# Patient Record
Sex: MALE | Race: BLACK OR AFRICAN AMERICAN | NOT HISPANIC OR LATINO | Employment: UNEMPLOYED | URBAN - METROPOLITAN AREA
[De-identification: names, ages, dates, MRNs, and addresses within clinical notes are randomized per-mention and may not be internally consistent; named-entity substitution may affect disease eponyms.]

---

## 2017-06-22 ENCOUNTER — HOSPITAL ENCOUNTER (EMERGENCY)
Facility: HOSPITAL | Age: 62
Discharge: HOME | End: 2017-06-22
Attending: FAMILY MEDICINE
Payer: COMMERCIAL

## 2017-06-22 VITALS
HEART RATE: 100 BPM | DIASTOLIC BLOOD PRESSURE: 93 MMHG | WEIGHT: 210 LBS | OXYGEN SATURATION: 100 % | SYSTOLIC BLOOD PRESSURE: 143 MMHG | HEIGHT: 72 IN | RESPIRATION RATE: 20 BRPM | BODY MASS INDEX: 28.44 KG/M2 | TEMPERATURE: 98 F

## 2017-06-22 DIAGNOSIS — K04.7 TOOTH ABSCESS: Primary | ICD-10-CM

## 2017-06-22 DIAGNOSIS — R06.02 SOB (SHORTNESS OF BREATH): ICD-10-CM

## 2017-06-22 DIAGNOSIS — R05.9 COUGH: ICD-10-CM

## 2017-06-22 LAB
BACTERIA #/AREA URNS AUTO: NORMAL /HPF
BILIRUB UR QL STRIP: NEGATIVE
CLARITY UR REFRACT.AUTO: CLEAR
COLOR UR AUTO: ABNORMAL
GLUCOSE UR QL STRIP: NEGATIVE
HGB UR QL STRIP: ABNORMAL
HYALINE CASTS UR QL AUTO: 1 /LPF
KETONES UR QL STRIP: ABNORMAL
LEUKOCYTE ESTERASE UR QL STRIP: ABNORMAL
MICROSCOPIC COMMENT: NORMAL
NITRITE UR QL STRIP: NEGATIVE
PH UR STRIP: 5 [PH] (ref 5–8)
PROT UR QL STRIP: ABNORMAL
RBC #/AREA URNS AUTO: 4 /HPF (ref 0–4)
SP GR UR STRIP: 1.02 (ref 1–1.03)
URN SPEC COLLECT METH UR: ABNORMAL
UROBILINOGEN UR STRIP-ACNC: ABNORMAL EU/DL
WBC #/AREA URNS AUTO: 3 /HPF (ref 0–5)

## 2017-06-22 PROCEDURE — 25000003 PHARM REV CODE 250: Performed by: FAMILY MEDICINE

## 2017-06-22 PROCEDURE — 81000 URINALYSIS NONAUTO W/SCOPE: CPT

## 2017-06-22 PROCEDURE — 96365 THER/PROPH/DIAG IV INF INIT: CPT

## 2017-06-22 PROCEDURE — 99284 EMERGENCY DEPT VISIT MOD MDM: CPT | Mod: 25

## 2017-06-22 RX ORDER — CLINDAMYCIN PHOSPHATE 150 MG/ML
600 INJECTION, SOLUTION INTRAVENOUS
Status: DISCONTINUED | OUTPATIENT
Start: 2017-06-22 | End: 2017-06-22

## 2017-06-22 RX ORDER — HYDROCODONE BITARTRATE AND ACETAMINOPHEN 5; 325 MG/1; MG/1
1 TABLET ORAL
Status: COMPLETED | OUTPATIENT
Start: 2017-06-22 | End: 2017-06-22

## 2017-06-22 RX ORDER — CLINDAMYCIN HYDROCHLORIDE 300 MG/1
300 CAPSULE ORAL EVERY 6 HOURS
Qty: 40 CAPSULE | Refills: 0 | Status: SHIPPED | OUTPATIENT
Start: 2017-06-22 | End: 2017-08-08

## 2017-06-22 RX ORDER — HYDROCODONE BITARTRATE AND ACETAMINOPHEN 5; 325 MG/1; MG/1
1 TABLET ORAL EVERY 8 HOURS PRN
Qty: 12 TABLET | Refills: 0 | Status: SHIPPED | OUTPATIENT
Start: 2017-06-22 | End: 2017-08-08

## 2017-06-22 RX ORDER — CHLORHEXIDINE GLUCONATE ORAL RINSE 1.2 MG/ML
15 SOLUTION DENTAL 2 TIMES DAILY
Qty: 473 ML | Refills: 0 | Status: SHIPPED | OUTPATIENT
Start: 2017-06-22 | End: 2017-07-08

## 2017-06-22 RX ORDER — METRONIDAZOLE 500 MG/1
500 TABLET ORAL 3 TIMES DAILY
Qty: 21 TABLET | Refills: 0 | Status: SHIPPED | OUTPATIENT
Start: 2017-06-22 | End: 2017-06-29

## 2017-06-22 RX ORDER — CLINDAMYCIN PHOSPHATE 600 MG/50ML
600 INJECTION, SOLUTION INTRAVENOUS
Status: COMPLETED | OUTPATIENT
Start: 2017-06-22 | End: 2017-06-22

## 2017-06-22 RX ADMIN — HYDROCODONE BITARTRATE AND ACETAMINOPHEN 1 TABLET: 5; 325 TABLET ORAL at 12:06

## 2017-06-22 RX ADMIN — CLINDAMYCIN IN 5 PERCENT DEXTROSE 600 MG: 12 INJECTION, SOLUTION INTRAVENOUS at 01:06

## 2017-06-22 NOTE — ED NOTES
Attempted to butterfly pt with 21g needle, attempt was unsuccessful, Dr. Garcia and Juno/RN notified. Verbal order to hold off on blood work by Dr. Garcia.

## 2017-06-22 NOTE — ED NOTES
"Pt's spouse at nurse's stating "He can barely move without being short of breath." Pt refusing labs. D/C instructions given.   "

## 2017-06-22 NOTE — ED PROVIDER NOTES
Encounter Date: 6/22/2017       History     Chief Complaint   Patient presents with    Shortness of Breath     Pt reports SOB x 2 weeks. Pt also reports right jaw swelling and pain that started this am. Pt denies chest pain. Pt reports SOB worse with ambulation    Jaw Pain     Patient complains of right lower jaw pain since this morning and also swelling.  Patient says he had some pain a few days ago.  Patient denies any fever.  Patient also complains of exertional shortness of breath and last few days.  No chest pain.  Patient does not take any medications.  No cough, cold, wheezing.      The history is provided by the patient.     Review of patient's allergies indicates:  No Known Allergies  History reviewed. No pertinent past medical history.  History reviewed. No pertinent surgical history.  History reviewed. No pertinent family history.  Social History   Substance Use Topics    Smoking status: Current Some Day Smoker     Types: Cigarettes    Smokeless tobacco: Never Used    Alcohol use No     Review of Systems   Constitutional: Negative for activity change, appetite change, chills, diaphoresis and fever.   HENT: Positive for dental problem. Negative for congestion, rhinorrhea, sneezing and sore throat.    Eyes: Negative for pain, discharge, redness and itching.   Respiratory: Negative for cough, chest tightness, shortness of breath and wheezing.    Cardiovascular: Negative for chest pain and palpitations.   Gastrointestinal: Negative for abdominal distention, abdominal pain, diarrhea, nausea and vomiting.   Genitourinary: Negative for dysuria, flank pain and frequency.   Musculoskeletal: Negative for back pain and neck pain.   Skin: Negative for rash.   Neurological: Negative for tremors, weakness, light-headedness, numbness and headaches.   Psychiatric/Behavioral: Negative for decreased concentration and hallucinations. The patient is not nervous/anxious.    All other systems reviewed and are  negative.      Physical Exam     Initial Vitals [06/22/17 1050]   BP Pulse Resp Temp SpO2   (!) 136/93 108 (!) 24 98.5 °F (36.9 °C) (!) 94 %      MAP       107.33         Physical Exam    Nursing note and vitals reviewed.  Constitutional: He appears well-developed and well-nourished.   HENT:   Head: Normocephalic.       Nose: Nose normal.   Mouth/Throat: Oropharynx is clear and moist.   Patient has swelling and tenderness in the right lower jaw-    Eyes: EOM are normal. Pupils are equal, round, and reactive to light.   Neck: Normal range of motion. Neck supple.   Cardiovascular: Normal rate, regular rhythm, normal heart sounds and intact distal pulses. Exam reveals no gallop and no friction rub.    No murmur heard.  Pulmonary/Chest: Breath sounds normal. No respiratory distress. He has no wheezes. He has no rhonchi. He has no rales. He exhibits no tenderness.   Abdominal: Soft. Bowel sounds are normal.   Musculoskeletal: Normal range of motion.   Neurological: He is alert and oriented to person, place, and time. He has normal strength and normal reflexes. He displays normal reflexes. No cranial nerve deficit or sensory deficit.   Skin: Skin is warm. Capillary refill takes less than 2 seconds.         ED Course   Procedures  Labs Reviewed   CBC W/ AUTO DIFFERENTIAL   COMPREHENSIVE METABOLIC PANEL   URINALYSIS   TROPONIN I   B-TYPE NATRIURETIC PEPTIDE             Medical Decision Making:   ED Management:  The patient had a difficult time getting blood.  So his blood tests were incomplete.  Patient did not want to stay anymore.  I offered him to complete his blood tests for better results  And monitoring.  He declined and wanted to leave.  Patient is advised to complete blood work up with his primary care physician.  And verbalized understanding and he will get it done.                   ED Course     Clinical Impression:   The primary encounter diagnosis was Tooth abscess. Diagnoses of SOB (shortness of breath) and  Cough were also pertinent to this visit.    Disposition:   Disposition: Discharged  Condition: Srinath Garcia MD  06/29/17 0623

## 2017-06-22 NOTE — ED TRIAGE NOTES
Pt reports SOB x 2 weeks. Pt also reports right jaw swelling and pain that started this am. Pt denies chest pain. Pt reports SOB worse with ambulation

## 2017-07-06 ENCOUNTER — TELEPHONE (OUTPATIENT)
Dept: GASTROENTEROLOGY | Facility: CLINIC | Age: 62
End: 2017-07-06

## 2017-07-06 NOTE — TELEPHONE ENCOUNTER
Message was left on patient's voice mail to give the office a call in regards to scheduling a office visit prior to scheduling Colonoscopy. Labs was scan in under media.

## 2017-08-08 ENCOUNTER — OFFICE VISIT (OUTPATIENT)
Dept: GASTROENTEROLOGY | Facility: CLINIC | Age: 62
End: 2017-08-08
Payer: COMMERCIAL

## 2017-08-08 VITALS
BODY MASS INDEX: 28.15 KG/M2 | DIASTOLIC BLOOD PRESSURE: 79 MMHG | HEART RATE: 78 BPM | SYSTOLIC BLOOD PRESSURE: 145 MMHG | WEIGHT: 207.81 LBS | HEIGHT: 72 IN

## 2017-08-08 DIAGNOSIS — Z80.0 FAMILY HISTORY OF COLON CANCER: ICD-10-CM

## 2017-08-08 DIAGNOSIS — R10.13 DYSPEPSIA: Primary | ICD-10-CM

## 2017-08-08 PROCEDURE — 99243 OFF/OP CNSLTJ NEW/EST LOW 30: CPT | Mod: S$GLB,,, | Performed by: INTERNAL MEDICINE

## 2017-08-08 PROCEDURE — 99999 PR PBB SHADOW E&M-EST. PATIENT-LVL III: CPT | Mod: PBBFAC,,, | Performed by: INTERNAL MEDICINE

## 2017-08-08 NOTE — LETTER
August 8, 2017      Farhat Matson MD  [address]  [address]  [place] [address]           [facility]  [address], [address],  [place] [address]  Phone: [phone]  Fax: [phone]          Patient: Miky Esqueda   MR Number: [id number]   YOB: 1955   Date of Visit: 8/8/2017       Dear Dr. Farhat Matson:    Thank you for referring Miky Esqueda to me for evaluation. Attached you will find relevant portions of my assessment and plan of care.    If you have questions, please do not hesitate to call me. I look forward to following Miky Esqueda along with you.    Sincerely,    Orlando Lu Jr., MD    Enclosure  CC:  No Recipients    If you would like to receive this communication electronically, please contact [email or web address] or [phone] to request more information on [employer] Link access.    For providers and/or their staff who would like to refer a patient to [facility], please contact us through our one-stop-shop provider referral line, [facility], at [phone].    If you feel you have received this communication in error or would no longer like to receive these types of communications, please e-mail [email or web address]

## 2017-08-08 NOTE — PROGRESS NOTES
History and Physical          History    Patient presents for GI evaluation.  Has occasional dyspepsia, on no medications.  No persistent heartburn.  GI systems review unremarkable.  MCV is low.  No anemia.    Patient denies any abdominal pain, weight loss or blood in the stool.    There is a family history of colon cancer in his grandfather.  Nondrinker.  Tobacco occasional.  Past medical history otherwise unremarkable.    Systems review:   No weight changes or fatigue.  No sore throat or hoarseness.  No chest pain, palpitations.  No shortness of breath or wheezing.  No abnormal bleeding.  No frequency or difficulty urinating.  No neurologic symptoms  No anxiety or depression.         History reviewed. No pertinent past medical history.    History reviewed. No pertinent surgical history.    Family History   Problem Relation Age of Onset    Heart disease Father        Social History     Social History    Marital status:      Spouse name: N/A    Number of children: N/A    Years of education: N/A     Social History Main Topics    Smoking status: Former Smoker     Types: Cigarettes    Smokeless tobacco: Former User    Alcohol use No    Drug use: No    Sexual activity: Not Asked     Other Topics Concern    None     Social History Narrative    None       No current outpatient prescriptions on file.     No current facility-administered medications for this visit.        Review of patient's allergies indicates:  No Known Allergies    Objective:      Vitals:    08/08/17 1330   BP: (!) 145/79   Pulse: 78     Physical Exam   Constitutional: Patient is oriented to person, place, and time. Appears well-nourished.   HENT: sclera non icteric  Mouth/Throat: Oropharynx is clear and moist.   Eyes: Pupils are equal, round, and reactive to light.   Neck: Neck supple.   Cardiovascular: Normal heart sounds.   Pulmonary/Chest: Effort normal and breath sounds normal.   Abdominal: Soft. Exhibits no mass. There is no  tenderness. There is no guarding.    Neurological:Alert and oriented to person, place, and time.   Skin: Skin is warm. No rash noted.   Psychiatric: Has a normal mood and affect.     Assessment:  Dyspepsia  Family history of colon cancer    Plan:  Colonoscopy    CC Dr. Farhat Matson       I have reviewed the patient's medical history in detail and updated the computerized patient record

## 2017-08-30 ENCOUNTER — TELEPHONE (OUTPATIENT)
Dept: GASTROENTEROLOGY | Facility: CLINIC | Age: 62
End: 2017-08-30

## 2017-08-30 NOTE — TELEPHONE ENCOUNTER
----- Message from Joi Mercado sent at 8/29/2017  4:59 PM CDT -----  Contact: self, [phone]  Patient called in requesting to speak with you regarding call he received about colonoscopy prep.  Please advise.

## 2017-08-30 NOTE — TELEPHONE ENCOUNTER
Spoke with patient in regards to cancelling his Colonoscopy at [facility] on 9/5/17. Patient stated that we will call back at a later date to reschedule.

## 2020-05-25 ENCOUNTER — HOSPITAL ENCOUNTER (INPATIENT)
Facility: HOSPITAL | Age: 65
LOS: 1 days | Discharge: HOME | DRG: 176 | End: 2020-05-26
Attending: EMERGENCY MEDICINE | Admitting: SPECIALIST
Payer: COMMERCIAL

## 2020-05-25 DIAGNOSIS — I26.99 PE (PULMONARY THROMBOEMBOLISM): Primary | ICD-10-CM

## 2020-05-25 DIAGNOSIS — R06.02 SHORTNESS OF BREATH: ICD-10-CM

## 2020-05-25 DIAGNOSIS — R55 NEAR SYNCOPE: ICD-10-CM

## 2020-05-25 DIAGNOSIS — I26.99 BILATERAL PULMONARY EMBOLISM: ICD-10-CM

## 2020-05-25 DIAGNOSIS — R79.89 ELEVATED TROPONIN: ICD-10-CM

## 2020-05-25 DIAGNOSIS — R42 DIZZINESS: ICD-10-CM

## 2020-05-25 PROBLEM — D50.9 IRON DEFICIENCY ANEMIA: Status: ACTIVE | Noted: 2020-05-25

## 2020-05-25 PROBLEM — F17.200 TOBACCO DEPENDENCE: Status: ACTIVE | Noted: 2020-05-25

## 2020-05-25 PROBLEM — R06.09 DYSPNEA ON EXERTION: Status: ACTIVE | Noted: 2020-05-25

## 2020-05-25 LAB
ALBUMIN SERPL BCP-MCNC: 3.9 G/DL (ref 3.5–5.2)
ALP SERPL-CCNC: 88 U/L (ref 55–135)
ALT SERPL W/O P-5'-P-CCNC: 13 U/L (ref 10–44)
ANION GAP SERPL CALC-SCNC: 9 MMOL/L (ref 8–16)
AST SERPL-CCNC: 32 U/L (ref 10–40)
BASOPHILS # BLD AUTO: 0.03 K/UL (ref 0–0.2)
BASOPHILS NFR BLD: 0.4 % (ref 0–1.9)
BILIRUB SERPL-MCNC: 0.4 MG/DL (ref 0.1–1)
BNP SERPL-MCNC: 123 PG/ML (ref 0–99)
BUN SERPL-MCNC: 19 MG/DL (ref 8–23)
CALCIUM SERPL-MCNC: 9.1 MG/DL (ref 8.7–10.5)
CHLORIDE SERPL-SCNC: 108 MMOL/L (ref 95–110)
CK SERPL-CCNC: 275 U/L (ref 20–200)
CO2 SERPL-SCNC: 23 MMOL/L (ref 23–29)
CREAT SERPL-MCNC: 1.2 MG/DL (ref 0.5–1.4)
CRP SERPL-MCNC: 12.6 MG/L (ref 0–8.2)
D DIMER PPP IA.FEU-MCNC: 30.14 MG/L FEU
DIFFERENTIAL METHOD: ABNORMAL
EOSINOPHIL # BLD AUTO: 0.1 K/UL (ref 0–0.5)
EOSINOPHIL NFR BLD: 0.6 % (ref 0–8)
ERYTHROCYTE [DISTWIDTH] IN BLOOD BY AUTOMATED COUNT: 15.2 % (ref 11.5–14.5)
ERYTHROCYTE [SEDIMENTATION RATE] IN BLOOD BY WESTERGREN METHOD: 17 MM/HR (ref 0–10)
EST. GFR  (AFRICAN AMERICAN): >60 ML/MIN/1.73 M^2
EST. GFR  (NON AFRICAN AMERICAN): >60 ML/MIN/1.73 M^2
ESTIMATED AVG GLUCOSE: 120 MG/DL (ref 68–131)
FERRITIN SERPL-MCNC: 445 NG/ML (ref 20–300)
GLUCOSE SERPL-MCNC: 103 MG/DL (ref 70–110)
HBA1C MFR BLD HPLC: 5.8 % (ref 4–5.6)
HCT VFR BLD AUTO: 43.3 % (ref 40–54)
HGB BLD-MCNC: 13.3 G/DL (ref 14–18)
IMM GRANULOCYTES # BLD AUTO: 0.01 K/UL (ref 0–0.04)
IMM GRANULOCYTES NFR BLD AUTO: 0.1 % (ref 0–0.5)
INR PPP: 1 (ref 0.8–1.2)
LACTATE SERPL-SCNC: 1.2 MMOL/L (ref 0.5–2.2)
LDH SERPL L TO P-CCNC: 325 U/L (ref 110–260)
LYMPHOCYTES # BLD AUTO: 2.9 K/UL (ref 1–4.8)
LYMPHOCYTES NFR BLD: 36.3 % (ref 18–48)
MAGNESIUM SERPL-MCNC: 1.9 MG/DL (ref 1.6–2.6)
MCH RBC QN AUTO: 23.3 PG (ref 27–31)
MCHC RBC AUTO-ENTMCNC: 30.7 G/DL (ref 32–36)
MCV RBC AUTO: 76 FL (ref 82–98)
MONOCYTES # BLD AUTO: 0.9 K/UL (ref 0.3–1)
MONOCYTES NFR BLD: 11.3 % (ref 4–15)
NEUTROPHILS # BLD AUTO: 4.1 K/UL (ref 1.8–7.7)
NEUTROPHILS NFR BLD: 51.3 % (ref 38–73)
NRBC BLD-RTO: 0 /100 WBC
PHOSPHATE SERPL-MCNC: 3.1 MG/DL (ref 2.7–4.5)
PLATELET # BLD AUTO: 141 K/UL (ref 150–350)
PMV BLD AUTO: 11.1 FL (ref 9.2–12.9)
POTASSIUM SERPL-SCNC: 3.9 MMOL/L (ref 3.5–5.1)
PROCALCITONIN SERPL IA-MCNC: 0.04 NG/ML
PROT SERPL-MCNC: 8.2 G/DL (ref 6–8.4)
PROTHROMBIN TIME: 10.3 SEC (ref 9–12.5)
RBC # BLD AUTO: 5.72 M/UL (ref 4.6–6.2)
SARS-COV-2 RDRP RESP QL NAA+PROBE: NEGATIVE
SODIUM SERPL-SCNC: 140 MMOL/L (ref 136–145)
TROPONIN I SERPL DL<=0.01 NG/ML-MCNC: 3.46 NG/ML (ref 0–0.03)
TROPONIN I SERPL DL<=0.01 NG/ML-MCNC: 3.84 NG/ML (ref 0–0.03)
TSH SERPL DL<=0.005 MIU/L-ACNC: 2.56 UIU/ML (ref 0.4–4)
WBC # BLD AUTO: 7.99 K/UL (ref 3.9–12.7)

## 2020-05-25 PROCEDURE — 87040 BLOOD CULTURE FOR BACTERIA: CPT | Mod: 59

## 2020-05-25 PROCEDURE — 85379 FIBRIN DEGRADATION QUANT: CPT

## 2020-05-25 PROCEDURE — 63600175 PHARM REV CODE 636 W HCPCS: Performed by: EMERGENCY MEDICINE

## 2020-05-25 PROCEDURE — 83036 HEMOGLOBIN GLYCOSYLATED A1C: CPT

## 2020-05-25 PROCEDURE — 96372 THER/PROPH/DIAG INJ SC/IM: CPT | Mod: 59

## 2020-05-25 PROCEDURE — 83880 ASSAY OF NATRIURETIC PEPTIDE: CPT

## 2020-05-25 PROCEDURE — 85610 PROTHROMBIN TIME: CPT

## 2020-05-25 PROCEDURE — U0002 COVID-19 LAB TEST NON-CDC: HCPCS

## 2020-05-25 PROCEDURE — 84145 PROCALCITONIN (PCT): CPT

## 2020-05-25 PROCEDURE — 83615 LACTATE (LD) (LDH) ENZYME: CPT

## 2020-05-25 PROCEDURE — 25500020 PHARM REV CODE 255: Performed by: SPECIALIST

## 2020-05-25 PROCEDURE — 85652 RBC SED RATE AUTOMATED: CPT

## 2020-05-25 PROCEDURE — 93005 ELECTROCARDIOGRAM TRACING: CPT

## 2020-05-25 PROCEDURE — 84484 ASSAY OF TROPONIN QUANT: CPT

## 2020-05-25 PROCEDURE — 99285 EMERGENCY DEPT VISIT HI MDM: CPT | Mod: 25

## 2020-05-25 PROCEDURE — 82728 ASSAY OF FERRITIN: CPT

## 2020-05-25 PROCEDURE — 36000 PLACE NEEDLE IN VEIN: CPT

## 2020-05-25 PROCEDURE — 84484 ASSAY OF TROPONIN QUANT: CPT | Mod: 91

## 2020-05-25 PROCEDURE — 36415 COLL VENOUS BLD VENIPUNCTURE: CPT

## 2020-05-25 PROCEDURE — 80053 COMPREHEN METABOLIC PANEL: CPT

## 2020-05-25 PROCEDURE — 82550 ASSAY OF CK (CPK): CPT

## 2020-05-25 PROCEDURE — 84100 ASSAY OF PHOSPHORUS: CPT

## 2020-05-25 PROCEDURE — 83605 ASSAY OF LACTIC ACID: CPT

## 2020-05-25 PROCEDURE — 86140 C-REACTIVE PROTEIN: CPT

## 2020-05-25 PROCEDURE — 11000001 HC ACUTE MED/SURG PRIVATE ROOM

## 2020-05-25 PROCEDURE — 85025 COMPLETE CBC W/AUTO DIFF WBC: CPT

## 2020-05-25 PROCEDURE — 83735 ASSAY OF MAGNESIUM: CPT

## 2020-05-25 PROCEDURE — 87040 BLOOD CULTURE FOR BACTERIA: CPT

## 2020-05-25 PROCEDURE — 84443 ASSAY THYROID STIM HORMONE: CPT

## 2020-05-25 RX ORDER — DEXTROSE 50 % IN WATER (D50W) INTRAVENOUS SYRINGE
25
Status: DISCONTINUED | OUTPATIENT
Start: 2020-05-25 | End: 2020-05-26 | Stop reason: HOSPADM

## 2020-05-25 RX ORDER — ENOXAPARIN SODIUM 100 MG/ML
1 INJECTION SUBCUTANEOUS
Status: DISCONTINUED | OUTPATIENT
Start: 2020-05-26 | End: 2020-05-26 | Stop reason: HOSPADM

## 2020-05-25 RX ORDER — IBUPROFEN 200 MG
16 TABLET ORAL
Status: DISCONTINUED | OUTPATIENT
Start: 2020-05-25 | End: 2020-05-26 | Stop reason: HOSPADM

## 2020-05-25 RX ORDER — ACETAMINOPHEN 325 MG/1
650 TABLET ORAL EVERY 4 HOURS PRN
Status: DISCONTINUED | OUTPATIENT
Start: 2020-05-25 | End: 2020-05-26 | Stop reason: HOSPADM

## 2020-05-25 RX ORDER — FERROUS SULFATE 324(65)MG
325 TABLET, DELAYED RELEASE (ENTERIC COATED) ORAL DAILY
COMMUNITY
End: 2020-07-16

## 2020-05-25 RX ORDER — PANTOPRAZOLE SODIUM 40 MG/1
40 TABLET, DELAYED RELEASE ORAL DAILY
Status: DISCONTINUED | OUTPATIENT
Start: 2020-05-26 | End: 2020-05-26

## 2020-05-25 RX ORDER — GLUCAGON 1 MG
1 KIT INJECTION
Status: DISCONTINUED | OUTPATIENT
Start: 2020-05-25 | End: 2020-05-26 | Stop reason: HOSPADM

## 2020-05-25 RX ORDER — IBUPROFEN 200 MG
24 TABLET ORAL
Status: DISCONTINUED | OUTPATIENT
Start: 2020-05-25 | End: 2020-05-26 | Stop reason: HOSPADM

## 2020-05-25 RX ORDER — ENOXAPARIN SODIUM 100 MG/ML
1 INJECTION SUBCUTANEOUS
Status: COMPLETED | OUTPATIENT
Start: 2020-05-25 | End: 2020-05-25

## 2020-05-25 RX ORDER — ONDANSETRON 8 MG/1
8 TABLET, ORALLY DISINTEGRATING ORAL EVERY 6 HOURS PRN
Status: DISCONTINUED | OUTPATIENT
Start: 2020-05-25 | End: 2020-05-26 | Stop reason: HOSPADM

## 2020-05-25 RX ORDER — TALC
9 POWDER (GRAM) TOPICAL NIGHTLY PRN
Status: DISCONTINUED | OUTPATIENT
Start: 2020-05-25 | End: 2020-05-26 | Stop reason: HOSPADM

## 2020-05-25 RX ORDER — SODIUM CHLORIDE 0.9 % (FLUSH) 0.9 %
5 SYRINGE (ML) INJECTION
Status: DISCONTINUED | OUTPATIENT
Start: 2020-05-25 | End: 2020-05-26 | Stop reason: HOSPADM

## 2020-05-25 RX ORDER — AMOXICILLIN 250 MG
1 CAPSULE ORAL 2 TIMES DAILY PRN
Status: DISCONTINUED | OUTPATIENT
Start: 2020-05-25 | End: 2020-05-26 | Stop reason: HOSPADM

## 2020-05-25 RX ADMIN — IOHEXOL 100 ML: 350 INJECTION, SOLUTION INTRAVENOUS at 07:05

## 2020-05-25 RX ADMIN — ENOXAPARIN SODIUM 100 MG: 100 INJECTION SUBCUTANEOUS at 06:05

## 2020-05-25 NOTE — ED NOTES
Adult Physical Assessment  LOC: Miky Esqueda, 65 y.o. male verified via two identifiers.  The patient is awake, alert, oriented and speaking appropriately at this time.  APPEARANCE: Patient resting comfortably and appears to be in no acute distress at this time. Patient is clean and well groomed, patient's clothing is properly fastened.  SKIN:The skin is warm and dry, color consistent with ethnicity, patient has normal skin turgor and moist mucus membranes, skin intact, no breakdown or brusing noted.  MUSCULOSKELETAL: Patient moving all extremities well, no obvious swelling or deformities noted.  RESPIRATORY: Airway is open and patent, respirations are spontaneous, patient has a normal effort and rate, no accessory muscle use noted.  CARDIAC: Patient has a normal rate and rhythm, no periphreal edema noted in any extremity, capillary refill < 3 seconds in all extremities  ABDOMEN: Soft and non tender to palpation, no abdominal distention noted. Bowel sounds present in all four quadrants.  NEUROLOGIC: Eyes open spontaneously, behavior appropriate to situation, follows commands, facial expression symmetrical, bilateral hand grasp equal and even, purposeful motor response noted, normal sensation in all extremities when touched with a finger.

## 2020-05-25 NOTE — ED PROVIDER NOTES
Encounter Date: 5/25/2020    SCRIBE #1 NOTE: I, Leonora Yao, am scribing for, and in the presence of,  Dr. Mahajan. I have scribed the entire note.       History     Chief Complaint   Patient presents with    Dizziness     dizzy and sob with standing. onset yesterday after working on his car in the heat.      Miky Esqueda is a 65 y.o. male with a past medical history of anemia who presents to the ED due to dizziness with associated shortness of breath. Patient reports symptoms began mid-afternoon yesterday when he was changing the oil underneath his car. Upon getting up, he states he began to feel dizzy near syncopal and short of breath. Patient describes dizziness as a wooziness as opposed to a spinning sensation. Shortly after, he notes symptoms mildly improved, but today dizziness has persisted with changes in position from sitting to standing. When EMS arrived to patient's house, they report he was mildly tachypneic and hypoxic. He denies trauma prior to onset of symptoms. He also denies fever, chills, cough, congestion, nausea, vomiting, headache, or chest pain. The patient is not currently on any medications and has no history of any respiratory illness.     The history is provided by the patient.     Review of patient's allergies indicates:  No Known Allergies  No past medical history on file.  No past surgical history on file.  Family History   Problem Relation Age of Onset    Heart disease Father      Social History     Tobacco Use    Smoking status: Former Smoker     Types: Cigarettes    Smokeless tobacco: Former User   Substance Use Topics    Alcohol use: No    Drug use: No     Review of Systems   Constitutional: Negative for chills and fever.   HENT: Negative for congestion.    Eyes: Negative for pain.   Respiratory: Positive for shortness of breath.    Cardiovascular: Negative for chest pain.   Gastrointestinal: Negative for nausea and vomiting.   Genitourinary: Negative for dysuria and  hematuria.   Musculoskeletal: Negative for back pain.   Skin: Negative for color change.   Neurological: Positive for dizziness (Wooziness). Negative for syncope and numbness.   Psychiatric/Behavioral: Negative for confusion.   All other systems reviewed and are negative.      Physical Exam     Initial Vitals [05/25/20 1555]   BP Pulse Resp Temp SpO2   (!) 184/81 99 (!) 26 97.6 °F (36.4 °C) 95 %      MAP       --         Physical Exam    Nursing note and vitals reviewed.  Constitutional: He appears well-developed and well-nourished. He is not diaphoretic. No distress.   HENT:   Head: Normocephalic and atraumatic.   Partially dry mucous membranes   Eyes: Conjunctivae and EOM are normal. Pupils are equal, round, and reactive to light.   Neck: Normal range of motion. Neck supple.   Cardiovascular: Normal rate, regular rhythm, normal heart sounds and intact distal pulses. Exam reveals no gallop and no friction rub.    No murmur heard.  Pulmonary/Chest: Breath sounds normal. No respiratory distress.   Abdominal: Soft. Bowel sounds are normal. He exhibits no distension. There is no tenderness.   Musculoskeletal: Normal range of motion. He exhibits no edema or tenderness.   Neurological: He is alert and oriented to person, place, and time. He has normal strength.   Skin: Skin is warm and dry. Capillary refill takes less than 2 seconds. No erythema.   Psychiatric: He has a normal mood and affect. His behavior is normal. Thought content normal.         ED Course   Procedures  Labs Reviewed   CBC W/ AUTO DIFFERENTIAL - Abnormal; Notable for the following components:       Result Value    Hemoglobin 13.3 (*)     Mean Corpuscular Volume 76 (*)     Mean Corpuscular Hemoglobin 23.3 (*)     Mean Corpuscular Hemoglobin Conc 30.7 (*)     RDW 15.2 (*)     Platelets 141 (*)     All other components within normal limits   TROPONIN I - Abnormal; Notable for the following components:    Troponin I 3.841 (*)     All other components  within normal limits   B-TYPE NATRIURETIC PEPTIDE - Abnormal; Notable for the following components:     (*)     All other components within normal limits   D DIMER, QUANTITATIVE - Abnormal; Notable for the following components:    D-Dimer 30.14 (*)     All other components within normal limits   C-REACTIVE PROTEIN - Abnormal; Notable for the following components:    CRP 12.6 (*)     All other components within normal limits   COMPREHENSIVE METABOLIC PANEL   PROTIME-INR   SARS-COV-2 RNA AMPLIFICATION, QUAL   C-REACTIVE PROTEIN     EKG Readings: (Independently Interpreted)   Time of 16:29. Normal sinus rhythm. Inferior Q wave. Nonspecific ST and T wave abnormality. T wave flattening an minimal inversion appreciated in the high lateral leads is new from previous EKG taken in 2017, but T wave inversion in anterior leads is old.        Imaging Results          CT Head Without Contrast (Final result)  Result time 05/25/20 17:17:29    Final result by Suhas Barker MD (05/25/20 17:17:29)                 Impression:      No acute intracranial abnormalities identified.    Paranasal sinus disease as above.      Electronically signed by: Suhas Braker MD  Date:    05/25/2020  Time:    17:17             Narrative:    EXAMINATION:  CT HEAD WITHOUT CONTRAST    CLINICAL HISTORY:  Confusion/delirium, altered LOC, unexplained;Dizziness;Stroke;    TECHNIQUE:  Low dose axial images were obtained through the head.  Coronal and sagittal reformations were also performed. Contrast was not administered.    COMPARISON:  None.    FINDINGS:  No evidence of acute/recent major vascular distribution cerebral infarction, intraparenchymal hemorrhage, or intra-axial space occupying lesion. The ventricular system is normal in size and configuration with no evidence of hydrocephalus. No effacement of the skull-base cisterns. No abnormal extra-axial fluid collections or blood products.  Bilateral maxillary, ethmoid, sphenoid, and frontal  sinus disease is seen.  Partial fluid opacification is seen of the bilateral mastoid air cells.  The calvarium shows no significant abnormality.                               X-Ray Chest AP Portable (Final result)  Result time 05/25/20 17:10:39    Final result by Jf Rosenthal MD (05/25/20 17:10:39)                 Impression:      As above.      Electronically signed by: Jf Rosenthal  Date:    05/25/2020  Time:    17:10             Narrative:    EXAMINATION:  XR CHEST AP PORTABLE    CLINICAL HISTORY:  CHF;    TECHNIQUE:  Single frontal view of the chest was performed.    COMPARISON:  06/22/2017    FINDINGS:  Cardiomediastinal silhouette is stable.  Slight increased interstitial attenuation without confluent airspace opacity or lobar consolidation.  No pleural effusion or pneumothorax.  Possible right upper lobe 5 mm nodular density not definitely seen on prior.  May benefit from outpatient, nonemergent CT chest for further evaluation.    No acute osseous abnormality.                                 Medical Decision Making:   Initial Assessment:   Miky Esqueda is a 65 y.o. male with a past medical history of anemia who presents to the ED due to dizziness with associated shortness of breath.   Differential Diagnosis:   Differential Diagnosis includes, but is not limited to:  Clinical Tests:   Lab Tests: Ordered and Reviewed  Radiological Study: Ordered and Reviewed  Medical Tests: Ordered and Reviewed  ED Management:  6:40 PM  Patient non-toxic in appearance, no CP or visible respiratory difficulty. Discussed CT and lab findings with patient which exhibit concern for an underlying PE. COVID-19 test is negative at this juncture. No acute changes on EKG except for slight flattening of T waves in the high lateral leads in comparison to most recent one. SC Lovenox had been administered. Will obtain CTA of chest to further elucidate abnormal nodule on CXR and assess for possible PE. Patient remains  hemodynamically stable. [facility] who will [facility] patient for further management.   7:31 PM  Radiologist notified that extensive bilateral PE are appreciated on CTA. Lovenox has already been administered. Patient remains hemodynamically stable. Questionable consolidation in RUL. Will order IV Rocephin/Azithromycin. Patient denies allergy to PCN.                                  Clinical Impression:       ICD-10-CM ICD-9-CM   1. Dizziness R42 780.4   2. Shortness of breath R06.02 786.05   3. Near syncope R55 780.2   4. Elevated troponin R79.89 790.6   5. PE (pulmonary thromboembolism) I26.99 415.19                   I, Dr. Vinh Mahajan, personally performed the services described in this documentation. All medical record entries made by the scribe were at my direction and in my presence.  I have reviewed the chart and agree that the record reflects my personal performance and is accurate and complete               Vinh Mahajan MD  05/25/20 6000       Vinh Mahajan MD  05/25/20 1325

## 2020-05-26 VITALS
HEIGHT: 72 IN | SYSTOLIC BLOOD PRESSURE: 141 MMHG | WEIGHT: 208.13 LBS | BODY MASS INDEX: 28.19 KG/M2 | RESPIRATION RATE: 19 BRPM | HEART RATE: 84 BPM | DIASTOLIC BLOOD PRESSURE: 84 MMHG | OXYGEN SATURATION: 90 % | TEMPERATURE: 96 F

## 2020-05-26 PROBLEM — I82.4Y3 ACUTE DEEP VEIN THROMBOSIS (DVT) OF PROXIMAL VEIN OF BOTH LOWER EXTREMITIES: Status: ACTIVE | Noted: 2020-05-26

## 2020-05-26 LAB
ALBUMIN SERPL BCP-MCNC: 3.5 G/DL (ref 3.5–5.2)
ALP SERPL-CCNC: 72 U/L (ref 55–135)
ALT SERPL W/O P-5'-P-CCNC: 10 U/L (ref 10–44)
ANION GAP SERPL CALC-SCNC: 8 MMOL/L (ref 8–16)
AST SERPL-CCNC: 21 U/L (ref 10–40)
BASOPHILS # BLD AUTO: 0.03 K/UL (ref 0–0.2)
BASOPHILS NFR BLD: 0.3 % (ref 0–1.9)
BILIRUB SERPL-MCNC: 0.6 MG/DL (ref 0.1–1)
BUN SERPL-MCNC: 18 MG/DL (ref 8–23)
CALCIUM SERPL-MCNC: 8.9 MG/DL (ref 8.7–10.5)
CHLORIDE SERPL-SCNC: 108 MMOL/L (ref 95–110)
CO2 SERPL-SCNC: 23 MMOL/L (ref 23–29)
CREAT SERPL-MCNC: 1.2 MG/DL (ref 0.5–1.4)
DIFFERENTIAL METHOD: ABNORMAL
EOSINOPHIL # BLD AUTO: 0.1 K/UL (ref 0–0.5)
EOSINOPHIL NFR BLD: 0.6 % (ref 0–8)
ERYTHROCYTE [DISTWIDTH] IN BLOOD BY AUTOMATED COUNT: 15.1 % (ref 11.5–14.5)
EST. GFR  (AFRICAN AMERICAN): >60 ML/MIN/1.73 M^2
EST. GFR  (NON AFRICAN AMERICAN): >60 ML/MIN/1.73 M^2
GLUCOSE SERPL-MCNC: 102 MG/DL (ref 70–110)
HCT VFR BLD AUTO: 40.7 % (ref 40–54)
HGB BLD-MCNC: 12.6 G/DL (ref 14–18)
IMM GRANULOCYTES # BLD AUTO: 0.02 K/UL (ref 0–0.04)
IMM GRANULOCYTES NFR BLD AUTO: 0.2 % (ref 0–0.5)
LYMPHOCYTES # BLD AUTO: 2.7 K/UL (ref 1–4.8)
LYMPHOCYTES NFR BLD: 31.5 % (ref 18–48)
MAGNESIUM SERPL-MCNC: 2 MG/DL (ref 1.6–2.6)
MCH RBC QN AUTO: 23.2 PG (ref 27–31)
MCHC RBC AUTO-ENTMCNC: 31 G/DL (ref 32–36)
MCV RBC AUTO: 75 FL (ref 82–98)
MONOCYTES # BLD AUTO: 0.9 K/UL (ref 0.3–1)
MONOCYTES NFR BLD: 10.3 % (ref 4–15)
NEUTROPHILS # BLD AUTO: 4.9 K/UL (ref 1.8–7.7)
NEUTROPHILS NFR BLD: 57.1 % (ref 38–73)
NRBC BLD-RTO: 0 /100 WBC
PHOSPHATE SERPL-MCNC: 3.4 MG/DL (ref 2.7–4.5)
PLATELET # BLD AUTO: 125 K/UL (ref 150–350)
PMV BLD AUTO: 11 FL (ref 9.2–12.9)
POTASSIUM SERPL-SCNC: 4.4 MMOL/L (ref 3.5–5.1)
PROT SERPL-MCNC: 7.2 G/DL (ref 6–8.4)
RBC # BLD AUTO: 5.43 M/UL (ref 4.6–6.2)
SODIUM SERPL-SCNC: 139 MMOL/L (ref 136–145)
TROPONIN I SERPL DL<=0.01 NG/ML-MCNC: 2.61 NG/ML (ref 0–0.03)
WBC # BLD AUTO: 8.64 K/UL (ref 3.9–12.7)

## 2020-05-26 PROCEDURE — 84484 ASSAY OF TROPONIN QUANT: CPT

## 2020-05-26 PROCEDURE — 97165 OT EVAL LOW COMPLEX 30 MIN: CPT

## 2020-05-26 PROCEDURE — 93005 ELECTROCARDIOGRAM TRACING: CPT

## 2020-05-26 PROCEDURE — 83735 ASSAY OF MAGNESIUM: CPT

## 2020-05-26 PROCEDURE — 25000003 PHARM REV CODE 250: Performed by: STUDENT IN AN ORGANIZED HEALTH CARE EDUCATION/TRAINING PROGRAM

## 2020-05-26 PROCEDURE — 97116 GAIT TRAINING THERAPY: CPT

## 2020-05-26 PROCEDURE — 94761 N-INVAS EAR/PLS OXIMETRY MLT: CPT

## 2020-05-26 PROCEDURE — 84100 ASSAY OF PHOSPHORUS: CPT

## 2020-05-26 PROCEDURE — 63600175 PHARM REV CODE 636 W HCPCS: Performed by: STUDENT IN AN ORGANIZED HEALTH CARE EDUCATION/TRAINING PROGRAM

## 2020-05-26 PROCEDURE — 97161 PT EVAL LOW COMPLEX 20 MIN: CPT

## 2020-05-26 PROCEDURE — 80053 COMPREHEN METABOLIC PANEL: CPT

## 2020-05-26 PROCEDURE — 36415 COLL VENOUS BLD VENIPUNCTURE: CPT

## 2020-05-26 PROCEDURE — 85025 COMPLETE CBC W/AUTO DIFF WBC: CPT

## 2020-05-26 PROCEDURE — 96372 THER/PROPH/DIAG INJ SC/IM: CPT

## 2020-05-26 RX ADMIN — ENOXAPARIN SODIUM 100 MG: 100 INJECTION SUBCUTANEOUS at 05:05

## 2020-05-26 RX ADMIN — PANTOPRAZOLE SODIUM 40 MG: 40 TABLET, DELAYED RELEASE ORAL at 09:05

## 2020-05-26 NOTE — PLAN OF CARE
Pt has been approved for home oxygen.  Andrea delivered his tank to his bedside nurse.  His wife will transport him home.  No other needs identified.  Rounds completed on pt.  All questions addressed.  Bedside nurse to discuss d/c medications.  Discussed importance to attend all f/u appts and take medications as prescribed.  Verbalized understanding.    Follow up with Farhat Matson MD   The office will call you to set up hospital follow-up appointment.  [address]   [address]   [place] [address]   [phone]         05/26/20 1342   Final Note   Assessment Type Final Discharge Note   Anticipated Discharge Disposition Home   Hospital Follow Up  Appt(s) scheduled? Yes   Discharge plans and expectations educations in teach back method with documentation complete? Yes   Right Care Referral Info   Post Acute Recommendation No Care     Darron Morrell RN,   [phone]

## 2020-05-26 NOTE — PROGRESS NOTES
Progress Note    Admit Date: 5/25/2020   LOS: 1 day     SUBJECTIVE:       The patient was seen and examined this morning via video conferencing, to conserve PPE. Patient reports no acute issues overnight. Patient reports that pain is adequately controlled. Patient denies decreased urinary output.Patient denies fevers overnight.  Continues to require supplemental O2.     Denies f/c/n/v/d/c/cp/palpitations/freq/urgency/dysuria    Continuous Infusions:  Scheduled Meds:   enoxaparin  1 mg/kg Subcutaneous Q12H    pantoprazole  40 mg Oral Daily     PRN Meds:acetaminophen, dextrose 50 % in water (D50W), dextrose 50%, glucagon (human recombinant), glucose, glucose, melatonin, ondansetron, senna-docusate 8.6-50 mg, sodium chloride 0.9%    Review of patient's allergies indicates:  No Known Allergies    OBJECTIVE:     Vital Signs (Most Recent)  Temp: 97.7 °F (36.5 °C) (05/26/20 0756)  Pulse: 79 (05/26/20 0756)  Resp: 19 (05/26/20 0756)  BP: 128/81 (05/26/20 0756)  SpO2: (!) 93 % (05/26/20 0756)    Vital Signs Range (Last 24H):  Temp:  [96.5 °F (35.8 °C)-97.9 °F (36.6 °C)]   Pulse:  [75-99]   Resp:  [18-26]   BP: (127-184)/(78-92)   SpO2:  [92 %-96 %]     I & O (Last 24H):    Intake/Output Summary (Last 24 hours) at 5/26/2020 0956  Last data filed at 5/25/2020 2136  Gross per 24 hour   Intake --   Output 200 ml   Net -200 ml     Ventilator Data (Last 24H):          Physical Exam:    GEN - AAOx4, NAD  CHEST - no distress  EXTR - moving all extremities   NEURO - no asterixis or focal deficits   SKIN - no obvious skin breakdown or rash    Lines/Drains:       Peripheral IV - Single Lumen 05/25/20 1848 20 G Left Forearm (Active)   Site Assessment Clean;Dry;Intact;No redness;No swelling 5/25/2020 10:35 PM   Line Status Saline locked 5/25/2020 10:35 PM   Dressing Status Clean;Dry;Intact 5/25/2020 10:35 PM   Dressing Intervention First dressing 5/25/2020  6:54 PM   Reason Not Rotated Not due 5/25/2020  6:54 PM   Number of days: 0        Recent Labs     05/25/20  1634 05/25/20 2033 05/26/20  0517   WBC 7.99  --  8.64   HGB 13.3*  --  12.6*   HCT 43.3  --  40.7   *  --  125*     --  139   K 3.9  --  4.4     --  108   CO2 23  --  23   BUN 19  --  18   CREATININE 1.2  --  1.2   BILITOT 0.4  --  0.6   AST 32  --  21   ALT 13  --  10   ALKPHOS 88  --  72   CALCIUM 9.1  --  8.9   ALBUMIN 3.9  --  3.5   PROT 8.2  --  7.2   MG  --  1.9 2.0   PHOS  --  3.1 3.4         ASSESSMENT/PLAN:     Miky Esqueda is a 65 y.o. male with pmh  has a past medical history of Bilateral pulmonary embolism (5/25/2020) and Iron deficiency anemia.   who presented with Bilateral pulmonary embolism. The primary encounter diagnosis was Dizziness. Diagnoses of Shortness of breath, Near syncope, Elevated troponin, PE (pulmonary thromboembolism), and Bilateral pulmonary embolism were also pertinent to this visit.    Bilateral Pulmonary Embolisms:   - CTA w/ extensive bilateral pulmonary thromboemboli  - Therapeutic Lovenox initiated in ED (1mg/kg q12h), continue on admission  - BLE US - DVT of both extremities   - Echo results pending   - Continue supplemental O2 via NC  -Wean O2 as able, may need home O2 eval   -Will transition to Xarelto as out patient for 3 months duration   - Continuous pulse ox, telemetry  - Continue to monitor      Elevated Troponin:   - Likely 2/2 demand ischemia given bilateral PEs  - Troponin 3.8, EKG w/ nonspecific changes  -Troponin improved to 2.6, no EKG changes or chest pain  -Continue to monitor      COVID Rule-Out:   - Rapid COVID test negative in ED  - Additional labs ordered, results pending   - Continue COVID isolation and precautions   - Repeat COVID test ordered for 24 hours after initial test        Code: Full  Diet: Regular  PPx: PPI, SCDs, Lovenox 1mg/kg q12h, transition to Xarelto and discharge after home O2 eval and Oxygen is delivered          Shruthi Conner MD   [facility]   05/26/2020

## 2020-05-26 NOTE — NURSING
Admit completed per flowsheet. Patient's questions answered.  Pt instructed on VTE, diet, tests, fall precautions and medications. Understanding verbalized. Patient has no complaints at this time.

## 2020-05-26 NOTE — PT/OT/SLP EVAL
Physical Therapy Evaluation, Treatment, and Discharge Note    Patient Name:  Miky Esqueda   MRN:  [id number]    Recommendations:     Discharge Recommendations:  home   Discharge Equipment Recommendations: none   Barriers to discharge: None    Assessment:     Miky Esqueda is a 65 y.o. male admitted with a medical diagnosis of Bilateral pulmonary embolism. Pt ambulating independently without any safety concerns. Pt on 2 L/min supplemental O2 and SaO2 was 93-94% at rest and decreased to 87% while donning socks at EOB and was 89-90% immediately following ambulation. No further PT needs, d/c PT.    Recent Surgery: * No surgery found *      Plan:     During this hospitalization, patient does not require further acute PT services.  Please re-consult if situation changes.      Subjective     Chief Complaint: only c/o mild shortness of breath  Patient/Family Comments/goals: pt is pleasant and agreeable to participate in therapy  Pain/Comfort:  · Pain Rating 1: 0/10  · Pain Rating Post-Intervention 1: 0/10    Patients cultural, spiritual, Religion conflicts given the current situation: no    Living Environment:  Pt lives with his wife in a [facility] with no MAX and tub/shower combo.  Prior to admission, patients level of function was independent without AD for mobility and ADLs, drives, and works as a mechanical .  Equipment used at home: none.  DME owned (not currently used): none.  Upon discharge, patient will have assistance from his family.    Objective:     Communicated with nurse August prior to session.  Patient found supine with bed alarm, telemetry, oxygen upon PT entry to room.    General Precautions: Standard, fall   Orthopedic Precautions:N/A   Braces: N/A     Exams:  · Gross Motor Coordination:  WFL  · Postural Exam:  Patient presented with the following abnormalities:    · -       No postural abnormalities identified  · Sensation:    · -       Intact  · Skin Integrity/Edema:      · -       Skin  integrity: Visible skin intact  · RLE ROM: WFL  · RLE Strength: WFL  · LLE ROM: WFL  · LLE Strength: WFL    Functional Mobility:  · Bed Mobility:     · Scooting: modified independence  · Supine to Sit: modified independence  · Transfers:     · Sit to Stand:  independence with no AD  · Gait: ~150 ft with no AD independently - does not c/o shortness of breath. SaO2 was 89-90% during ambulation and upon seated rest break.    AM-PAC 6 CLICK MOBILITY  Total Score:24       Therapeutic Activities and Exercises:  Pt ambulated in room and presenting with no instability or safety concerns.  Pt on 2 L/min throughout session: SaO2 was 93-94% at rest, decreased as low as 87% when donning socks, recovered to 91% then was 89-90% during ambulation and post ambulation.  Left up in chair, educated to ambulate at least 2-3x/day with nursing staff.       Patient left up in chair with all lines intact, call button in reach, chair alarm on and nurse notified.    GOALS:   Multidisciplinary Problems     Physical Therapy Goals     Not on file          Multidisciplinary Problems (Resolved)        Problem: Physical Therapy Goal    Goal Priority Disciplines Outcome Goal Variances Interventions   Physical Therapy Goal   (Resolved)     PT, PT/OT Met                     History:     Past Medical History:   Diagnosis Date    Bilateral pulmonary embolism 5/25/2020    Iron deficiency anemia        History reviewed. No pertinent surgical history.    Time Tracking:     PT Received On: 05/26/20  PT Start Time: 1059     PT Stop Time: 1128  PT Total Time (min): 29 min with OT    Billable Minutes: Evaluation 15 and Gait Training 14      Karina Russ, PT  05/26/2020

## 2020-05-26 NOTE — PLAN OF CARE
"VN note: VN cued into patient's room to review discharge papers. VN educated patient on new medication and side effects. Medication list reviewed. Follow-up information given. Bedside Delivery completed. VN educated patient on pulmonary embolism signs and symptoms and when to seek medical attention. Patient verbalized understanding and all questions answered. Refer to clinical references for further education given. He will call when his wife arrives.    Patient's oxygen at bedside. Patient states he smokes "just a little." I informed patient cannot smoke with oxygen. Encouraged to quit.  "

## 2020-05-26 NOTE — PLAN OF CARE
Pt lives with his spouse in [place] and is independent at home.  He works in construction driving trucks.  His wife Priscilla will transport him home at time of discharge.  Pt encouraged to call with any questions or concerns.  Cm will continue to follow pt through transitions of care and assist with any discharge needs.     05/26/20 1337   Discharge Assessment   Assessment Type Discharge Planning Assessment   Confirmed/corrected address and phone number on facesheet? Yes   Assessment information obtained from? Patient   Communicated expected length of stay with patient/caregiver yes   Prior to hospitilization cognitive status: Alert/Oriented   Prior to hospitalization functional status: Independent   Current cognitive status: Alert/Oriented   Current Functional Status: Independent   Facility Arrived From: home   Lives With spouse   Able to Return to Prior Arrangements yes   Is patient able to care for self after discharge? Yes   Who are your caregiver(s) and their phone number(s)? Priscilla (pt's spouse)   Patient's perception of discharge disposition home or selfcare   Readmission Within the Last 30 Days no previous admission in last 30 days   Patient currently being followed by outpatient case management? No   Patient currently receives any other outside agency services? No   Equipment Currently Used at Home none   Do you have any problems affording any of your prescribed medications? No   Is the patient taking medications as prescribed? yes   Does the patient have transportation home? Yes   Transportation Anticipated family or friend will provide   Discharge Plan A Home   Discharge Plan B Home with family   DME Needed Upon Discharge  oxygen   Patient/Family in Agreement with Plan yes     Darron Morrell RN,   [phone]

## 2020-05-26 NOTE — PLAN OF CARE
VN note: VN cued into patient's room for introduction. VN informed patient that VN would be working closely along side bedside nurse, PCT, and the rest of the care team and making rounds throughout the shift. Updated on plan of care. Patient verbalized understanding. Allowed time for questions. VN will continue to be available to patient and intervene prn.

## 2020-05-26 NOTE — PLAN OF CARE
Problem: Physical Therapy Goal  Goal: Physical Therapy Goal  Outcome: Met     PT evaluation completed, note to follow. Pt ambulating independently without any safety concerns. Pt on 2 L/min supplemental O2 and SaO2 was 93-94% at rest and decreased to 87% while donning socks at EOB and was 89-90% immediately following ambulation. No further PT needs, d/c PT.

## 2020-05-26 NOTE — PLAN OF CARE
Patient aaox4, amb, contx2, vss, POC explained patient verbalize understanding, patient discharged, given o2 cylinder  to transport and education hand out,  will cont to monitor.

## 2020-05-26 NOTE — PLAN OF CARE
Problem: Occupational Therapy Goal  Goal: Occupational Therapy Goal  Outcome: MET        Pt performing at baseline for ADLs and functional mobility at this time. Pt with complaints of slight SOB With increased functional mobility in room; O2 dropping to 87% on 2L O2, however, recovering to 92% with seated rest break and PLB. Educated on PLB and energy conservation throughout session for home use. No further OT needs at this time.

## 2020-05-26 NOTE — DISCHARGE INSTRUCTIONS
Pulmonary Embolism, Discharge Instructions for (English) View Edit Remove   Oxygen, Using Safely (English) View Edit Remove   Rivaroxaban oral tablets (English) View Edit Remove   Diet, Discharge Instructions for Eating a Low-Salt (English) View Edit Remove

## 2020-05-26 NOTE — PT/OT/SLP EVAL
Occupational Therapy   Evaluation/Dc Summary     Name: Miky Esqueda  MRN: [id number]  Admitting Diagnosis:  Bilateral pulmonary embolism      Recommendations:     Discharge Recommendations: home  Discharge Equipment Recommendations:  none  Barriers to discharge:  None    Assessment:     Miky Esqueda is a 65 y.o. male with a medical diagnosis of Bilateral pulmonary embolism.  He presents SOB With increased axs. Performance deficits affecting function: impaired cardiopulmonary response to activity, impaired endurance.      Pt performing at baseline for ADLs and functional mobility at this time. Pt with complaints of slight SOB With increased functional mobility in room; O2 dropping to 87% on 2L O2, however, recovering to 92% with seated rest break and PLB. Educated on PLB and energy conservation throughout session for home use. No further OT needs at this time.     Rehab Prognosis: Good; patient would benefit from acute skilled OT services to address these deficits and reach maximum level of function.       Plan:     Patient to be seen (d/c OT ) to address the above listed problems via    · Plan of Care Expires: 05/26/20  · Plan of Care Reviewed with: patient    Subjective     Chief Complaint: SOB with increased axs  Patient/Family Comments/goals: decrease SOB     Occupational Profile:  Living Environment: spouse, SSH, no steps to enter, t/s combo  Previous level of function: independent; works; drives   Equipment Used at Home:  none  Assistance upon Discharge: from spouse     Pain/Comfort:  · Pain Rating 1: 0/10    Patients cultural, spiritual, Pentecostalism conflicts given the current situation:      Objective:     Communicated with: michelle prior to session.   General Precautions: Standard, fall   Orthopedic Precautions:N/A   Braces: N/A     Occupational Performance:    Bed Mobility:    · Patient completed Scooting/Bridging with independence  · Patient completed Supine to Sit with independence    Functional  Mobility/Transfers:  · Patient completed Sit <> Stand Transfer with independence  with  no assistive device   · Functional Mobility: independent without AD     Activities of Daily Living:  · Lower Body Dressing: independence /bringing BLEs to bed     Cognitive/Visual Perceptual:  WFL     Physical Exam:  Balance:    -       WFL   Postural examination/scapula alignment:    -       No postural abnormalities identified  Skin integrity: Visible skin intact  Dominant hand:    -       right  Upper Extremity Range of Motion:   BUE ROM WFL   Upper Extremity Strength:  BUE WFL    Strength:  Good     AMPAC 6 Click ADL:  AMPAC Total Score: 24    Treatment & Education:  Pt educated on PLB  Educated on impt of OOB axs and functional mobility throughout the day/sitting in b/s chair   Educated on adaptive LBD   Education:    Patient left up in chair with all lines intact, call button in reach, chair alarm on and nsg notified    GOALS:   Multidisciplinary Problems     Occupational Therapy Goals        Problem: Occupational Therapy Goal    Goal Priority Disciplines Outcome Interventions   Occupational Therapy Goal     OT, PT/OT Ongoing, Progressing                    History:     Past Medical History:   Diagnosis Date    Bilateral pulmonary embolism 5/25/2020    Iron deficiency anemia        History reviewed. No pertinent surgical history.    Time Tracking:     OT Date of Treatment: 05/26/20  OT Start Time: 1059  OT Stop Time: 1128  OT Total Time (min): 29 min    Billable Minutes:Evaluation 10 co treatment with PT     Sasha Moyer OT  5/26/2020

## 2020-05-26 NOTE — H&P
[facility]  History & Physical    Name: Miky Esqueda  : 1955  MRN: [id number]    SUBJECTIVE:     History of Present Illness:  66 yo M with a PMHx including iron deficiency anemia and tobacco use presents to [facility] for evaluation of acute onset SOB, diaphoresis, and lightheadedness x1 day. Pt reports sudden onset of symptoms yesterday afternoon, occurring while he was changing the oil in his car. Pt noted some improvement with rest, but symptoms continued overnight and into this morning/afternoon. Associated symptoms include headache yesterday evening (resolved with Tylenol x1). Pt's wife contacted PCP, Dr. Matson, who recommended ED evaluation. Additionally, pt's wife reports that pt is still smoking and that pt mentioned pulling a muscle in his right groin last week. 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 Denies fever, chills, chest pain, cough, sore throat, abdominal pain, N/V, diarrhea, constipation, urinary symptoms. No known sick contacts. Pt does not take any daily medications.     In the ED, pt was afebrile w/ HR 99, RR 26, /81, and SpO2 95% on 3L NC. Initial labs included D-Dimer 30.14, CRP 12.6, , troponin 3.8. Rapid COVID negative. Of note, Hgb 13.3 w/ MCV 76, consistent with PMHx of SUBHASH. CT head w/o contrast showed no acute intracranial abnormalities. CTA showed extensive bilateral pulmonary thromboemboli with no evidence of central saddle embolus, moderate pulmonary emphysema, and a small focal area of consolidation within the medial aspect of RUL (possibly reflecting focal pneumonia or potential small area of infarction). Therapeutic Lovenox was initiated in ED. Family Medicine consulted for admission.     Review of patient's allergies indicates:  No Known Allergies    Past Medical History:   Diagnosis Date    Iron deficiency anemia       History reviewed. No pertinent surgical history.     Family History   Problem Relation  Age of Onset    Heart disease Father      Social History     Tobacco Use    Smoking status: Former Smoker     Types: Cigarettes    Smokeless tobacco: Former User   Substance Use Topics    Alcohol use: No    Drug use: No      Review of Systems   Constitutional: Positive for diaphoresis. Negative for chills, fever, malaise/fatigue and weight loss.   HENT: Negative for congestion, sinus pain and sore throat.    Eyes: Negative for blurred vision and double vision.   Respiratory: Positive for shortness of breath. Negative for cough, hemoptysis, sputum production and wheezing.    Cardiovascular: Negative for chest pain, palpitations, orthopnea, claudication, leg swelling and PND.   Gastrointestinal: Negative for abdominal pain, blood in stool, constipation, diarrhea, heartburn, melena, nausea and vomiting.   Genitourinary: Negative for dysuria, flank pain, frequency, hematuria and urgency.   Musculoskeletal: Negative for back pain, falls, joint pain, myalgias and neck pain.   Skin: Negative.    Neurological: Positive for dizziness (lightheaded) and headaches (mild, resolved with Tylenol x1). Negative for tingling, tremors, sensory change, speech change, focal weakness, seizures and loss of consciousness.   Endo/Heme/Allergies: Negative.    Psychiatric/Behavioral: Negative.      OBJECTIVE:     Vital Signs (Most Recent)  Temp: 97.6 °F (36.4 °C) (05/25/20 1555)  Pulse: 85 (05/25/20 1832)  Resp: 18 (05/25/20 1832)  BP: (!) 148/90 (05/25/20 1832)  SpO2: (!) 93 % (05/25/20 1832)     Physical Exam   Constitutional: He is oriented to person, place, and time and well-developed, well-nourished, and in no distress. No distress.   HENT:   Head: Normocephalic and atraumatic.   Nose: Nose normal.   Mouth/Throat: Oropharynx is clear and moist. No oropharyngeal exudate.   Eyes: Pupils are equal, round, and reactive to light. Conjunctivae and EOM are normal. Right eye exhibits no discharge. Left eye exhibits no discharge. No scleral  icterus.   Neck: Normal range of motion. Neck supple. No JVD present.   Cardiovascular: Normal rate, regular rhythm, normal heart sounds and intact distal pulses.   Pulmonary/Chest: Effort normal and breath sounds normal. No respiratory distress.   Comfortable on 3L NC   Abdominal: Soft. Bowel sounds are normal. He exhibits no distension. There is no tenderness.   Musculoskeletal: Normal range of motion. He exhibits no edema, tenderness or deformity.   Lymphadenopathy:     He has no cervical adenopathy.   Neurological: He is alert and oriented to person, place, and time. GCS score is 15.   Skin: Skin is warm and dry. No rash noted. He is not diaphoretic. No erythema. No pallor.   Psychiatric: Mood, memory, affect and judgment normal.     Laboratory  Recent Labs   Lab 05/25/20  1634   WBC 7.99   RBC 5.72   HGB 13.3*   HCT 43.3   *   MCV 76*   MCH 23.3*   MCHC 30.7*     Recent Labs   Lab 05/25/20  1634      K 3.9   CO2 23      BUN 19   CREATININE 1.2     Recent Labs   Lab 05/25/20  1634   CALCIUM 9.1     Recent Labs   Lab 05/25/20  1634   PROT 8.2   ALBUMIN 3.9   BILITOT 0.4   AST 32   ALKPHOS 88   ALT 13     Recent Labs   Lab 05/25/20  1634   INR 1.0     Recent Labs   Lab 05/25/20  1634   TROPONINI 3.841*     Recent Labs   Lab 05/25/20  1634   *     Diagnostic Results  Imaging Results          CTA Chest Non-Coronary (PE Study) (Final result)  Result time 05/25/20 19:22:30    Final result by Suhas Barker MD (05/25/20 19:22:30)                 Impression:      1. Extensive bilateral pulmonary thromboemboli, as above.  No evidence of central saddle embolus.  2. Moderate pulmonary emphysema.  3. Small focal area of consolidation within the medial aspect of the right upper lobe.  This could reflect focal pneumonia or potential small area of infarction, given aforementioned findings.    COMMUNICATION  This critical result was discovered/received on 5/25/2020 at 19:16.    The critical  information above was relayed directly by Suhas Barker MD by telephone to Dr. Vinh Mahajan on 5/25/2020 at 19:21.      Electronically signed by: Suhas Barker MD  Date:    05/25/2020  Time:    19:22             Narrative:    EXAMINATION:  CTA CHEST NON CORONARY    CLINICAL HISTORY:  Chest pain, acute, PE suspected, high pretest prob;    TECHNIQUE:  Low dose axial images, sagittal and coronal reformations were obtained from the thoracic inlet to the lung bases following the IV administration of 100 mL of Omnipaque 350.  Contrast timing was optimized to evaluate the pulmonary arteries.  MIP images were performed.    COMPARISON:  None    FINDINGS:  Structures at the base of the neck are unremarkable.  Aorta is non-aneurysmal.  The heart is normal in size without pericardial effusion.  Bilateral intraluminal filling defects are seen consistent with pulmonary thromboemboli within bilateral upper and lower lobar and segmental branches.  Right-sided clot extends into the right main pulmonary artery.  No evidence of central saddle embolus.  There is no evidence of mediastinal, axillary, or hilar lymph node enlargement.  The esophagus is unremarkable along its course.    The trachea and bronchi are patent.  The lungs are symmetrically expanded.  Moderate centrilobular pulmonary emphysematous changes are seen within the upper lobes.  There is mild bronchiectasis seen within the bilateral lower lobes.  Small focal area of consolidation seen within the medial aspect of the right upper lobe.  No pleural effusion.    The visualized abdominal structures are unremarkable.  Osseous structures demonstrate degenerative change.  Extrathoracic soft tissues are unremarkable.                               CT Head Without Contrast (Final result)  Result time 05/25/20 17:17:29    Final result by Suhas Barker MD (05/25/20 17:17:29)                 Impression:      No acute intracranial abnormalities identified.    Paranasal sinus  disease as above.      Electronically signed by: Suhas Barker MD  Date:    05/25/2020  Time:    17:17             Narrative:    EXAMINATION:  CT HEAD WITHOUT CONTRAST    CLINICAL HISTORY:  Confusion/delirium, altered LOC, unexplained;Dizziness;Stroke;    TECHNIQUE:  Low dose axial images were obtained through the head.  Coronal and sagittal reformations were also performed. Contrast was not administered.    COMPARISON:  None.    FINDINGS:  No evidence of acute/recent major vascular distribution cerebral infarction, intraparenchymal hemorrhage, or intra-axial space occupying lesion. The ventricular system is normal in size and configuration with no evidence of hydrocephalus. No effacement of the skull-base cisterns. No abnormal extra-axial fluid collections or blood products.  Bilateral maxillary, ethmoid, sphenoid, and frontal sinus disease is seen.  Partial fluid opacification is seen of the bilateral mastoid air cells.  The calvarium shows no significant abnormality.                               X-Ray Chest AP Portable (Final result)  Result time 05/25/20 17:10:39    Final result by Jf Rosenthal MD (05/25/20 17:10:39)                 Impression:      As above.      Electronically signed by: Jf Rosenthal  Date:    05/25/2020  Time:    17:10             Narrative:    EXAMINATION:  XR CHEST AP PORTABLE    CLINICAL HISTORY:  CHF;    TECHNIQUE:  Single frontal view of the chest was performed.    COMPARISON:  06/22/2017    FINDINGS:  Cardiomediastinal silhouette is stable.  Slight increased interstitial attenuation without confluent airspace opacity or lobar consolidation.  No pleural effusion or pneumothorax.  Possible right upper lobe 5 mm nodular density not definitely seen on prior.  May benefit from outpatient, nonemergent CT chest for further evaluation.    No acute osseous abnormality.                                ASSESSMENT/PLAN:     64 yo M with a PMHx including iron deficiency anemia and  tobacco use presents to [facility] for evaluation of acute onset SOB, diaphoresis, and lightheadedness x1 day.     Bilateral Pulmonary Embolisms:   - CTA w/ extensive bilateral pulmonary thromboemboli  - Therapeutic Lovenox initiated in ED (1mg/kg q12h), continue on admission  - Echo and BLE US ordered, results pending   - Continue supplemental O2 via NC  - Continuous pulse ox, telemetry  - Continue to monitor     Elevated Troponin:   - Likely 2/2 demand ischemia given bilateral PEs  - Troponin 3.8, EKG w/ nonspecific changes  - Trend troponin and EKG q6h x2    COVID Rule-Out:   - Rapid COVID test negative in ED  - Additional labs ordered, results pending   - Continue COVID isolation and precautions   - Repeat COVID test ordered for 24 hours after initial test      Code: Full  Diet: Regular  PPx: PPI, SCDs, Lovenox 1mg/kg q12h    Case discussed with staff physicians, Dr. Matson (pt's PCP) and Dr. Magallon.       Cindy Bartholomew MD  [facility] PGY-1  5/25/2020

## 2020-05-26 NOTE — NURSING
Home Oxygen Evaluation    Date Performed: 2020    1) Patient's Home O2 Sat on room air, while at rest: 90%        If O2 sats on room air at rest are 88% or below, patient qualifies. No additional testing needed. Document N/A in steps 2 and 3. If 89% or above, complete steps 2.      2) Patient's O2 Sat on room air while exercisin%        If O2 sats on room air while exercising remain 89% or above patient does not qualify, no further testing needed Document N/A in step 3. If O2 sats on room air while exercising are 88% or below, continue to step 3.      3) Patient's O2 Sat while exercising on O2: 90% at 2 LPM         (Must show improvement from #2 for patients to qualify)    If O2 sats improve on oxygen, patient qualifies for portable oxygen. If not, the patient does not qualify.

## 2020-05-26 NOTE — PLAN OF CARE
Plan of care reviewed with patient and daughter, understanding verbalized.  Pt remains SR on tele.  No complaints overnight. Bed alarm on, call light in reach, fall precautions in place.

## 2020-05-26 NOTE — PLAN OF CARE
Discharge instructions given to the patient.  Reviewed post cath  Monitoring and care, return visits and medications. A copy of the discharge paperwork had been given to the patient. The patient had no questions.

## 2020-05-27 NOTE — DISCHARGE SUMMARY
.fp [facility]  Discharge Summary      Admit Date: 5/25/2020    Discharge Date and Time: 5/26/2020  3:16 PM    Attending Physician: Guillermina Loredo MD      Primary Care Physician: Farhat Matson MD     Reason for Admission: Shortness of breath       Hospital Course (synopsis of major diagnoses, care, treatment, and services provided during the course of the hospital stay):     64 yo M with a PMHx including iron deficiency anemia and tobacco use presents to [facility]- for evaluation of acute onset SOB, diaphoresis, and lightheadedness x1 day. Pt reports sudden onset of symptoms yesterday afternoon, occurring while he was changing the oil in his car. Pt noted some improvement with rest, but symptoms continued overnight and into this morning/afternoon. Associated symptoms include headache yesterday evening (resolved with Tylenol x1). Pt's wife contacted PCP, Dr. Matson, who recommended ED evaluation. Additionally, pt's wife reports that pt is still smoking and that pt mentioned pulling a muscle in his right groin last week. 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 Denies fever, chills, chest pain, cough, sore throat, abdominal pain, N/V, diarrhea, constipation, urinary symptoms. No known sick contacts. Pt does not take any daily medications.      In the ED, pt was afebrile w/ HR 99, RR 26, /81, and SpO2 95% on 3L NC. Initial labs included D-Dimer 30.14, CRP 12.6, , troponin 3.8. Rapid COVID negative. Of note, Hgb 13.3 w/ MCV 76, consistent with PMHx of SUBHASH. CT head w/o contrast showed no acute intracranial abnormalities. CTA showed extensive bilateral pulmonary thromboemboli with no evidence of central saddle embolus, moderate pulmonary emphysema, and a small focal area of consolidation within the medial aspect of RUL (possibly reflecting focal pneumonia or potential small area of infarction). Therapeutic Lovenox was initiated in ED. Family  Medicine consulted for admission. Patient was admitted for further workup of DVT and to initiate treatment. He qualified for home O2 and was delivered to patient at bedside. Repeat COVID test negative and patient will be discharge with 3 months of Xarelto and close PCP follow up. Patient is medically stable for discharge home with close follow up.       Consults: none    Significant Diagnostic Studies:   Recent Labs     05/25/20 1634 05/26/20  0517   WBC 7.99 8.64   HGB 13.3* 12.6*   HCT 43.3 40.7   * 125*   MCV 76* 75*   RDW 15.2* 15.1*       Recent Labs     05/25/20  1634 05/26/20  0517    139   K 3.9 4.4    108   CO2 23 23    102   BUN 19 18   CREATININE 1.2 1.2   CALCIUM 9.1 8.9   PROT 8.2 7.2   ALBUMIN 3.9 3.5   BILITOT 0.4 0.6   ALKPHOS 88 72   AST 32 21   ALT 13 10   ANIONGAP 9 8   ESTGFRAFRICA >60 >60   EGFRNONAA >60 >60       Recent Labs     05/25/20 2033 05/26/20  0517   MG 1.9 2.0   PHOS 3.1 3.4       Coags  Lab Results   Component Value Date    INR 1.0 05/25/2020     Recent Labs   Lab 05/25/20  1634   INR 1.0       A1c:   Lab Results   Component Value Date    HGBA1C 5.8 (H) 05/25/2020   , Last Gluc: No results for input(s): POCTGLUCOSE in the last 168 hours.    TSH:   Lab Results   Component Value Date    TSH 2.565 05/25/2020         Cardiac Enzymes  Recent Labs     05/25/20 1634 05/25/20 2033 05/25/20  2304 05/26/20  0517   TROPONINI 3.841*  --  3.461* 2.606*   CPK  --  275*  --   --        Imaging   Imaging Results           US Lower Extremity Veins Bilateral (Final result)  Result time 05/25/20 22:18:54    Final result by Suhas Barker MD (05/25/20 22:18:54)                 Impression:      Bilateral lower extremity deep venous thrombosis, as above.    This report was flagged in Epic as abnormal.      Electronically signed by: Suhas Barker MD  Date:    05/25/2020  Time:    22:18             Narrative:    EXAMINATION:  US LOWER EXTREMITY VEINS BILATERAL    CLINICAL  HISTORY:  Bilateral pulmonary embolism;    TECHNIQUE:  Duplex and color flow Doppler evaluation of the bilateral lower extremity veins was performed.    COMPARISON:  None    FINDINGS:  Right lower extremity:    Nonocclusive thrombus is seen within the right femoral and popliteal veins.  No evidence of clot involving the right common femoral, greater saphenous, posterior tibial, anterior tibial, and peroneal veins.    Left lower extremity:    Nonocclusive thrombus is seen within the left popliteal vein.  No evidence of clot involving the left common femoral, femoral, greater saphenous, posterior tibial, anterior tibial, and peroneal veins.                               CTA Chest Non-Coronary (PE Study) (Final result)  Result time 05/25/20 19:22:30    Final result by Suhas Barker MD (05/25/20 19:22:30)                 Impression:      1. Extensive bilateral pulmonary thromboemboli, as above.  No evidence of central saddle embolus.  2. Moderate pulmonary emphysema.  3. Small focal area of consolidation within the medial aspect of the right upper lobe.  This could reflect focal pneumonia or potential small area of infarction, given aforementioned findings.    COMMUNICATION  This critical result was discovered/received on 5/25/2020 at 19:16.    The critical information above was relayed directly by Suhas Barker MD by telephone to Dr. Vinh Mahajan on 5/25/2020 at 19:21.      Electronically signed by: Suhas Barker MD  Date:    05/25/2020  Time:    19:22             Narrative:    EXAMINATION:  CTA CHEST NON CORONARY    CLINICAL HISTORY:  Chest pain, acute, PE suspected, high pretest prob;    TECHNIQUE:  Low dose axial images, sagittal and coronal reformations were obtained from the thoracic inlet to the lung bases following the IV administration of 100 mL of Omnipaque 350.  Contrast timing was optimized to evaluate the pulmonary arteries.  MIP images were performed.    COMPARISON:  None    FINDINGS:  Structures  at the base of the neck are unremarkable.  Aorta is non-aneurysmal.  The heart is normal in size without pericardial effusion.  Bilateral intraluminal filling defects are seen consistent with pulmonary thromboemboli within bilateral upper and lower lobar and segmental branches.  Right-sided clot extends into the right main pulmonary artery.  No evidence of central saddle embolus.  There is no evidence of mediastinal, axillary, or hilar lymph node enlargement.  The esophagus is unremarkable along its course.    The trachea and bronchi are patent.  The lungs are symmetrically expanded.  Moderate centrilobular pulmonary emphysematous changes are seen within the upper lobes.  There is mild bronchiectasis seen within the bilateral lower lobes.  Small focal area of consolidation seen within the medial aspect of the right upper lobe.  No pleural effusion.    The visualized abdominal structures are unremarkable.  Osseous structures demonstrate degenerative change.  Extrathoracic soft tissues are unremarkable.                               CT Head Without Contrast (Final result)  Result time 05/25/20 17:17:29    Final result by Suhas Barker MD (05/25/20 17:17:29)                 Impression:      No acute intracranial abnormalities identified.    Paranasal sinus disease as above.      Electronically signed by: Shuas Barker MD  Date:    05/25/2020  Time:    17:17             Narrative:    EXAMINATION:  CT HEAD WITHOUT CONTRAST    CLINICAL HISTORY:  Confusion/delirium, altered LOC, unexplained;Dizziness;Stroke;    TECHNIQUE:  Low dose axial images were obtained through the head.  Coronal and sagittal reformations were also performed. Contrast was not administered.    COMPARISON:  None.    FINDINGS:  No evidence of acute/recent major vascular distribution cerebral infarction, intraparenchymal hemorrhage, or intra-axial space occupying lesion. The ventricular system is normal in size and configuration with no evidence of  hydrocephalus. No effacement of the skull-base cisterns. No abnormal extra-axial fluid collections or blood products.  Bilateral maxillary, ethmoid, sphenoid, and frontal sinus disease is seen.  Partial fluid opacification is seen of the bilateral mastoid air cells.  The calvarium shows no significant abnormality.                               X-Ray Chest AP Portable (Final result)  Result time 05/25/20 17:10:39    Final result by Jf Rosenthal MD (05/25/20 17:10:39)                 Impression:      As above.      Electronically signed by: Jf Rosenthal  Date:    05/25/2020  Time:    17:10             Narrative:    EXAMINATION:  XR CHEST AP PORTABLE    CLINICAL HISTORY:  CHF;    TECHNIQUE:  Single frontal view of the chest was performed.    COMPARISON:  06/22/2017    FINDINGS:  Cardiomediastinal silhouette is stable.  Slight increased interstitial attenuation without confluent airspace opacity or lobar consolidation.  No pleural effusion or pneumothorax.  Possible right upper lobe 5 mm nodular density not definitely seen on prior.  May benefit from outpatient, nonemergent CT chest for further evaluation.    No acute osseous abnormality.                                  Final Diagnoses:    Principal Problem: Bilateral pulmonary embolism   Secondary Diagnoses:   Active Hospital Problems    Diagnosis  POA    *Bilateral pulmonary embolism [I26.99]  Yes    Iron deficiency anemia [D50.9]  Yes    Dyspnea on exertion [R06.09]  Yes    Near syncope [R55]  Yes    Elevated troponin [R79.89]  Yes    Tobacco dependence [F17.200]  Yes    Dizziness [R42]  Yes      Resolved Hospital Problems   No resolved problems to display.       Discharged Condition: stable    Disposition: Home or Self Care    Follow Up/Patient Instructions:     Medications:  Reconciled Home Medications:      Medication List      START taking these medications    * XARELTO 15 mg (42)- 20 mg (9) tablet dose pack  Generic drug:  rivaroxaban  Take  15 mg by mouth 2 (two) times daily with meals for 21 days, THEN 20 mg daily with dinner or evening meal.  Start taking on:  May 26, 2020     * XARELTO 20 mg Tab  Generic drug:  rivaroxaban  Take one tablet by mouth once daily         * This list has 2 medication(s) that are the same as other medications prescribed for you. Read the directions carefully, and ask your doctor or other care provider to review them with you.            CONTINUE taking these medications    ferrous sulfate 324 mg (65 mg iron) Tbec  Take 325 mg by mouth once daily.     multivitamin capsule  Take 1 capsule by mouth once daily.          Discharge Procedure Orders   OXYGEN FOR HOME USE     Order Specific Question Answer Comments   Liter Flow 2    Duration With activity    Qualifying SpO2: 85%    Testing done at: Exercise/Activity    Route nasal cannula    Device home concentrator with portable unit    Length of need (in months): 99 mos    Patient condition with qualifying saturation other chronic pulmonary condition PE   Height: 6' (1.829 m)    Weight: 94.4 kg (208 lb 1.8 oz)    Does patient have medical equipment at home? none    Alternative treatment measures have been tried or considered and deemed clinically ineffective. Yes    Vendor: [facility]    Expected Date of Delivery: 5/26/2020      Diet Cardiac     Notify your health care provider if you experience any of the following:  temperature >100.4     Notify your health care provider if you experience any of the following:  difficulty breathing or increased cough     Notify your health care provider if you experience any of the following:  severe persistent headache     Notify your health care provider if you experience any of the following:  persistent dizziness, light-headedness, or visual disturbances     Notify your health care provider if you experience any of the following:  increased confusion or weakness     Activity as tolerated     Follow-up Information     Farhat Matson MD.     Specialty:  Family Medicine  Why:  The office will call you to set up hospital follow-up appointment.  Contact information:  [address]  [address]  [place] [address] [phone]                 Case discussed with staff, MD Shruthi Valle MD   [facility] Family Medicine PGY-2   [facility]  05/27/2020 3:54 PM

## 2020-05-28 ENCOUNTER — PATIENT OUTREACH (OUTPATIENT)
Dept: ADMINISTRATIVE | Facility: CLINIC | Age: 65
End: 2020-05-28

## 2020-05-28 NOTE — PATIENT INSTRUCTIONS
Pulmonary Embolism (PE)  A pulmonary embolus is most often due to a blood clot that develops in a deep vein of the leg (deep vein thrombosis). If that clot, breaks loose and travels to the lung, it is called a pulmonary embolism (PE). This can cut off the flow of blood in the lungs.  A blood clot in the lungs is a medical emergency and may cause death.   Healthcare providers use the term venous thromboembolism (VTE) to describe these 2 conditions: deep vein thrombosis and pulmonary embolism. They use the term VTE because the 2 conditions are very closely related. And, because their prevention and treatment are also closely related.      A pulmonary embolism occurs when a blood clot forms in a vein and travels to the lungs.   How is pulmonary embolism diagnosed?  Your healthcare provider examines you and asks about your symptoms and health history. You may also have one or more of the following:  · Blood tests to check for blood clotting or other problems  · Imaging tests to look for clots in the veins or lung  · Electrocardiography (ECG) to test how well the heart is working  How is pulmonary embolism treated?  · Blood-thinning medicines (anticoagulants). These medicines thin the blood. They may be given as a pill, as an injection, or through a tube into a vein (intravenous or IV). Blood thinners help prevent more blood clots from forming. They also help to prevent an existing clot from getting larger.  · Thrombolysis. Thrombolytic medicines are used to quickly dissolve a blood clot. A long, narrow tube (catheter) is used to deliver medicine directly to the clot. Thrombolytic medicines increase the risk of bleeding so they are used very carefully.  · Inferior vena cava (IVC) filter surgery. The vena cava is the bodys largest vein. It carries blood from the body to the heart. A small filter traps blood clots in the lower body and prevents them from traveling to the lungs. The filter is inserted into the vein  through a catheter. The filter may be used if blood thinners cannot be taken or if they don't work.   · Pulmonary embolectomy. This is a procedure to remove a blood clot in the lungs. It may be done with surgery or with a catheter inserted in the body. It may be done when other treatments aren't safe or don't work.  What are the long-term concerns?  With treatment, blood clots are usually dissolved or removed. Some treatments can even help prevent future clots. But having a PE can put you at risk for another life-threatening blood clot. So, you will likely need to take anticoagulants to help keep blood clots from forming again. You may need to take this medicine for months or years.  You may also need to make lifestyle changes. This may include getting more active and eating healthier. You may need to wear elastic (compression) stockings and and take breaks on long trips.  Call 911  Call 911 or get emergency help if you have symptoms of a blood clot that has traveled to the lungs. The symptoms include:  · Chest pain  · Trouble breathing  · Coughing (may cough up blood)  · Fainting  · Fast heartbeat  · Sweating  Call 911 if you have heavy or uncontrolled bleeding.  When to call your healthcare provider  Call your healthcare provider if you have swelling or pain in your leg, arm, or other area. These are symptoms of a blood clot.  You may have bleeding if you take medicine to help prevent blood clots.  Call your healthcare provider if you have signs or symptoms of bleeding. This includes:  · Blood in the urine  · Bleeding with bowel movements  · Bleeding from the nose, gums, a cut, or vagina   Date Last Reviewed: 2/1/2017 © 2000-2017 [employer]. [address] [place] [address], [place] [address]. All rights reserved. This information is not intended as a substitute for professional medical care. Always follow your healthcare professional's instructions.

## 2020-05-28 NOTE — PROGRESS NOTES
Please forward this important TCC information to your provider in order to maximize the post discharge care delivery of this patient.    C3 nurse spoke with Miky Esqueda  for a TCC post hospital discharge follow up call. The patient has a scheduled HOSFU appointment with Farhat Matson MD on 06/06/2020 @ 1100.    Respectfully,  Amy Parr LPN    [facility]    [email or web address]       Please do not reply to this message, as this inbox is not routinely monitored.

## 2020-05-31 LAB
BACTERIA BLD CULT: NORMAL
BACTERIA BLD CULT: NORMAL

## 2020-06-12 ENCOUNTER — HOSPITAL ENCOUNTER (INPATIENT)
Facility: HOSPITAL | Age: 65
LOS: 4 days | Discharge: HOME | DRG: 065 | End: 2020-06-16
Attending: EMERGENCY MEDICINE | Admitting: FAMILY MEDICINE
Payer: COMMERCIAL

## 2020-06-12 DIAGNOSIS — I63.9 STROKE: ICD-10-CM

## 2020-06-12 DIAGNOSIS — I63.9 CEREBROVASCULAR ACCIDENT (CVA), UNSPECIFIED MECHANISM: ICD-10-CM

## 2020-06-12 DIAGNOSIS — Z86.73 H/O: CVA (CEREBROVASCULAR ACCIDENT): ICD-10-CM

## 2020-06-12 DIAGNOSIS — R55 SYNCOPE: ICD-10-CM

## 2020-06-12 DIAGNOSIS — T45.515A ANTICOAGULANT CAUSING ADVERSE EFFECT IN THERAPEUTIC USE, INITIAL ENCOUNTER: ICD-10-CM

## 2020-06-12 DIAGNOSIS — Z86.718 HISTORY OF DEEP VEIN THROMBOSIS (DVT) OF LOWER EXTREMITY: ICD-10-CM

## 2020-06-12 DIAGNOSIS — R55 SYNCOPE, UNSPECIFIED SYNCOPE TYPE: Primary | ICD-10-CM

## 2020-06-12 DIAGNOSIS — D64.9 SYMPTOMATIC ANEMIA: ICD-10-CM

## 2020-06-12 DIAGNOSIS — Z86.711 HISTORY OF PULMONARY EMBOLUS (PE): ICD-10-CM

## 2020-06-12 DIAGNOSIS — R07.9 CHEST PAIN: ICD-10-CM

## 2020-06-12 DIAGNOSIS — D64.9 ACUTE ANEMIA: ICD-10-CM

## 2020-06-12 DIAGNOSIS — I63.9 CVA (CEREBRAL VASCULAR ACCIDENT): ICD-10-CM

## 2020-06-12 PROBLEM — I65.23 BILATERAL STENOSIS OF CAROTID ARTERIES GREATER THAN 50%: Status: ACTIVE | Noted: 2020-06-12

## 2020-06-12 LAB
ABO + RH BLD: NORMAL
ALBUMIN SERPL BCP-MCNC: 3.5 G/DL (ref 3.5–5.2)
ALP SERPL-CCNC: 61 U/L (ref 55–135)
ALT SERPL W/O P-5'-P-CCNC: 15 U/L (ref 10–44)
AMPHET+METHAMPHET UR QL: NEGATIVE
ANION GAP SERPL CALC-SCNC: 10 MMOL/L (ref 8–16)
AST SERPL-CCNC: 18 U/L (ref 10–40)
BARBITURATES UR QL SCN>200 NG/ML: NEGATIVE
BASOPHILS # BLD AUTO: 0.02 K/UL (ref 0–0.2)
BASOPHILS NFR BLD: 0.2 % (ref 0–1.9)
BENZODIAZ UR QL SCN>200 NG/ML: NEGATIVE
BILIRUB SERPL-MCNC: 0.3 MG/DL (ref 0.1–1)
BLD GP AB SCN CELLS X3 SERPL QL: NORMAL
BNP SERPL-MCNC: 15 PG/ML (ref 0–99)
BUN SERPL-MCNC: 24 MG/DL (ref 8–23)
BZE UR QL SCN: NEGATIVE
CALCIUM SERPL-MCNC: 8.5 MG/DL (ref 8.7–10.5)
CANNABINOIDS UR QL SCN: NEGATIVE
CHLORIDE SERPL-SCNC: 106 MMOL/L (ref 95–110)
CHOLEST SERPL-MCNC: 177 MG/DL (ref 120–199)
CHOLEST/HDLC SERPL: 4.3 {RATIO} (ref 2–5)
CO2 SERPL-SCNC: 21 MMOL/L (ref 23–29)
CREAT SERPL-MCNC: 1.1 MG/DL (ref 0.5–1.4)
CREAT UR-MCNC: 119.7 MG/DL (ref 23–375)
DIFFERENTIAL METHOD: ABNORMAL
EOSINOPHIL # BLD AUTO: 0 K/UL (ref 0–0.5)
EOSINOPHIL NFR BLD: 0.3 % (ref 0–8)
ERYTHROCYTE [DISTWIDTH] IN BLOOD BY AUTOMATED COUNT: 17.2 % (ref 11.5–14.5)
EST. GFR  (AFRICAN AMERICAN): >60 ML/MIN/1.73 M^2
EST. GFR  (NON AFRICAN AMERICAN): >60 ML/MIN/1.73 M^2
FERRITIN SERPL-MCNC: 333 NG/ML (ref 20–300)
GLUCOSE SERPL-MCNC: 106 MG/DL (ref 70–110)
HCT VFR BLD AUTO: 20.9 % (ref 40–54)
HCT VFR BLD AUTO: 22.5 % (ref 40–54)
HDLC SERPL-MCNC: 41 MG/DL (ref 40–75)
HDLC SERPL: 23.2 % (ref 20–50)
HGB BLD-MCNC: 6.4 G/DL (ref 14–18)
HGB BLD-MCNC: 6.8 G/DL (ref 14–18)
IMM GRANULOCYTES # BLD AUTO: 0.07 K/UL (ref 0–0.04)
IMM GRANULOCYTES NFR BLD AUTO: 0.7 % (ref 0–0.5)
INR PPP: 1.4 (ref 0.8–1.2)
LDH SERPL L TO P-CCNC: 185 U/L (ref 110–260)
LDLC SERPL CALC-MCNC: 118.2 MG/DL (ref 63–159)
LYMPHOCYTES # BLD AUTO: 1.7 K/UL (ref 1–4.8)
LYMPHOCYTES NFR BLD: 18 % (ref 18–48)
MAGNESIUM SERPL-MCNC: 1.6 MG/DL (ref 1.6–2.6)
MCH RBC QN AUTO: 24.3 PG (ref 27–31)
MCHC RBC AUTO-ENTMCNC: 30.2 G/DL (ref 32–36)
MCV RBC AUTO: 80 FL (ref 82–98)
METHADONE UR QL SCN>300 NG/ML: NEGATIVE
MONOCYTES # BLD AUTO: 0.7 K/UL (ref 0.3–1)
MONOCYTES NFR BLD: 7.5 % (ref 4–15)
NEUTROPHILS # BLD AUTO: 6.9 K/UL (ref 1.8–7.7)
NEUTROPHILS NFR BLD: 73.3 % (ref 38–73)
NONHDLC SERPL-MCNC: 136 MG/DL
NRBC BLD-RTO: 0 /100 WBC
OB PNL STL: POSITIVE
OPIATES UR QL SCN: NEGATIVE
PCP UR QL SCN>25 NG/ML: NEGATIVE
PHOSPHATE SERPL-MCNC: 3.2 MG/DL (ref 2.7–4.5)
PLATELET # BLD AUTO: 205 K/UL (ref 150–350)
PMV BLD AUTO: 11.2 FL (ref 9.2–12.9)
POCT GLUCOSE: 100 MG/DL (ref 70–110)
POTASSIUM SERPL-SCNC: 4.1 MMOL/L (ref 3.5–5.1)
PROT SERPL-MCNC: 6.8 G/DL (ref 6–8.4)
PROTHROMBIN TIME: 15 SEC (ref 9–12.5)
RBC # BLD AUTO: 2.8 M/UL (ref 4.6–6.2)
RETICS/RBC NFR AUTO: 5.4 % (ref 0.4–2)
SARS-COV-2 RDRP RESP QL NAA+PROBE: NEGATIVE
SODIUM SERPL-SCNC: 137 MMOL/L (ref 136–145)
TOXICOLOGY INFORMATION: NORMAL
TRIGL SERPL-MCNC: 89 MG/DL (ref 30–150)
TROPONIN I SERPL DL<=0.01 NG/ML-MCNC: <0.006 NG/ML (ref 0–0.03)
TSH SERPL DL<=0.005 MIU/L-ACNC: 0.92 UIU/ML (ref 0.4–4)
WBC # BLD AUTO: 9.35 K/UL (ref 3.9–12.7)

## 2020-06-12 PROCEDURE — 83735 ASSAY OF MAGNESIUM: CPT

## 2020-06-12 PROCEDURE — 85014 HEMATOCRIT: CPT

## 2020-06-12 PROCEDURE — 93010 ELECTROCARDIOGRAM REPORT: CPT | Mod: ,,, | Performed by: INTERNAL MEDICINE

## 2020-06-12 PROCEDURE — 85045 AUTOMATED RETICULOCYTE COUNT: CPT

## 2020-06-12 PROCEDURE — 82272 OCCULT BLD FECES 1-3 TESTS: CPT

## 2020-06-12 PROCEDURE — 84443 ASSAY THYROID STIM HORMONE: CPT

## 2020-06-12 PROCEDURE — A4216 STERILE WATER/SALINE, 10 ML: HCPCS | Performed by: STUDENT IN AN ORGANIZED HEALTH CARE EDUCATION/TRAINING PROGRAM

## 2020-06-12 PROCEDURE — 84425 ASSAY OF VITAMIN B-1: CPT

## 2020-06-12 PROCEDURE — P9021 RED BLOOD CELLS UNIT: HCPCS

## 2020-06-12 PROCEDURE — 93005 ELECTROCARDIOGRAM TRACING: CPT

## 2020-06-12 PROCEDURE — 80307 DRUG TEST PRSMV CHEM ANLYZR: CPT

## 2020-06-12 PROCEDURE — 82607 VITAMIN B-12: CPT

## 2020-06-12 PROCEDURE — 86920 COMPATIBILITY TEST SPIN: CPT

## 2020-06-12 PROCEDURE — 83880 ASSAY OF NATRIURETIC PEPTIDE: CPT

## 2020-06-12 PROCEDURE — 83615 LACTATE (LD) (LDH) ENZYME: CPT

## 2020-06-12 PROCEDURE — 94761 N-INVAS EAR/PLS OXIMETRY MLT: CPT

## 2020-06-12 PROCEDURE — 11000001 HC ACUTE MED/SURG PRIVATE ROOM

## 2020-06-12 PROCEDURE — 82728 ASSAY OF FERRITIN: CPT

## 2020-06-12 PROCEDURE — 36415 COLL VENOUS BLD VENIPUNCTURE: CPT

## 2020-06-12 PROCEDURE — 84484 ASSAY OF TROPONIN QUANT: CPT

## 2020-06-12 PROCEDURE — 82746 ASSAY OF FOLIC ACID SERUM: CPT

## 2020-06-12 PROCEDURE — 85018 HEMOGLOBIN: CPT

## 2020-06-12 PROCEDURE — 86901 BLOOD TYPING SEROLOGIC RH(D): CPT

## 2020-06-12 PROCEDURE — 84100 ASSAY OF PHOSPHORUS: CPT

## 2020-06-12 PROCEDURE — 83540 ASSAY OF IRON: CPT

## 2020-06-12 PROCEDURE — 99285 EMERGENCY DEPT VISIT HI MDM: CPT | Mod: 25

## 2020-06-12 PROCEDURE — 80053 COMPREHEN METABOLIC PANEL: CPT

## 2020-06-12 PROCEDURE — 36430 TRANSFUSION BLD/BLD COMPNT: CPT

## 2020-06-12 PROCEDURE — 85610 PROTHROMBIN TIME: CPT

## 2020-06-12 PROCEDURE — 25000003 PHARM REV CODE 250: Performed by: STUDENT IN AN ORGANIZED HEALTH CARE EDUCATION/TRAINING PROGRAM

## 2020-06-12 PROCEDURE — 85025 COMPLETE CBC W/AUTO DIFF WBC: CPT

## 2020-06-12 PROCEDURE — 93010 EKG 12-LEAD: ICD-10-PCS | Mod: ,,, | Performed by: INTERNAL MEDICINE

## 2020-06-12 PROCEDURE — 80061 LIPID PANEL: CPT

## 2020-06-12 PROCEDURE — U0002 COVID-19 LAB TEST NON-CDC: HCPCS

## 2020-06-12 RX ORDER — HYDROCODONE BITARTRATE AND ACETAMINOPHEN 500; 5 MG/1; MG/1
TABLET ORAL
Status: DISCONTINUED | OUTPATIENT
Start: 2020-06-12 | End: 2020-06-16 | Stop reason: HOSPADM

## 2020-06-12 RX ORDER — ATORVASTATIN CALCIUM 40 MG/1
40 TABLET, FILM COATED ORAL DAILY
Status: DISCONTINUED | OUTPATIENT
Start: 2020-06-12 | End: 2020-06-16 | Stop reason: HOSPADM

## 2020-06-12 RX ORDER — SODIUM CHLORIDE AND POTASSIUM CHLORIDE 150; 900 MG/100ML; MG/100ML
INJECTION, SOLUTION INTRAVENOUS CONTINUOUS
Status: DISCONTINUED | OUTPATIENT
Start: 2020-06-12 | End: 2020-06-12

## 2020-06-12 RX ORDER — LABETALOL HYDROCHLORIDE 5 MG/ML
10 INJECTION, SOLUTION INTRAVENOUS EVERY 4 HOURS PRN
Status: DISCONTINUED | OUTPATIENT
Start: 2020-06-12 | End: 2020-06-16 | Stop reason: HOSPADM

## 2020-06-12 RX ORDER — SODIUM CHLORIDE 0.9 % (FLUSH) 0.9 %
3 SYRINGE (ML) INJECTION EVERY 8 HOURS
Status: DISCONTINUED | OUTPATIENT
Start: 2020-06-12 | End: 2020-06-16 | Stop reason: HOSPADM

## 2020-06-12 RX ADMIN — ATORVASTATIN CALCIUM 40 MG: 40 TABLET, FILM COATED ORAL at 06:06

## 2020-06-12 RX ADMIN — Medication 3 ML: at 09:06

## 2020-06-12 NOTE — PLAN OF CARE
VN cued into pt's room for introduction. VN informed pt that VN would be working along side bedside nurse and PCT throughout shift. Level of present pain assessed. At present no distress noted. Thoroughly discussed with the patient the plan of care. Admission assessment data collection completed with assistance from patient. Discussed with patient High fall risk protocol and interventions that have been initiated and cont be in place for safety. Patient verbalized clear understanding and cooperation using teach back method. Bed alarm presently activated and in use. Will cont to be available to patient and intervene prn.

## 2020-06-12 NOTE — ED NOTES
Pt resting on stretcher with eyes closed. On cardiac monitor. VS stable. No distress noted. Sitter at bedside. Will continue to monitor.

## 2020-06-12 NOTE — HPI
"Miky Esqueda is a 65 y.o. male with history of bilateral PE on Xarelto, iron deficiency anemia who presents to [facility] for evaluation of  syncopal episode at  Dr. Matson's office 1 hour PTA . The patient went to Dr. Matson's office this AM because last night he was urinating and had an episode of dizziness and he started to "black out". The patient reports having multiple bouts of feeling faint and sob the last couple of days. Today, while at 's office, patient states he was sitting down when he became dizzy again and had another syncopal episode. Per EMS, the syncopal episode lasted about 30 seconds. Before he had this episode, he notes that he started sweating. He denied experiencing any CP, SOB, HA, numbness, or tingling. Per , patient had syncopal episode then very brief 15 second seizure then was limp in his arms and woke up very quickly. He was very diaphoretic. He had micturition. Patient noted to have a heart murmur and bilateral bruits on exam at [facility] office.   "

## 2020-06-12 NOTE — ED PROVIDER NOTES
"Encounter Date: 6/12/2020    SCRIBE #1 NOTE: I, Adolph Lam , am scribing for, and in the presence of,  . I have scribed the entire note.       History     Chief Complaint   Patient presents with    Loss of Consciousness     Recent admit for bilateral PE at this facility - presented to family clinic in [facility] for follow-up; per Dr. Gonsalves, pt. stated that he had a syncopal episode last night and while in office, patient experienced another syncopal episode followed by brief seizure like activity lasting about 30 sec. Sent here for eval.      Miky Esqueda is a 65 y.o. male who  has a past medical history of Bilateral pulmonary embolism (5/25/2020) and Iron deficiency anemia.    The patient presents to the ED after having a syncopal episode at Dr. Matson's office. The patient went to Dr. Matson's office this AM because last night he was urinating and had an episode of dizziness and he started to "black out". His wife made an appointment for this morning at 9am. While at 's office, patient states he was sitting down when he became dizzy again and had another syncopal episode. Per EMS, the syncopal episode lasted about 30 seconds. Before he had this episode, he notes that he started sweating. He denied experiencing any CP, SOB, HA, numbness, or tingling. Per , patient had syncopal episode then very brief 15 second seizure then was limp in his arms and woke up very quickly. He was very diaphoretic. He had micturition. Patient noted to have a heart murmur and bilateral bruits on exam.         The history is provided by the patient.     Review of patient's allergies indicates:  No Known Allergies  Past Medical History:   Diagnosis Date    Bilateral pulmonary embolism 5/25/2020    Iron deficiency anemia      No past surgical history on file.  Family History   Problem Relation Age of Onset    Heart disease Father      Social History     Tobacco Use    Smoking status: Former Smoker     Types: " Cigarettes    Smokeless tobacco: Former User   Substance Use Topics    Alcohol use: No    Drug use: No     Review of Systems   Constitutional: Positive for diaphoresis. Negative for fever.   Respiratory: Negative for cough and shortness of breath.    Cardiovascular: Negative for chest pain.   Gastrointestinal: Negative for nausea.   Musculoskeletal: Negative for myalgias.   Neurological: Positive for dizziness and syncope. Negative for weakness, numbness and headaches.       Physical Exam     Initial Vitals [06/12/20 1300]   BP Pulse Resp Temp SpO2   (!) 121/58 81 18 -- 99 %      MAP       --         Physical Exam    Nursing note and vitals reviewed.  Constitutional: He appears well-developed and well-nourished.   HENT:   Head: Normocephalic and atraumatic.   Eyes: EOM are normal. Pupils are equal, round, and reactive to light.   Neck: Normal range of motion. Neck supple.   Cardiovascular: Normal rate, regular rhythm, normal heart sounds and intact distal pulses.   Pulmonary/Chest: Breath sounds normal.   Abdominal: Soft. Bowel sounds are normal. He exhibits no distension. There is no tenderness. There is no rebound and no guarding.   Musculoskeletal: Normal range of motion. He exhibits no edema.   Neurological: He is alert and oriented to person, place, and time.   Skin: Skin is warm and dry.   Psychiatric: He has a normal mood and affect. His behavior is normal. Judgment and thought content normal.         ED Course   Critical Care  Date/Time: 6/12/2020 2:51 PM  Performed by: Isela Garcia MD  Authorized by: Genaro Mckinnon MD   Total critical care time (exclusive of procedural time) : 45 minutes  Critical care time was exclusive of separately billable procedures and treating other patients and teaching time.  Critical care was necessary to treat or prevent imminent or life-threatening deterioration of the following conditions: cardiac failure, circulatory failure and CNS failure or compromise.  Critical  care was time spent personally by me on the following activities: blood draw for specimens, development of treatment plan with patient or surrogate, examination of patient, ordering and performing treatments and interventions, ordering and review of radiographic studies, re-evaluation of patient's condition, evaluation of patient's response to treatment, obtaining history from patient or surrogate, ordering and review of laboratory studies and pulse oximetry.        Labs Reviewed   CBC W/ AUTO DIFFERENTIAL - Abnormal; Notable for the following components:       Result Value    RBC 2.80 (*)     Hemoglobin 6.8 (*)     Hematocrit 22.5 (*)     Mean Corpuscular Volume 80 (*)     Mean Corpuscular Hemoglobin 24.3 (*)     Mean Corpuscular Hemoglobin Conc 30.2 (*)     RDW 17.2 (*)     Immature Granulocytes 0.7 (*)     Immature Grans (Abs) 0.07 (*)     Gran% 73.3 (*)     All other components within normal limits   COMPREHENSIVE METABOLIC PANEL - Abnormal; Notable for the following components:    CO2 21 (*)     BUN, Bld 24 (*)     Calcium 8.5 (*)     All other components within normal limits   TROPONIN I   B-TYPE NATRIURETIC PEPTIDE   PROTIME-INR   SARS-COV-2 RNA AMPLIFICATION, QUAL   PROTIME-INR   TYPE & SCREEN   PREPARE RBC SOFT     EKG Readings: (Independently Interpreted)   Initial: 1232. Rhythm: Normal Sinus Rhythm. Heart Rate: 82. Ectopy: No Ectopy. Conduction: Normal. ST Segments: Normal ST Segments. T Waves: Normal. Clinical Impression: Normal Sinus Rhythm       Imaging Results           CT Head Without Contrast (Final result)  Result time 06/12/20 14:02:08    Final result by Calixto Saha MD (06/12/20 14:02:08)                 Impression:      When compared to prior CT of 05/25/2020, there has been interval development of a hypodense area in the left parietal lobe concerning for infarct.  MRI could help provide a better time frame and further characterize.    Extensive chronic paranasal sinus disease.    This report  was flagged in Epic as abnormal.      Electronically signed by: Calixto Saha MD  Date:    06/12/2020  Time:    14:02             Narrative:    EXAMINATION:  CT HEAD WITHOUT CONTRAST    CLINICAL HISTORY:  Confusion/delirium, altered LOC, unexplained;    TECHNIQUE:  Low dose axial images were obtained through the head.  Coronal and sagittal reformations were also performed. Contrast was not administered.    COMPARISON:  05/25/2020    FINDINGS:  When compared to prior CT of 05/25/2020, there has been interval development of a hypodense area in the left parietal lobe suggestive of a infarct.  No evidence of acute intracranial hemorrhage or other areas of infarct.  No midline shift or hydrocephalus.  No abnormal extra-axial fluid collections.  Calvarium is intact.  There is extensive paranasal sinus disease with chronic opacification of the sphenoid, maxillary, ethmoid and frontal sinuses.  No evidence of fracture.                               X-Ray Chest AP Portable (Final result)  Result time 06/12/20 13:15:52    Final result by Calixto Saha MD (06/12/20 13:15:52)                 Impression:      No acute abnormality.      Electronically signed by: Calixto Saha MD  Date:    06/12/2020  Time:    13:15             Narrative:    EXAMINATION:  XR CHEST AP PORTABLE    CLINICAL HISTORY:  Chest Pain;.    TECHNIQUE:  Single frontal portable view of the chest was performed.    COMPARISON:  05/25/2020    FINDINGS:  Support devices: None    The lungs are clear, with normal appearance of pulmonary vasculature and no pleural effusion or pneumothorax.    The cardiac silhouette is normal in size. The hilar and mediastinal contours are unremarkable.    Bones are intact.                                 Medical Decision Making:   Clinical Tests:   Lab Tests: Reviewed and Ordered  Radiological Study: Reviewed and Ordered  Medical Tests: Reviewed and Ordered                                 Clinical Impression:       ICD-10-CM  ICD-9-CM   1. Syncope, unspecified syncope type R55 780.2   2. Chest pain R07.9 786.50   3. Cerebrovascular accident (CVA), unspecified mechanism I63.9 434.91   4. Syncope R55 780.2   5. Acute anemia D64.9 285.9             ED Disposition Condition    Admit              I, Isela Garcia, personally performed the services described in this documentation. All medical record entries made by the scribe were at my direction and in my presence.  I have reviewed the chart and agree that the record reflects my personal performance and is accurate and complete. Isela Garcia M.D. 2:51 PM06/12/2020             Isela Garcia MD  06/12/20 1452

## 2020-06-12 NOTE — ASSESSMENT & PLAN NOTE
Recently discharged from [facility]   Started on Xarelto 15 mg BID x 21 days (day 17)   Then Xarelto 20 mg daily   Will hold Xarelto now in the setting of decreased Hgb in 2 weeks

## 2020-06-12 NOTE — ASSESSMENT & PLAN NOTE
Patient with syncopal episode at PCP   Recently discharged from [facility]   CT head is negative for any acute hemorrhagic event  Anti-platelet therapy: ASA, consider plavix   Atorvastatin for secondary stroke prevention   Labs: HgA1C, Lipid Panel, TSH, Vitamin B-12, B6, Thiamine, Folate, RPR  Neurology consulted  admit to tele  Q4 nuero checks   MRI, MRA, Carotid Doppler ordered  2D echo with bubble study  PT/OT/ST evaluation  NPO pending speech evaluation   Elevate the head of the bed with aspiration precautions  Fall precautions  hold home blood pressure medications to allow for permissive hypertension    IV labetalol PRN for SBP >220 or DBP >120  Will provide stroke education prior to discharge

## 2020-06-12 NOTE — ED NOTES
Present to ED with syncopal episode and +LOC. Reports feeling dizzy last night. Reports going to doctor's appointment today while in the doctor's office pt had syncopal episode with + LOC and seizure lasting about 20-30 secs.. Denies chest pains, SOB, N/V/D.

## 2020-06-12 NOTE — ASSESSMENT & PLAN NOTE
Patient with reported dizziness and SOB for the last couple days   Patient with hx of iron deficiency anemia at baseline on iron   Never had a colonoscopy - denies dark tarry stools   H/H 6.8/22.5 on admission down from 12.6/40.7 2 weeks ago   Will hold Xarelto for now   Type and Cross   Consents signed in chart   Will transfuse 1 unit today   [facility] consulted   FOBT pending

## 2020-06-12 NOTE — SUBJECTIVE & OBJECTIVE
Past Medical History:   Diagnosis Date    Bilateral pulmonary embolism 5/25/2020    Iron deficiency anemia        No past surgical history on file.    Review of patient's allergies indicates:  No Known Allergies    No current facility-administered medications on file prior to encounter.      Current Outpatient Medications on File Prior to Encounter   Medication Sig    ferrous sulfate 324 mg (65 mg iron) TbEC Take 325 mg by mouth once daily.    multivitamin capsule Take 1 capsule by mouth once daily.    rivaroxaban (XARELTO) 15 mg (42)- 20 mg (9) tablet dose pack Take 15 mg by mouth 2 (two) times daily with meals for 21 days, THEN 20 mg daily with dinner or evening meal.    rivaroxaban (XARELTO) 20 mg Tab Take one tablet by mouth once daily     Family History     Problem Relation (Age of Onset)    Heart disease Father        Tobacco Use    Smoking status: Former Smoker     Types: Cigarettes    Smokeless tobacco: Former User   Substance and Sexual Activity    Alcohol use: No    Drug use: No    Sexual activity: Not on file     Review of Systems   Constitutional: Negative for activity change, chills, fatigue and fever.   HENT: Negative for congestion, ear pain and nosebleeds.    Eyes: Negative for pain.   Respiratory: Positive for shortness of breath. Negative for apnea, chest tightness and wheezing.    Cardiovascular: Negative for chest pain and palpitations.   Gastrointestinal: Negative for abdominal distention, abdominal pain, anal bleeding, blood in stool, constipation, diarrhea, nausea and rectal pain.   Endocrine: Negative for polydipsia.   Genitourinary: Negative for flank pain.   Musculoskeletal: Negative for arthralgias, back pain, neck pain and neck stiffness.   Skin: Negative for rash.   Neurological: Positive for dizziness, seizures (reported by PCP), syncope and headaches. Negative for tremors, facial asymmetry, speech difficulty and light-headedness.   Psychiatric/Behavioral: Negative for  agitation.     Objective:     Vital Signs (Most Recent):  Pulse: 78 (06/12/20 1508)  Resp: 18 (06/12/20 1508)  BP: 124/70 (06/12/20 1400)  SpO2: 98 % (06/12/20 1508) Vital Signs (24h Range):  Pulse:  [72-81] 78  Resp:  [15-18] 18  SpO2:  [98 %-99 %] 98 %  BP: (121-124)/(58-70) 124/70     Weight: 96.2 kg (212 lb)  Body mass index is 28.75 kg/m².    Physical Exam   Constitutional: He is oriented to person, place, and time. He appears well-developed and well-nourished. He is cooperative. He does not appear ill.   HENT:   Head: Normocephalic and atraumatic.   Right Ear: External ear normal.   Left Ear: External ear normal.   Nose: Nose normal.   Mouth/Throat: Oropharynx is clear and moist.   Eyes: Pupils are equal, round, and reactive to light. Conjunctivae and EOM are normal.   Neck: Normal range of motion. Neck supple.   Cardiovascular: Normal rate, regular rhythm and intact distal pulses. Exam reveals no gallop and no friction rub.   No murmur heard.  Pulmonary/Chest: Effort normal and breath sounds normal. No stridor. No respiratory distress. He has no wheezes.   Abdominal: Soft. Bowel sounds are normal. He exhibits no distension. There is no tenderness. There is no guarding.   Musculoskeletal: Normal range of motion. He exhibits no edema.   Neurological: He is alert and oriented to person, place, and time. He has normal strength. He displays no tremor. No cranial nerve deficit or sensory deficit. GCS eye subscore is 4. GCS verbal subscore is 5. GCS motor subscore is 6.          Skin: Skin is warm and dry.   Psychiatric: He has a normal mood and affect.   Nursing note and vitals reviewed.        CRANIAL NERVES     CN III, IV, VI   Pupils are equal, round, and reactive to light.  Extraocular motions are normal.        Significant Labs:   CBC:   Recent Labs   Lab 06/12/20  1252   WBC 9.35   HGB 6.8*   HCT 22.5*        CMP:   Recent Labs   Lab 06/12/20  1252      K 4.1      CO2 21*      BUN  24*   CREATININE 1.1   CALCIUM 8.5*   PROT 6.8   ALBUMIN 3.5   BILITOT 0.3   ALKPHOS 61   AST 18   ALT 15   ANIONGAP 10   EGFRNONAA >60     Cardiac Markers:   Recent Labs   Lab 06/12/20  1252   BNP 15     POCT Glucose: No results for input(s): POCTGLUCOSE in the last 48 hours.  Troponin:   Recent Labs   Lab 06/12/20  1252   TROPONINI <0.006     TSH:   Recent Labs   Lab 05/25/20  2033   TSH 2.565     All pertinent labs within the past 24 hours have been reviewed.    Significant Imaging:   US Carotid Bilateral   Final Result      There is slight increased peak systolic velocities in the ICAs concerning for 50-69% stenosis.      Antegrade vertebral arteries bilateral      See above for additional details.         Electronically signed by: Santy Catalan DO   Date:    06/12/2020   Time:    16:00      CT Head Without Contrast   Final Result   Abnormal      When compared to prior CT of 05/25/2020, there has been interval development of a hypodense area in the left parietal lobe concerning for infarct.  MRI could help provide a better time frame and further characterize.      Extensive chronic paranasal sinus disease.      This report was flagged in Epic as abnormal.         Electronically signed by: Calixto Saha MD   Date:    06/12/2020   Time:    14:02      X-Ray Chest AP Portable   Final Result      No acute abnormality.         Electronically signed by: Calixto Saha MD   Date:    06/12/2020   Time:    13:15      MRA Brain without contrast    (Results Pending)   MRA Neck without contrast    (Results Pending)   MRI Brain Without Contrast    (Results Pending)      I have reviewed and interpreted all pertinent imaging results/findings within the past 24 hours.

## 2020-06-12 NOTE — H&P
"[facility] Medicine  History & Physical    Patient Name: Miky Esqueda  MRN: [id number]  Admission Date: 6/12/2020  Attending Physician: Genaro Mckinnon MD   Primary Care Provider: Farhat Matson MD         Patient information was obtained from patient, past medical records and ER records.     Subjective:     Principal Problem:CVA (cerebral vascular accident)    Chief Complaint:   Chief Complaint   Patient presents with    Loss of Consciousness     Recent admit for bilateral PE at this facility - presented to family [facility] in [facility] for follow-up; per Dr. Gonsalves, pt. stated that he had a syncopal episode last night and while in office, patient experienced another syncopal episode followed by brief seizure like activity lasting about 30 sec. Sent here for eval.         HPI: Miky Esqueda is a 65 y.o. male with history of bilateral PE on Xarelto, iron deficiency anemia who presents to [facility] for evaluation of  syncopal episode at  Dr. Matson's office 1 hour PTA . The patient went to Dr. Matson's office this AM because last night he was urinating and had an episode of dizziness and he started to "black out". The patient reports having multiple bouts of feeling faint and sob the last couple of days. Today, while at 's office, patient states he was sitting down when he became dizzy again and had another syncopal episode. Per EMS, the syncopal episode lasted about 30 seconds. Before he had this episode, he notes that he started sweating. He denied experiencing any CP, SOB, HA, numbness, or tingling. Per , patient had syncopal episode then very brief 15 second seizure then was limp in his arms and woke up very quickly. He was very diaphoretic. He had micturition. Patient noted to have a heart murmur and bilateral bruits on exam at [facility] office.     In the ED, CTH showed interval development of a hypodense area in the left parietal lobe concerning for infarct. There was a " significant drop in H/H to 6.8/22.5 from 12.6/40.7 2 weeks prior. He was type, crossed and transfused 1 unit RBC. [facility] was consulted. Vitals stable on admission. FM consulted for admission and management     Past Medical History:   Diagnosis Date    Bilateral pulmonary embolism 5/25/2020    Iron deficiency anemia        No past surgical history on file.    Review of patient's allergies indicates:  No Known Allergies    No current facility-administered medications on file prior to encounter.      Current Outpatient Medications on File Prior to Encounter   Medication Sig    ferrous sulfate 324 mg (65 mg iron) TbEC Take 325 mg by mouth once daily.    multivitamin capsule Take 1 capsule by mouth once daily.    rivaroxaban (XARELTO) 15 mg (42)- 20 mg (9) tablet dose pack Take 15 mg by mouth 2 (two) times daily with meals for 21 days, THEN 20 mg daily with dinner or evening meal.    rivaroxaban (XARELTO) 20 mg Tab Take one tablet by mouth once daily     Family History     Problem Relation (Age of Onset)    Heart disease Father        Tobacco Use    Smoking status: Former Smoker     Types: Cigarettes    Smokeless tobacco: Former User   Substance and Sexual Activity    Alcohol use: No    Drug use: No    Sexual activity: Not on file     Review of Systems   Constitutional: Negative for activity change, chills, fatigue and fever.   HENT: Negative for congestion, ear pain and nosebleeds.    Eyes: Negative for pain.   Respiratory: Positive for shortness of breath. Negative for apnea, chest tightness and wheezing.    Cardiovascular: Negative for chest pain and palpitations.   Gastrointestinal: Negative for abdominal distention, abdominal pain, anal bleeding, blood in stool, constipation, diarrhea, nausea and rectal pain.   Endocrine: Negative for polydipsia.   Genitourinary: Negative for flank pain.   Musculoskeletal: Negative for arthralgias, back pain, neck pain and neck stiffness.   Skin: Negative for rash.    Neurological: Positive for dizziness, seizures (reported by PCP), syncope and headaches. Negative for tremors, facial asymmetry, speech difficulty and light-headedness.   Psychiatric/Behavioral: Negative for agitation.     Objective:     Vital Signs (Most Recent):  Pulse: 78 (06/12/20 1508)  Resp: 18 (06/12/20 1508)  BP: 124/70 (06/12/20 1400)  SpO2: 98 % (06/12/20 1508) Vital Signs (24h Range):  Pulse:  [72-81] 78  Resp:  [15-18] 18  SpO2:  [98 %-99 %] 98 %  BP: (121-124)/(58-70) 124/70     Weight: 96.2 kg (212 lb)  Body mass index is 28.75 kg/m².    Physical Exam   Constitutional: He is oriented to person, place, and time. He appears well-developed and well-nourished. He is cooperative. He does not appear ill.   HENT:   Head: Normocephalic and atraumatic.   Right Ear: External ear normal.   Left Ear: External ear normal.   Nose: Nose normal.   Mouth/Throat: Oropharynx is clear and moist.   Eyes: Pupils are equal, round, and reactive to light. Conjunctivae and EOM are normal.   Neck: Normal range of motion. Neck supple.   Cardiovascular: Normal rate, regular rhythm and intact distal pulses. Exam reveals no gallop and no friction rub.   No murmur heard.  Pulmonary/Chest: Effort normal and breath sounds normal. No stridor. No respiratory distress. He has no wheezes.   Abdominal: Soft. Bowel sounds are normal. He exhibits no distension. There is no tenderness. There is no guarding.   Musculoskeletal: Normal range of motion. He exhibits no edema.   Neurological: He is alert and oriented to person, place, and time. He has normal strength. He displays no tremor. No cranial nerve deficit or sensory deficit. GCS eye subscore is 4. GCS verbal subscore is 5. GCS motor subscore is 6.          Skin: Skin is warm and dry.   Psychiatric: He has a normal mood and affect.   Nursing note and vitals reviewed.        CRANIAL NERVES     CN III, IV, VI   Pupils are equal, round, and reactive to light.  Extraocular motions are normal.         Significant Labs:   CBC:   Recent Labs   Lab 06/12/20  1252   WBC 9.35   HGB 6.8*   HCT 22.5*        CMP:   Recent Labs   Lab 06/12/20  1252      K 4.1      CO2 21*      BUN 24*   CREATININE 1.1   CALCIUM 8.5*   PROT 6.8   ALBUMIN 3.5   BILITOT 0.3   ALKPHOS 61   AST 18   ALT 15   ANIONGAP 10   EGFRNONAA >60     Cardiac Markers:   Recent Labs   Lab 06/12/20  1252   BNP 15     POCT Glucose: No results for input(s): POCTGLUCOSE in the last 48 hours.  Troponin:   Recent Labs   Lab 06/12/20  1252   TROPONINI <0.006     TSH:   Recent Labs   Lab 05/25/20 2033   TSH 2.565     All pertinent labs within the past 24 hours have been reviewed.    Significant Imaging:   US Carotid Bilateral   Final Result      There is slight increased peak systolic velocities in the ICAs concerning for 50-69% stenosis.      Antegrade vertebral arteries bilateral      See above for additional details.         Electronically signed by: Santy Catalan DO   Date:    06/12/2020   Time:    16:00      CT Head Without Contrast   Final Result   Abnormal      When compared to prior CT of 05/25/2020, there has been interval development of a hypodense area in the left parietal lobe concerning for infarct.  MRI could help provide a better time frame and further characterize.      Extensive chronic paranasal sinus disease.      This report was flagged in Epic as abnormal.         Electronically signed by: Calixto Saha MD   Date:    06/12/2020   Time:    14:02      X-Ray Chest AP Portable   Final Result      No acute abnormality.         Electronically signed by: Calixto Saha MD   Date:    06/12/2020   Time:    13:15      MRA Brain without contrast    (Results Pending)   MRA Neck without contrast    (Results Pending)   MRI Brain Without Contrast    (Results Pending)      I have reviewed and interpreted all pertinent imaging results/findings within the past 24 hours.    Assessment/Plan:     * CVA (cerebral vascular  accident)  Patient with syncopal episode at PCP   Recently discharged from [facility]   CT head is negative for any acute hemorrhagic event  Anti-platelet therapy: ASA, consider plavix   Atorvastatin for secondary stroke prevention   Labs: HgA1C, Lipid Panel, TSH, Vitamin B-12, B6, Thiamine, Folate, RPR  Neurology consulted  admit to tele  Q4 nuero checks   MRI, MRA, Carotid Doppler ordered  2D echo with bubble study  PT/OT/ST evaluation  NPO pending speech evaluation   Elevate the head of the bed with aspiration precautions  Fall precautions  hold home blood pressure medications to allow for permissive hypertension    IV labetalol PRN for SBP >220 or DBP >120  Will provide stroke education prior to discharge          Symptomatic anemia  Patient with reported dizziness and SOB for the last couple days   Patient with hx of iron deficiency anemia at baseline on iron   Never had a colonoscopy - denies dark tarry stools   H/H 6.8/22.5 on admission down from 12.6/40.7 2 weeks ago   Will hold Xarelto for now   Type and Cross   Consents signed in chart   Will transfuse 1 unit today   [facility] consulted   FOBT pending         History of pulmonary embolus (PE)  Recently discharged from [facility]   Started on Xarelto 15 mg BID x 21 days (day 17)   Then Xarelto 20 mg daily   Will hold Xarelto now in the setting of decreased Hgb in 2 weeks       VTE Risk Mitigation (From admission, onward)         Ordered     Place NORAH hose  Until discontinued      06/12/20 1450              Code: Full   Diet: NPO  Disposition: Pending clinical improvement of symptoms.       D'Amico C Johnson, MD  Department of Hospital Medicine   [facility]

## 2020-06-13 LAB
ALBUMIN SERPL BCP-MCNC: 2.8 G/DL (ref 3.5–5.2)
ALP SERPL-CCNC: 45 U/L (ref 55–135)
ALT SERPL W/O P-5'-P-CCNC: 13 U/L (ref 10–44)
ANION GAP SERPL CALC-SCNC: 2 MMOL/L (ref 8–16)
APTT BLDCRRT: 24.8 SEC (ref 21–32)
AST SERPL-CCNC: 58 U/L (ref 10–40)
BASOPHILS # BLD AUTO: 0.01 K/UL (ref 0–0.2)
BASOPHILS NFR BLD: 0.1 % (ref 0–1.9)
BILIRUB SERPL-MCNC: 0.4 MG/DL (ref 0.1–1)
BLD PROD TYP BPU: NORMAL
BLD PROD TYP BPU: NORMAL
BLOOD UNIT EXPIRATION DATE: NORMAL
BLOOD UNIT EXPIRATION DATE: NORMAL
BLOOD UNIT TYPE CODE: 5100
BLOOD UNIT TYPE CODE: 5100
BLOOD UNIT TYPE: NORMAL
BLOOD UNIT TYPE: NORMAL
BUN SERPL-MCNC: 24 MG/DL (ref 8–23)
CALCIUM SERPL-MCNC: 8 MG/DL (ref 8.7–10.5)
CHLORIDE SERPL-SCNC: 105 MMOL/L (ref 95–110)
CO2 SERPL-SCNC: 26 MMOL/L (ref 23–29)
CODING SYSTEM: NORMAL
CODING SYSTEM: NORMAL
CREAT SERPL-MCNC: 0.9 MG/DL (ref 0.5–1.4)
DIFFERENTIAL METHOD: ABNORMAL
DISPENSE STATUS: NORMAL
DISPENSE STATUS: NORMAL
EOSINOPHIL # BLD AUTO: 0.1 K/UL (ref 0–0.5)
EOSINOPHIL NFR BLD: 0.5 % (ref 0–8)
ERYTHROCYTE [DISTWIDTH] IN BLOOD BY AUTOMATED COUNT: 17.9 % (ref 11.5–14.5)
EST. GFR  (AFRICAN AMERICAN): >60 ML/MIN/1.73 M^2
EST. GFR  (NON AFRICAN AMERICAN): >60 ML/MIN/1.73 M^2
FOLATE SERPL-MCNC: 10.7 NG/ML (ref 4–24)
GLUCOSE SERPL-MCNC: 101 MG/DL (ref 70–110)
HCT VFR BLD AUTO: 22 % (ref 40–54)
HCT VFR BLD AUTO: 22.8 % (ref 40–54)
HCT VFR BLD AUTO: 24.2 % (ref 40–54)
HCT VFR BLD AUTO: 25 % (ref 40–54)
HCT VFR BLD AUTO: 25.4 % (ref 40–54)
HGB BLD-MCNC: 6.9 G/DL (ref 14–18)
HGB BLD-MCNC: 7.1 G/DL (ref 14–18)
HGB BLD-MCNC: 7.6 G/DL (ref 14–18)
HGB BLD-MCNC: 8.1 G/DL (ref 14–18)
HGB BLD-MCNC: 8.1 G/DL (ref 14–18)
IMM GRANULOCYTES # BLD AUTO: 0.06 K/UL (ref 0–0.04)
IMM GRANULOCYTES NFR BLD AUTO: 0.6 % (ref 0–0.5)
INR PPP: 1 (ref 0.8–1.2)
IRON SERPL-MCNC: 50 UG/DL (ref 45–160)
LYMPHOCYTES # BLD AUTO: 2.3 K/UL (ref 1–4.8)
LYMPHOCYTES NFR BLD: 21.9 % (ref 18–48)
MAGNESIUM SERPL-MCNC: 1.8 MG/DL (ref 1.6–2.6)
MCH RBC QN AUTO: 25.4 PG (ref 27–31)
MCHC RBC AUTO-ENTMCNC: 31.4 G/DL (ref 32–36)
MCV RBC AUTO: 81 FL (ref 82–98)
MONOCYTES # BLD AUTO: 0.9 K/UL (ref 0.3–1)
MONOCYTES NFR BLD: 8.8 % (ref 4–15)
NEUTROPHILS # BLD AUTO: 7.1 K/UL (ref 1.8–7.7)
NEUTROPHILS NFR BLD: 68.1 % (ref 38–73)
NRBC BLD-RTO: 1 /100 WBC
PHOSPHATE SERPL-MCNC: 3.1 MG/DL (ref 2.7–4.5)
PLATELET # BLD AUTO: 177 K/UL (ref 150–350)
PMV BLD AUTO: 11.3 FL (ref 9.2–12.9)
POCT GLUCOSE: 103 MG/DL (ref 70–110)
POCT GLUCOSE: 111 MG/DL (ref 70–110)
POTASSIUM SERPL-SCNC: 4.2 MMOL/L (ref 3.5–5.1)
PROT SERPL-MCNC: 5.8 G/DL (ref 6–8.4)
PROTHROMBIN TIME: 11 SEC (ref 9–12.5)
RBC # BLD AUTO: 2.72 M/UL (ref 4.6–6.2)
RPR SER QL: NORMAL
SATURATED IRON: 16 % (ref 20–50)
SODIUM SERPL-SCNC: 133 MMOL/L (ref 136–145)
TOTAL IRON BINDING CAPACITY: 306 UG/DL (ref 250–450)
TRANS ERYTHROCYTES VOL PATIENT: NORMAL ML
TRANS ERYTHROCYTES VOL PATIENT: NORMAL ML
TRANSFERRIN SERPL-MCNC: 207 MG/DL (ref 200–375)
VIT B12 SERPL-MCNC: >2000 PG/ML (ref 210–950)
WBC # BLD AUTO: 10.43 K/UL (ref 3.9–12.7)

## 2020-06-13 PROCEDURE — 85018 HEMOGLOBIN: CPT | Mod: 91

## 2020-06-13 PROCEDURE — 85014 HEMATOCRIT: CPT

## 2020-06-13 PROCEDURE — 83735 ASSAY OF MAGNESIUM: CPT

## 2020-06-13 PROCEDURE — 11000001 HC ACUTE MED/SURG PRIVATE ROOM

## 2020-06-13 PROCEDURE — 85730 THROMBOPLASTIN TIME PARTIAL: CPT

## 2020-06-13 PROCEDURE — 80053 COMPREHEN METABOLIC PANEL: CPT

## 2020-06-13 PROCEDURE — 85018 HEMOGLOBIN: CPT

## 2020-06-13 PROCEDURE — 85014 HEMATOCRIT: CPT | Mod: 91

## 2020-06-13 PROCEDURE — 94761 N-INVAS EAR/PLS OXIMETRY MLT: CPT

## 2020-06-13 PROCEDURE — 36415 COLL VENOUS BLD VENIPUNCTURE: CPT

## 2020-06-13 PROCEDURE — 36430 TRANSFUSION BLD/BLD COMPNT: CPT

## 2020-06-13 PROCEDURE — P9021 RED BLOOD CELLS UNIT: HCPCS

## 2020-06-13 PROCEDURE — A4216 STERILE WATER/SALINE, 10 ML: HCPCS | Performed by: STUDENT IN AN ORGANIZED HEALTH CARE EDUCATION/TRAINING PROGRAM

## 2020-06-13 PROCEDURE — 84100 ASSAY OF PHOSPHORUS: CPT

## 2020-06-13 PROCEDURE — 92610 EVALUATE SWALLOWING FUNCTION: CPT

## 2020-06-13 PROCEDURE — 86592 SYPHILIS TEST NON-TREP QUAL: CPT

## 2020-06-13 PROCEDURE — 85025 COMPLETE CBC W/AUTO DIFF WBC: CPT

## 2020-06-13 PROCEDURE — 25000003 PHARM REV CODE 250: Performed by: STUDENT IN AN ORGANIZED HEALTH CARE EDUCATION/TRAINING PROGRAM

## 2020-06-13 PROCEDURE — 85610 PROTHROMBIN TIME: CPT

## 2020-06-13 RX ADMIN — SODIUM CHLORIDE: 0.9 INJECTION, SOLUTION INTRAVENOUS at 10:06

## 2020-06-13 RX ADMIN — Medication 3 ML: at 06:06

## 2020-06-13 RX ADMIN — Medication 3 ML: at 11:06

## 2020-06-13 NOTE — CONSULTS
"NEUROLOGY FLOOR CONSULT    Reason for consult:  CVA    Informant:  Chart Check       Other sources of information : patient    CC:  "syncopal episode"    HPI:   Miky Esqueda is a 65 y.o. year old man who has pmh of SUBHASH, recent b/l PE w DVTs, smoking, who was recently discharged from [facility] after he was found to have bilateral Pes and DVTs.     Two days ago of "dizziness" which he describes as lightheadedness; he states that this happened while he was urinating and he was not wearing his Oxygen. After he reapplied oxygen his symptoms resolved. Yesterday he saw his PCP where he had a syncopal episode and was sent to the ER for Evaluation. He states that right before the episode he felt lightheaded and asked her wife call for help. He was sent to the ER for evaluation. In the ER he was found to have significantly low H/H which had dropped from previous 12.6/40.7 --> 6.8/22.5.    He denies weakness/numbness or episode of such week or two ago. He denies double vision, blurry vision, nausea/vomiting.    ROS: negative except as above, denies dark stools.     Histories:     Allergies:  Patient has no known allergies.    Current Medications:    Current Facility-Administered Medications   Medication Dose Route Frequency Provider Last Rate Last Dose    0.9%  NaCl infusion (for blood administration)   Intravenous Q24H PRN D'Amico C. Johnson, MD        atorvastatin tablet 40 mg  40 mg Oral Daily D'Amico C. Johnson, MD   40 mg at 06/12/20 1831    labetaloL injection 10 mg  10 mg Intravenous Q4H PRN D'Amico C. Johnson, MD        sodium chloride 0.9% flush 3 mL  3 mL Intravenous Q8H D'Amico C. Johnson, MD   3 mL at 06/13/20 0608       Past Medical/Surgical/Family History:  Medical:   Past Medical History:   Diagnosis Date    Bilateral pulmonary embolism 5/25/2020    History of DVT (deep vein thrombosis) 6/12/2020    Iron deficiency anemia       Surgeries: History reviewed. No pertinent surgical history.   Family:   Family " History   Problem Relation Age of Onset    Heart disease Father    , no family history of nerve or muscle disease    Social History:    Substance Abuse/Dependence History:  Social History     Socioeconomic History    Marital status:      Spouse name: Not on file    Number of children: Not on file    Years of education: Not on file    Highest education level: Not on file   Occupational History    Not on file   Social Needs    Financial resource strain: Not on file    Food insecurity     Worry: Not on file     Inability: Not on file    Transportation needs     Medical: Not on file     Non-medical: Not on file   Tobacco Use    Smoking status: Former Smoker     Types: Cigarettes    Smokeless tobacco: Former User   Substance and Sexual Activity    Alcohol use: No    Drug use: No    Sexual activity: Not Currently     Partners: Female   Lifestyle    Physical activity     Days per week: Not on file     Minutes per session: Not on file    Stress: Not on file   Relationships    Social connections     Talks on phone: Not on file     Gets together: Not on file     Attends [employer] service: Not on file     Active member of club or organization: Not on file     Attends meetings of clubs or organizations: Not on file     Relationship status: Not on file   Other Topics Concern    Not on file   Social History Narrative    Not on file               Current Evaluation:     Vital Signs:   Vitals:    06/13/20 0909   BP: 116/65   Pulse: 86   Resp: 16   Temp: 96.6 °F (35.9 °C)          Neurological Exam   Mental Status:  Alert and oriented to self, place, and time.     Language:  No aphasia, no dysarthria.     Cranial Nerves:  Visual fields were intact to confrontation, no RAPD, no anisocoria, EOM intact bilaterally, V1-V3 with good sensation to light touch, no facial asymmetry, hearing grossly intact, palate, and tongue midline. Good shoulder rug.     Motor:  Strength: 5/5 in all 4 extremities through out.    Tone: Good muscle tone.    No pronator drift    DTRs:  Symmetric and 2+ un uppers  3+ in lowers  No clonus  Down going plantars    Sensation:  Intact to light touch through out.  Vibration and proprioception intact    Cerebellar:  Negative Romberg   Good finger to nose.  Good heal to shin  Rapid alternating movement without any problems.    Gait and Stand:  deferred      LABORATORY STUDIES:  Recent Results (from the past 24 hour(s))   CBC auto differential    Collection Time: 06/12/20 12:52 PM   Result Value Ref Range    WBC 9.35 3.90 - 12.70 K/uL    RBC 2.80 (L) 4.60 - 6.20 M/uL    Hemoglobin 6.8 (L) 14.0 - 18.0 g/dL    Hematocrit 22.5 (L) 40.0 - 54.0 %    Mean Corpuscular Volume 80 (L) 82 - 98 fL    Mean Corpuscular Hemoglobin 24.3 (L) 27.0 - 31.0 pg    Mean Corpuscular Hemoglobin Conc 30.2 (L) 32.0 - 36.0 g/dL    RDW 17.2 (H) 11.5 - 14.5 %    Platelets 205 150 - 350 K/uL    MPV 11.2 9.2 - 12.9 fL    Immature Granulocytes 0.7 (H) 0.0 - 0.5 %    Gran # (ANC) 6.9 1.8 - 7.7 K/uL    Immature Grans (Abs) 0.07 (H) 0.00 - 0.04 K/uL    Lymph # 1.7 1.0 - 4.8 K/uL    Mono # 0.7 0.3 - 1.0 K/uL    Eos # 0.0 0.0 - 0.5 K/uL    Baso # 0.02 0.00 - 0.20 K/uL    nRBC 0 0 /100 WBC    Gran% 73.3 (H) 38.0 - 73.0 %    Lymph% 18.0 18.0 - 48.0 %    Mono% 7.5 4.0 - 15.0 %    Eosinophil% 0.3 0.0 - 8.0 %    Basophil% 0.2 0.0 - 1.9 %    Differential Method Automated    Comprehensive metabolic panel    Collection Time: 06/12/20 12:52 PM   Result Value Ref Range    Sodium 137 136 - 145 mmol/L    Potassium 4.1 3.5 - 5.1 mmol/L    Chloride 106 95 - 110 mmol/L    CO2 21 (L) 23 - 29 mmol/L    Glucose 106 70 - 110 mg/dL    BUN, Bld 24 (H) 8 - 23 mg/dL    Creatinine 1.1 0.5 - 1.4 mg/dL    Calcium 8.5 (L) 8.7 - 10.5 mg/dL    Total Protein 6.8 6.0 - 8.4 g/dL    Albumin 3.5 3.5 - 5.2 g/dL    Total Bilirubin 0.3 0.1 - 1.0 mg/dL    Alkaline Phosphatase 61 55 - 135 U/L    AST 18 10 - 40 U/L    ALT 15 10 - 44 U/L    Anion Gap 10 8 - 16 mmol/L    eGFR if  African American >60 >60 mL/min/1.73 m^2    eGFR if non African American >60 >60 mL/min/1.73 m^2   Troponin I #1    Collection Time: 06/12/20 12:52 PM   Result Value Ref Range    Troponin I <0.006 0.000 - 0.026 ng/mL   B-Type natriuretic peptide (BNP)    Collection Time: 06/12/20 12:52 PM   Result Value Ref Range    BNP 15 0 - 99 pg/mL   Protime-INR    Collection Time: 06/12/20  1:19 PM   Result Value Ref Range    Prothrombin Time 15.0 (H) 9.0 - 12.5 sec    INR 1.4 (H) 0.8 - 1.2   COVID-19 Rapid Screening    Collection Time: 06/12/20  2:34 PM   Result Value Ref Range    SARS-CoV-2 RNA, Amplification, Qual Negative Negative   Prepare RBC 1 Unit    Collection Time: 06/12/20  2:35 PM   Result Value Ref Range    UNIT NUMBER [id number]     Product Code I6420A06     DISPENSE STATUS TRANSFUSED     CODING SYSTEM [id number]     Unit Blood Type Code 5100     Unit Blood Type O POS     Unit Expiration [id number]     UNIT NUMBER [id number]     Product Code R5330D03     DISPENSE STATUS CROSSMATCHED     CODING SYSTEM [id number]     Unit Blood Type Code 5100     Unit Blood Type O POS     Unit Expiration [id number]    Type & Screen    Collection Time: 06/12/20  3:23 PM   Result Value Ref Range    Group & Rh B NEG     Indirect Romel NEG    Magnesium    Collection Time: 06/12/20  6:07 PM   Result Value Ref Range    Magnesium 1.6 1.6 - 2.6 mg/dL   Phosphorus    Collection Time: 06/12/20  6:07 PM   Result Value Ref Range    Phosphorus 3.2 2.7 - 4.5 mg/dL   Lipid panel    Collection Time: 06/12/20  6:07 PM   Result Value Ref Range    Cholesterol 177 120 - 199 mg/dL    Triglycerides 89 30 - 150 mg/dL    HDL 41 40 - 75 mg/dL    LDL Cholesterol 118.2 63.0 - 159.0 mg/dL    Hdl/Cholesterol Ratio 23.2 20.0 - 50.0 %    Total Cholesterol/HDL Ratio 4.3 2.0 - 5.0    Non-HDL Cholesterol 136 mg/dL   Lactate dehydrogenase    Collection Time: 06/12/20  6:07 PM   Result Value Ref Range     110 - 260 U/L   Iron and TIBC    Collection Time:  06/12/20  6:07 PM   Result Value Ref Range    Iron 50 45 - 160 ug/dL    Transferrin 207 200 - 375 mg/dL    TIBC 306 250 - 450 ug/dL    Saturated Iron 16 (L) 20 - 50 %   Vitamin B12    Collection Time: 06/12/20  6:07 PM   Result Value Ref Range    Vitamin B-12 >2000 (H) 210 - 950 pg/mL   Folate    Collection Time: 06/12/20  6:07 PM   Result Value Ref Range    Folate 10.7 4.0 - 24.0 ng/mL   Hemoglobin    Collection Time: 06/12/20  6:07 PM   Result Value Ref Range    Hemoglobin 6.4 (L) 14.0 - 18.0 g/dL   Hematocrit    Collection Time: 06/12/20  6:07 PM   Result Value Ref Range    Hematocrit 20.9 (L) 40.0 - 54.0 %   TSH    Collection Time: 06/12/20  6:09 PM   Result Value Ref Range    TSH 0.916 0.400 - 4.000 uIU/mL   Reticulocytes    Collection Time: 06/12/20  6:09 PM   Result Value Ref Range    Retic 5.4 (H) 0.4 - 2.0 %   Ferritin    Collection Time: 06/12/20  6:09 PM   Result Value Ref Range    Ferritin 333 (H) 20.0 - 300.0 ng/mL   Drug screen panel, emergency    Collection Time: 06/12/20  7:20 PM   Result Value Ref Range    Benzodiazepines Negative     Methadone metabolites Negative     Cocaine (Metab.) Negative     Opiate Scrn, Ur Negative     Barbiturate Screen, Ur Negative     Amphetamine Screen, Ur Negative     THC Negative     Phencyclidine Negative     Creatinine, Random Ur 119.7 23.0 - 375.0 mg/dL    Toxicology Information SEE COMMENT    Occult blood x 1, stool    Collection Time: 06/12/20  8:35 PM   Result Value Ref Range    Occult Blood Positive (A) Negative   POCT glucose    Collection Time: 06/12/20  9:57 PM   Result Value Ref Range    POCT Glucose 100 70 - 110 mg/dL   Hemoglobin    Collection Time: 06/13/20 12:07 AM   Result Value Ref Range    Hemoglobin 7.6 (L) 14.0 - 18.0 g/dL   Hematocrit    Collection Time: 06/13/20 12:07 AM   Result Value Ref Range    Hematocrit 24.2 (L) 40.0 - 54.0 %   Comprehensive metabolic panel    Collection Time: 06/13/20  5:32 AM   Result Value Ref Range    Sodium 133 (L) 136  - 145 mmol/L    Potassium 4.2 3.5 - 5.1 mmol/L    Chloride 105 95 - 110 mmol/L    CO2 26 23 - 29 mmol/L    Glucose 101 70 - 110 mg/dL    BUN, Bld 24 (H) 8 - 23 mg/dL    Creatinine 0.9 0.5 - 1.4 mg/dL    Calcium 8.0 (L) 8.7 - 10.5 mg/dL    Total Protein 5.8 (L) 6.0 - 8.4 g/dL    Albumin 2.8 (L) 3.5 - 5.2 g/dL    Total Bilirubin 0.4 0.1 - 1.0 mg/dL    Alkaline Phosphatase 45 (L) 55 - 135 U/L    AST 58 (H) 10 - 40 U/L    Anion Gap 2 (L) 8 - 16 mmol/L    eGFR if African American >60 >60 mL/min/1.73 m^2    eGFR if non African American >60 >60 mL/min/1.73 m^2   Magnesium    Collection Time: 06/13/20  5:32 AM   Result Value Ref Range    Magnesium 1.8 1.6 - 2.6 mg/dL   Phosphorus    Collection Time: 06/13/20  5:32 AM   Result Value Ref Range    Phosphorus 3.1 2.7 - 4.5 mg/dL   CBC auto differential    Collection Time: 06/13/20  5:32 AM   Result Value Ref Range    WBC 10.43 3.90 - 12.70 K/uL    RBC 2.72 (L) 4.60 - 6.20 M/uL    Hemoglobin 6.9 (L) 14.0 - 18.0 g/dL    Hematocrit 22.0 (L) 40.0 - 54.0 %    Mean Corpuscular Volume 81 (L) 82 - 98 fL    Mean Corpuscular Hemoglobin 25.4 (L) 27.0 - 31.0 pg    Mean Corpuscular Hemoglobin Conc 31.4 (L) 32.0 - 36.0 g/dL    RDW 17.9 (H) 11.5 - 14.5 %    Platelets 177 150 - 350 K/uL    MPV 11.3 9.2 - 12.9 fL    Immature Granulocytes 0.6 (H) 0.0 - 0.5 %    Gran # (ANC) 7.1 1.8 - 7.7 K/uL    Immature Grans (Abs) 0.06 (H) 0.00 - 0.04 K/uL    Lymph # 2.3 1.0 - 4.8 K/uL    Mono # 0.9 0.3 - 1.0 K/uL    Eos # 0.1 0.0 - 0.5 K/uL    Baso # 0.01 0.00 - 0.20 K/uL    nRBC 1 (A) 0 /100 WBC    Gran% 68.1 38.0 - 73.0 %    Lymph% 21.9 18.0 - 48.0 %    Mono% 8.8 4.0 - 15.0 %    Eosinophil% 0.5 0.0 - 8.0 %    Basophil% 0.1 0.0 - 1.9 %    Differential Method Automated    APTT    Collection Time: 06/13/20  5:32 AM   Result Value Ref Range    aPTT 24.8 21.0 - 32.0 sec   Protime-INR    Collection Time: 06/13/20  5:32 AM   Result Value Ref Range    Prothrombin Time 11.0 9.0 - 12.5 sec    INR 1.0 0.8 - 1.2    Hemoglobin    Collection Time: 06/13/20  5:32 AM   Result Value Ref Range    Hemoglobin 7.1 (L) 14.0 - 18.0 g/dL   Hematocrit    Collection Time: 06/13/20  5:32 AM   Result Value Ref Range    Hematocrit 22.8 (L) 40.0 - 54.0 %   POCT glucose    Collection Time: 06/13/20  6:15 AM   Result Value Ref Range    POCT Glucose 103 70 - 110 mg/dL       Thyroid normal  HgA1C%:  5.8  Vit B12: >2000  Folate:  10.7    RADIOLOGY STUDIES:  I have personally reviewed the images performed.     HEAD CT: L parietal lobe hypodensity which is absent in previous CT head dated 5/25    BRAIN MRI Subacute ischemic event in L parietal lobe w ADC and T2 changes w no DWI hyperintensity.      Assessment:  P     66 yo man w recent b/l PE in the setting of b/l DVTs who was discharged on NOAC presents after syncopal episode. Workup remarkable for significant drop in h/h w positive FOBT and CTH w new L parietal hypodensity. MR brain consistent with subacute/early remote ischemic event as there are no DWI changes. Etiology of the stroke could be related to the sudden drop of H/H in the setting of 50-69% stenosis of ICAs seen on US vs Hypercoagulable state and paradoxical emboli given recent h/o DVT and PE.     - TTE w bubble pending  if there is a shunt would consult cards.  - Would keep Hemoglobin >7  - normoglycemia, normotension  - Telemetry  - if not anticoagulating 2/2 GI bleed would add Plavix if/when safe from GI perspective. Continue ASA; if anticoagulation is started at some point will need to keep only one antiplatelet on. Triple therapy is not recommended.  - if he is found to have malignancy as a cause of the bleeding will likely need anticoagulation when safe   - NPO until swallow eval  - Smoking cessation counseling  states already stopped smoking when he was diagnosed with pulmonary embolism  - PT/OT/ST  - labs reviewed      Differential diagnosis was explained to the patient. All questions were answered. Patient understood and  agreed to adhere to plan.     Case to be discussed with Dr. Steele      Will follow for additional input regarding management of current neurologic condition and monitor for any new signs/symptoms to suggest neurologic detrimental changes.     Appreciate the consult.     Hever Castano MD

## 2020-06-13 NOTE — PT/OT/SLP PROGRESS
Occupational Therapy      Patient Name:  Miky Esqueda   MRN:  [id number]    9:54 AM Patient not seen today secondary to (EKG in room). Will follow-up as able.    Cora Knott OT  6/13/2020

## 2020-06-13 NOTE — ASSESSMENT & PLAN NOTE
Patient with syncopal episode at PCP   Recently discharged from [facility]   CT head is negative for any acute hemorrhagic event  Anti-platelet therapy: ASA, consider plavix   Atorvastatin for secondary stroke prevention   Labs: HgA1C, Lipid Panel, TSH, Vitamin B-12, B6, Thiamine, Folate, RPR  Neurology consulted, recommendations pending  admit to tele  Q4 nuero checks   MRI, MRA, Carotid Doppler demonstrated no high-grade stenosis, MRI confirmed eft parietal lobe as noted on CT, on MRI appears to represent likely late subacute to early remote ischemic change  2D echo with bubble study  PT/OT/ST evaluation  NPO pending speech evaluation   Elevate the head of the bed with aspiration precautions  Fall precautions  hold home blood pressure medications to allow for permissive hypertension    IV labetalol PRN for SBP >220 or DBP >120  Will provide stroke education prior to discharge

## 2020-06-13 NOTE — PLAN OF CARE
Dr. Harris notified of H/H 6.9/22.  Physician stated she would be placing orders for another unit of blood

## 2020-06-13 NOTE — ASSESSMENT & PLAN NOTE
Recently discharged from [facility]   Started on Xarelto 15 mg BID x 21 days (day 17)   Then Xarelto 20 mg daily   Will hold Xarelto now in the setting of decreased Hgb in 2 weeks   -Continue to monitor H/H  -No increased WOB

## 2020-06-13 NOTE — HOSPITAL COURSE
In the ED, CTH showed interval development of a hypodense area in the left parietal lobe concerning for infarct. There was a significant drop in H/H to 6.8/22.5 from 12.6/40.7 2 weeks prior. He was type, crossed and transfused 2 units RBC. [facility] was consulted. Vitals stable on admission. FM consulted for admission and management. MRA head and neck showed the major intracranial arterial vascular structures demonstrate evidence for appropriate flow without evidence for high-grade stenosis or occlusion and there is no evidence for intracranial aneurysm or AVM. Bilateral Carotid US was significant for  slight increased peak systolic velocities in the ICAs concerning for 50-69% stenosis. Neurology recommended TTE with bubble and if not anticoagulating secondary to GI bleed would add Plavix if/when safe from GI perspective. Continue ASA; if anticoagulation is started at some point will need to keep only one antiplatelet on. Triple therapy is not recommended. A TTE saline (bubble) was ordered. The study is positive for right to left shunt. Cardiology advised they  would not pursue with closure devices till other issues addressed. They were in agreement  holding anticoagulants untill GI evaluates but he will need his xarelto for the PE. The patient was taken by GI for EGD and colonoscopy. EGD showed A small hiatal hernia, patchy moderately friable mucosa with no bleeding was found in the gastric fundus and a single small angiodysplastic lesion with no bleeding was found on the posterior wall of the gastric antrum. Biopsies were taken with a cold forceps for histology. Colonoscopy revealed hemorrhoids on perianal exam. A 6 mm sessile polyp was found in the sigmoid colon and a A 2 mm sessile polyp was found in the rectum  The polyps were removed with a cold snare. Resection and retrieval were complete. GI recommended to consider VCE for further evaluation of possible small bowel AVMs, resume Xarelto (rivaroxaban), No  aspirin, ibuprofen, naproxen, or other non-steroidal anti-inflammatory drugs unless needed for cardiovascular protection. The patient will need repeat colonoscopy in 5 years. Atorvastatin, ASA and Xarelto were sent to [facility] pharmacy for delivery at bedside. Appointments made with PCP, and cardiology. Indications to return to ED given. All questions answered

## 2020-06-13 NOTE — PT/OT/SLP EVAL
"Speech Language Pathology Evaluation  Bedside Swallow    Patient Name:  Miky Esqueda   MRN:  [id number]  Admitting Diagnosis: CVA (cerebral vascular accident)    Recommendations:                 General Recommendations:  diet f/u x1-2  Diet recommendations:  Regular, Thin   Aspiration Precautions: 1 bite/sip at a time, Alternating bites/sips, Feed only when awake/alert, Frequent oral care, HOB to 90 degrees, Meds whole 1 at a time, Remain upright 30 minutes post meal, Small bites/sips and Standard aspiration precautions   General Precautions: Standard, fall  Communication strategies:  none    History:     Past Medical History:   Diagnosis Date    Bilateral pulmonary embolism 5/25/2020    History of DVT (deep vein thrombosis) 6/12/2020    Iron deficiency anemia      HPI: Miky Esqueda is a 65 y.o. male with history of bilateral PE on Xarelto, iron deficiency anemia who presents to [facility] for evaluation of  syncopal episode at  Dr. Matson's office 1 hour PTA . The patient went to Dr. Matson's office this AM because last night he was urinating and had an episode of dizziness and he started to "black out". The patient reports having multiple bouts of feeling faint and sob the last couple of days. Today, while at 's office, patient states he was sitting down when he became dizzy again and had another syncopal episode. Per EMS, the syncopal episode lasted about 30 seconds. Before he had this episode, he notes that he started sweating. He denied experiencing any CP, SOB, HA, numbness, or tingling. Per , patient had syncopal episode then very brief 15 second seizure then was limp in his arms and woke up very quickly. He was very diaphoretic. He had micturition. Patient noted to have a heart murmur and bilateral bruits on exam at [facility]'s office.      In the ED, CTH showed interval development of a hypodense area in the left parietal lobe concerning for infarct. There was a significant drop in H/H to " "6.8/22.5 from 12.6/40.7 2 weeks prior. He was type, crossed and transfused 1 unit RBC. [facility] was consulted. Vitals stable on admission. FM consulted for admission and management      History reviewed. No pertinent surgical history.    Social History: Patient lives at home.    Prior Intubation HX:  N/A    Modified Barium Swallow: None on file, per chart review.     Chest X-Rays: No acute abnormality.     MRI Brain without Contrast:   Impression:       There is no evidence for acute intracranial process, there is a finding of the left parietal lobe as noted on CT, on MRI appears to represent likely late subacute to early remote ischemic change, as discussed above.     Additional chronic intracranial changes are noted.     Paranasal sinus and mastoid findings are noted as above.      Prior diet: Per pt, regular textures/thin liquids.    Subjective     SLP confirmed with RN prior to entry. Pt found in bed awake, alert, and agreeable to participate in skilled ST session. Pt appeared to be in good spirits.   Patient goals: "I'm doing pretty good this morning"     Pain/Comfort:  Pain Rating 1: 0/10    Objective:     Oral Musculature Evaluation  Oral Musculature: WFL  Dentition: present and adequate  Mucosal Quality: good  Mandibular Strength and Mobility: WFL  Oral Labial Strength and Mobility: WFL  Lingual Strength and Mobility: WFL  Buccal Strength and Mobility: WFL  Volitional Swallow: (timely trigger of swallow with adequate laryngeal elevation/excursion per hand palpation)  Voice Prior to PO Intake: (clear)    Bedside Swallow Eval:   Consistencies Assessed:  · Thin liquids -via cup sips x6, straw sips x7  · Puree (pudding) via tsp bites x3  · Solids (pieces of love cracker) x3     Oral Phase:   · WFL    Pharyngeal Phase:   · no overt clinical signs/symptoms of aspiration  · no overt clinical signs/symptoms of pharyngeal dysphagia    Compensatory Strategies  · None    Treatment: SLP will continue to follow and " treat pt x3/wk. Pt will participate in an informal speech-language-cognition assessment next session.     Assessment:     Miky Esqueda is a 65 y.o. male admitted with CVA (cerebral vascular accident).  He presents with oral and pharyngeal phases of the swallow judged to be WFL -- no overt s/s of penetration and/or aspiration across all PO trials assessed.     Goals:   Multidisciplinary Problems     SLP Goals        Problem: SLP Goal    Goal Priority Disciplines Outcome   SLP Goal     SLP Ongoing, Progressing   Description: Short Term Goals:  1. Pt will participate in BSS to determine least restrictive diet. -- MET 6/13  2. Pt will tolerate regular textures/thin liquids with no overt s/s of aspiration.  3. Pt will participate in an informal speech-language-cognition assessment to r/o related deficits.                      Plan:     · Patient to be seen:  3 x/week   · Plan of Care expires:  07/13/20  · Plan of Care reviewed with:  patient, other (see comments)(FLOR Mckinney)   · SLP Follow-Up:  Yes       Discharge recommendations:  other (see comments)(TBD pending pt's progress)     Time Tracking:     SLP Treatment Date:   06/13/20  Speech Start Time:  0821  Speech Stop Time:  0829     Speech Total Time (min):  8 min    Billable Minutes: Eval Swallow and Oral Function 8    NIKOS Gonzalez, CCC-SLP  06/13/2020

## 2020-06-13 NOTE — ASSESSMENT & PLAN NOTE
Patient with reported dizziness and SOB for the last couple days   Patient with hx of iron deficiency anemia at baseline on iron   Never had a colonoscopy - denies dark tarry stools   H/H 6.8/22.5 on admission down from 12.6/40.7 2 weeks ago   Will hold Xarelto for now   Type and Cross   Consents signed in chart   Will transfuse 1 unit today in addition to prior 1 unit PRBCs for total of 2, as H 6.9  [facility] consulted   FOBT positive

## 2020-06-13 NOTE — PLAN OF CARE
1915H Patient is awake and orientedx4. Care plan explained and verbalized understanding. VIP care model introduced.  On room air, O2 saturation 94%. Hooked to heart monitor running. IV site to the right antecubital and left forearm 20G; 1 unit of PRBC infusing well. Maintained on diabetic diet. Instructed on fall precaution. Bed in lowest position, bed alarms on, call light within reach and instructed to call for help when needed.     Blood sugar monitored. Neurochecks every 4 hours, no drift on all 4 extremities.

## 2020-06-13 NOTE — PLAN OF CARE
Problem: SLP Goal  Goal: SLP Goal  Description: Short Term Goals:  1. Pt will participate in BSS to determine least restrictive diet. -- MET 6/13  2. Pt will tolerate regular textures/thin liquids with no overt s/s of aspiration.  3. Pt will participate in an informal speech-language-cognition assessment to r/o related deficits.       Outcome: Ongoing, Progressing   6/13:  Pt participated in clinical swallow eval this AM. Pt tolerated thin liquids, puree, and hard solid textures with no overt s/s of aspiration, at bedside. SLP will continue to follow and treat pt.   - SLP recs: Pending MD approval, Regular Textures and Thin Liquids with standard swallow precautions.   VIVIANA Gonzalez., CCC-SLP   Speech-Language Pathologist

## 2020-06-13 NOTE — PROGRESS NOTES
"  [facility] Medicine  Progress Note    Patient Name: Miky Esqueda  MRN: [id number]  Patient Class: IP- Inpatient   Admission Date: 6/12/2020  Length of Stay: 1 days  Attending Physician: Farhat Matson MD  Primary Care Provider: Farhat Matson MD        Subjective:     Principal Problem: Symptomatic Anemia      HPI:  Miky Esqueda is a 65 y.o. male with history of bilateral PE on Xarelto, iron deficiency anemia who presents to [facility] [place]- for evaluation of  syncopal episode at  Dr. Matson's office 1 hour PTA . The patient went to Dr. Matson's office this AM because last night he was urinating and had an episode of dizziness and he started to "black out". The patient reports having multiple bouts of feeling faint and sob the last couple of days. Today, while at 's office, patient states he was sitting down when he became dizzy again and had another syncopal episode. Per EMS, the syncopal episode lasted about 30 seconds. Before he had this episode, he notes that he started sweating. He denied experiencing any CP, SOB, HA, numbness, or tingling. Per , patient had syncopal episode then very brief 15 second seizure then was limp in his arms and woke up very quickly. He was very diaphoretic. He had micturition. Patient noted to have a heart murmur and bilateral bruits on exam at [facility]'s office.     In the ED, CTH showed interval development of a hypodense area in the left parietal lobe concerning for infarct. There was a significant drop in H/H to 6.8/22.5 from 12.6/40.7 2 weeks prior. He was type, crossed and transfused 1 unit RBC. [facility] was consulted. Vitals stable on admission. FM consulted for admission and management     Overview/Hospital Course:  6/13 FOBT+, MRI/MRA demonstrate no high grade stenosis/occlusion. ICAs 50-60% occlusion. Patient H 6.9, 2nd unit PRBC ordered, neuro consult pending. Awaiting GI and Neuro recs.     Interval History:   +FOBT, no high grade " stenosis/occlusions on MRI/MRA, ICAs 50-60% occlusion.Awaiting GI/Neuro recs. NPO pending GI consult. H: 6.9, an additional 1u RBC ordered.     Review of Systems   Constitutional: Negative for activity change, chills, fatigue and fever.   HENT: Negative for congestion, ear pain and nosebleeds.    Eyes: Negative for pain.   Respiratory: Negative for apnea, chest tightness, shortness of breath and wheezing.    Cardiovascular: Negative for chest pain and palpitations.   Gastrointestinal: Negative for abdominal distention, abdominal pain, anal bleeding, blood in stool, constipation, diarrhea, nausea and rectal pain.   Endocrine: Negative for polydipsia.   Genitourinary: Negative for flank pain.   Musculoskeletal: Negative for arthralgias, back pain, neck pain and neck stiffness.   Skin: Negative for rash.   Neurological: Negative for dizziness, tremors, seizures (reported by PCP), syncope, facial asymmetry, speech difficulty, light-headedness and headaches.   Psychiatric/Behavioral: Negative for agitation.     Objective:     Vital Signs (Most Recent):  Temp: 96.4 °F (35.8 °C) (06/13/20 0610)  Pulse: 83 (06/13/20 0610)  Resp: 20 (06/13/20 0610)  BP: 114/70 (06/13/20 0610)  SpO2: 96 % (06/13/20 0610) Vital Signs (24h Range):  Temp:  [96.4 °F (35.8 °C)-97.6 °F (36.4 °C)] 96.4 °F (35.8 °C)  Pulse:  [] 83  Resp:  [14-20] 20  SpO2:  [93 %-99 %] 96 %  BP: (114-152)/(58-73) 114/70     Weight: 94.3 kg (207 lb 14.3 oz)  Body mass index is 27.43 kg/m².    Intake/Output Summary (Last 24 hours) at 6/13/2020 0749  Last data filed at 6/13/2020 0400  Gross per 24 hour   Intake 250 ml   Output 700 ml   Net -450 ml      Physical Exam  Vitals signs and nursing note reviewed.   Constitutional:       Appearance: He is well-developed and well-nourished. He is not ill-appearing.   HENT:      Head: Normocephalic and atraumatic.      Right Ear: External ear normal.      Left Ear: External ear normal.      Nose: Nose normal.       Mouth/Throat:      Mouth: Oropharynx is clear and moist.   Eyes:      Extraocular Movements: EOM normal.      Conjunctiva/sclera: Conjunctivae normal.      Pupils: Pupils are equal, round, and reactive to light.   Neck:      Musculoskeletal: Normal range of motion and neck supple.   Cardiovascular:      Rate and Rhythm: Normal rate and regular rhythm.      Pulses: Intact distal pulses.      Heart sounds: No murmur. No friction rub. No gallop.    Pulmonary:      Effort: Pulmonary effort is normal. No respiratory distress.      Breath sounds: Normal breath sounds. No stridor. No wheezing.   Abdominal:      General: Bowel sounds are normal. There is no distension.      Palpations: Abdomen is soft.      Tenderness: There is no abdominal tenderness. There is no guarding.   Musculoskeletal: Normal range of motion.         General: No edema.   Skin:     General: Skin is warm and dry.   Neurological:      Mental Status: He is alert and oriented to person, place, and time.      GCS: GCS eye subscore is 4. GCS verbal subscore is 5. GCS motor subscore is 6.      Cranial Nerves: No cranial nerve deficit.      Sensory: No sensory deficit.      Motor: No tremor.      Deep Tendon Reflexes: Strength normal.      Comments:        Psychiatric:         Mood and Affect: Mood and affect normal.         Behavior: Behavior is cooperative.         Significant Labs:   Recent Labs   Lab 06/13/20  0532  06/14/20  0544  06/15/20  0004 06/15/20  0531 06/15/20  1227   WBC 10.43  --  11.31  --   --  11.50  --    HGB 6.9*  7.1*   < > 8.2*  8.2*   < > 8.8* 8.1* 8.3*   HCT 22.0*  22.8*   < > 26.3*  25.6*   < > 28.0* 25.6* 26.1*     --  178  --   --  175  --    *  --  137  --   --  136  --    K 4.2  --  4.4  --   --  4.2  --      --  108  --   --  106  --    CREATININE 0.9  --  1.2  --   --  1.1  --    BUN 24*  --  27*  --   --  17  --    CO2 26  --  22*  --   --  25  --    ALT 13  --  14  --   --  17  --    AST 58*  --  21  --    --  22  --     < > = values in this interval not displayed.         Significant Imaging:   Imaging Results          MRI Brain Without Contrast (Final result)  Result time 06/12/20 17:36:57    Final result by Yusuf Choe MD (06/12/20 17:36:57)                 Impression:      There is no evidence for acute intracranial process, there is a finding of the left parietal lobe as noted on CT, on MRI appears to represent likely late subacute to early remote ischemic change, as discussed above.    Additional chronic intracranial changes are noted.    Paranasal sinus and mastoid findings are noted as above.      Electronically signed by: Yusuf Choe  Date:    06/12/2020  Time:    17:36             Narrative:    EXAMINATION:  MRI BRAIN WITHOUT CONTRAST    CLINICAL HISTORY:  Stroke;    TECHNIQUE:  Multiplanar multisequence MR imaging of the brain was performed without contrast.    COMPARISON:  CT examination of the brain May 25, 2020, CT examination of the brain June 12, 2020    FINDINGS:  MRI examination of the brain was performed, intravenous contrast was not utilized.  Note is made of the finding of the left parietal lobe on the CT brain of June 12, 2020 that was not present on CT brain of May 25, 2020, this is identified on MRI examination, however does not demonstrate abnormal diffusion signal hyperintensity and therefore is not consistent with an area of acute ischemia, this likely relates to an area of late subacute to early remote ischemic change, without evidence for significant diffusion signal abnormality.  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  Additional chronic appearing changes including chronic white matter changes are noted.  There is no evidence for intracranial mass, mass effect or midline shift, and there is no evidence for abnormal diffusion signal hyperintensity to  specifically suggest acute ischemia/infarct or other cause for restricted diffusion.    Appropriate CSF spaces are appropriate flow voids are noted at the skull base.  The upper cervical cord, brainstem and craniocervical junction appear appropriate when accounting for artifact.  The orbits appear intact.  There is prominent circumferential mucosal thickening of the maxillary antra and there is prominent mucosal thickening and opacity throughout the ethmoid air cells left greater than right, there is opacification of the right frontal sinus with mucosal thickening of the left frontal sinus, there is mucosal thickening of the sphenoid sinus with suggestion of mild air-fluid level on the right.  The mastoid air cells demonstrate signal hyperintensity on T2 imaging likely relating to fluid and opacification.                               MRA Neck without contrast (Final result)  Result time 06/12/20 17:42:35    Final result by Yusuf Choe MD (06/12/20 17:42:35)                 Impression:      The major extracranial arterial vascular structures demonstrate signal consistent with appropriate flow without evidence for high-grade stenosis, occlusion, or dissection.      Electronically signed by: Yusuf Choe  Date:    06/12/2020  Time:    17:42             Narrative:    EXAMINATION:  MRA NECK WITHOUT CONTRAST    CLINICAL HISTORY:  Stroke;    TECHNIQUE:  Noncontrast MRA of the neck was performed.  Non-contrast 2D and/or 3D time-of-flight MR angiography of the neck was performed, with MIP reformatting.    COMPARISON:  None    FINDINGS:  The visualized vertebral arteries demonstrate signal consistent with appropriate flow without evidence for high-grade stenosis or occlusion.  The visualized common carotid, internal carotid and external carotid arteries demonstrate signal consistent with appropriate flow, the internal carotid arteries demonstrating appropriate appearance to the level of the skull base, intracranial  components are evaluated separately on the MRA brain report, there is no evidence for high-grade stenosis, or occlusion and no evidence for dissection.                               MRA Brain without contrast (Final result)  Result time 06/12/20 17:42:50    Final result by Yusuf Choe MD (06/12/20 17:42:50)                 Impression:      The major intracranial arterial vascular structures demonstrate evidence for appropriate flow without evidence for high-grade stenosis or occlusion and there is no evidence for intracranial aneurysm or AVM on this exam.      Electronically signed by: Yusuf Choe  Date:    06/12/2020  Time:    17:42             Narrative:    EXAMINATION:  MRA BRAIN WITHOUT CONTRAST    CLINICAL HISTORY:  Stroke;    TECHNIQUE:  Non-contrast 3-D time-of-flight intracranial MR angiography was performed through the [place] [facility] [place] with MIP reformatting.    COMPARISON:  CT examination of the brain June 12, 2020, MRI examination of the brain June 12, 2020    FINDINGS:  The visualized aspects of the distal vertebral arteries demonstrate signal consistent with appropriate flow as does the basilar artery.  The P1 segment of the PCA bilaterally appears hypoplastic, and there are bilateral posterior communicating arteries, otherwise the posterior cerebral arteries bilaterally appear appropriate.  The visualized aspects of the distal internal carotid arteries demonstrate signal consistent with appropriate flow extending to the [id number].  The DEE and MCA bilaterally appear appropriate.  There is no evidence for major intracranial arterial vascular occlusion, or high-grade stenosis and there is no finding to suggest intracranial aneurysm or AVM on this exam.  Density at the left parietal lobe is noted, this may relate to mineralization or may relate to cortical laminar necrosis associated with an area of remote ischemia, paranasal sinus disease is noted.  Intracranial findings are otherwise  discussed on the head CT.                               US Carotid Bilateral (Final result)  Result time 06/12/20 16:00:45    Final result by Santy Catalan DO (06/12/20 16:00:45)                 Impression:      There is slight increased peak systolic velocities in the ICAs concerning for 50-69% stenosis.    Antegrade vertebral arteries bilateral    See above for additional details.      Electronically signed by: Santy Catalan DO  Date:    06/12/2020  Time:    16:00             Narrative:    EXAMINATION:  US CAROTID BILATERAL    CLINICAL HISTORY:  Syncope and collapse    TECHNIQUE:  Grayscale and color Doppler ultrasound examination of the carotid and vertebral artery systems bilaterally.    COMPARISON:  None.    FINDINGS:  Results: the right common carotid artery is patent. The peak systolic/diastolic velocities approximately 118/46 cm/sec which is within normal limits.  . The peak systolic/diastolic velocities of the right proximal, mid and distal internal carotid artery are approximately 118/46, 129/48, and 117/36 cm/sec respectively this corresponds to an ICA/CCA ratio of approximately 1.1/1.0 which is within normal limits.    The right vertebral artery is antegrade.    The left common carotid artery is patent. The peak systolic/diastolic velocity is approximately 130/39 cm/sec which is within normal limits. Peak systolic/diastolic velocity of the proximal, mid and distal left internal carotid arteries is approximately 156/52, 156/45, and 121/51 cm/sec respectively. This corresponds to an ICA/CCA ratio of approximately 1.2/1.3 which is within normal limits.    The left vertebral artery is antegrade.    Measurement of carotid stenosis is based on velocity parameters that correlate the residual internal carotid diameter with [place] [employer] symptomatic carotid endarterectomy trial (NASCET) - based stenosis levels.                                CT Head Without Contrast (Final result)  Result time 06/12/20  14:02:08    Final result by Calixto Saha MD (06/12/20 14:02:08)                 Impression:      When compared to prior CT of 05/25/2020, there has been interval development of a hypodense area in the left parietal lobe concerning for infarct.  MRI could help provide a better time frame and further characterize.    Extensive chronic paranasal sinus disease.    This report was flagged in Epic as abnormal.      Electronically signed by: Calixto Saha MD  Date:    06/12/2020  Time:    14:02             Narrative:    EXAMINATION:  CT HEAD WITHOUT CONTRAST    CLINICAL HISTORY:  Confusion/delirium, altered LOC, unexplained;    TECHNIQUE:  Low dose axial images were obtained through the head.  Coronal and sagittal reformations were also performed. Contrast was not administered.    COMPARISON:  05/25/2020    FINDINGS:  When compared to prior CT of 05/25/2020, there has been interval development of a hypodense area in the left parietal lobe suggestive of a infarct.  No evidence of acute intracranial hemorrhage or other areas of infarct.  No midline shift or hydrocephalus.  No abnormal extra-axial fluid collections.  Calvarium is intact.  There is extensive paranasal sinus disease with chronic opacification of the sphenoid, maxillary, ethmoid and frontal sinuses.  No evidence of fracture.                               X-Ray Chest AP Portable (Final result)  Result time 06/12/20 13:15:52    Final result by Calixto Saha MD (06/12/20 13:15:52)                 Impression:      No acute abnormality.      Electronically signed by: Calixto Saha MD  Date:    06/12/2020  Time:    13:15             Narrative:    EXAMINATION:  XR CHEST AP PORTABLE    CLINICAL HISTORY:  Chest Pain;.    TECHNIQUE:  Single frontal portable view of the chest was performed.    COMPARISON:  05/25/2020    FINDINGS:  Support devices: None    The lungs are clear, with normal appearance of pulmonary vasculature and no pleural effusion or  pneumothorax.    The cardiac silhouette is normal in size. The hilar and mediastinal contours are unremarkable.    Bones are intact.                                    Assessment/Plan:      *Symptomatic anemia  Patient with reported dizziness and SOB for the last couple days   Patient with hx of iron deficiency anemia at baseline on iron   Never had a colonoscopy - denies dark tarry stools   H/H 6.8/22.5 on admission down from 12.6/40.7 2 weeks ago   Will hold Xarelto for now   Type and Cross   Consents signed in chart   Will transfuse 1 unit today in addition to prior 1 unit PRBCs for total of 2, as H 6.9  [facility] consulted   FOBT positive        History of deep vein thrombosis (DVT) of lower extremity  Recently in the hospital for DVT       Syncope  -No episodes since yesterday  -Workup ongoing      History of pulmonary embolus (PE)  Recently discharged from [facility]   Started on Xarelto 15 mg BID x 21 days (day 17)   Then Xarelto 20 mg daily   Will hold Xarelto now in the setting of decreased Hgb in 2 weeks   -Continue to monitor H/H  -No increased WOB  *CVA (cerebral vascular accident)  Patient with syncopal episode at PCP   Recently discharged from [facility]   CT head is negative for any acute hemorrhagic event  Anti-platelet therapy: ASA, consider plavix   Atorvastatin for secondary stroke prevention   Labs: HgA1C, Lipid Panel, TSH, Vitamin B-12, B6, Thiamine, Folate, RPR  Neurology consulted, recommendations pending  admit to tele  Q4 nuero checks   MRI, MRA, Carotid Doppler demonstrated no high-grade stenosis, MRI confirmed eft parietal lobe as noted on CT, on MRI appears to represent likely late subacute to early remote ischemic change  2D echo with bubble study  PT/OT/ST evaluation  NPO pending speech evaluation   Elevate the head of the bed with aspiration precautions  Fall precautions  hold home blood pressure medications to allow for permissive hypertension    IV labetalol PRN for SBP >220 or DBP >120  Will  provide stroke education prior to discharge      VTE Risk Mitigation (From admission, onward)         Ordered     IP VTE HIGH RISK PATIENT  Once      06/12/20 1624     Reason for No Pharmacological VTE Prophylaxis  Once     Question:  Reasons:  Answer:  Already adequately anticoagulated on oral Anticoagulants    06/12/20 1624     Place NORAH hose  Until discontinued      06/12/20 1450                      Sylvia Draper MD   [employer]  PGY-1  Department of [facility] Medicine   [facility]

## 2020-06-13 NOTE — PT/OT/SLP PROGRESS
Physical Therapy  Missed Visit Note      Patient Name:  Miky Esqueda   MRN:  [id number]    Patient not seen today secondary to EKG in room. Will follow-up as able.    Gautam Parisi, PT, DPT

## 2020-06-13 NOTE — SUBJECTIVE & OBJECTIVE
Interval History:   +FOBT, no high grade stenosis/occlusions on MRI/MRA, ICAs 50-60% occlusion.Awaiting GI/Neuro recs. NPO pending GI consult. H: 6.9, an additional 1u RBC ordered.     Review of Systems   Constitutional: Negative for activity change, chills, fatigue and fever.   HENT: Negative for congestion, ear pain and nosebleeds.    Eyes: Negative for pain.   Respiratory: Negative for apnea, chest tightness, shortness of breath and wheezing.    Cardiovascular: Negative for chest pain and palpitations.   Gastrointestinal: Negative for abdominal distention, abdominal pain, anal bleeding, blood in stool, constipation, diarrhea, nausea and rectal pain.   Endocrine: Negative for polydipsia.   Genitourinary: Negative for flank pain.   Musculoskeletal: Negative for arthralgias, back pain, neck pain and neck stiffness.   Skin: Negative for rash.   Neurological: Negative for dizziness, tremors, seizures (reported by PCP), syncope, facial asymmetry, speech difficulty, light-headedness and headaches.   Psychiatric/Behavioral: Negative for agitation.     Objective:     Vital Signs (Most Recent):  Temp: 96.4 °F (35.8 °C) (06/13/20 0610)  Pulse: 83 (06/13/20 0610)  Resp: 20 (06/13/20 0610)  BP: 114/70 (06/13/20 0610)  SpO2: 96 % (06/13/20 0610) Vital Signs (24h Range):  Temp:  [96.4 °F (35.8 °C)-97.6 °F (36.4 °C)] 96.4 °F (35.8 °C)  Pulse:  [] 83  Resp:  [14-20] 20  SpO2:  [93 %-99 %] 96 %  BP: (114-152)/(58-73) 114/70     Weight: 94.3 kg (207 lb 14.3 oz)  Body mass index is 27.43 kg/m².    Intake/Output Summary (Last 24 hours) at 6/13/2020 0749  Last data filed at 6/13/2020 0400  Gross per 24 hour   Intake 250 ml   Output 700 ml   Net -450 ml      Physical Exam  Vitals signs and nursing note reviewed.   Constitutional:       Appearance: He is well-developed and well-nourished. He is not ill-appearing.   HENT:      Head: Normocephalic and atraumatic.      Right Ear: External ear normal.      Left Ear: External ear  normal.      Nose: Nose normal.      Mouth/Throat:      Mouth: Oropharynx is clear and moist.   Eyes:      Extraocular Movements: EOM normal.      Conjunctiva/sclera: Conjunctivae normal.      Pupils: Pupils are equal, round, and reactive to light.   Neck:      Musculoskeletal: Normal range of motion and neck supple.   Cardiovascular:      Rate and Rhythm: Normal rate and regular rhythm.      Pulses: Intact distal pulses.      Heart sounds: No murmur. No friction rub. No gallop.    Pulmonary:      Effort: Pulmonary effort is normal. No respiratory distress.      Breath sounds: Normal breath sounds. No stridor. No wheezing.   Abdominal:      General: Bowel sounds are normal. There is no distension.      Palpations: Abdomen is soft.      Tenderness: There is no abdominal tenderness. There is no guarding.   Musculoskeletal: Normal range of motion.         General: No edema.   Skin:     General: Skin is warm and dry.   Neurological:      Mental Status: He is alert and oriented to person, place, and time.      GCS: GCS eye subscore is 4. GCS verbal subscore is 5. GCS motor subscore is 6.      Cranial Nerves: No cranial nerve deficit.      Sensory: No sensory deficit.      Motor: No tremor.      Deep Tendon Reflexes: Strength normal.      Comments:        Psychiatric:         Mood and Affect: Mood and affect normal.         Behavior: Behavior is cooperative.         Significant Labs:   Recent Lab Results       06/13/20  0615   06/13/20  0532   06/13/20  0007   06/12/20 2157   06/12/20  2035        Benzodiazepines               Methadone metabolites               Phencyclidine               Unit Blood Type Code               Unit Expiration               Unit Blood Type               Albumin   2.8           Alkaline Phosphatase   45           ALT               Amphetamine Screen, Ur               Anion Gap   2           aPTT   24.8  Comment:  aPTT therapeutic range = 39-69 seconds           AST   58  Comment:  Specimen  slightly hemolyzed           Barbiturate Screen, Ur               Baso #   0.01           Basophil%   0.1           BILIRUBIN TOTAL   0.4  Comment:  For infants and newborns, interpretation of results should be based  on gestational age, weight and in agreement with clinical  observations.  Premature Infant recommended reference ranges:  Up to 24 hours.............<8.0 mg/dL  Up to 48 hours............<12.0 mg/dL  3-5 days..................<15.0 mg/dL  6-29 days.................<15.0 mg/dL             BNP               BUN, Bld   24           Calcium   8.0           Chloride   105           Cholesterol               CO2   26           Cocaine (Metab.)               CODING SYSTEM               Creatinine   0.9           Creatinine, Random Ur               Differential Method   Automated           DISPENSE STATUS               eGFR if    >60           eGFR if non    >60  Comment:  Calculation used to obtain the estimated glomerular filtration  rate (eGFR) is the CKD-EPI equation.              Eos #   0.1           Eosinophil%   0.5           Ferritin               Folate               Glucose   101           Gran # (ANC)   7.1           Gran%   68.1           Group & Rh               HDL               Hdl/Cholesterol Ratio               Hematocrit   22.0 24.2            22.8           Hemoglobin   6.9 7.6            7.1           Immature Grans (Abs)   0.06  Comment:  Mild elevation in immature granulocytes is non specific and   can be seen in a variety of conditions including stress response,   acute inflammation, trauma and pregnancy. Correlation with other   laboratory and clinical findings is essential.             Immature Granulocytes   0.6           INDIRECT JACINDA               INR   1.0  Comment:  Coumadin Therapy:  2.0 - 3.0 for INR for all indicators except mechanical heart valves  and antiphospholipid syndromes which should use 2.5 - 3.5.             Iron               LD                LDL Cholesterol External               Lymph #   2.3           Lymph%   21.9           Magnesium   1.8           MCH   25.4           MCHC   31.4           MCV   81           Mono #   0.9           Mono%   8.8           MPV   11.3           Non-HDL Cholesterol               nRBC   1           Occult Blood         Positive     Opiate Scrn, Ur               Phosphorus   3.1           Platelets   177           POCT Glucose 103     100       Potassium   4.2           Product Code               PROTEIN TOTAL   5.8           Protime   11.0           RBC   2.72           RDW   17.9           Retic               SARS-CoV-2 RNA, Amplification, Qual               Saturated Iron               Sodium   133           Marijuana (THC) Metabolite               TIBC               Total Cholesterol/HDL Ratio               Toxicology Information               Transferrin               Triglycerides               Troponin I               TSH               UNIT NUMBER               Vitamin B-12               WBC   10.43                            06/12/20  1920   06/12/20  1809   06/12/20  1807   06/12/20  1523   06/12/20  1435        Benzodiazepines Negative             Methadone metabolites Negative             Phencyclidine Negative             Unit Blood Type Code         5100     Unit Expiration         [id number]     Unit Blood Type         O POS     Albumin               Alkaline Phosphatase               ALT               Amphetamine Screen, Ur Negative             Anion Gap               aPTT               AST               Barbiturate Screen, Ur Negative             Baso #               Basophil%               BILIRUBIN TOTAL               BNP               BUN, Bld               Calcium               Chloride               Cholesterol     177  Comment:  The National Cholesterol Education Program (NCEP) has set the  following guidelines (reference ranges) for Cholesterol:  Optimal.....................<200  mg/dL  Borderline High.............200-239 mg/dL  High........................> or = 240 mg/dL           CO2               Cocaine (Metab.) Negative             CODING SYSTEM         [id number]     Creatinine               Creatinine, Random Ur 119.7  Comment:  The random urine reference ranges provided were established   for 24 hour urine collections.  No reference ranges exist for  random urine specimens.  Correlate clinically.               Differential Method               DISPENSE STATUS         TRANSFUSED     eGFR if                eGFR if non                Eos #               Eosinophil%               Ferritin   333           Folate     10.7         Glucose               Gran # (ANC)               Gran%               Group & Rh       B NEG       HDL     41  Comment:  The National Cholesterol Education Program (NCEP) has set the  following guidelines (reference values) for HDL Cholesterol:  Low...............<40 mg/dL  Optimal...........>60 mg/dL           Hdl/Cholesterol Ratio     23.2         Hematocrit     20.9         Hemoglobin     6.4         Immature Grans (Abs)               Immature Granulocytes               INDIRECT JACINDA       NEG       INR               Iron     50         LD     185  Comment:  Results are increased in hemolyzed samples.         LDL Cholesterol External     118.2  Comment:  The National Cholesterol Education Program (NCEP) has set the  following guidelines (reference values) for LDL Cholesterol:  Optimal.......................<130 mg/dL  Borderline High...............130-159 mg/dL  High..........................160-189 mg/dL  Very High.....................>190 mg/dL           Lymph #               Lymph%               Magnesium     1.6         MCH               MCHC               MCV               Mono #               Mono%               MPV               Non-HDL Cholesterol     136  Comment:  Risk category and Non-HDL cholesterol goals:  Coronary  heart disease (CHD)or equivalent (10-year risk of CHD >20%):  Non-HDL cholesterol goal     <130 mg/dL  Two or more CHD risk factors and 10-year risk of CHD <= 20%:  Non-HDL cholesterol goal     <160 mg/dL  0 to 1 CHD risk factor:  Non-HDL cholesterol goal     <190 mg/dL           nRBC               Occult Blood               Opiate Scrn, Ur Negative             Phosphorus     3.2         Platelets               POCT Glucose               Potassium               Product Code         [id number]     PROTEIN TOTAL               Protime               RBC               RDW               Retic   5.4           SARS-CoV-2 RNA, Amplification, Qual               Saturated Iron     16         Sodium               Marijuana (THC) Metabolite Negative             TIBC     306         Total Cholesterol/HDL Ratio     4.3         Toxicology Information SEE COMMENT  Comment:  This screen includes the following classes of drugs at the   listed cut-off:  Benzodiazepines                  200 ng/ml  Methadone                        300 ng/ml  Cocaine metabolite               300 ng/ml  Opiates                          300 ng/ml  Barbiturates                     200 ng/ml  Amphetamines                    1000 ng/ml  Marijuana metabs (THC)            50 ng/ml  Phencyclidine (PCP)               25 ng/ml  High concentrations of Diphenhydramine may cross-react with  Phencyclidine PCP screening immunoassay giving a false   positive result.  High concentrations of Methylenedioxymethamphetamine (MDMA aka  Ectasy) and other structurally similar compounds may cross-   react with the Amphetamine/Methamphetamine screening   immunoassay giving a false positive result.  A metabolite of the anti-HIV drug Sustiva () may cause  false positive results in the Marijuana metabolite (THC)   screening assay.  Note: This exception list includes only more common   interferants in toxicology screen testing.  Because of many   cross-reactantspositive results  on toxicology drug screens   should be confirmed whenever results do not correlate with   clinical presentation.  This report is intended for use in clinical monitoring and  management of patients. It is not intended for use in   employment related drug testing.  Because of any cross-reactants, positive results on toxicology  drug screens should be confirmed whenever results do not  correlate with clinical presentation.  Presumptive positive results are unconfirmed and may be used   only for medical purposes.  Assay Intended Use: This assay provides only a preliminary analytical  test result. A more specific alternate chemical method must be used  to obtain a confirmed analytical result. Gas chromatography/mass  spectrometry (GS/MS)is the preferred confirmatory method. Clinical  consideration and professional judgement should be applied to any   drug of abuse test result, particularly when preliminary results  are used.               Transferrin     207         Triglycerides     89  Comment:  The National Cholesterol Education Program (NCEP) has set the  following guidelines (reference values) for triglycerides:  Normal......................<150 mg/dL  Borderline High.............150-199 mg/dL  High........................200-499 mg/dL           Troponin I               TSH   0.916           UNIT NUMBER         [id number]     Vitamin B-12     >2000         WBC                                06/12/20  1434   06/12/20  1319   06/12/20  1252        Benzodiazepines           Methadone metabolites           Phencyclidine           Unit Blood Type Code           Unit Expiration           Unit Blood Type           Albumin     3.5     Alkaline Phosphatase     61     ALT     15     Amphetamine Screen, Ur           Anion Gap     10     aPTT           AST     18     Barbiturate Screen, Ur           Baso #     0.02     Basophil%     0.2     BILIRUBIN TOTAL     0.3  Comment:  For infants and newborns, interpretation of  results should be based  on gestational age, weight and in agreement with clinical  observations.  Premature Infant recommended reference ranges:  Up to 24 hours.............<8.0 mg/dL  Up to 48 hours............<12.0 mg/dL  3-5 days..................<15.0 mg/dL  6-29 days.................<15.0 mg/dL       BNP     15  Comment:  Values of less than 100 pg/ml are consistent with non-CHF populations.     BUN, Bld     24     Calcium     8.5     Chloride     106     Cholesterol           CO2     21     Cocaine (Metab.)           CODING SYSTEM           Creatinine     1.1     Creatinine, Random Ur           Differential Method     Automated     DISPENSE STATUS           eGFR if      >60     eGFR if non      >60  Comment:  Calculation used to obtain the estimated glomerular filtration  rate (eGFR) is the CKD-EPI equation.        Eos #     0.0     Eosinophil%     0.3     Ferritin           Folate           Glucose     106     Gran # (ANC)     6.9     Gran%     73.3     Group & Rh           HDL           Hdl/Cholesterol Ratio           Hematocrit     22.5     Hemoglobin     6.8     Immature Grans (Abs)     0.07  Comment:  Mild elevation in immature granulocytes is non specific and   can be seen in a variety of conditions including stress response,   acute inflammation, trauma and pregnancy. Correlation with other   laboratory and clinical findings is essential.       Immature Granulocytes     0.7     INDIRECT JACINDA           INR   1.4  Comment:  Coumadin Therapy:  2.0 - 3.0 for INR for all indicators except mechanical heart valves  and antiphospholipid syndromes which should use 2.5 - 3.5.         Iron           LD           LDL Cholesterol External           Lymph #     1.7     Lymph%     18.0     Magnesium           MCH     24.3     MCHC     30.2     MCV     80     Mono #     0.7     Mono%     7.5     MPV     11.2     Non-HDL Cholesterol           nRBC     0     Occult Blood            Opiate Scrn, Ur           Phosphorus           Platelets     205     POCT Glucose           Potassium     4.1     Product Code           PROTEIN TOTAL     6.8     Protime   15.0       RBC     2.80     RDW     17.2     Retic           SARS-CoV-2 RNA, Amplification, Qual Negative  Comment:  This test utilizes isothermal nucleic acid amplification   technology to detect the SARS-CoV-2 RdRp nucleic acid segment.   The analytical sensitivity (limit of detection) is 125 genome   equivalents/mL.   A POSITIVE result implies infection with the SARS-CoV-2 virus;  the patient is presumed to be contagious.    A NEGATIVE result means that SARS-CoV-2 nucleic acids are not  present above the limit of detection. A NEGATIVE result should be   treated as presumptive. It does not rule out the possibility of   COVID-19 and should not be the sole basis for treatment decisions.   If COVID-19 is strongly suspected based on clinical and exposure   history, re-testing using an alternate molecular assay should be   considered.   This test is only for use under the Food and Drug   Administration s Emergency Use Authorization (EUA).   Commercial kits are provided by [employer].   Performance characteristics of the EUA have been independently  verified by [facility] Department of  Pathology and Laboratory Medicine.   _________________________________________________________________  The ID NOW COVID-19 Letter of Authorization, along with the   authorized Fact Sheet for Healthcare Providers, the authorized Fact  Sheet for Patients, and authorized labeling are available on the FDA   website:  www.fda.gov/MedicalDevices/Safety/EmergencySituations/hsb527742.htm           Saturated Iron           Sodium     137     Marijuana (THC) Metabolite           TIBC           Total Cholesterol/HDL Ratio           Toxicology Information           Transferrin           Triglycerides           Troponin I     <0.006  Comment:  The reference  interval for Troponin I represents the 99th percentile   cutoff   for our facility and is consistent with 3rd generation assay   performance.       TSH           UNIT NUMBER           Vitamin B-12           WBC     9.35           Significant Imaging:   Imaging Results          MRI Brain Without Contrast (Final result)  Result time 06/12/20 17:36:57    Final result by Yusuf Choe MD (06/12/20 17:36:57)                 Impression:      There is no evidence for acute intracranial process, there is a finding of the left parietal lobe as noted on CT, on MRI appears to represent likely late subacute to early remote ischemic change, as discussed above.    Additional chronic intracranial changes are noted.    Paranasal sinus and mastoid findings are noted as above.      Electronically signed by: Yusuf Choe  Date:    06/12/2020  Time:    17:36             Narrative:    EXAMINATION:  MRI BRAIN WITHOUT CONTRAST    CLINICAL HISTORY:  Stroke;    TECHNIQUE:  Multiplanar multisequence MR imaging of the brain was performed without contrast.    COMPARISON:  CT examination of the brain May 25, 2020, CT examination of the brain June 12, 2020    FINDINGS:  MRI examination of the brain was performed, intravenous contrast was not utilized.  Note is made of the finding of the left parietal lobe on the CT brain of June 12, 2020 that was not present on CT brain of May 25, 2020, this is identified on MRI examination, however does not demonstrate abnormal diffusion signal hyperintensity and therefore is not consistent with an area of acute ischemia, this likely relates to an area of late subacute to early remote ischemic change, without evidence for significant diffusion signal abnormality.  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  Additional chronic appearing changes including chronic white matter changes  are noted.  There is no evidence for intracranial mass, mass effect or midline shift, and there is no evidence for abnormal diffusion signal hyperintensity to specifically suggest acute ischemia/infarct or other cause for restricted diffusion.    Appropriate CSF spaces are appropriate flow voids are noted at the skull base.  The upper cervical cord, brainstem and craniocervical junction appear appropriate when accounting for artifact.  The orbits appear intact.  There is prominent circumferential mucosal thickening of the maxillary antra and there is prominent mucosal thickening and opacity throughout the ethmoid air cells left greater than right, there is opacification of the right frontal sinus with mucosal thickening of the left frontal sinus, there is mucosal thickening of the sphenoid sinus with suggestion of mild air-fluid level on the right.  The mastoid air cells demonstrate signal hyperintensity on T2 imaging likely relating to fluid and opacification.                               MRA Neck without contrast (Final result)  Result time 06/12/20 17:42:35    Final result by Yusuf Choe MD (06/12/20 17:42:35)                 Impression:      The major extracranial arterial vascular structures demonstrate signal consistent with appropriate flow without evidence for high-grade stenosis, occlusion, or dissection.      Electronically signed by: Yusuf Choe  Date:    06/12/2020  Time:    17:42             Narrative:    EXAMINATION:  MRA NECK WITHOUT CONTRAST    CLINICAL HISTORY:  Stroke;    TECHNIQUE:  Noncontrast MRA of the neck was performed.  Non-contrast 2D and/or 3D time-of-flight MR angiography of the neck was performed, with MIP reformatting.    COMPARISON:  None    FINDINGS:  The visualized vertebral arteries demonstrate signal consistent with appropriate flow without evidence for high-grade stenosis or occlusion.  The visualized common carotid, internal carotid and external carotid arteries  demonstrate signal consistent with appropriate flow, the internal carotid arteries demonstrating appropriate appearance to the level of the skull base, intracranial components are evaluated separately on the MRA brain report, there is no evidence for high-grade stenosis, or occlusion and no evidence for dissection.                               MRA Brain without contrast (Final result)  Result time 06/12/20 17:42:50    Final result by Yusuf Choe MD (06/12/20 17:42:50)                 Impression:      The major intracranial arterial vascular structures demonstrate evidence for appropriate flow without evidence for high-grade stenosis or occlusion and there is no evidence for intracranial aneurysm or AVM on this exam.      Electronically signed by: Yusuf Choe  Date:    06/12/2020  Time:    17:42             Narrative:    EXAMINATION:  MRA BRAIN WITHOUT CONTRAST    CLINICAL HISTORY:  Stroke;    TECHNIQUE:  Non-contrast 3-D time-of-flight intracranial MR angiography was performed through the [place] [facility] [place] with MIP reformatting.    COMPARISON:  CT examination of the brain June 12, 2020, MRI examination of the brain June 12, 2020    FINDINGS:  The visualized aspects of the distal vertebral arteries demonstrate signal consistent with appropriate flow as does the basilar artery.  The P1 segment of the PCA bilaterally appears hypoplastic, and there are bilateral posterior communicating arteries, otherwise the posterior cerebral arteries bilaterally appear appropriate.  The visualized aspects of the distal internal carotid arteries demonstrate signal consistent with appropriate flow extending to the [id number].  The DEE and MCA bilaterally appear appropriate.  There is no evidence for major intracranial arterial vascular occlusion, or high-grade stenosis and there is no finding to suggest intracranial aneurysm or AVM on this exam.  Density at the left parietal lobe is noted, this may relate to  mineralization or may relate to cortical laminar necrosis associated with an area of remote ischemia, paranasal sinus disease is noted.  Intracranial findings are otherwise discussed on the head CT.                               US Carotid Bilateral (Final result)  Result time 06/12/20 16:00:45    Final result by Santy Catalan DO (06/12/20 16:00:45)                 Impression:      There is slight increased peak systolic velocities in the ICAs concerning for 50-69% stenosis.    Antegrade vertebral arteries bilateral    See above for additional details.      Electronically signed by: Santy Catalan DO  Date:    06/12/2020  Time:    16:00             Narrative:    EXAMINATION:  US CAROTID BILATERAL    CLINICAL HISTORY:  Syncope and collapse    TECHNIQUE:  Grayscale and color Doppler ultrasound examination of the carotid and vertebral artery systems bilaterally.    COMPARISON:  None.    FINDINGS:  Results: the right common carotid artery is patent. The peak systolic/diastolic velocities approximately 118/46 cm/sec which is within normal limits.  . The peak systolic/diastolic velocities of the right proximal, mid and distal internal carotid artery are approximately 118/46, 129/48, and 117/36 cm/sec respectively this corresponds to an ICA/CCA ratio of approximately 1.1/1.0 which is within normal limits.    The right vertebral artery is antegrade.    The left common carotid artery is patent. The peak systolic/diastolic velocity is approximately 130/39 cm/sec which is within normal limits. Peak systolic/diastolic velocity of the proximal, mid and distal left internal carotid arteries is approximately 156/52, 156/45, and 121/51 cm/sec respectively. This corresponds to an ICA/CCA ratio of approximately 1.2/1.3 which is within normal limits.    The left vertebral artery is antegrade.    Measurement of carotid stenosis is based on velocity parameters that correlate the residual internal carotid diameter with [place]  symptomatic carotid endarterectomy trial (NASCET) - based stenosis levels.                                CT Head Without Contrast (Final result)  Result time 06/12/20 14:02:08    Final result by Calixto Saha MD (06/12/20 14:02:08)                 Impression:      When compared to prior CT of 05/25/2020, there has been interval development of a hypodense area in the left parietal lobe concerning for infarct.  MRI could help provide a better time frame and further characterize.    Extensive chronic paranasal sinus disease.    This report was flagged in Epic as abnormal.      Electronically signed by: Calixto Saha MD  Date:    06/12/2020  Time:    14:02             Narrative:    EXAMINATION:  CT HEAD WITHOUT CONTRAST    CLINICAL HISTORY:  Confusion/delirium, altered LOC, unexplained;    TECHNIQUE:  Low dose axial images were obtained through the head.  Coronal and sagittal reformations were also performed. Contrast was not administered.    COMPARISON:  05/25/2020    FINDINGS:  When compared to prior CT of 05/25/2020, there has been interval development of a hypodense area in the left parietal lobe suggestive of a infarct.  No evidence of acute intracranial hemorrhage or other areas of infarct.  No midline shift or hydrocephalus.  No abnormal extra-axial fluid collections.  Calvarium is intact.  There is extensive paranasal sinus disease with chronic opacification of the sphenoid, maxillary, ethmoid and frontal sinuses.  No evidence of fracture.                               X-Ray Chest AP Portable (Final result)  Result time 06/12/20 13:15:52    Final result by Calixto Saha MD (06/12/20 13:15:52)                 Impression:      No acute abnormality.      Electronically signed by: Calixto Saha MD  Date:    06/12/2020  Time:    13:15             Narrative:    EXAMINATION:  XR CHEST AP PORTABLE    CLINICAL HISTORY:  Chest Pain;.    TECHNIQUE:  Single frontal portable view of the chest was  performed.    COMPARISON:  05/25/2020    FINDINGS:  Support devices: None    The lungs are clear, with normal appearance of pulmonary vasculature and no pleural effusion or pneumothorax.    The cardiac silhouette is normal in size. The hilar and mediastinal contours are unremarkable.    Bones are intact.

## 2020-06-13 NOTE — CONSULTS
"  [facility]  Adult Nutrition  Consult Note    SUMMARY     Recommendations    1. Consult SLP for swallow study, honor rec's.   2. If pt on PO diet, consider cardiac restriction.   3. If pt remains NPO, initiate Isosource 1.5 @ 10 ml/hr, advance 10ml/hr q4hrs to goal rate of 50ml/hr. (This provides 1800 kcal, 82g protein, 917 ml free water)   4. Water flushes: 150 ml q4hrs or per MD  5. Weigh pt weekly.    Goals: Initiate diet order within 72 hrs  Nutrition Goal Status: new  Communication of RD Recs: (plan of care)    Thanks for the consult.     Reason for Assessment    Reason For Assessment: consult(assess dietary needs)  Diagnosis: stroke/CVA  Relevant Medical History: iron deficiency anemia, bilateral pulmonary embolism, DVT  Interdisciplinary Rounds: did not attend    General Information Comments: 65 y.o. male presented to the ED after having to syncopal episodes one w/ seizures. Pt on room air no need for respiratory support. Pt NPO. UBW per wt hx= 207 lbs, no weight loss noted. No reports of N/V diarrhea/constipation at this time. SLP has not seen pt to assess swallowing. Pt negative for COVID-19.    Nutrition Discharge Planning: Adequate intake on cardiac diet    Nutrition Risk Screen    Nutrition Risk Screen: no indicators present    Nutrition/Diet History    Spiritual, Cultural Beliefs, Yazdanism Practices, Values that Affect Care: no  Food Allergies: NKFA  Factors Affecting Nutritional Intake: NPO    Anthropometrics    Temp: 96.4 °F (35.8 °C)  Height Method: Stated  Height: 6' 1" (185.4 cm)  Height (inches): 73 in  Weight Method: Bed Scale  Weight: 94.3 kg (207 lb 14.3 oz)  Weight (lb): 207.9 lb  Ideal Body Weight (IBW), Male: 184 lb  % Ideal Body Weight, Male (lb): 112.99 %  BMI (Calculated): 27.4  BMI Grade: 25 - 29.9 - overweight  Weight Loss: (None)     Lab/Procedures/Meds    Pertinent Labs Reviewed: reviewed  Pertinent Labs Comments: H/H 6.9/22.0, Na 133, BUN 24, Ca 8, Alk phos 45, " Pro tot 5.8, Alb 2.8, AST 58  Pertinent Medications Reviewed: reviewed  Pertinent Medications Comments: Na Cl flush    Estimated/Assessed Needs    Weight Used For Calorie Calculations: 94.3 kg (207 lb 14.3 oz)  Energy Calorie Requirements (kcal): 1800 kcal  Energy Need Method: [place]-St Jeor  Protein Requirements: 75-95g (0.8-1.0 g/kg)  Weight Used For Protein Calculations: 94.3 kg (207 lb 14.3 oz)  Fluid Requirements (mL): 1 ml/kcal or per MD  Estimated Fluid Requirement Method: RDA Method  RDA Method (mL): 1800  CHO Requirement: 225 g daily    Nutrition Prescription Ordered    Current Diet Order: NPO    Evaluation of Received Nutrient/Fluid Intake    I/O: -450  Energy Calories Required: not meeting needs  Protein Required: not meeting needs  Fluid Required: meeting needs  Comments: Last BM 6/11  % Intake of Estimated Energy Needs: 0 %  % Meal Intake: NPO    Nutrition Risk    Level of Risk/Frequency of Follow-up: moderate - high(2x/week)     Assessment and Plan  Nutrition Problem  Inadequate oral intake    Related to (etiology):   NPO status    Signs and Symptoms (as evidenced by):   NPO with no other means of nutrition     Interventions(treatment strategy):  1. Collaboration with other providers    Nutrition Diagnosis Status:   New    Monitor and Evaluation    Food and Nutrient Intake: energy intake  Food and Nutrient Adminstration: diet order  Physical Activity and Function: nutrition-related ADLs and IADLs  Anthropometric Measurements: weight, weight change  Biochemical Data, Medical Tests and Procedures: inflammatory profile, electrolyte and renal panel  Nutrition-Focused Physical Findings: overall appearance, skin     Malnutrition Assessment  Energy Intake: (NPO day 1)  Skin (Micronutrient): (Eugene score-18)  Teeth (Micronutrient): (WDL)   Micronutrient Evaluation: suspected deficiency  Micronutrient Evaluation Comments: Iron       Edema (Fluid Accumulation): 1-->trace(legs)   Subcutaneous Fat Loss (Final  Summary): well nourished  Muscle Loss Evaluation (Final Summary): well nourished         Nutrition Follow-Up    RD Follow-up?: Yes

## 2020-06-13 NOTE — PLAN OF CARE
Recommendations    1. Consult SLP for swallow study, honor rec's.   2. If pt on PO diet, consider cardiac restriction.   3. If pt remains NPO, initiate Isosource 1.5 @ 10 ml/hr, advance 10ml/hr q4hrs to goal rate of 50ml/hr. (This provides 1800 kcal, 82g protein, 917 ml free water)   4. Water flushes: 150 ml q4hrs or per MD  5. Weigh pt weekly.    Goals: Initiate diet order within 72 hrs  Nutrition Goal Status: new  Communication of RD Recs: (plan of care)    Thanks for the consult.

## 2020-06-13 NOTE — CONSULTS
[facility]    CC: anemia    HPI 65 y.o. man with acute on chronic, severe, symptomatic, microscopic, iron deficiency anemia not associated with overt GI bleeding. He has had SUBHASH for the last year and has been on iron. Then he recently started xarelto for DVT/PEs about 2 weeks ago. He went to Dr. Matson's office and had a loss of consciousness with seizure like activity and was brought to the ED and found to have a new stroke. He denies any black/bloody stools or diarrhea during this time. He has never had colonoscopy. His grandfather had colon cancer. He denies nausea, vomiting, abdominal pain, weight loss, or fever. He rarely takes NSAIDs, last time about a month ago for a headache.     Collateral obtained from nurse. Chart reviewed and summarized here.    Past Medical History   has a past medical history of Bilateral pulmonary embolism (5/25/2020), History of DVT (deep vein thrombosis) (6/12/2020), and Iron deficiency anemia.    Past Surgical History  None    Social History  No significant alcohol, quit smoking    Family History  Grandfather with colon cancer    Review of Systems  General ROS: negative for chills, fever or weight loss Positive for loss of consciousness  Psychological ROS: negative for hallucination, depression or suicidal ideation  Ophthalmic ROS: negative for blurry vision, photophobia or eye pain  ENT ROS: negative for epistaxis, sore throat or rhinorrhea  Respiratory ROS: no cough, shortness of breath, or wheezing  Cardiovascular ROS: no chest pain or dyspnea on exertion  Gastrointestinal ROS: no abdominal pain, vomiting, diarrhea, black or bloody stools  Genito-Urinary ROS: no dysuria, trouble voiding, or hematuria  Musculoskeletal ROS: negative for gait disturbance or muscular weakness  Neurological ROS: no syncope or seizures; no ataxia  Dermatological ROS: negative for pruritis, rash and jaundice    Physical Examination  /65   Pulse 86   Temp 96.6 °F (35.9 °C)   Resp 16   " Ht 6' 1" (1.854 m)   Wt 94.3 kg (207 lb 14.3 oz)   SpO2 96%   BMI 27.43 kg/m²   General appearance: alert, cooperative, no distress, overweight  HENT: Normocephalic, atraumatic, neck symmetrical, no nasal discharge   Eyes: conjunctivae/corneas clear, EOM's intact  Lungs: clear to auscultation in all fields, no dullness to percussion bilaterally, no wheeze  Heart: normal rate, regular rhythm without rub; palpable peripheral pulses  Abdomen: soft, NT, ND  Extremities: extremities symmetric; no clubbing, cyanosis, or edema  Integument: Skin color, texture, turgor normal; no rashes; hair distrubution normal  Neurologic: Alert and oriented X 3, normal strength, normal coordination  Psychiatric: no pressured speech; normal affect; no evidence of impaired cognition     Labs:  INR 1.4 -> 1  Hg 12.6 -> 6.8  MCV 80    Imaging:  CT Head 6/12/2020  Possible infarct in parietal lobe    I have personally reviewed these images    Assessment:   65 year old man with acute worsening of SUBHASH without overt GI bleeding but recently started on xarelto for PEs. No colonoscopy and grandfather with colon cancer. Rare NSAID use, could be upper or small bowel bleeding, but concerning for malignancy with unexplained SUBHASH, new PEs, and family history.    Plan:  - EGD and colonoscopy on Monday  - clear liquid diet on Sunday  - prep on Sunday night and NPO at midnight    Teresa Nicole MD MPH  [facility] PGY 5  [phone] cell  [phone] pager        "

## 2020-06-14 LAB
ALBUMIN SERPL BCP-MCNC: 3.2 G/DL (ref 3.5–5.2)
ALP SERPL-CCNC: 52 U/L (ref 55–135)
ALT SERPL W/O P-5'-P-CCNC: 14 U/L (ref 10–44)
ANION GAP SERPL CALC-SCNC: 7 MMOL/L (ref 8–16)
AORTIC ROOT ANNULUS: 3.14 CM
AST SERPL-CCNC: 21 U/L (ref 10–40)
AV INDEX (PROSTH): 0.96
AV MEAN GRADIENT: 4 MMHG
AV PEAK GRADIENT: 7 MMHG
AV VALVE AREA: 4.44 CM2
AV VELOCITY RATIO: 0.84
BASOPHILS # BLD AUTO: 0.02 K/UL (ref 0–0.2)
BASOPHILS NFR BLD: 0.2 % (ref 0–1.9)
BILIRUB SERPL-MCNC: 0.4 MG/DL (ref 0.1–1)
BSA FOR ECHO PROCEDURE: 2.2 M2
BUN SERPL-MCNC: 27 MG/DL (ref 8–23)
CALCIUM SERPL-MCNC: 8.5 MG/DL (ref 8.7–10.5)
CHLORIDE SERPL-SCNC: 108 MMOL/L (ref 95–110)
CO2 SERPL-SCNC: 22 MMOL/L (ref 23–29)
CREAT SERPL-MCNC: 1.2 MG/DL (ref 0.5–1.4)
CV ECHO LV RWT: 0.37 CM
DIFFERENTIAL METHOD: ABNORMAL
DOP CALC AO PEAK VEL: 1.33 M/S
DOP CALC AO VTI: 19.82 CM
DOP CALC LVOT AREA: 4.6 CM2
DOP CALC LVOT DIAMETER: 2.43 CM
DOP CALC LVOT PEAK VEL: 1.12 M/S
DOP CALC LVOT STROKE VOLUME: 88.07 CM3
DOP CALCLVOT PEAK VEL VTI: 19 CM
E WAVE DECELERATION TIME: 282.3 MSEC
E/A RATIO: 0.76
ECHO LV POSTERIOR WALL: 0.89 CM (ref 0.6–1.1)
EOSINOPHIL # BLD AUTO: 0.2 K/UL (ref 0–0.5)
EOSINOPHIL NFR BLD: 1.4 % (ref 0–8)
ERYTHROCYTE [DISTWIDTH] IN BLOOD BY AUTOMATED COUNT: 18.4 % (ref 11.5–14.5)
EST. GFR  (AFRICAN AMERICAN): >60 ML/MIN/1.73 M^2
EST. GFR  (NON AFRICAN AMERICAN): >60 ML/MIN/1.73 M^2
FRACTIONAL SHORTENING: 30 % (ref 28–44)
GLUCOSE SERPL-MCNC: 106 MG/DL (ref 70–110)
HCT VFR BLD AUTO: 25.6 % (ref 40–54)
HCT VFR BLD AUTO: 26 % (ref 40–54)
HCT VFR BLD AUTO: 26.3 % (ref 40–54)
HCT VFR BLD AUTO: 27.1 % (ref 40–54)
HGB BLD-MCNC: 8.2 G/DL (ref 14–18)
HGB BLD-MCNC: 8.2 G/DL (ref 14–18)
HGB BLD-MCNC: 8.3 G/DL (ref 14–18)
HGB BLD-MCNC: 8.5 G/DL (ref 14–18)
IMM GRANULOCYTES # BLD AUTO: 0.06 K/UL (ref 0–0.04)
IMM GRANULOCYTES NFR BLD AUTO: 0.5 % (ref 0–0.5)
INTERVENTRICULAR SEPTUM: 1.01 CM (ref 0.6–1.1)
IVRT: 62.8 MSEC
LA MAJOR: 4.65 CM
LA MINOR: 3.94 CM
LA WIDTH: 3.62 CM
LEFT ATRIUM SIZE: 3.89 CM
LEFT ATRIUM VOLUME INDEX: 23.3 ML/M2
LEFT ATRIUM VOLUME: 51.06 CM3
LEFT INTERNAL DIMENSION IN SYSTOLE: 3.4 CM (ref 2.1–4)
LEFT VENTRICLE DIASTOLIC VOLUME INDEX: 50.89 ML/M2
LEFT VENTRICLE DIASTOLIC VOLUME: 111.32 ML
LEFT VENTRICLE MASS INDEX: 74 G/M2
LEFT VENTRICLE SYSTOLIC VOLUME INDEX: 21.7 ML/M2
LEFT VENTRICLE SYSTOLIC VOLUME: 47.5 ML
LEFT VENTRICULAR INTERNAL DIMENSION IN DIASTOLE: 4.87 CM (ref 3.5–6)
LEFT VENTRICULAR MASS: 162.66 G
LV LATERAL E/E' RATIO: 6.1 M/S
LYMPHOCYTES # BLD AUTO: 2.7 K/UL (ref 1–4.8)
LYMPHOCYTES NFR BLD: 24.2 % (ref 18–48)
MAGNESIUM SERPL-MCNC: 1.9 MG/DL (ref 1.6–2.6)
MCH RBC QN AUTO: 26.3 PG (ref 27–31)
MCHC RBC AUTO-ENTMCNC: 32 G/DL (ref 32–36)
MCV RBC AUTO: 82 FL (ref 82–98)
MONOCYTES # BLD AUTO: 0.9 K/UL (ref 0.3–1)
MONOCYTES NFR BLD: 7.6 % (ref 4–15)
MV PEAK A VEL: 0.8 M/S
MV PEAK E VEL: 0.61 M/S
NEUTROPHILS # BLD AUTO: 7.5 K/UL (ref 1.8–7.7)
NEUTROPHILS NFR BLD: 66.1 % (ref 38–73)
NRBC BLD-RTO: 1 /100 WBC
PHOSPHATE SERPL-MCNC: 3.8 MG/DL (ref 2.7–4.5)
PLATELET # BLD AUTO: 178 K/UL (ref 150–350)
PMV BLD AUTO: 10.3 FL (ref 9.2–12.9)
POTASSIUM SERPL-SCNC: 4.4 MMOL/L (ref 3.5–5.1)
PROT SERPL-MCNC: 6.2 G/DL (ref 6–8.4)
PULM VEIN S/D RATIO: 1.83
PV PEAK D VEL: 0.24 M/S
PV PEAK S VEL: 0.44 M/S
PV PEAK VELOCITY: 0.77 CM/S
RA MAJOR: 4.77 CM
RBC # BLD AUTO: 3.12 M/UL (ref 4.6–6.2)
RIGHT VENTRICULAR END-DIASTOLIC DIMENSION: 2.02 CM
SODIUM SERPL-SCNC: 137 MMOL/L (ref 136–145)
TDI LATERAL: 0.1 M/S
WBC # BLD AUTO: 11.31 K/UL (ref 3.9–12.7)

## 2020-06-14 PROCEDURE — 85025 COMPLETE CBC W/AUTO DIFF WBC: CPT

## 2020-06-14 PROCEDURE — 97116 GAIT TRAINING THERAPY: CPT

## 2020-06-14 PROCEDURE — 97161 PT EVAL LOW COMPLEX 20 MIN: CPT

## 2020-06-14 PROCEDURE — 85018 HEMOGLOBIN: CPT | Mod: 91

## 2020-06-14 PROCEDURE — 83735 ASSAY OF MAGNESIUM: CPT

## 2020-06-14 PROCEDURE — A4216 STERILE WATER/SALINE, 10 ML: HCPCS | Performed by: STUDENT IN AN ORGANIZED HEALTH CARE EDUCATION/TRAINING PROGRAM

## 2020-06-14 PROCEDURE — 94761 N-INVAS EAR/PLS OXIMETRY MLT: CPT

## 2020-06-14 PROCEDURE — 97165 OT EVAL LOW COMPLEX 30 MIN: CPT

## 2020-06-14 PROCEDURE — 80053 COMPREHEN METABOLIC PANEL: CPT

## 2020-06-14 PROCEDURE — 85014 HEMATOCRIT: CPT | Mod: 91

## 2020-06-14 PROCEDURE — 84100 ASSAY OF PHOSPHORUS: CPT

## 2020-06-14 PROCEDURE — 11000001 HC ACUTE MED/SURG PRIVATE ROOM

## 2020-06-14 PROCEDURE — 25000003 PHARM REV CODE 250: Performed by: STUDENT IN AN ORGANIZED HEALTH CARE EDUCATION/TRAINING PROGRAM

## 2020-06-14 PROCEDURE — 97530 THERAPEUTIC ACTIVITIES: CPT

## 2020-06-14 PROCEDURE — 36415 COLL VENOUS BLD VENIPUNCTURE: CPT

## 2020-06-14 PROCEDURE — 25000003 PHARM REV CODE 250

## 2020-06-14 RX ORDER — POLYETHYLENE GLYCOL 3350, SODIUM SULFATE ANHYDROUS, SODIUM BICARBONATE, SODIUM CHLORIDE, POTASSIUM CHLORIDE 236; 22.74; 6.74; 5.86; 2.97 G/4L; G/4L; G/4L; G/4L; G/4L
4000 POWDER, FOR SOLUTION ORAL ONCE
Status: COMPLETED | OUTPATIENT
Start: 2020-06-14 | End: 2020-06-14

## 2020-06-14 RX ADMIN — Medication 3 ML: at 02:06

## 2020-06-14 RX ADMIN — ATORVASTATIN CALCIUM 40 MG: 40 TABLET, FILM COATED ORAL at 08:06

## 2020-06-14 RX ADMIN — Medication 3 ML: at 05:06

## 2020-06-14 RX ADMIN — Medication 3 ML: at 10:06

## 2020-06-14 RX ADMIN — POLYETHYLENE GLYCOL 3350, SODIUM SULFATE ANHYDROUS, SODIUM BICARBONATE, SODIUM CHLORIDE, POTASSIUM CHLORIDE 4000 ML: 236; 22.74; 6.74; 5.86; 2.97 POWDER, FOR SOLUTION ORAL at 08:06

## 2020-06-14 NOTE — PROGRESS NOTES
Progress Note  [facility]    Patient Name: Miky Esqueda  MRN: [id number]  Patient Class: IP- Inpatient     Admission Date: 6/12/2020  Length of Stay: 2 days  Attending Physician: Farhat Matson MD  Primary Care Provider: Farhat Matson MD    SUBJECTIVE:     Principal Problem: Symptomatic anemia    Patient seen and examined this AM. NAEON. Pt reports sleeping well - no complaints this AM. Denies headache, dizziness/lightheadedness, chest pain, SOB, abdominal pain, hematuria, blood in stool, melena. Reviewed plan for colonoscopy prep today and plan for EGD and colonoscopy with GI tomorrow - pt expressed understanding, all questions answered.     Review of Systems   Constitutional: Negative for chills and fever.   HENT: Negative for congestion, sinus pain and sore throat.    Eyes: Negative.    Respiratory: Negative for cough and shortness of breath.    Cardiovascular: Negative for chest pain, palpitations and leg swelling.   Gastrointestinal: Negative for abdominal pain, blood in stool, constipation, diarrhea, melena, nausea and vomiting.   Genitourinary: Negative for dysuria, flank pain, frequency, hematuria and urgency.   Musculoskeletal: Negative for back pain, joint pain and myalgias.   Skin: Negative.    Neurological: Negative for dizziness, weakness and headaches.   Endo/Heme/Allergies: Negative.    Psychiatric/Behavioral: Negative.       OBJECTIVE:     Vital Signs (Most Recent)  Temp: 97.1 °F (36.2 °C) (06/14/20 0345)  Pulse: 77 (06/14/20 0400)  Resp: 20 (06/14/20 0345)  BP: 111/74 (06/14/20 0345)  SpO2: (!) 93 % (06/14/20 0345)    I & O (Last 24H):  Intake/Output Summary (Last 24 hours) at 6/14/2020 0603  Last data filed at 6/14/2020 0500  Gross per 24 hour   Intake 915 ml   Output 1050 ml   Net -135 ml     Current Diet Order   Procedures    Diet clear liquid      Physical Exam   Constitutional: He is oriented to person, place, and time and well-developed, well-nourished, and in no distress. No  distress.   HENT:   Head: Normocephalic and atraumatic.   Nose: Nose normal.   Mouth/Throat: Oropharynx is clear and moist. No oropharyngeal exudate.   Eyes: Pupils are equal, round, and reactive to light. Conjunctivae and EOM are normal.   Neck: Normal range of motion. Neck supple.   Cardiovascular: Normal rate, regular rhythm, normal heart sounds and intact distal pulses.   Pulmonary/Chest: Breath sounds normal. No respiratory distress.   Abdominal: Soft. Bowel sounds are normal. He exhibits no distension. There is no abdominal tenderness.   Musculoskeletal: Normal range of motion.         General: No tenderness, deformity or edema.   Lymphadenopathy:     He has no cervical adenopathy.   Neurological: He is alert and oriented to person, place, and time. No cranial nerve deficit. GCS score is 15.   Skin: Skin is warm and dry. He is not diaphoretic.   Nursing note and vitals reviewed.    Laboratory Data  Recent Labs   Lab 06/12/20  1252  06/13/20  0532 06/13/20  1636 06/13/20  2332 06/14/20  0544   WBC 9.35  --  10.43  --   --  11.31   RBC 2.80*  --  2.72*  --   --  3.12*   HGB 6.8*   < > 6.9*  7.1* 8.1* 8.1* 8.2*  8.2*   HCT 22.5*   < > 22.0*  22.8* 25.0* 25.4* 26.3*  25.6*     --  177  --   --  178   MCV 80*  --  81*  --   --  82   MCH 24.3*  --  25.4*  --   --  26.3*   MCHC 30.2*  --  31.4*  --   --  32.0    < > = values in this interval not displayed.     Recent Labs   Lab 06/12/20  1252 06/13/20  0532   CALCIUM 8.5* 8.0*   PROT 6.8 5.8*    133*   K 4.1 4.2   CO2 21* 26    105   BUN 24* 24*   CREATININE 1.1 0.9   ALKPHOS 61 45*   ALT 15 13   AST 18 58*   BILITOT 0.3 0.4     POCT Glucose   Date Value Ref Range Status   06/13/2020 111 (H) 70 - 110 mg/dL Final   06/13/2020 103 70 - 110 mg/dL Final   06/12/2020 100 70 - 110 mg/dL Final     Recent Labs   Lab 06/12/20  1319 06/13/20  0532   INR 1.4* 1.0   APTT  --  24.8     Lab Results   Component Value Date    CHOL 177 06/12/2020     Lab  Results   Component Value Date    HDL 41 06/12/2020     Lab Results   Component Value Date    LDLCALC 118.2 06/12/2020     Lab Results   Component Value Date    TRIG 89 06/12/2020     Lab Results   Component Value Date    CHOLHDL 23.2 06/12/2020     ASSESSMENT/PLAN:     CVA (cerebral vascular accident)  - Patient with syncopal episode at PCP   - CT head w/ hypodense area of the left parietal lobe concerning for infarct  - B/l carotid US showed 50-69% stenosis of ICA bilaterally  - MRI confirmed left parietal lobe chanegs as noted on CT, on MRI appears to represent likely late subacute to early remote ischemic change  - MRA negative for any high-grade stenosis   - Neurology consulted for recs  - Anti-platelet therapy:Holding ASA/Plavix for now, consider restarting if/when safe from GI persepctive  - Atorvastatin for secondary stroke prevention   - 2D echo with bubble study - prelim read shows shunt, awaiting final read, [facility] consulted  - Speech recommending regular textures and thin liquids with standard swallow precautions  - Hold home blood pressure medications to allow for permissive hypertension    - IV labetalol PRN for SBP >220 or DBP >120  - Stroke education prior to discharge  - PT/OT/ST evaluation      Symptomatic anemia  - H/H 6.8/22.5 on admission down from 12.6/40.7 2 weeks ago   - FOBT positive, pt denies noticing bright red blood per rectum/melena  - Holding Xarelto (perviously started for bilateral PEs diagnosed on recent hospital admit)  - S/p 2 pRBC transfusion on 6/13, post-transfusion H/H 8.2/26/3  - [facility] consulted, plan for scope (endo and colo) Monday, clear liquid diet and prep Sunday and NPO at midnight   - Concern for malignancy given SUBHASH, bilateral PEs, and family history of colon cancer       Syncope  - No additional episodes since day of admission  - Workup ongoing, as above     History of pulmonary embolus (PE)      - Holding Xarelto given significant symptomatic anemia        - Perviously started for bilateral PEs diagnosed on recent hospital admit      - Continue to monitor     History of deep vein thrombosis (DVT) of lower extremity      - Holding Xarelto given significant symptomatic anemia    VTE Risk Mitigation (From admission, onward)         Ordered     IP VTE HIGH RISK PATIENT  Once      06/12/20 1624     Reason for No Pharmacological VTE Prophylaxis  Once     Question:  Reasons:  Answer:  Already adequately anticoagulated on oral Anticoagulants    06/12/20 1624     Place NORAH hose  Until discontinued      06/12/20 1450                Code: Full  Diet: Clear liquid, NPO at midnight  Dispo: Scope with GI scheduled for Monday, follow up results      Cindy Bartholomew MD  [facility] PGY-1  6/14/2020

## 2020-06-14 NOTE — PLAN OF CARE
1910H Patient is awake and orientedx4. Care plan explained and verbalized understanding. VIP care model introduced.  On room air, O2 saturation 96%. On heart monitor running Sinus Rhythm, heart rate 80s. IV site to the right antecubital and left forearm 20G; second unit of blood was given during the day. Maintained on low salt,low fat diet. Maintained on fall precaution. Bed in lowest position, bed alarms on, call light within reach and instructed to call for help when needed.      Neurochecks every 4 hours, no drift on all 4 extremities.

## 2020-06-14 NOTE — PLAN OF CARE
Patient stable. VSS. Plan of care reviewed with patient. Verbal reported of understanding. AAOx4. NSR on monitor with no red alarms noted. HR 70-90s. Patient still on clear liquid diet. Neuro checks every 4 hours. No new neuro check deficits noted at this time. Fall precaution maintained. No falls. Safety maintained. Due meds given per ordered. No complaint of SOB or pain throughout the shift. No acute distress noted. Bed in lowest position. Bed alarm on. Head of bed elevated.  Side rails x 2 are up. Call bell within reach. Patient will be monitored overnight.

## 2020-06-14 NOTE — CONSULTS
Reviewed the echocardiogram done which does not conclusively show an intracardiac shut; feel that in one view this most likely is an artifact. Will repeat bubble study in am.

## 2020-06-14 NOTE — PROGRESS NOTES
"[facility]    CC: anemia     HPI 65 y.o. man with acute on chronic, severe, symptomatic, microscopic, iron deficiency anemia not associated with overt GI bleeding. He has had SUBHASH for the last year and has been on iron. Then he recently started xarelto for DVT/PEs about 2 weeks ago. He went to Dr. Matson's office and had a loss of consciousness with seizure like activity and was brought to the ED and found to have a new stroke. He denies any black/bloody stools or diarrhea during this time. He has never had colonoscopy. His grandfather had colon cancer. He denies nausea, vomiting, abdominal pain, weight loss, or fever. He rarely takes NSAIDs, last time about a month ago for a headache.     Interval History:  No bloody stools or diarrhea. No abdominal pain or vomiting.     Past Medical History    has a past medical history of Bilateral pulmonary embolism (5/25/2020), History of DVT (deep vein thrombosis) (6/12/2020), and Iron deficiency anemia.      Review of Systems  General ROS: negative for - chills, fever or weight loss  Cardiovascular ROS: no chest pain or dyspnea on exertion  Gastrointestinal ROS: no abdominal pain, change in bowel habits, or black/ bloody stools    Physical Examination  BP (!) 113/57   Pulse 95   Temp 98 °F (36.7 °C)   Resp 17   Ht 6' 1" (1.854 m)   Wt 98.5 kg (217 lb 2.5 oz)   SpO2 95%   BMI 28.65 kg/m²   General appearance: alert, cooperative, no distress, overweight  HENT: Normocephalic, atraumatic, neck symmetrical, no nasal discharge   Eyes: conjunctivae/corneas clear, EOM's intact  Lungs: clear to auscultation in all fields, no dullness to percussion bilaterally, no wheeze  Heart: normal rate, regular rhythm without rub; palpable peripheral pulses  Abdomen: soft, NT, ND  Extremities: extremities symmetric; no clubbing, cyanosis, or edema  Integument: Skin color, texture, turgor normal; no rashes; hair distrubution normal  Neurologic: Alert and oriented X 3, normal " strength, normal coordination  Psychiatric: no pressured speech; normal affect; no evidence of impaired cognition     Labs:  Hg 8.2 (stable since transfusion)    Assessment:   65 year old man with acute worsening of SUBHASH without overt GI bleeding but recently started on xarelto for PEs. No colonoscopy and grandfather with colon cancer. Rare NSAID use, could be upper or small bowel bleeding, but concerning for malignancy with unexplained SUBHASH, new PEs, and family history.    Plan:  - EGD and colonoscopy on Monday  - clear liquid diet today  - prep tonight and NPO at midnight    Teresa Nicole MD MPH  [facility] PGY 5  [phone] cell  [phone] pager

## 2020-06-14 NOTE — PLAN OF CARE
VN rounds:  VN cued into pt's room with pt's permission.  Pt resting in bed in low position with call bell within reach. Fall risk protocol discussed with pt.  VN instructed to call for assistance.  Pt aware and agreeable.   No acute distress noted.  Pt's chart, labs and vital signs reviewed.  Allowed time for questions.  Will continue to be available and intervene as needed.

## 2020-06-14 NOTE — PLAN OF CARE
Pt performing at baseline for ADLs and functional mobility. No reports of dizziness and/or SOB on RA during session. Able to perform axs with supervision/mod I level of assist. Educated on home safety, recs of shower chair, and given handouts on pursed lip breathing as well as energy conservation techniques. No further OT services required. D/c OT

## 2020-06-14 NOTE — PT/OT/SLP EVAL
Physical Therapy Evaluation    Patient Name:  Miky Esqueda   MRN:  [id number]    Recommendations:     Discharge Recommendations:  home   Discharge Equipment Recommendations: shower chair   Barriers to discharge: None    Assessment:     Miky Esqueda is a 65 y.o. male admitted with a medical diagnosis of Symptomatic anemia.  He presents with the following impairments/functional limitations:  impaired endurance, impaired functional mobilty, gait instability, impaired balance.  Pt cooperative and motivated with Evaluation.  Plan for assess pt's status following procedure planned Monday and if pt stable and achieves PT goals, discharge to home with recs for shower chair.    Rehab Prognosis: Good; patient would benefit from acute skilled PT services to address these deficits and reach maximum level of function.    Recent Surgery: Procedure(s) (LRB):  EGD (ESOPHAGOGASTRODUODENOSCOPY) (N/A)  COLONOSCOPY (N/A)      Plan:     During this hospitalization, patient to be seen 5 x/week to address the identified rehab impairments via gait training, therapeutic activities, therapeutic exercises and progress toward the following goals:    · Plan of Care Expires:  07/14/20    Subjective     Chief Complaint: pt reports no pain or dizziness;  Pt reports fatigue and not yet at full premorbid levels of balance   Patient/Family Comments/goals: PLOF and to return to home  Pain/Comfort:  · Pain Rating 1: 0/10    Patients cultural, spiritual, Mosque conflicts given the current situation: no    Living Environment:  Pt lives with his wife in [facility] with with no MAX and uses tub/shower combo with no shower chair or other DME in home.  Pt ambulates household and community distances without AD independently and + .  Pt is independent with ADL.  Pt utilizes NC O2 PRN in home.    Equipment used at home: none.  DME owned (not currently used): none.  Upon discharge, patient will have assistance from spouse.    Objective:     Communicated with  nsg prior to session.  Patient found supine with bed alarm, telemetry  upon PT entry to room.    General Precautions: Standard, fall   Orthopedic Precautions:N/A   Braces: N/A     Exams:  · A & O x 4  · AROM:  BLE  WFL  Throughout  · MMT:  BLE grossly 5/5 throughout  · Sensory lt touch - intact    Functional Mobility:  · Bed mobility:  Mod Independent w VC technique for improved energy conservation and safety provided  · Txfs:  Sup<>sit SBA and VC;  Sit<>stand SBA   · Gait:  Ambulates without AD CGA ~50' x 2 reps;  Pt then perf within 50' distance reps of higher levels balance challenging activities including step back, change in speed, B dir side step, and 180* turns each perf without demo LOB; SLS - LLE 13 seconds and RLE 11 seconds w slightly greater intrinsic foot mm/ankle use for balance demo'd;  Pt mild increased exertion breathing w activity performance;  Pt demo good safety awareness and following direction during session    Therapeutic Activities and Exercises:   Ed provided for safety w mobility technique and energy conservation techniques w breathing and household tasks.  Ed provided for gradually progression of walking program.  Ed w verbal and demo for self monitoring of pulse at wrist w pt verbalize and return demo understanding.    AM-PAC 6 CLICK MOBILITY  Total Score:22     Patient left seated EOB speaking with MD group with all lines intact, call button in reach, nsg notified and MDs present.    GOALS:   Multidisciplinary Problems     Physical Therapy Goals        Problem: Physical Therapy Goal    Goal Priority Disciplines Outcome Goal Variances Interventions   Physical Therapy Goal     PT, PT/OT      Description: Goals to be met by: discharge date    Patient will increase functional independence with mobility by performin.  Pt to ambulate without AD use without LOB >150' with Supervision/Modified Independent.  2.  Pt to tolerate perf therex 3 x 12 reps  3.  Pt to perf all functional bed  mobility and transfers with Supervsion/Modified Independent demonstrating good safety w technique.                     History:     Past Medical History:   Diagnosis Date    Bilateral pulmonary embolism 5/25/2020    History of DVT (deep vein thrombosis) 6/12/2020    Iron deficiency anemia        History reviewed. No pertinent surgical history.    Time Tracking:     PT Received On: 06/14/20  PT Start Time: 1035     PT Stop Time: 1106  PT Total Time (min): 31 min     Billable Minutes: Evaluation 15 and Gait Training 16      Ekta Morgan, PT  06/14/2020

## 2020-06-14 NOTE — PT/OT/SLP EVAL
Occupational Therapy   Evaluation/Treatment/Dc Summary    Name: Miky Esqueda  MRN: [id number]  Admitting Diagnosis:  Symptomatic anemia      Recommendations:     Discharge Recommendations: other (see comments)(OP cardio/pulm rehab)  Discharge Equipment Recommendations:  shower chair  Barriers to discharge:  None    Assessment:     Miky Esqueda is a 65 y.o. male with a medical diagnosis of Symptomatic anemia.  He presents with impaired endurance. Performance deficits affecting function: impaired endurance, impaired cardiopulmonary response to activity.      Pt performing at baseline for ADLs and functional mobility. No reports of dizziness and/or SOB on RA during session. Able to perform axs with supervision/mod I level of assist. Educated on home safety, recs of shower chair, and given handouts on pursed lip breathing as well as energy conservation techniques. Pt does not demonstrate any deficits with FMC/GMC, strength, ROM and/or vision. No further OT services required. D/c OT     Rehab Prognosis: Good; patient would benefit from acute skilled OT services to address these deficits and reach maximum level of function.       Plan:     Patient to be seen (d/c OT) to address the above listed problems via    · Plan of Care Expires: 06/14/20  · Plan of Care Reviewed with: patient    Subjective     Chief Complaint: pt reports nervous regarding ambulation and OOB axs at this time 2/2 recent syncope episodes   Patient/Family Comments/goals: return to PLOF     Occupational Profile:  Living Environment: with spouse, SSH, no steps, t/s combo  Previous level of function: independent; driving; works, however, currently not at this time due to COVID; recently d/c home from hospital with O2; currently utilizes PRN 2L   Equipment Used at Home:  oxygen  Assistance upon Discharge: from spouse     Pain/Comfort:  · Pain Rating 1: 0/10    Patients cultural, spiritual, Taoist conflicts given the current situation:      Objective:      Communicated with: nsg prior to session.    General Precautions: Standard,     Orthopedic Precautions:N/A   Braces: N/A     Occupational Performance:    Bed Mobility:    · Patient completed Scooting/Bridging with modified independence  · Patient completed Supine to Sit with modified independence  · Patient completed Sit to Supine with modified independence    Functional Mobility/Transfers:  · Patient completed Sit <> Stand Transfer with stand by assistance  with  no assistive device   · Functional Mobility: SBA- progressing to supervision without AD; initially guarded and without arm swing, however, progressing with verbal cues     Activities of Daily Living:  · Feeding:  independence    · Lower Body Dressing: modified independence seated EOB     Cognitive/Visual Perceptual:  WFL     Physical Exam:  Balance:    -       WFL   Skin integrity: Visible skin intact  Sensation:    -       Intact  Dominant hand:    -       right  Upper Extremity Range of Motion:   BUE ROM WFL   Upper Extremity Strength:  BUE strength WFL    Strength:  Good; equal bilaterally   Fine Motor Coordination:    -       Intact    AMPAC 6 Click ADL:  AMPAC Total Score: 23    Treatment & Education:  Pt performing skills as above   Pt initially fearful/hesitsnt of axs 2/2 recent syncope episodes over previous day   Educated pt regarding safety with movements; monitoring of signs/symptoms of dizziness/syncope; moving slowly following transitional movements   Reviewed PLB and energy conservation handouts with pt and left in room   Functional mobility performed in room without AD SBA-supervision; initially guarded and no arm swing, but able to correct with verbal cues   Educated on shower chair recs   Therapist demonstrating UE/LE therex while supine   Education:    Patient left supine with all lines intact, call button in reach, bed alarm on and nsg notified    GOALS:   Multidisciplinary Problems     Occupational Therapy Goals     Not on file                 History:     Past Medical History:   Diagnosis Date    Bilateral pulmonary embolism 5/25/2020    History of DVT (deep vein thrombosis) 6/12/2020    Iron deficiency anemia        History reviewed. No pertinent surgical history.    Time Tracking:     OT Date of Treatment: 06/14/20  OT Start Time: 0909  OT Stop Time: 0937  OT Total Time (min): 28 min    Billable Minutes:Evaluation 10  Therapeutic Activity 18    Sasha Moyer, BLANCA  6/14/2020

## 2020-06-14 NOTE — PLAN OF CARE
PT Evaluation performed.  Pt cooperative and motivated throughout.  Pt amb without AD CGA/SBA 50' x 2 without LOB.  Rec assess pt following Monday's procedure and if pt's status remains stable, achieve goals and discharge acute physical therapy services.

## 2020-06-15 ENCOUNTER — ANESTHESIA (OUTPATIENT)
Dept: ENDOSCOPY | Facility: HOSPITAL | Age: 65
DRG: 065 | End: 2020-06-15
Payer: COMMERCIAL

## 2020-06-15 ENCOUNTER — ANESTHESIA EVENT (OUTPATIENT)
Dept: ENDOSCOPY | Facility: HOSPITAL | Age: 65
DRG: 065 | End: 2020-06-15
Payer: COMMERCIAL

## 2020-06-15 PROBLEM — Z86.718 HISTORY OF DEEP VEIN THROMBOSIS (DVT) OF LOWER EXTREMITY: Status: RESOLVED | Noted: 2020-06-12 | Resolved: 2020-06-15

## 2020-06-15 PROBLEM — T45.515A ANTICOAGULANT CAUSING ADVERSE EFFECT IN THERAPEUTIC USE: Status: RESOLVED | Noted: 2020-06-12 | Resolved: 2020-06-15

## 2020-06-15 LAB
ALBUMIN SERPL BCP-MCNC: 3.2 G/DL (ref 3.5–5.2)
ALP SERPL-CCNC: 54 U/L (ref 55–135)
ALT SERPL W/O P-5'-P-CCNC: 17 U/L (ref 10–44)
ANION GAP SERPL CALC-SCNC: 5 MMOL/L (ref 8–16)
AST SERPL-CCNC: 22 U/L (ref 10–40)
BASOPHILS # BLD AUTO: 0.02 K/UL (ref 0–0.2)
BASOPHILS NFR BLD: 0.2 % (ref 0–1.9)
BILIRUB SERPL-MCNC: 0.6 MG/DL (ref 0.1–1)
BSA FOR ECHO PROCEDURE: 2.2 M2
BUN SERPL-MCNC: 17 MG/DL (ref 8–23)
CALCIUM SERPL-MCNC: 8.5 MG/DL (ref 8.7–10.5)
CHLORIDE SERPL-SCNC: 106 MMOL/L (ref 95–110)
CO2 SERPL-SCNC: 25 MMOL/L (ref 23–29)
CREAT SERPL-MCNC: 1.1 MG/DL (ref 0.5–1.4)
DIFFERENTIAL METHOD: ABNORMAL
EOSINOPHIL # BLD AUTO: 0.1 K/UL (ref 0–0.5)
EOSINOPHIL NFR BLD: 0.4 % (ref 0–8)
ERYTHROCYTE [DISTWIDTH] IN BLOOD BY AUTOMATED COUNT: 18.9 % (ref 11.5–14.5)
EST. GFR  (AFRICAN AMERICAN): >60 ML/MIN/1.73 M^2
EST. GFR  (NON AFRICAN AMERICAN): >60 ML/MIN/1.73 M^2
GLUCOSE SERPL-MCNC: 107 MG/DL (ref 70–110)
HCT VFR BLD AUTO: 25.6 % (ref 40–54)
HCT VFR BLD AUTO: 26.1 % (ref 40–54)
HCT VFR BLD AUTO: 28 % (ref 40–54)
HGB BLD-MCNC: 8.1 G/DL (ref 14–18)
HGB BLD-MCNC: 8.3 G/DL (ref 14–18)
HGB BLD-MCNC: 8.8 G/DL (ref 14–18)
IMM GRANULOCYTES # BLD AUTO: 0.06 K/UL (ref 0–0.04)
IMM GRANULOCYTES NFR BLD AUTO: 0.5 % (ref 0–0.5)
LYMPHOCYTES # BLD AUTO: 1.2 K/UL (ref 1–4.8)
LYMPHOCYTES NFR BLD: 10.4 % (ref 18–48)
MAGNESIUM SERPL-MCNC: 1.8 MG/DL (ref 1.6–2.6)
MCH RBC QN AUTO: 26.2 PG (ref 27–31)
MCHC RBC AUTO-ENTMCNC: 31.6 G/DL (ref 32–36)
MCV RBC AUTO: 83 FL (ref 82–98)
MONOCYTES # BLD AUTO: 1 K/UL (ref 0.3–1)
MONOCYTES NFR BLD: 8.8 % (ref 4–15)
NEUTROPHILS # BLD AUTO: 9.2 K/UL (ref 1.8–7.7)
NEUTROPHILS NFR BLD: 79.7 % (ref 38–73)
NRBC BLD-RTO: 0 /100 WBC
PHOSPHATE SERPL-MCNC: 4 MG/DL (ref 2.7–4.5)
PLATELET # BLD AUTO: 175 K/UL (ref 150–350)
PMV BLD AUTO: 10.5 FL (ref 9.2–12.9)
POTASSIUM SERPL-SCNC: 4.2 MMOL/L (ref 3.5–5.1)
PROT SERPL-MCNC: 6.2 G/DL (ref 6–8.4)
RBC # BLD AUTO: 3.09 M/UL (ref 4.6–6.2)
SODIUM SERPL-SCNC: 136 MMOL/L (ref 136–145)
WBC # BLD AUTO: 11.5 K/UL (ref 3.9–12.7)

## 2020-06-15 PROCEDURE — 43270 PR EGD, FLEX, W/ABLATION, TUMOR/POLYP/LESION(S), W/ DILATION: ICD-10-PCS | Mod: 51,,, | Performed by: INTERNAL MEDICINE

## 2020-06-15 PROCEDURE — 45385 COLONOSCOPY W/LESION REMOVAL: CPT | Performed by: INTERNAL MEDICINE

## 2020-06-15 PROCEDURE — 45385 COLONOSCOPY W/LESION REMOVAL: CPT | Mod: ,,, | Performed by: INTERNAL MEDICINE

## 2020-06-15 PROCEDURE — 88342 IMHCHEM/IMCYTCHM 1ST ANTB: CPT | Mod: 26,,, | Performed by: PATHOLOGY

## 2020-06-15 PROCEDURE — 43239 EGD BIOPSY SINGLE/MULTIPLE: CPT | Performed by: INTERNAL MEDICINE

## 2020-06-15 PROCEDURE — A4216 STERILE WATER/SALINE, 10 ML: HCPCS | Performed by: STUDENT IN AN ORGANIZED HEALTH CARE EDUCATION/TRAINING PROGRAM

## 2020-06-15 PROCEDURE — 83735 ASSAY OF MAGNESIUM: CPT

## 2020-06-15 PROCEDURE — 88305 TISSUE EXAM BY PATHOLOGIST: CPT | Mod: 59 | Performed by: PATHOLOGY

## 2020-06-15 PROCEDURE — 25000003 PHARM REV CODE 250: Performed by: NURSE ANESTHETIST, CERTIFIED REGISTERED

## 2020-06-15 PROCEDURE — 88305 TISSUE EXAM BY PATHOLOGIST: ICD-10-PCS | Mod: 26,,, | Performed by: PATHOLOGY

## 2020-06-15 PROCEDURE — 27201012 HC FORCEPS, HOT/COLD, DISP: Performed by: INTERNAL MEDICINE

## 2020-06-15 PROCEDURE — 80053 COMPREHEN METABOLIC PANEL: CPT

## 2020-06-15 PROCEDURE — 85014 HEMATOCRIT: CPT | Mod: 91

## 2020-06-15 PROCEDURE — 85018 HEMOGLOBIN: CPT

## 2020-06-15 PROCEDURE — 84100 ASSAY OF PHOSPHORUS: CPT

## 2020-06-15 PROCEDURE — 37000008 HC ANESTHESIA 1ST 15 MINUTES: Performed by: INTERNAL MEDICINE

## 2020-06-15 PROCEDURE — 43270 EGD LESION ABLATION: CPT | Performed by: INTERNAL MEDICINE

## 2020-06-15 PROCEDURE — 43239 EGD BIOPSY SINGLE/MULTIPLE: CPT | Mod: 59,,, | Performed by: INTERNAL MEDICINE

## 2020-06-15 PROCEDURE — 88342 IMHCHEM/IMCYTCHM 1ST ANTB: CPT | Performed by: PATHOLOGY

## 2020-06-15 PROCEDURE — 63600175 PHARM REV CODE 636 W HCPCS: Performed by: NURSE ANESTHETIST, CERTIFIED REGISTERED

## 2020-06-15 PROCEDURE — 43239 PR EGD, FLEX, W/BIOPSY, SGL/MULTI: ICD-10-PCS | Mod: 59,,, | Performed by: INTERNAL MEDICINE

## 2020-06-15 PROCEDURE — 36415 COLL VENOUS BLD VENIPUNCTURE: CPT

## 2020-06-15 PROCEDURE — 97530 THERAPEUTIC ACTIVITIES: CPT | Mod: CQ

## 2020-06-15 PROCEDURE — 88305 TISSUE EXAM BY PATHOLOGIST: CPT | Mod: 26,,, | Performed by: PATHOLOGY

## 2020-06-15 PROCEDURE — 25000003 PHARM REV CODE 250: Performed by: STUDENT IN AN ORGANIZED HEALTH CARE EDUCATION/TRAINING PROGRAM

## 2020-06-15 PROCEDURE — 11000001 HC ACUTE MED/SURG PRIVATE ROOM

## 2020-06-15 PROCEDURE — 88342 CHG IMMUNOCYTOCHEMISTRY: ICD-10-PCS | Mod: 26,,, | Performed by: PATHOLOGY

## 2020-06-15 PROCEDURE — 85025 COMPLETE CBC W/AUTO DIFF WBC: CPT

## 2020-06-15 PROCEDURE — 27202087 HC PROBE, APC: Performed by: INTERNAL MEDICINE

## 2020-06-15 PROCEDURE — 97116 GAIT TRAINING THERAPY: CPT | Mod: CQ

## 2020-06-15 PROCEDURE — 37000009 HC ANESTHESIA EA ADD 15 MINS: Performed by: INTERNAL MEDICINE

## 2020-06-15 PROCEDURE — 85018 HEMOGLOBIN: CPT | Mod: 91

## 2020-06-15 PROCEDURE — 43270 EGD LESION ABLATION: CPT | Mod: 51,,, | Performed by: INTERNAL MEDICINE

## 2020-06-15 PROCEDURE — 85014 HEMATOCRIT: CPT

## 2020-06-15 PROCEDURE — 94761 N-INVAS EAR/PLS OXIMETRY MLT: CPT

## 2020-06-15 PROCEDURE — 45385 PR COLONOSCOPY,REMV LESN,SNARE: ICD-10-PCS | Mod: ,,, | Performed by: INTERNAL MEDICINE

## 2020-06-15 PROCEDURE — 27201089 HC SNARE, DISP (ANY): Performed by: INTERNAL MEDICINE

## 2020-06-15 RX ORDER — ETOMIDATE 2 MG/ML
INJECTION INTRAVENOUS
Status: DISCONTINUED | OUTPATIENT
Start: 2020-06-15 | End: 2020-06-15

## 2020-06-15 RX ORDER — SODIUM CHLORIDE 9 MG/ML
INJECTION, SOLUTION INTRAVENOUS CONTINUOUS PRN
Status: DISCONTINUED | OUTPATIENT
Start: 2020-06-15 | End: 2020-06-15

## 2020-06-15 RX ORDER — LIDOCAINE HCL/PF 100 MG/5ML
SYRINGE (ML) INTRAVENOUS
Status: DISCONTINUED | OUTPATIENT
Start: 2020-06-15 | End: 2020-06-15

## 2020-06-15 RX ORDER — GLYCOPYRROLATE 0.2 MG/ML
INJECTION INTRAMUSCULAR; INTRAVENOUS
Status: DISCONTINUED | OUTPATIENT
Start: 2020-06-15 | End: 2020-06-15

## 2020-06-15 RX ORDER — PHENYLEPHRINE HYDROCHLORIDE 10 MG/ML
INJECTION INTRAVENOUS
Status: DISCONTINUED | OUTPATIENT
Start: 2020-06-15 | End: 2020-06-15

## 2020-06-15 RX ORDER — PROPOFOL 10 MG/ML
INJECTION, EMULSION INTRAVENOUS CONTINUOUS PRN
Status: DISCONTINUED | OUTPATIENT
Start: 2020-06-15 | End: 2020-06-15

## 2020-06-15 RX ORDER — PROPOFOL 10 MG/ML
INJECTION, EMULSION INTRAVENOUS
Status: DISCONTINUED | OUTPATIENT
Start: 2020-06-15 | End: 2020-06-15

## 2020-06-15 RX ADMIN — PHENYLEPHRINE HYDROCHLORIDE 100 MCG: 10 INJECTION INTRAVENOUS at 04:06

## 2020-06-15 RX ADMIN — PHENYLEPHRINE HYDROCHLORIDE 100 MCG: 10 INJECTION INTRAVENOUS at 03:06

## 2020-06-15 RX ADMIN — SODIUM CHLORIDE: 0.9 INJECTION, SOLUTION INTRAVENOUS at 03:06

## 2020-06-15 RX ADMIN — GLYCOPYRROLATE 0.2 MG: 0.2 INJECTION, SOLUTION INTRAMUSCULAR; INTRAVENOUS at 03:06

## 2020-06-15 RX ADMIN — ETOMIDATE 4 MG: 2 INJECTION, SOLUTION INTRAVENOUS at 03:06

## 2020-06-15 RX ADMIN — Medication 3 ML: at 06:06

## 2020-06-15 RX ADMIN — Medication 3 ML: at 09:06

## 2020-06-15 RX ADMIN — LIDOCAINE HYDROCHLORIDE 50 MG: 20 INJECTION, SOLUTION INTRAVENOUS at 03:06

## 2020-06-15 RX ADMIN — PROPOFOL 40 MG: 10 INJECTION, EMULSION INTRAVENOUS at 03:06

## 2020-06-15 RX ADMIN — TOPICAL ANESTHETIC 1 EACH: 200 SPRAY DENTAL; PERIODONTAL at 03:06

## 2020-06-15 RX ADMIN — PROPOFOL 100 MCG/KG/MIN: 10 INJECTION, EMULSION INTRAVENOUS at 03:06

## 2020-06-15 NOTE — SUBJECTIVE & OBJECTIVE
Interval History: The patient was seen and examined this morning at bedside, the patient is resting comfortably in NAD. The patient has no acute complaints this morning. The patient is tolerating diet well, good urinary output and has worked with PT/OT. The patient feels significantly better than on admission. The nurse reports no acute events over night. The patient denies any chest pain, SOB or focal neuro deficits. The patient is scheduled for EGD and colonoscopy today. Was NPO last night and completed bowel prep.       Review of Systems   Constitutional: Negative for activity change, chills, fatigue and fever.   HENT: Negative for congestion and ear pain.    Eyes: Negative for pain.   Respiratory: Negative for apnea, chest tightness, shortness of breath and wheezing.    Cardiovascular: Negative for chest pain and palpitations.   Gastrointestinal: Negative for abdominal distention, abdominal pain, constipation, diarrhea, nausea and rectal pain.   Endocrine: Negative for polydipsia.   Genitourinary: Negative for flank pain.   Musculoskeletal: Negative for arthralgias, back pain, neck pain and neck stiffness.   Skin: Negative for rash.   Neurological: Negative for dizziness, tremors, facial asymmetry, speech difficulty and light-headedness.   Psychiatric/Behavioral: Negative for agitation.     Objective:     Vital Signs (Most Recent):  Temp: 96.9 °F (36.1 °C) (06/15/20 0336)  Pulse: 87 (06/15/20 0413)  Resp: 20 (06/15/20 0336)  BP: 119/74 (06/15/20 0336)  SpO2: 98 % (06/15/20 0336) Vital Signs (24h Range):  Temp:  [96.9 °F (36.1 °C)-98.9 °F (37.2 °C)] 96.9 °F (36.1 °C)  Pulse:  [75-95] 87  Resp:  [16-20] 20  SpO2:  [96 %-99 %] 98 %  BP: (119-133)/(62-75) 119/74     Weight: 99.8 kg (220 lb 0.3 oz)  Body mass index is 29.03 kg/m².    Intake/Output Summary (Last 24 hours) at 6/15/2020 0756  Last data filed at 6/15/2020 0611  Gross per 24 hour   Intake 4350 ml   Output 1050 ml   Net 3300 ml      Physical Exam  Vitals  signs and nursing note reviewed.   Constitutional:       Appearance: He is well-developed and well-nourished.   HENT:      Head: Normocephalic and atraumatic.      Right Ear: External ear normal.      Left Ear: External ear normal.      Mouth/Throat:      Mouth: Oropharynx is clear and moist.   Eyes:      Extraocular Movements: EOM normal.      Conjunctiva/sclera: Conjunctivae normal.      Pupils: Pupils are equal, round, and reactive to light.   Neck:      Musculoskeletal: Normal range of motion and neck supple.   Cardiovascular:      Rate and Rhythm: Normal rate and regular rhythm.      Pulses: Intact distal pulses. Pulses are strong.      Heart sounds: Normal heart sounds, S1 normal and S2 normal. No murmur. No friction rub. No gallop.    Pulmonary:      Effort: Pulmonary effort is normal. No respiratory distress.      Breath sounds: Normal breath sounds.   Abdominal:      General: Bowel sounds are normal. There is no distension.      Palpations: Abdomen is soft.      Tenderness: There is no abdominal tenderness. There is no guarding.   Musculoskeletal: Normal range of motion.         General: No edema.   Skin:     General: Skin is warm and dry.   Neurological:      Mental Status: He is alert and oriented to person, place, and time.      GCS: GCS eye subscore is 4. GCS verbal subscore is 5. GCS motor subscore is 6.      Cranial Nerves: No cranial nerve deficit.      Sensory: No sensory deficit.      Deep Tendon Reflexes: Strength normal.   Psychiatric:         Mood and Affect: Mood and affect normal.         Behavior: Behavior is cooperative.         Significant Labs:   CBC:   Recent Labs   Lab 06/14/20  0544  06/14/20  1747 06/15/20  0004 06/15/20  0531   WBC 11.31  --   --   --  11.50   HGB 8.2*  8.2*   < > 8.5* 8.8* 8.1*   HCT 26.3*  25.6*   < > 27.1* 28.0* 25.6*     --   --   --  175    < > = values in this interval not displayed.     CMP:   Recent Labs   Lab 06/14/20  0544 06/15/20  0531     136   K 4.4 4.2    106   CO2 22* 25    107   BUN 27* 17   CREATININE 1.2 1.1   CALCIUM 8.5* 8.5*   PROT 6.2 6.2   ALBUMIN 3.2* 3.2*   BILITOT 0.4 0.6   ALKPHOS 52* 54*   AST 21 22   ALT 14 17   ANIONGAP 7* 5*   EGFRNONAA >60 >60     Magnesium:   Recent Labs   Lab 06/14/20  0544 06/15/20  0531   MG 1.9 1.8     POCT Glucose:   Recent Labs   Lab 06/13/20  1604   POCTGLUCOSE 111*     All pertinent labs within the past 24 hours have been reviewed.    Significant Imaging: I have reviewed all pertinent imaging results/findings within the past 24 hours.

## 2020-06-15 NOTE — PLAN OF CARE
Patient transferred down to Endoscopy via stretcher.  R AC IV flushed and connected to IVF for procedure.  VSS, no complaints at this time, just a little nervous.  Answered questions and offered support.

## 2020-06-15 NOTE — ASSESSMENT & PLAN NOTE
Holding Xarelto in the setting of symptomatic anemia   Will resume on discharge, will start 20 mg daily

## 2020-06-15 NOTE — NURSING
Pt back from procedure AAO. Pt able to transfer into bed independently with staff verbal curing. Rectal polyps removed and sent for biopsy per RN report. Pt denies any discomfort when asked. Pt eating dinner at this time.

## 2020-06-15 NOTE — ASSESSMENT & PLAN NOTE
Hb stable  s/p 2 units PRBCs  Hgb/Hct 8.1/25.6 this AM   Set for EGD/colonoscopy this AM  with Dr Espinal  Completed Bowel prep last night   Continue to hold anticoag and antiplatelet therapy for now  Per PCP: Switch to xarelto 20mg daily upon discharge; noemi completed 15mg BID course  May add ASA 81mg upon discharge also due to subacute CVA

## 2020-06-15 NOTE — PROGRESS NOTES
"[facility] Hospital Medicine  Progress Note    Patient Name: Miky Esqueda  MRN: [id number]  Patient Class: IP- Inpatient   Admission Date: 6/12/2020  Length of Stay: 3 days  Attending Physician: Farhat Matson MD  Primary Care Provider: Farhat Matson MD        Subjective:     Principal Problem:Symptomatic anemia      HPI:  Miky Esqueda is a 65 y.o. male with history of bilateral PE on Xarelto, iron deficiency anemia who presents to [facility] for evaluation of  syncopal episode at  Dr. Matson's office 1 hour PTA . The patient went to Dr. Matson's office this AM because last night he was urinating and had an episode of dizziness and he started to "black out". The patient reports having multiple bouts of feeling faint and sob the last couple of days. Today, while at 's office, patient states he was sitting down when he became dizzy again and had another syncopal episode. Per EMS, the syncopal episode lasted about 30 seconds. Before he had this episode, he notes that he started sweating. He denied experiencing any CP, SOB, HA, numbness, or tingling. Per , patient had syncopal episode then very brief 15 second seizure then was limp in his arms and woke up very quickly. He was very diaphoretic. He had micturition. Patient noted to have a heart murmur and bilateral bruits on exam at [facility]'s office.     In the ED, CTH showed interval development of a hypodense area in the left parietal lobe concerning for infarct. There was a significant drop in H/H to 6.8/22.5 from 12.6/40.7 2 weeks prior. He was type, crossed and transfused 1 unit RBC. [facility] was consulted. Vitals stable on admission. FM consulted for admission and management     Overview/Hospital Course:  6/13 FOBT+, MRI/MRA demonstrate no high grade stenosis/occlusion. ICAs 50-60% occlusion. Patient H 6.9, 2nd unit PRBC ordered, neuro consult pending. Awaiting GI and Neuro recs.     Interval History: The patient was seen and " examined this morning at bedside, the patient is resting comfortably in NAD. The patient has no acute complaints this morning. The patient is tolerating diet well, good urinary output and has worked with PT/OT. The patient feels significantly better than on admission. The nurse reports no acute events over night. The patient denies any chest pain, SOB or focal neuro deficits. The patient is scheduled for EGD and colonoscopy today. Was NPO last night and completed bowel prep.       Review of Systems   Constitutional: Negative for activity change, chills, fatigue and fever.   HENT: Negative for congestion and ear pain.    Eyes: Negative for pain.   Respiratory: Negative for apnea, chest tightness, shortness of breath and wheezing.    Cardiovascular: Negative for chest pain and palpitations.   Gastrointestinal: Negative for abdominal distention, abdominal pain, constipation, diarrhea, nausea and rectal pain.   Endocrine: Negative for polydipsia.   Genitourinary: Negative for flank pain.   Musculoskeletal: Negative for arthralgias, back pain, neck pain and neck stiffness.   Skin: Negative for rash.   Neurological: Negative for dizziness, tremors, facial asymmetry, speech difficulty and light-headedness.   Psychiatric/Behavioral: Negative for agitation.     Objective:     Vital Signs (Most Recent):  Temp: 96.9 °F (36.1 °C) (06/15/20 0336)  Pulse: 87 (06/15/20 0413)  Resp: 20 (06/15/20 0336)  BP: 119/74 (06/15/20 0336)  SpO2: 98 % (06/15/20 0336) Vital Signs (24h Range):  Temp:  [96.9 °F (36.1 °C)-98.9 °F (37.2 °C)] 96.9 °F (36.1 °C)  Pulse:  [75-95] 87  Resp:  [16-20] 20  SpO2:  [96 %-99 %] 98 %  BP: (119-133)/(62-75) 119/74     Weight: 99.8 kg (220 lb 0.3 oz)  Body mass index is 29.03 kg/m².    Intake/Output Summary (Last 24 hours) at 6/15/2020 3753  Last data filed at 6/15/2020 0611  Gross per 24 hour   Intake 4350 ml   Output 1050 ml   Net 3300 ml      Physical Exam  Vitals signs and nursing note reviewed.    Constitutional:       Appearance: He is well-developed and well-nourished.   HENT:      Head: Normocephalic and atraumatic.      Right Ear: External ear normal.      Left Ear: External ear normal.      Mouth/Throat:      Mouth: Oropharynx is clear and moist.   Eyes:      Extraocular Movements: EOM normal.      Conjunctiva/sclera: Conjunctivae normal.      Pupils: Pupils are equal, round, and reactive to light.   Neck:      Musculoskeletal: Normal range of motion and neck supple.   Cardiovascular:      Rate and Rhythm: Normal rate and regular rhythm.      Pulses: Intact distal pulses. Pulses are strong.      Heart sounds: Normal heart sounds, S1 normal and S2 normal. No murmur. No friction rub. No gallop.    Pulmonary:      Effort: Pulmonary effort is normal. No respiratory distress.      Breath sounds: Normal breath sounds.   Abdominal:      General: Bowel sounds are normal. There is no distension.      Palpations: Abdomen is soft.      Tenderness: There is no abdominal tenderness. There is no guarding.   Musculoskeletal: Normal range of motion.         General: No edema.   Skin:     General: Skin is warm and dry.   Neurological:      Mental Status: He is alert and oriented to person, place, and time.      GCS: GCS eye subscore is 4. GCS verbal subscore is 5. GCS motor subscore is 6.      Cranial Nerves: No cranial nerve deficit.      Sensory: No sensory deficit.      Deep Tendon Reflexes: Strength normal.   Psychiatric:         Mood and Affect: Mood and affect normal.         Behavior: Behavior is cooperative.         Significant Labs:   CBC:   Recent Labs   Lab 06/14/20  0544  06/14/20  1747 06/15/20  0004 06/15/20  0531   WBC 11.31  --   --   --  11.50   HGB 8.2*  8.2*   < > 8.5* 8.8* 8.1*   HCT 26.3*  25.6*   < > 27.1* 28.0* 25.6*     --   --   --  175    < > = values in this interval not displayed.     CMP:   Recent Labs   Lab 06/14/20  0544 06/15/20  0531    136   K 4.4 4.2    106   CO2  22* 25    107   BUN 27* 17   CREATININE 1.2 1.1   CALCIUM 8.5* 8.5*   PROT 6.2 6.2   ALBUMIN 3.2* 3.2*   BILITOT 0.4 0.6   ALKPHOS 52* 54*   AST 21 22   ALT 14 17   ANIONGAP 7* 5*   EGFRNONAA >60 >60     Magnesium:   Recent Labs   Lab 06/14/20  0544 06/15/20  0531   MG 1.9 1.8     POCT Glucose:   Recent Labs   Lab 06/13/20  1604   POCTGLUCOSE 111*     All pertinent labs within the past 24 hours have been reviewed.    Significant Imaging: I have reviewed all pertinent imaging results/findings within the past 24 hours.      Assessment/Plan:      * Symptomatic anemia  Hb stable  s/p 2 units PRBCs  Hgb/Hct 8.1/25.6 this AM   Set for EGD/colonoscopy this AM  with Dr Espinal  Completed Bowel prep last night   Continue to hold anticoag and antiplatelet therapy for now  Per PCP: Switch to xarelto 20mg daily upon discharge; noemi completed 15mg BID course  May add ASA 81mg upon discharge also due to subacute CVA          Bilateral stenosis of carotid arteries greater than 50%  Carotid US significant for  slight increased peak systolic velocities in the ICAs concerning for 50-69% stenosis  Continue Atorvastatin for secondary stroke prevention   Hold ASA, Xarelto and plavix in setting of possible GI bleed   Pending repeat Echo       CVA (cerebral vascular accident)  CT head w/ hypodense area of the left parietal lobe concerning for infarct  B/l carotid US showed 50-69% stenosis of ICA bilaterally  MRI confirmed left parietal lobe chanegs as noted on CT, on MRI appears to represent likely late subacute to early remote ischemic change  MRA negative for any high-grade stenosis   Neurology recommends: if not anticoagulating 2/2 GI bleed would add Plavix if/when safe from GI perspective. Continue ASA; if anticoagulation is started at some point will need to keep only one antiplatelet on. Triple therapy is not recommended.  Anti-platelet therapy:Holding ASA/Plavix for now, consider restarting if/when safe from GI  persepctive  Atorvastatin for secondary stroke prevention   2D echo with bubble study - prelim read shows shunt, Cards feels this could be shadowing   Repeat Echo today       Syncope  No further episodes since admission    Will continue to monitor       History of pulmonary embolus (PE)  Holding Xarelto in the setting of symptomatic anemia   Will resume on discharge, will start 20 mg daily       VTE Risk Mitigation (From admission, onward)         Ordered     IP VTE HIGH RISK PATIENT  Once      06/12/20 1624     Reason for No Pharmacological VTE Prophylaxis  Once     Question:  Reasons:  Answer:  Already adequately anticoagulated on oral Anticoagulants    06/12/20 1624     Place NORAH hose  Until discontinued      06/12/20 1450                Code: Full   Diet: NPO pending EGD and Colonoscopy   Disposition: Pending clinical improvement of symptoms.       D'Amico C Johnson, MD  Department of Hospital Medicine   [facility]

## 2020-06-15 NOTE — ANESTHESIA PREPROCEDURE EVALUATION
06/15/2020  Miky Esqueda is a 65 y.o., male w/ GI bleed here for EGD/colonoscopy    started xarelto for DVT/PEs about 2 weeks ago. He went to Dr. Matson's office and had a loss of consciousness with seizure like activity and was brought to the ED and found to have a new stroke    Past Medical History:   Diagnosis Date    Bilateral pulmonary embolism 5/25/2020    History of DVT (deep vein thrombosis) 6/12/2020    Iron deficiency anemia      History reviewed. No pertinent surgical history.  Review of patient's allergies indicates:  No Known Allergies      Anesthesia Evaluation    I have reviewed the Patient Summary Reports.    I have reviewed the Nursing Notes. I have reviewed the NPO Status.   I have reviewed the Medications.     Review of Systems  Anesthesia Hx:   Denies Personal Hx of Anesthesia complications.   Social:  Former Smoker    Hematology/Oncology:         -- Anemia:   Cardiovascular:   PVD ECG has been reviewed. Hx of bilateral PE and DVT, on xarelto ; carotid stenosis   Neurological:   CVA        Physical Exam  General:  Well nourished    Airway/Jaw/Neck:  Airway Findings: Mouth Opening: Normal Tongue: Normal  General Airway Assessment: Adult  Mallampati: III  TM Distance: Normal, at least 6 cm  Jaw/Neck Findings:  Neck ROM: Normal ROM      Dental:  Dental Findings: Upper Dentures   Chest/Lungs:  Chest/Lungs Findings: Clear to auscultation, Normal Respiratory Rate     Heart/Vascular:  Heart Findings: Rate: Tachycardia  Rhythm: Regular Rhythm        Mental Status:  Mental Status Findings:  Alert and Oriented       Lab Results   Component Value Date    WBC 11.50 06/15/2020    HGB 8.1 (L) 06/15/2020    HCT 25.6 (L) 06/15/2020     06/15/2020    CHOL 177 06/12/2020    TRIG 89 06/12/2020    HDL 41 06/12/2020    ALT 17 06/15/2020    AST 22 06/15/2020     06/15/2020    K 4.2 06/15/2020      06/15/2020    CREATININE 1.1 06/15/2020    BUN 17 06/15/2020    CO2 25 06/15/2020    TSH 0.916 06/12/2020    INR 1.0 06/13/2020    HGBA1C 5.8 (H) 05/25/2020 6/13/20  Conclusion    · There is no evidence of intracardiac shunting.  · Left ventricular systolic function. The estimated ejection fraction is 60%.  · Grade I (mild) left ventricular diastolic dysfunction consistent with impaired relaxation.  · Normal right ventricular systolic function.  · Echo is not diagnostic for an intracardiac shunt; most likely artifact, will repeat in am.         Anesthesia Plan  Type of Anesthesia, risks & benefits discussed:  Anesthesia Type:  MAC, general  Patient's Preference:   Intra-op Monitoring Plan: standard ASA monitors  Intra-op Monitoring Plan Comments:   Post Op Pain Control Plan:   Post Op Pain Control Plan Comments:   Induction:    Beta Blocker:  Patient is not currently on a Beta-Blocker (No further documentation required).       Informed Consent: Patient understands risks and agrees with Anesthesia plan.  Questions answered. Anesthesia consent signed with patient.  ASA Score: 4     Day of Surgery Review of History & Physical:            Ready For Surgery From Anesthesia Perspective.

## 2020-06-15 NOTE — PLAN OF CARE
[facility] Plan of Care    Patient is a 64yo M w hx of recent b/l PE in setting of b/l DVTs who was discarged on NOAC, presented on this admission after syncopal episode. Also with drop in H&H, had EGD today to remove rectal polyps. Found to have subacute/early ischemic L parietal stroke (no DWI changes).     Etiology of the stroke could be related to the sudden drop of H/H in the setting of 50-69% stenosis of ICAs seen on US vs Hypercoagulable state and paradoxical emboli given recent h/o DVT and PE and now with echo showing positive L to R shunt.     Patient without focal neurologic deficits on exam.    Stroke workup:    - MRI with L parietal subacute/early ischemic CVA (not seen on DWI)  - MRA H&N without LVO or significant stenosis  - Carotid ultrasounds show 50-69% stenosis of bialteral ICA. Both have patent flow  - TTE w positive L to R shunt. Per cards, not surgical candidate at this time and will followup as outpatient.   - 2/2 stroke risk factors - A1C 5.8, , TSH wnl, B12 >2000, smoking cessation  - would continue on aspirin 81mg once GI cleared after EGD (currently held today for EGD) and high intensity statin atorvastatin 40 given patient w moderate stenosis of bilateral ICA's and LDL of 118. Would not recommend adding on 2nd antiplatelet as there are plans to resume patient's home xarrelo on discharge    Will sign off at this time. Please call with further questions.     Mirela Ac MD  [facility]

## 2020-06-15 NOTE — [employer]OVATION PATIENT INSTRUCTIONS
Discharge Summary/Instructions after an Endoscopic Procedure  Patient Name: Miky Esqueda  Patient MRN: [id number]  Patient YOB: 1955  Monday, Avelina 15, 2020  Brown Go MD  Your health is very important to us during the Covid Crisis. Following your   procedure today, you will receive a daily text for 2 weeks asking about   signs or symptoms of Covid 19.  Please respond to this text when you   receive it so we can follow up and keep you as safe as possible.   RESTRICTIONS:  During your procedure today, you received medications for sedation.  These   medications may affect your judgment, balance and coordination.  Therefore,   for 24 hours, you have the following restrictions:   - DO NOT drive a car, operate machinery, make legal/financial decisions,   sign important papers or drink alcohol.    ACTIVITY:  Today: no heavy lifting, straining or running due to procedural   sedation/anesthesia.  The following day: return to full activity including work.  DIET:  Eat and drink normally unless instructed otherwise.     TREATMENT FOR COMMON SIDE EFFECTS:  - Mild abdominal pain, nausea, belching, bloating or excessive gas:  rest,   eat lightly and use a heating pad.  - Sore Throat: treat with throat lozenges and/or gargle with warm salt   water.  - Because air was used during the procedure, expelling large amounts of air   from your rectum or belching is normal.  - If a bowel prep was taken, you may not have a bowel movement for 1-3 days.    This is normal.  SYMPTOMS TO WATCH FOR AND REPORT TO YOUR PHYSICIAN:  1. Abdominal pain or bloating, other than gas cramps.  2. Chest pain.  3. Back pain.  4. Signs of infection such as: chills or fever occurring within 24 hours   after the procedure.  5. Rectal bleeding, which would show as bright red, maroon, or black stools.   (A tablespoon of blood from the rectum is not serious, especially if   hemorrhoids are present.)  6. Vomiting.  7. Weakness or dizziness.  GO  DIRECTLY TO THE NEAREST EMERGENCY ROOM IF YOU HAVE ANY OF THE FOLLOWING:      Difficulty breathing              Chills and/or fever over 101 F   Persistent vomiting and/or vomiting blood   Severe abdominal pain   Severe chest pain   Black, tarry stools   Bleeding- more than one tablespoon   Any other symptom or condition that you feel may need urgent attention  Your doctor recommends these additional instructions:  If any biopsies were taken, your doctors clinic will contact you in 1 to 2   weeks with any results.  - Return patient to hospital hall for ongoing care.   - Resume previous diet.   - Continue present medications.   - Await pathology results.   - Repeat colonoscopy in 5 years for surveillance.   - Monitor CBC while on iron.  - No obvious source on todays EGD / Colon. Await pathology, if iron def   anemia persists in the near future, would consider VCE for further   evaluation of possible small bowel AVMs.  - Resume Xarelto (rivaroxaban) at prior dose tomorrow.  Refer to referring   physician for further adjustment of therapy.  For questions, problems or results please call your physician - Brown Go MD at Work:  [phone].  EMERGENCY PHONE NUMBER: [phone],  LAB RESULTS: [phone]  IF A COMPLICATION OR EMERGENCY SITUATION ARISES AND YOU ARE UNABLE TO REACH   YOUR PHYSICIAN - GO DIRECTLY TO THE EMERGENCY ROOM.  Brown Go MD  6/15/2020 4:17:04 PM  This report has been verified and signed electronically.  [employer]

## 2020-06-15 NOTE — PLAN OF CARE
Problem: Physical Therapy Goal  Goal: Physical Therapy Goal  Description: Goals to be met by: discharge date    Patient will increase functional independence with mobility by performin.  Pt to ambulate without AD use without LOB >150' with Supervision/Modified Independent.  2.  Pt to tolerate perf therex 3 x 12 reps  3.  Pt to perf all functional bed mobility and transfers with Supervsion/Modified Independent demonstrating good safety w technique.    Outcome: Ongoing, Progressing   Goals ongoing

## 2020-06-15 NOTE — PT/OT/SLP PROGRESS
Physical Therapy Treatment    Patient Name:  Miky Esqueda   MRN:  [id number]    Recommendations:     Discharge Recommendations:  home   Discharge Equipment Recommendations: shower chair   Barriers to discharge: decreased endurance and strength    Assessment:     Miky Esqueda is a 65 y.o. male admitted with a medical diagnosis of Symptomatic anemia.  He presents with the following impairments/functional limitations:  impaired endurance, impaired functional mobilty, impaired balance,pt with improving status and progressing toward goals,continue current POC.    Rehab Prognosis: Good; patient would benefit from acute skilled PT services to address these deficits and reach maximum level of function.    Recent Surgery: Procedure(s) (LRB):  EGD (ESOPHAGOGASTRODUODENOSCOPY) (N/A)  COLONOSCOPY (N/A) Day of Surgery    Plan:     During this hospitalization, patient to be seen 5 x/week to address the identified rehab impairments via gait training, therapeutic activities, therapeutic exercises and progress toward the following goals:    · Plan of Care Expires:  07/14/20    Subjective     Chief Complaint: n/a  Patient/Family Comments/goals: pt is waiting to go for a colonoscopy.  Pain/Comfort:  · Pain Rating 1: 0/10      Objective:     Communicated with nsg prior to session.  Patient found supine with telemetry upon PT entry to room.     General Precautions: Standard, fall   Orthopedic Precautions:N/A   Braces: N/A     Functional Mobility:  · Bed Mobility:     · Supine to Sit: modified independence  · Sit to Supine: modified independence  · Transfers:     · Sit to Stand:  modified independence and supervision with no AD  · Gait: amb ~110' X 1 w/o AD and S  · Balance: fair standing balance      AM-PAC 6 CLICK MOBILITY  Turning over in bed (including adjusting bedclothes, sheets and blankets)?: 4  Sitting down on and standing up from a chair with arms (e.g., wheelchair, bedside commode, etc.): 4  Moving from lying on back to  sitting on the side of the bed?: 4  Moving to and from a bed to a chair (including a wheelchair)?: 3  Need to walk in hospital room?: 3  Climbing 3-5 steps with a railing?: 3  Basic Mobility Total Score: 21       Therapeutic Activities and Exercises: issued pt HEP.       Patient left supine with all lines intact and call button in reach..    GOALS: see general POC  Multidisciplinary Problems     Physical Therapy Goals        Problem: Physical Therapy Goal    Goal Priority Disciplines Outcome Goal Variances Interventions   Physical Therapy Goal     PT, PT/OT Ongoing, Progressing     Description: Goals to be met by: discharge date    Patient will increase functional independence with mobility by performin.  Pt to ambulate without AD use without LOB >150' with Supervision/Modified Independent.  2.  Pt to tolerate perf therex 3 x 12 reps  3.  Pt to perf all functional bed mobility and transfers with Supervsion/Modified Independent demonstrating good safety w technique.                     Time Tracking:     PT Received On: 06/15/20  PT Start Time: 1333     PT Stop Time: 1356  PT Total Time (min): 23 min     Billable Minutes: Gait Training 13 and Therapeutic Activity 10    Treatment Type: Treatment  PT/PTA: PTA     PTA Visit Number: 1     Jaya Jaime, PTA  06/15/2020

## 2020-06-15 NOTE — PLAN OF CARE
Report given to FLOR Garcia. VSS. No complain of pain. Dr Muñoz spoken to patient about report. Report given together with the chart.

## 2020-06-15 NOTE — CONSULTS
Cardiology    Consult Requested By:  Reason for Consult: possible pfo    SUBJECTIVE:     History of Present Illness:  Patient is a 65 y.o. male presents with no history of hypertension or diabetes; he states that he was a past smoker. When asked what brought him here, he tells me he was changing oil and passed out. The next day, he felt dizzy and lied down on the bed. He was feeling bad and his wife brought him to the ER. He denies chest pains or shortness of breath. He denies any medication use. He states he has not been ill in the past; However,   According to the ER notes from 6/12/20, he apparently had a syncopal episode in the office of his family practioner; He does not remember this and states the history he stated during his 5/20 admission. He does not give me any history of that admit and of having a pulmonary embolus. No cardiac issues per patient     Review of patient's allergies indicates:  No Known Allergies    Past Medical History:   Diagnosis Date    Bilateral pulmonary embolism 5/25/2020    History of DVT (deep vein thrombosis) 6/12/2020    Iron deficiency anemia      History reviewed. No pertinent surgical history.  Family History   Problem Relation Age of Onset    Heart disease Father      Social History     Tobacco Use    Smoking status: Former Smoker     Types: Cigarettes    Smokeless tobacco: Former User   Substance Use Topics    Alcohol use: No    Drug use: No        Home meds:  No current facility-administered medications on file prior to encounter.      Current Outpatient Medications on File Prior to Encounter   Medication Sig Dispense Refill    ferrous sulfate 324 mg (65 mg iron) TbEC Take 325 mg by mouth once daily.      multivitamin capsule Take 1 capsule by mouth once daily.      rivaroxaban (XARELTO) 20 mg Tab Take one tablet by mouth once daily 30 tablet 1       Current meds:  Scheduled Meds:   atorvastatin  40 mg Oral Daily    sodium chloride 0.9%  3 mL  Intravenous Q8H     Continuous Infusions:  PRN Meds:.sodium chloride, labetalol      OBJECTIVE:     Vital Signs (Most Recent)  Temp: 97.7 °F (36.5 °C) (06/15/20 1116)  Pulse: 87 (06/15/20 1149)  Resp: 19 (06/15/20 1116)  BP: 125/68 (06/15/20 1116)  SpO2: 100 % (06/15/20 1116)    Vital Signs Range (Last 24H):  Temp:  [96.9 °F (36.1 °C)-98.9 °F (37.2 °C)]   Pulse:  []   Resp:  [16-20]   BP: (116-133)/(62-75)   SpO2:  [96 %-100 %]     Physical Exam:  Neck: normal carotid upstrokes; bilateral carotid bruits, normal JVP  Lungs :clear  Heart: RR, normal S1,S2, systolic ejection murmur LSB, no AI  Abd: negative  Exts: normal DP pulses bilaterally, no edema     Laboratory:  LABS  CBC  Recent Labs   Lab 06/13/20  0532  06/14/20  0544  06/15/20  0004 06/15/20  0531 06/15/20  1227   WBC 10.43  --  11.31  --   --  11.50  --    RBC 2.72*  --  3.12*  --   --  3.09*  --    HGB 6.9*  7.1*   < > 8.2*  8.2*   < > 8.8* 8.1* 8.3*   HCT 22.0*  22.8*   < > 26.3*  25.6*   < > 28.0* 25.6* 26.1*     --  178  --   --  175  --    MCV 81*  --  82  --   --  83  --    MCH 25.4*  --  26.3*  --   --  26.2*  --    MCHC 31.4*  --  32.0  --   --  31.6*  --     < > = values in this interval not displayed.     BMP  Recent Labs   Lab 06/13/20  0532 06/14/20  0544 06/15/20  0531   * 137 136   K 4.2 4.4 4.2   CO2 26 22* 25    108 106   BUN 24* 27* 17   CREATININE 0.9 1.2 1.1    106 107       Recent Labs   Lab 06/13/20  0532 06/14/20  0544 06/15/20  0531   CALCIUM 8.0* 8.5* 8.5*   MG 1.8 1.9 1.8   PHOS 3.1 3.8 4.0       LFT  Recent Labs   Lab 06/13/20  0532 06/14/20  0544 06/15/20  0531   PROT 5.8* 6.2 6.2   ALBUMIN 2.8* 3.2* 3.2*   BILITOT 0.4 0.4 0.6   AST 58* 21 22   ALKPHOS 45* 52* 54*   ALT 13 14 17       COAGS  Recent Labs   Lab 06/12/20  1319 06/13/20  0532   INR 1.4* 1.0   APTT  --  24.8     CE  Recent Labs   Lab 06/12/20  1252   TROPONINI <0.006     BNP  Recent Labs   Lab 06/12/20  1252   BNP 15     Lipid  panel:  Lab Results   Component Value Date    CHOL 177 06/12/2020     Lab Results   Component Value Date    HDL 41 06/12/2020     Lab Results   Component Value Date    LDLCALC 118.2 06/12/2020     Lab Results   Component Value Date    TRIG 89 06/12/2020     Lab Results   Component Value Date    CHOLHDL 23.2 06/12/2020     Diagnostic Results:  EKG: sinus; normal; definitely different from the 2017 EKG showing T wave inversion anteiorly then   Echo: normal EF, diastolic dysfunction; aortic valve normal ; PFO noted  MRA: no high grade stenosis of ICA's  Carotid US: 6/20: ICA disease of 50-69% bilaterally   CT chjest 5/20: pulmonary emboli noted ; troponin 3.8; CTA showed extensive bilateral pulmonary emboli and he was discharged on xarelto  Chart review:  1. Admitted 5/25/20 for evaluation of acute onset of shortness of breath;   ASSESSMENT/PLAN:     1. History of anemia with recent H/H of 8.3/26.1  2.recent history 5/20 of pulmonary emboli discharged on Xarelto and currently on atorvastatin only  3. Memory loss?? - reiterates the reason for this admission as that of the admission for 5/20  4. Abnormal echo with no evidence of aortic stenosis; PFO noted    Plan: 1. Agree with your plans so far of holding anticoagulants till GI evaluates but he will need his xarelto for the pe  2. As for the PFO, at this time, would not pursue with closure devices till other issues addressed.      Kim Cadena MD

## 2020-06-15 NOTE — ASSESSMENT & PLAN NOTE
Carotid US significant for  slight increased peak systolic velocities in the ICAs concerning for 50-69% stenosis  Continue Atorvastatin for secondary stroke prevention   Hold ASA, Xarelto and plavix in setting of possible GI bleed   Pending repeat Echo

## 2020-06-15 NOTE — PLAN OF CARE
1905H Patient is awake and orientedx4. Neurochecks every 4 hours, no deficit noted. Ambulated during the day without dizziness. Care plan explained and verbalized understanding. VIP care model explained. On room air, O2 saturation 96%. On heart monitor running Sinus Rhythm, heart rate 80s, flushed and saline locked. Maintained on clear liquid diet. Maintained on fall precaution. Bed in lowest position, bed alarms on, call light within reach and instructed to call for help when needed.      Golytely solution given, FOR EGD/[place] in the morning. Instructed on NPO after midnight except Golytely prep. Ambulated to bedside commode chair.

## 2020-06-15 NOTE — ASSESSMENT & PLAN NOTE
CT head w/ hypodense area of the left parietal lobe concerning for infarct  B/l carotid US showed 50-69% stenosis of ICA bilaterally  MRI confirmed left parietal lobe chanegs as noted on CT, on MRI appears to represent likely late subacute to early remote ischemic change  MRA negative for any high-grade stenosis   Neurology recommends: if not anticoagulating 2/2 GI bleed would add Plavix if/when safe from GI perspective. Continue ASA; if anticoagulation is started at some point will need to keep only one antiplatelet on. Triple therapy is not recommended.  Anti-platelet therapy:Holding ASA/Plavix for now, consider restarting if/when safe from GI persepctive  Atorvastatin for secondary stroke prevention   2D echo with bubble study - prelim read shows shunt, Cards feels this could be shadowing   Repeat Echo today

## 2020-06-15 NOTE — TRANSFER OF CARE
"Anesthesia Transfer of Care Note    Patient: Miky Esqueda    Procedure(s) Performed: Procedure(s) (LRB):  EGD (ESOPHAGOGASTRODUODENOSCOPY) (N/A)  COLONOSCOPY (N/A)    Patient location: GI    Anesthesia Type: MAC    Transport from OR: Transported from OR on room air with adequate spontaneous ventilation    Post pain: adequate analgesia    Post assessment: no apparent anesthetic complications and tolerated procedure well    Post vital signs: stable    Level of consciousness: awake, alert and oriented    Nausea/Vomiting: no nausea/vomiting    Complications: none    Transfer of care protocol was followed      Last vitals:   Visit Vitals  BP (!) 149/69   Pulse 96   Temp 36.8 °C (98.2 °F)   Resp 20   Ht 6' 1" (1.854 m)   Wt 99.8 kg (220 lb 0.3 oz)   SpO2 100%   BMI 29.03 kg/m²     "

## 2020-06-15 NOTE — PROVATION PATIENT INSTRUCTIONS
Discharge Summary/Instructions after an Endoscopic Procedure  Patient Name: Miky Esqueda  Patient MRN: [id number]  Patient YOB: 1955  Monday, Avelina 15, 2020  Brown Go MD  Your health is very important to us during the Covid Crisis. Following your   procedure today, you will receive a daily text for 2 weeks asking about   signs or symptoms of Covid 19.  Please respond to this text when you   receive it so we can follow up and keep you as safe as possible.   RESTRICTIONS:  During your procedure today, you received medications for sedation.  These   medications may affect your judgment, balance and coordination.  Therefore,   for 24 hours, you have the following restrictions:   - DO NOT drive a car, operate machinery, make legal/financial decisions,   sign important papers or drink alcohol.    ACTIVITY:  Today: no heavy lifting, straining or running due to procedural   sedation/anesthesia.  The following day: return to full activity including work.  DIET:  Eat and drink normally unless instructed otherwise.     TREATMENT FOR COMMON SIDE EFFECTS:  - Mild abdominal pain, nausea, belching, bloating or excessive gas:  rest,   eat lightly and use a heating pad.  - Sore Throat: treat with throat lozenges and/or gargle with warm salt   water.  - Because air was used during the procedure, expelling large amounts of air   from your rectum or belching is normal.  - If a bowel prep was taken, you may not have a bowel movement for 1-3 days.    This is normal.  SYMPTOMS TO WATCH FOR AND REPORT TO YOUR PHYSICIAN:  1. Abdominal pain or bloating, other than gas cramps.  2. Chest pain.  3. Back pain.  4. Signs of infection such as: chills or fever occurring within 24 hours   after the procedure.  5. Rectal bleeding, which would show as bright red, maroon, or black stools.   (A tablespoon of blood from the rectum is not serious, especially if   hemorrhoids are present.)  6. Vomiting.  7. Weakness or dizziness.  GO  DIRECTLY TO THE NEAREST EMERGENCY ROOM IF YOU HAVE ANY OF THE FOLLOWING:      Difficulty breathing              Chills and/or fever over 101 F   Persistent vomiting and/or vomiting blood   Severe abdominal pain   Severe chest pain   Black, tarry stools   Bleeding- more than one tablespoon   Any other symptom or condition that you feel may need urgent attention  Your doctor recommends these additional instructions:  If any biopsies were taken, your doctors clinic will contact you in 1 to 2   weeks with any results.  - Continue present medications.   - Await pathology results.   - No aspirin, ibuprofen, naproxen, or other non-steroidal anti-inflammatory   drugs unless needed for cardiovascular protection.  - See the other procedure note for documentation of additional   recommendations.   - Perform a colonoscopy today.   - Return patient to hospital hall for ongoing care.  For questions, problems or results please call your physician - Brown Go MD at Work:  [phone].  EMERGENCY PHONE NUMBER: [phone],  LAB RESULTS: [phone]  IF A COMPLICATION OR EMERGENCY SITUATION ARISES AND YOU ARE UNABLE TO REACH   YOUR PHYSICIAN - GO DIRECTLY TO THE EMERGENCY ROOM.  Brown Go MD  6/15/2020 3:44:12 PM  This report has been verified and signed electronically.  [employer]

## 2020-06-15 NOTE — PLAN OF CARE
Patient AAOx3  Lives at home with wife  Independent with ADL's    Has home oxygen (portable and home concentrator)- but states he only wears as needed.    PCP is Dr. Farhat Matson    This  put name on white board and explained blue discharge folder to patient. Discharge planning brochure and/or business card given to patient.  Patient verbalized understanding.      Follow-up With  Details  Why  Contact Info   Farhat Matson MD  On 6/22/2020  9:30am  [address]  [address]  [facility] [address]  [phone]          06/15/20 0937   Discharge Assessment   Assessment Type Discharge Planning Assessment   Confirmed/corrected address and phone number on facesheet? Yes   Assessment information obtained from? Patient   Expected Length of Stay (days) 2   Communicated expected length of stay with patient/caregiver yes   Prior to hospitilization cognitive status: Alert/Oriented   Prior to hospitalization functional status: Independent   Current cognitive status: Alert/Oriented   Current Functional Status: Independent   Lives With spouse   Able to Return to Prior Arrangements yes   Is patient able to care for self after discharge? Yes   Patient's perception of discharge disposition home or selfcare   Readmission Within the Last 30 Days no previous admission in last 30 days   Patient currently being followed by outpatient case management? No   Patient currently receives any other outside agency services? No   Is it the patient/care giver preference to resume care with the current outside agency? No   Equipment Currently Used at Home oxygen   Do you have any problems affording any of your prescribed medications? No   Is the patient taking medications as prescribed? yes   Does the patient have transportation home? Yes   Transportation Anticipated family or friend will provide   Does the patient receive services at the [facility]? No   Discharge Plan A Home;Home with family   Discharge Plan B Home;Home with  family   DME Needed Upon Discharge  none   Patient/Family in Agreement with Plan yes     Thi Rothman, RN, CCM, CMSRN  RN Transition Navigator  [phone]

## 2020-06-15 NOTE — ANESTHESIA POSTPROCEDURE EVALUATION
Anesthesia Post Evaluation    Patient: Miky Esqueda    Procedure(s) Performed: Procedure(s) (LRB):  EGD (ESOPHAGOGASTRODUODENOSCOPY) (N/A)  COLONOSCOPY (N/A)    Final Anesthesia Type: MAC    Patient location during evaluation: PACU  Patient participation: Yes- Able to Participate  Level of consciousness: awake and alert  Post-procedure vital signs: reviewed and stable  Pain management: adequate  Airway patency: patent    PONV status at discharge: No PONV  Anesthetic complications: no      Cardiovascular status: blood pressure returned to baseline  Respiratory status: unassisted  Hydration status: euvolemic            Vitals Value Taken Time   /77 06/15/20 1645   Temp 36.7 °C (98.1 °F) 06/15/20 1615   Pulse 99 06/15/20 1645   Resp 20 06/15/20 1645   SpO2 100 % 06/15/20 1645         No case tracking events are documented in the log.      Pain/Randolph Score: Randolph Score: 10 (6/15/2020  4:45 PM)

## 2020-06-16 VITALS
HEIGHT: 73 IN | RESPIRATION RATE: 20 BRPM | SYSTOLIC BLOOD PRESSURE: 113 MMHG | DIASTOLIC BLOOD PRESSURE: 66 MMHG | BODY MASS INDEX: 29.16 KG/M2 | TEMPERATURE: 98 F | HEART RATE: 110 BPM | WEIGHT: 220 LBS | OXYGEN SATURATION: 97 %

## 2020-06-16 PROBLEM — R55 SYNCOPE: Status: RESOLVED | Noted: 2020-06-12 | Resolved: 2020-06-16

## 2020-06-16 LAB
ALBUMIN SERPL BCP-MCNC: 3.1 G/DL (ref 3.5–5.2)
ALP SERPL-CCNC: 59 U/L (ref 55–135)
ALT SERPL W/O P-5'-P-CCNC: 16 U/L (ref 10–44)
ANION GAP SERPL CALC-SCNC: 7 MMOL/L (ref 8–16)
AST SERPL-CCNC: 23 U/L (ref 10–40)
BASOPHILS # BLD AUTO: 0.02 K/UL (ref 0–0.2)
BASOPHILS NFR BLD: 0.3 % (ref 0–1.9)
BILIRUB SERPL-MCNC: 0.4 MG/DL (ref 0.1–1)
BUN SERPL-MCNC: 18 MG/DL (ref 8–23)
CALCIUM SERPL-MCNC: 8.7 MG/DL (ref 8.7–10.5)
CHLORIDE SERPL-SCNC: 107 MMOL/L (ref 95–110)
CO2 SERPL-SCNC: 25 MMOL/L (ref 23–29)
CREAT SERPL-MCNC: 1.3 MG/DL (ref 0.5–1.4)
DIFFERENTIAL METHOD: ABNORMAL
EOSINOPHIL # BLD AUTO: 0.1 K/UL (ref 0–0.5)
EOSINOPHIL NFR BLD: 1.1 % (ref 0–8)
ERYTHROCYTE [DISTWIDTH] IN BLOOD BY AUTOMATED COUNT: 19.3 % (ref 11.5–14.5)
EST. GFR  (AFRICAN AMERICAN): >60 ML/MIN/1.73 M^2
EST. GFR  (NON AFRICAN AMERICAN): 58 ML/MIN/1.73 M^2
GLUCOSE SERPL-MCNC: 107 MG/DL (ref 70–110)
HCT VFR BLD AUTO: 26.3 % (ref 40–54)
HGB BLD-MCNC: 8.3 G/DL (ref 14–18)
IMM GRANULOCYTES # BLD AUTO: 0.02 K/UL (ref 0–0.04)
IMM GRANULOCYTES NFR BLD AUTO: 0.3 % (ref 0–0.5)
LYMPHOCYTES # BLD AUTO: 1.4 K/UL (ref 1–4.8)
LYMPHOCYTES NFR BLD: 17.8 % (ref 18–48)
MAGNESIUM SERPL-MCNC: 2 MG/DL (ref 1.6–2.6)
MCH RBC QN AUTO: 26.4 PG (ref 27–31)
MCHC RBC AUTO-ENTMCNC: 31.6 G/DL (ref 32–36)
MCV RBC AUTO: 84 FL (ref 82–98)
MONOCYTES # BLD AUTO: 1.1 K/UL (ref 0.3–1)
MONOCYTES NFR BLD: 13.8 % (ref 4–15)
NEUTROPHILS # BLD AUTO: 5.3 K/UL (ref 1.8–7.7)
NEUTROPHILS NFR BLD: 66.7 % (ref 38–73)
NRBC BLD-RTO: 1 /100 WBC
PHOSPHATE SERPL-MCNC: 4.4 MG/DL (ref 2.7–4.5)
PLATELET # BLD AUTO: 169 K/UL (ref 150–350)
PMV BLD AUTO: 10.7 FL (ref 9.2–12.9)
POTASSIUM SERPL-SCNC: 4.7 MMOL/L (ref 3.5–5.1)
PROT SERPL-MCNC: 6.3 G/DL (ref 6–8.4)
RBC # BLD AUTO: 3.14 M/UL (ref 4.6–6.2)
SODIUM SERPL-SCNC: 139 MMOL/L (ref 136–145)
WBC # BLD AUTO: 7.88 K/UL (ref 3.9–12.7)

## 2020-06-16 PROCEDURE — 36415 COLL VENOUS BLD VENIPUNCTURE: CPT

## 2020-06-16 PROCEDURE — 80053 COMPREHEN METABOLIC PANEL: CPT

## 2020-06-16 PROCEDURE — A4216 STERILE WATER/SALINE, 10 ML: HCPCS | Performed by: STUDENT IN AN ORGANIZED HEALTH CARE EDUCATION/TRAINING PROGRAM

## 2020-06-16 PROCEDURE — 94761 N-INVAS EAR/PLS OXIMETRY MLT: CPT

## 2020-06-16 PROCEDURE — 83735 ASSAY OF MAGNESIUM: CPT

## 2020-06-16 PROCEDURE — 25000003 PHARM REV CODE 250: Performed by: STUDENT IN AN ORGANIZED HEALTH CARE EDUCATION/TRAINING PROGRAM

## 2020-06-16 PROCEDURE — 85025 COMPLETE CBC W/AUTO DIFF WBC: CPT

## 2020-06-16 PROCEDURE — 84100 ASSAY OF PHOSPHORUS: CPT

## 2020-06-16 RX ORDER — ATORVASTATIN CALCIUM 40 MG/1
40 TABLET, FILM COATED ORAL DAILY
Qty: 90 TABLET | Refills: 3 | Status: ON HOLD | OUTPATIENT
Start: 2020-06-16 | End: 2020-08-06 | Stop reason: ALTCHOICE

## 2020-06-16 RX ORDER — RIVAROXABAN 20 MG/1
20 TABLET, FILM COATED ORAL
Qty: 30 TABLET | Refills: 0 | Status: SHIPPED | OUTPATIENT
Start: 2020-06-16 | End: 2020-07-17

## 2020-06-16 RX ORDER — NAPROXEN SODIUM 220 MG/1
81 TABLET, FILM COATED ORAL DAILY
Qty: 30 TABLET | Refills: 11 | Status: ON HOLD | OUTPATIENT
Start: 2020-06-16 | End: 2020-08-06 | Stop reason: ALTCHOICE

## 2020-06-16 RX ADMIN — Medication 3 ML: at 06:06

## 2020-06-16 RX ADMIN — ATORVASTATIN CALCIUM 40 MG: 40 TABLET, FILM COATED ORAL at 09:06

## 2020-06-16 NOTE — PLAN OF CARE
Discharge orders noted. Additional clinical references attached. Patient's discharge instructions given by bedside RN and reviewed via this VN. Wife at bedside.  Education provided on new and previous medications, diagnosis, and follow-up appointments.  New medications delivered by pharmacy. Patient verbalized understanding. Patient's ride/transportation home at bedside. All questions answered. Transport to [facility] requested. Floor nurse notified.

## 2020-06-16 NOTE — ASSESSMENT & PLAN NOTE
TTE  is positive for right to left shunt.   Cardiology believes at this time, would not pursue with closure devices till other issues addressed  Will need cards follow up outpatient

## 2020-06-16 NOTE — SUBJECTIVE & OBJECTIVE
Interval History: The patient was seen and examined this morning at bedside, the patient is resting comfortably in NAD. The patient has no acute complaints this morning. The patient is tolerating diet well, good urinary output and has worked with PT/OT. Yesterday he had a colonoscopy, EGD and TTE.  The patient feels significantly better than on admission. The nurse reports no acute events over night. The patient denies any chest pain, SOB or focal neuro deficits       Review of Systems   Constitutional: Negative for activity change, chills, fatigue and fever.   HENT: Negative for congestion and ear pain.    Eyes: Negative for pain.   Respiratory: Negative for apnea, chest tightness, shortness of breath and wheezing.    Cardiovascular: Negative for chest pain and palpitations.   Gastrointestinal: Negative for abdominal distention, abdominal pain, constipation, diarrhea, nausea and rectal pain.   Endocrine: Negative for polydipsia.   Genitourinary: Negative for flank pain.   Musculoskeletal: Negative for arthralgias, back pain, neck pain and neck stiffness.   Skin: Negative for rash.   Neurological: Negative for dizziness, tremors, facial asymmetry, speech difficulty and light-headedness.   Psychiatric/Behavioral: Negative for agitation.     Objective:     Vital Signs (Most Recent):  Temp: 98.3 °F (36.8 °C) (06/16/20 0750)  Pulse: 89 (06/16/20 0750)  Resp: 20 (06/16/20 0750)  BP: 113/66 (06/16/20 0750)  SpO2: (!) 94 % (06/16/20 0750) Vital Signs (24h Range):  Temp:  [96.5 °F (35.8 °C)-98.3 °F (36.8 °C)] 98.3 °F (36.8 °C)  Pulse:  [87-99] 89  Resp:  [16-20] 20  SpO2:  [91 %-100 %] 94 %  BP: (104-149)/(55-77) 113/66     Weight: 99.8 kg (220 lb 0.3 oz)  Body mass index is 29.03 kg/m².    Intake/Output Summary (Last 24 hours) at 6/16/2020 0826  Last data filed at 6/16/2020 0800  Gross per 24 hour   Intake 500 ml   Output 800 ml   Net -300 ml      Physical Exam  Vitals signs and nursing note reviewed.   Constitutional:        Appearance: He is well-developed and well-nourished.   HENT:      Head: Normocephalic and atraumatic.      Right Ear: External ear normal.      Left Ear: External ear normal.      Nose: Nose normal.      Mouth/Throat:      Mouth: Oropharynx is clear and moist.   Eyes:      Extraocular Movements: EOM normal.      Conjunctiva/sclera: Conjunctivae normal.      Pupils: Pupils are equal, round, and reactive to light.   Neck:      Musculoskeletal: Normal range of motion and neck supple.   Cardiovascular:      Rate and Rhythm: Normal rate and regular rhythm.      Pulses: Intact distal pulses.      Heart sounds: Normal heart sounds, S1 normal and S2 normal. No murmur. No friction rub. No gallop.    Pulmonary:      Effort: Pulmonary effort is normal. No respiratory distress.      Breath sounds: Normal breath sounds.   Abdominal:      General: Bowel sounds are normal. There is no distension.      Palpations: Abdomen is soft.      Tenderness: There is no abdominal tenderness. There is no guarding.   Musculoskeletal: Normal range of motion.         General: No edema.   Skin:     General: Skin is warm and dry.      Capillary Refill: Capillary refill takes less than 2 seconds.   Neurological:      Mental Status: He is alert and oriented to person, place, and time.      Cranial Nerves: No cranial nerve deficit.      Coordination: Coordination normal.   Psychiatric:         Mood and Affect: Mood and affect normal.         Behavior: Behavior is cooperative.           Significant Labs:   CBC:   Recent Labs   Lab 06/15/20  0531 06/15/20  1227 06/16/20  0505   WBC 11.50  --  7.88   HGB 8.1* 8.3* 8.3*   HCT 25.6* 26.1* 26.3*     --  169     CMP:   Recent Labs   Lab 06/15/20  0531 06/16/20  0505    139   K 4.2 4.7    107   CO2 25 25    107   BUN 17 18   CREATININE 1.1 1.3   CALCIUM 8.5* 8.7   PROT 6.2 6.3   ALBUMIN 3.2* 3.1*   BILITOT 0.6 0.4   ALKPHOS 54* 59   AST 22 23   ALT 17 16   ANIONGAP 5* 7*    EGFRNONAA >60 58*     Magnesium:   Recent Labs   Lab 06/15/20  0531 06/16/20  0505   MG 1.8 2.0     All pertinent labs within the past 24 hours have been reviewed.    Significant Imaging:    I have reviewed and interpreted all pertinent imaging results/findings within the past 24 hours.

## 2020-06-16 NOTE — ASSESSMENT & PLAN NOTE
Repeat Echo is positive for right to left shunt  No intervention by Cardiology at this time   Atorvastatin and ASA for secondary stroke prevention   Neurology signed off with the above recommendations

## 2020-06-16 NOTE — DISCHARGE SUMMARY
"[facility] Hospital Medicine  Discharge Summary      Patient Name: Miky Esqueda  MRN: [id number]  Admission Date: 6/12/2020  Hospital Length of Stay: 4 days  Discharge Date and Time:  06/16/2020 10:26 AM  Attending Physician: Farhat Matson MD   Discharging Provider: D'Amico C Johnson, MD  Primary Care Provider: Farhat Matson MD      HPI:   Miky Esqueda is a 65 y.o. male with history of bilateral PE on Xarelto, iron deficiency anemia who presents to [facility] for evaluation of  syncopal episode at  Dr. Matson's office 1 hour PTA . The patient went to Dr. Matson's office this AM because last night he was urinating and had an episode of dizziness and he started to "black out". The patient reports having multiple bouts of feeling faint and sob the last couple of days. Today, while at 's office, patient states he was sitting down when he became dizzy again and had another syncopal episode. Per EMS, the syncopal episode lasted about 30 seconds. Before he had this episode, he notes that he started sweating. He denied experiencing any CP, SOB, HA, numbness, or tingling. Per , patient had syncopal episode then very brief 15 second seizure then was limp in his arms and woke up very quickly. He was very diaphoretic. He had micturition. Patient noted to have a heart murmur and bilateral bruits on exam at [facility]'s office.     Procedure(s) (LRB):  EGD (ESOPHAGOGASTRODUODENOSCOPY) (N/A)  COLONOSCOPY (N/A)      Hospital Course:    In the ED, CTH showed interval development of a hypodense area in the left parietal lobe concerning for infarct. There was a significant drop in H/H to 6.8/22.5 from 12.6/40.7 2 weeks prior. He was type, crossed and transfused 2 units RBC. [facility] was consulted. Vitals stable on admission.  consulted for admission and management. MRA head and neck showed the major intracranial arterial vascular structures demonstrate evidence for appropriate flow without evidence " for high-grade stenosis or occlusion and there is no evidence for intracranial aneurysm or AVM. Bilateral Carotid US was significant for  slight increased peak systolic velocities in the ICAs concerning for 50-69% stenosis. Neurology recommended TTE with bubble and if not anticoagulating secondary to GI bleed would add Plavix if/when safe from GI perspective. Continue ASA; if anticoagulation is started at some point will need to keep only one antiplatelet on. Triple therapy is not recommended. A TTE saline (bubble) was ordered. The study is positive for right to left shunt. Cardiology advised they  would not pursue with closure devices till other issues addressed. They were in agreement  holding anticoagulants untill GI evaluates but he will need his xarelto for the PE. The patient was taken by GI for EGD and colonoscopy. EGD showed A small hiatal hernia, patchy moderately friable mucosa with no bleeding was found in the gastric fundus and a single small angiodysplastic lesion with no bleeding was found on the posterior wall of the gastric antrum. Biopsies were taken with a cold forceps for histology. Colonoscopy revealed hemorrhoids on perianal exam. A 6 mm sessile polyp was found in the sigmoid colon and a A 2 mm sessile polyp was found in the rectum  The polyps were removed with a cold snare. Resection and retrieval were complete. GI recommended to consider VCE for further evaluation of possible small bowel AVMs, resume Xarelto (rivaroxaban), No aspirin, ibuprofen, naproxen, or other non-steroidal anti-inflammatory drugs unless needed for cardiovascular protection. The patient will need repeat colonoscopy in 5 years. Atorvastatin, ASA and Xarelto were sent to [facility] pharmacy for delivery at bedside. Appointments made with PCP, and cardiology. Indications to return to ED given. All questions answered      Consults:   Consults (From admission, onward)        Status Ordering Provider     Inpatient consult to  Cardiology-[facility]  Once     Provider:  (Not yet assigned)    Completed DERRICK WALTER     Inpatient consult to Gastroenterology-[facility]  Once     Provider:  Elle Stark MD    Completed JOHNSON, D'AMICO C.     Inpatient consult to Neurology  Once     Provider:  (Not yet assigned)    Completed PARIS VAZQUEZ     Inpatient consult to Registered Dietitian/Nutritionist  Once     Provider:  (Not yet assigned)    Completed JOHNSON, D'AMICO C.     IP consult to case management/social work  Once     Provider:  (Not yet assigned)    Completed JOHNSON, D'AMICO C.          * Symptomatic anemia  Hb stable  s/p 2 units PRBCs  H/H stable this AM 8.3/26.3  EGD showed A small hiatal hernia, patchy moderately friable mucosa with no bleeding was found in the gastric fundus and a single small angiodysplastic lesion with no bleeding was found on the posterior wall of the gastric antrum. Biopsies were taken with a cold forceps for histology.   Colonoscopy revealed hemorrhoids on perianal exam. A 6 mm sessile polyp was found in the sigmoid colon and a A 2 mm sessile polyp was found in the rectum  The polyps were removed with a cold snare. Resection and retrieval were complete  Per PCP: Switch to xarelto 20mg daily upon discharge; noemi completed 15mg BID course  May add ASA 81mg upon discharge also due to subacute CVA  Will resume Xarelto and ASA   Repeat colonoscopy in 5 years           Bilateral stenosis of carotid arteries greater than 50%  Carotid US significant for  slight increased peak systolic velocities in the ICAs concerning for 50-69% stenosis  Continue Atorvastatin for secondary stroke prevention   Resume ASA          CVA (cerebral vascular accident)  Repeat Echo is positive for right to left shunt  No intervention by Cardiology at this time   Atorvastatin and ASA for secondary stroke prevention   Neurology signed off with the above recommendations         Final Active Diagnoses:    Diagnosis Date Noted POA     "PRINCIPAL PROBLEM:  Symptomatic anemia [D64.9] 06/12/2020 Yes    Left to right cardiac shunt [I28.0] 06/16/2020 Clinically Undetermined    CVA (cerebral vascular accident) [I63.9] 06/12/2020 Yes    Bilateral stenosis of carotid arteries greater than 50% [I65.23] 06/12/2020 Yes    History of pulmonary embolus (PE) [Z86.711] 05/26/2020 Yes      Problems Resolved During this Admission:    Diagnosis Date Noted Date Resolved POA    Syncope [R55] 06/12/2020 06/16/2020 Yes    History of deep vein thrombosis (DVT) of lower extremity [Z86.718] 06/12/2020 06/15/2020 Not Applicable    Anticoagulant causing adverse effect in therapeutic use [T45.515A] 06/12/2020 06/15/2020 Yes       Discharged Condition: good    Disposition: Home or Self Care    Follow Up:  Follow-up Information     Farhat Matson MD On 6/22/2020.    Specialty: Family Medicine  Why: 9:30am  Contact information:  [address]  [address]  [place] [address]  [phone]             Cruzito Hurst MD On 7/1/2020.    Specialty: Cardiology  Why: 9:15am  Contact information:  [address]  [address]  [facility] [address]  [phone]                 Patient Instructions:      BATH/SHOWER CHAIR FOR HOME USE     Order Specific Question Answer Comments   Height: 6' 1" (1.854 m)    Weight: 99.8 kg (220 lb 0.3 oz)    Does patient have medical equipment at home? oxygen    Length of need (1-99 months): 99    Type: With back      Ambulatory referral/consult to Cardiology   Standing Status: Future   Referral Priority: Routine Referral Type: Consultation   Referral Reason: Specialty Services Required   Referred to Provider: CRUZITO HURST Requested Specialty: Cardiology   Number of Visits Requested: 1     Diet Cardiac     Notify your health care provider if you experience any of the following:  persistent nausea and vomiting or diarrhea     Notify your health care provider if you experience any of the following:  difficulty breathing or increased " cough     Notify your health care provider if you experience any of the following:  increased confusion or weakness     Notify your health care provider if you experience any of the following:  persistent dizziness, light-headedness, or visual disturbances     Activity as tolerated       Significant Diagnostic Studies:   Recent Labs   Lab 06/14/20  0544  06/15/20  0531 06/15/20  1227 06/16/20  0505   WBC 11.31  --  11.50  --  7.88   HGB 8.2*  8.2*   < > 8.1* 8.3* 8.3*   HCT 26.3*  25.6*   < > 25.6* 26.1* 26.3*     --  175  --  169    < > = values in this interval not displayed.     Recent Labs   Lab 06/14/20  0544 06/15/20  0531 06/16/20  0505    136 139   K 4.4 4.2 4.7    106 107   CO2 22* 25 25   BUN 27* 17 18   CREATININE 1.2 1.1 1.3   CALCIUM 8.5* 8.5* 8.7   PROT 6.2 6.2 6.3   BILITOT 0.4 0.6 0.4   ALKPHOS 52* 54* 59   ALT 14 17 16   AST 21 22 23     Recent Labs   Lab 06/12/20  2157 06/13/20  0615 06/13/20  1604   POCTGLUCOSE 100 103 111*     Recent Labs   Lab 06/12/20  1252   TROPONINI <0.006     Lab Results   Component Value Date    TSH 0.916 06/12/2020    TSH 2.565 05/25/2020     MRI Brain Without Contrast   Final Result      There is no evidence for acute intracranial process, there is a finding of the left parietal lobe as noted on CT, on MRI appears to represent likely late subacute to early remote ischemic change, as discussed above.      Additional chronic intracranial changes are noted.      Paranasal sinus and mastoid findings are noted as above.         Electronically signed by: Yusuf Choe   Date:    06/12/2020   Time:    17:36      MRA Neck without contrast   Final Result      The major extracranial arterial vascular structures demonstrate signal consistent with appropriate flow without evidence for high-grade stenosis, occlusion, or dissection.         Electronically signed by: Yusuf Choe   Date:    06/12/2020   Time:    17:42      MRA Brain without contrast   Final  Result      The major intracranial arterial vascular structures demonstrate evidence for appropriate flow without evidence for high-grade stenosis or occlusion and there is no evidence for intracranial aneurysm or AVM on this exam.         Electronically signed by: Yusuf Choe   Date:    06/12/2020   Time:    17:42      US Carotid Bilateral   Final Result      There is slight increased peak systolic velocities in the ICAs concerning for 50-69% stenosis.      Antegrade vertebral arteries bilateral      See above for additional details.         Electronically signed by: Santy Catalan DO   Date:    06/12/2020   Time:    16:00      CT Head Without Contrast   Final Result   Abnormal      When compared to prior CT of 05/25/2020, there has been interval development of a hypodense area in the left parietal lobe concerning for infarct.  MRI could help provide a better time frame and further characterize.      Extensive chronic paranasal sinus disease.      This report was flagged in Epic as abnormal.         Electronically signed by: Calixto Saha MD   Date:    06/12/2020   Time:    14:02      X-Ray Chest AP Portable   Final Result      No acute abnormality.         Electronically signed by: Calixto Saha MD   Date:    06/12/2020   Time:    13:15              Pending Diagnostic Studies:     Procedure Component Value Units Date/Time    Specimen to Pathology, Surgery Gastrointestinal tract [[id number]] Collected: 06/15/20 1613    Order Status: Sent Lab Status: In process Updated: 06/16/20 0909    Vitamin B1 [[id number]] Collected: 06/12/20 1808    Order Status: Sent Lab Status: In process Updated: 06/13/20 1430    Specimen: Blood          Medications:  Reconciled Home Medications:      Medication List      START taking these medications    aspirin 81 MG Chew  Chew and swallow 1 tablet (81 mg total) by mouth once daily.     atorvastatin 40 MG tablet  Commonly known as: LIPITOR  Take 1 tablet (40 mg total) by mouth once  daily.        CHANGE how you take these medications    XARELTO 20 mg Tab  Generic drug: rivaroxaban  Take 1 tablet (20 mg total) by mouth daily with dinner or evening meal.  What changed:   · how much to take  · how to take this  · when to take this        CONTINUE taking these medications    ferrous sulfate 324 mg (65 mg iron) Tbec  Take 325 mg by mouth once daily.     multivitamin capsule  Take 1 capsule by mouth once daily.            Indwelling Lines/Drains at time of discharge:   Lines/Drains/Airways     Airway                 Airway - Non-Surgical 06/15/20 1500 Nasal Cannula less than 1 day                Time spent on the discharge of patient: 32 minutes  Patient was seen and examined on the date of discharge and determined to be suitable for discharge.         D'Amico C Johnson, MD  Department of Hospital Medicine  [facility]

## 2020-06-16 NOTE — PROGRESS NOTES
Pharmacy New Medication Education    Patient and/or Caregiver ACCEPTED medication education.    Pharmacy has provided education on the name, indication, and possible side effects of the medication(s) prescribed, using teach-back method.     Learners of pharmacy medication education includes:  patient    The following medications have also been discussed, during this admission.     Current Facility-Administered Medications   Medication Frequency    0.9%  NaCl infusion (for blood administration) Q24H PRN    atorvastatin tablet 40 mg Daily    labetaloL injection 10 mg Q4H PRN    rivaroxaban tablet 20 mg Daily with dinner    sodium chloride 0.9% flush 3 mL Q8H        Thank you  Brady Ac, PharmD

## 2020-06-16 NOTE — ASSESSMENT & PLAN NOTE
Hb stable  s/p 2 units PRBCs  H/H stable this AM 8.3/26.3  EGD showed A small hiatal hernia, patchy moderately friable mucosa with no bleeding was found in the gastric fundus and a single small angiodysplastic lesion with no bleeding was found on the posterior wall of the gastric antrum. Biopsies were taken with a cold forceps for histology.   Colonoscopy revealed hemorrhoids on perianal exam. A 6 mm sessile polyp was found in the sigmoid colon and a A 2 mm sessile polyp was found in the rectum  The polyps were removed with a cold snare. Resection and retrieval were complete  Per PCP: Switch to xarelto 20mg daily upon discharge; noemi completed 15mg BID course  May add ASA 81mg upon discharge also due to subacute CVA  Will resume Xarelto and ASA   Repeat colonoscopy in 5 years

## 2020-06-16 NOTE — ASSESSMENT & PLAN NOTE
Carotid US significant for  slight increased peak systolic velocities in the ICAs concerning for 50-69% stenosis  Continue Atorvastatin for secondary stroke prevention   Resume ASA

## 2020-06-16 NOTE — PLAN OF CARE
Pt stable. NO distress noted. POC reviewed with pt. Pt verbalized understanding. Pt remains SR on the monitor. NO true red alarms noted. Neuro intact. Denies numbness and tingling. No drift noted in all extremities. Pt denied pain. Fall precautions maintained. Bed in lowest position, call light in reach and bed alarm on.

## 2020-06-16 NOTE — PROGRESS NOTES
"[facility] Hospital Medicine  Progress Note    Patient Name: iMky Esqueda  MRN: [id number]  Patient Class: IP- Inpatient   Admission Date: 6/12/2020  Length of Stay: 4 days  Attending Physician: Farhat Matson MD  Primary Care Provider: Farhat Matson MD        Subjective:     Principal Problem:Symptomatic anemia      HPI:  Miky Esqueda is a 65 y.o. male with history of bilateral PE on Xarelto, iron deficiency anemia who presents to [facility] for evaluation of  syncopal episode at  Dr. Matson's office 1 hour PTA . The patient went to Dr. Matson's office this AM because last night he was urinating and had an episode of dizziness and he started to "black out". The patient reports having multiple bouts of feeling faint and sob the last couple of days. Today, while at 's office, patient states he was sitting down when he became dizzy again and had another syncopal episode. Per EMS, the syncopal episode lasted about 30 seconds. Before he had this episode, he notes that he started sweating. He denied experiencing any CP, SOB, HA, numbness, or tingling. Per , patient had syncopal episode then very brief 15 second seizure then was limp in his arms and woke up very quickly. He was very diaphoretic. He had micturition. Patient noted to have a heart murmur and bilateral bruits on exam at [facility]'s office.     In the ED, CTH showed interval development of a hypodense area in the left parietal lobe concerning for infarct. There was a significant drop in H/H to 6.8/22.5 from 12.6/40.7 2 weeks prior. He was type, crossed and transfused 1 unit RBC. [facility] was consulted. Vitals stable on admission. FM consulted for admission and management     Overview/Hospital Course:   In the ED, CTH showed interval development of a hypodense area in the left parietal lobe concerning for infarct. There was a significant drop in H/H to 6.8/22.5 from 12.6/40.7 2 weeks prior. He was type, crossed and transfused " 2 units RBC. [facility] was consulted. Vitals stable on admission. FM consulted for admission and management. MRA head and neck showed the major intracranial arterial vascular structures demonstrate evidence for appropriate flow without evidence for high-grade stenosis or occlusion and there is no evidence for intracranial aneurysm or AVM. Bilateral Carotid US was significant for  slight increased peak systolic velocities in the ICAs concerning for 50-69% stenosis. Neurology recommended TTE with bubble and if not anticoagulating secondary to GI bleed would add Plavix if/when safe from GI perspective. Continue ASA; if anticoagulation is started at some point will need to keep only one antiplatelet on. Triple therapy is not recommended. A TTE saline (bubble) was ordered. The study is positive for right to left shunt. Cardiology advised they  would not pursue with closure devices till other issues addressed. They were in agreement  holding anticoagulants untill GI evaluates but he will need his xarelto for the PE. The patient was taken by GI for EGD and colonoscopy. EGD showed A small hiatal hernia, patchy moderately friable mucosa with no bleeding was found in the gastric fundus and a single small angiodysplastic lesion with no bleeding was found on the posterior wall of the gastric antrum. Biopsies were taken with a cold forceps for histology. Colonoscopy revealed hemorrhoids on perianal exam. A 6 mm sessile polyp was found in the sigmoid colon and a A 2 mm sessile polyp was found in the rectum  The polyps were removed with a cold snare. Resection and retrieval were complete. GI recommended to consider VCE for further evaluation of possible small bowel AVMs, resume Xarelto (rivaroxaban), No aspirin, ibuprofen, naproxen, or other non-steroidal anti-inflammatory drugs unless needed for cardiovascular protection.     Interval History: The patient was seen and examined this morning at bedside, the patient is resting  comfortably in NAD. The patient has no acute complaints this morning. The patient is tolerating diet well, good urinary output and has worked with PT/OT. Yesterday he had a colonoscopy, EGD and TTE.  The patient feels significantly better than on admission. The nurse reports no acute events over night. The patient denies any chest pain, SOB or focal neuro deficits       Review of Systems   Constitutional: Negative for activity change, chills, fatigue and fever.   HENT: Negative for congestion and ear pain.    Eyes: Negative for pain.   Respiratory: Negative for apnea, chest tightness, shortness of breath and wheezing.    Cardiovascular: Negative for chest pain and palpitations.   Gastrointestinal: Negative for abdominal distention, abdominal pain, constipation, diarrhea, nausea and rectal pain.   Endocrine: Negative for polydipsia.   Genitourinary: Negative for flank pain.   Musculoskeletal: Negative for arthralgias, back pain, neck pain and neck stiffness.   Skin: Negative for rash.   Neurological: Negative for dizziness, tremors, facial asymmetry, speech difficulty and light-headedness.   Psychiatric/Behavioral: Negative for agitation.     Objective:     Vital Signs (Most Recent):  Temp: 98.3 °F (36.8 °C) (06/16/20 0750)  Pulse: 89 (06/16/20 0750)  Resp: 20 (06/16/20 0750)  BP: 113/66 (06/16/20 0750)  SpO2: (!) 94 % (06/16/20 0750) Vital Signs (24h Range):  Temp:  [96.5 °F (35.8 °C)-98.3 °F (36.8 °C)] 98.3 °F (36.8 °C)  Pulse:  [87-99] 89  Resp:  [16-20] 20  SpO2:  [91 %-100 %] 94 %  BP: (104-149)/(55-77) 113/66     Weight: 99.8 kg (220 lb 0.3 oz)  Body mass index is 29.03 kg/m².    Intake/Output Summary (Last 24 hours) at 6/16/2020 0826  Last data filed at 6/16/2020 0800  Gross per 24 hour   Intake 500 ml   Output 800 ml   Net -300 ml      Physical Exam  Vitals signs and nursing note reviewed.   Constitutional:       Appearance: He is well-developed and well-nourished.   HENT:      Head: Normocephalic and  atraumatic.      Right Ear: External ear normal.      Left Ear: External ear normal.      Nose: Nose normal.      Mouth/Throat:      Mouth: Oropharynx is clear and moist.   Eyes:      Extraocular Movements: EOM normal.      Conjunctiva/sclera: Conjunctivae normal.      Pupils: Pupils are equal, round, and reactive to light.   Neck:      Musculoskeletal: Normal range of motion and neck supple.   Cardiovascular:      Rate and Rhythm: Normal rate and regular rhythm.      Pulses: Intact distal pulses.      Heart sounds: Normal heart sounds, S1 normal and S2 normal. No murmur. No friction rub. No gallop.    Pulmonary:      Effort: Pulmonary effort is normal. No respiratory distress.      Breath sounds: Normal breath sounds.   Abdominal:      General: Bowel sounds are normal. There is no distension.      Palpations: Abdomen is soft.      Tenderness: There is no abdominal tenderness. There is no guarding.   Musculoskeletal: Normal range of motion.         General: No edema.   Skin:     General: Skin is warm and dry.      Capillary Refill: Capillary refill takes less than 2 seconds.   Neurological:      Mental Status: He is alert and oriented to person, place, and time.      Cranial Nerves: No cranial nerve deficit.      Coordination: Coordination normal.   Psychiatric:         Mood and Affect: Mood and affect normal.         Behavior: Behavior is cooperative.           Significant Labs:   CBC:   Recent Labs   Lab 06/15/20  0531 06/15/20  1227 06/16/20  0505   WBC 11.50  --  7.88   HGB 8.1* 8.3* 8.3*   HCT 25.6* 26.1* 26.3*     --  169     CMP:   Recent Labs   Lab 06/15/20  0531 06/16/20  0505    139   K 4.2 4.7    107   CO2 25 25    107   BUN 17 18   CREATININE 1.1 1.3   CALCIUM 8.5* 8.7   PROT 6.2 6.3   ALBUMIN 3.2* 3.1*   BILITOT 0.6 0.4   ALKPHOS 54* 59   AST 22 23   ALT 17 16   ANIONGAP 5* 7*   EGFRNONAA >60 58*     Magnesium:   Recent Labs   Lab 06/15/20  0531 06/16/20  0505   MG 1.8 2.0      All pertinent labs within the past 24 hours have been reviewed.    Significant Imaging:    I have reviewed and interpreted all pertinent imaging results/findings within the past 24 hours.      Assessment/Plan:      * Symptomatic anemia  Hb stable  s/p 2 units PRBCs  H/H stable this AM 8.3/26.3  EGD showed A small hiatal hernia, patchy moderately friable mucosa with no bleeding was found in the gastric fundus and a single small angiodysplastic lesion with no bleeding was found on the posterior wall of the gastric antrum. Biopsies were taken with a cold forceps for histology.   Colonoscopy revealed hemorrhoids on perianal exam. A 6 mm sessile polyp was found in the sigmoid colon and a A 2 mm sessile polyp was found in the rectum  The polyps were removed with a cold snare. Resection and retrieval were complete  Per PCP: Switch to xarelto 20mg daily upon discharge; noemi completed 15mg BID course  May add ASA 81mg upon discharge also due to subacute CVA  Will resume Xarelto and ASA   Repeat colonoscopy in 5 years           Left to right cardiac shunt  TTE  is positive for right to left shunt.   Cardiology believes at this time, would not pursue with closure devices till other issues addressed  Will need cards follow up outpatient       Bilateral stenosis of carotid arteries greater than 50%  Carotid US significant for  slight increased peak systolic velocities in the ICAs concerning for 50-69% stenosis  Continue Atorvastatin for secondary stroke prevention   Resume ASA          CVA (cerebral vascular accident)  Repeat Echo is positive for right to left shunt  No intervention by Cardiology at this time   Atorvastatin and ASA for secondary stroke prevention   Neurology signed off with the above recommendations       Syncope  No further episodes since admission    Will continue to monitor       History of pulmonary embolus (PE)  Ok to resume xarelto    Will start 20 mg daily       VTE Risk Mitigation (From admission,  onward)         Ordered     rivaroxaban tablet 20 mg  With dinner      06/16/20 0724     IP VTE HIGH RISK PATIENT  Once      06/12/20 1624     Reason for No Pharmacological VTE Prophylaxis  Once     Question:  Reasons:  Answer:  Already adequately anticoagulated on oral Anticoagulants    06/12/20 1624     Place NORAH hose  Until discontinued      06/12/20 2570                Code: Full   Diet: Cardiac   Disposition: Today . Will need out patient follow up with cards      D'Amico C Johnson, MD  Department of Hospital Medicine   [facility]

## 2020-06-17 ENCOUNTER — PATIENT OUTREACH (OUTPATIENT)
Dept: ADMINISTRATIVE | Facility: CLINIC | Age: 65
End: 2020-06-17

## 2020-06-17 NOTE — PATIENT INSTRUCTIONS
Sepsis     To treat sepsis, antibiotics and fluids may by given through an intravenous (IV) line.     Sepsis happens when your body responds with widespread inflammation to a bad infection or bacteremia--the presence of bacteria in your bloodstream. Sepsis can be deadly. Blood pressure may drop and the lungs and kidneys may start to fail. Emergency care for sepsis is crucial.  Risk factors  Those most at risk for sepsis are:  · Infants or older adults  · People who have an illness that weakens their immune system, such as cancer, AIDS, or diabetes  · People being treated with chemotherapy medicines or radiation, which weakens the immune system  · People who have had a transplant  · People with a very severe infection such as pneumonia, meningitis, or a urinary tract infection  When to go to the emergency department (ED)  Sepsis is an emergency. Go to the nearest ED if you have a fever with any of these symptoms:  · Chills and shaking  · Rapid heartbeat and breathing  · Trouble breathing  · Severe nausea or uncontrolled vomiting  · Confusion, disorientation, drowsiness, or dizziness  · Decreased urination  · Severe pain, including in the back or joints   What to expect in the ED  · Blood and urine tests are done to look for bacteria. They also check for organ failure.  · Blood, urine, or sputum cultures may be taken. The samples are sent to a lab. They are placed in a special container. Any bacteria should grow in 24 hours.  · X-rays or other imaging tests may be done.  A person with sepsis will be admitted to the hospital and treated with antibiotics. Treatment may also include oxygen and intravenous fluids.  Date Last Reviewed: 10/1/2016  © 2027-4334 [employer]. [address] [place] [address], [place] [address]. All rights reserved. This information is not intended as a substitute for professional medical care. Always follow your healthcare professional's instructions.

## 2020-06-18 DIAGNOSIS — B96.81 CHRONIC HELICOBACTER PYLORI GASTRITIS: Primary | ICD-10-CM

## 2020-06-18 DIAGNOSIS — K29.50 CHRONIC HELICOBACTER PYLORI GASTRITIS: Primary | ICD-10-CM

## 2020-06-18 LAB
FINAL PATHOLOGIC DIAGNOSIS: NORMAL
GROSS: NORMAL

## 2020-06-18 RX ORDER — CLARITHROMYCIN 500 MG/1
500 TABLET, FILM COATED ORAL 2 TIMES DAILY
Qty: 28 TABLET | Refills: 0 | Status: CANCELLED | OUTPATIENT
Start: 2020-06-18 | End: 2020-07-02

## 2020-06-18 RX ORDER — BISMUTH SUBCITRATE POTASSIUM, METRONIDAZOLE, TETRACYCLINE HYDROCHLORIDE 140; 125; 125 MG/1; MG/1; MG/1
3 CAPSULE ORAL
Qty: 120 CAPSULE | Refills: 0 | Status: SHIPPED | OUTPATIENT
Start: 2020-06-18 | End: 2020-06-28

## 2020-06-18 RX ORDER — PANTOPRAZOLE SODIUM 40 MG/1
40 TABLET, DELAYED RELEASE ORAL 2 TIMES DAILY
Qty: 20 TABLET | Refills: 0 | Status: ON HOLD | OUTPATIENT
Start: 2020-06-18 | End: 2020-08-10 | Stop reason: HOSPADM

## 2020-06-18 RX ORDER — AMOXICILLIN 500 MG/1
1000 CAPSULE ORAL 2 TIMES DAILY
Qty: 56 CAPSULE | Refills: 0 | Status: CANCELLED | OUTPATIENT
Start: 2020-06-18 | End: 2020-07-02

## 2020-06-19 LAB — VIT B1 BLD-MCNC: 31 UG/L (ref 38–122)

## 2020-06-28 ENCOUNTER — NURSE TRIAGE (OUTPATIENT)
Dept: ADMINISTRATIVE | Facility: CLINIC | Age: 65
End: 2020-06-28

## 2020-06-28 NOTE — TELEPHONE ENCOUNTER
Reason for Disposition   No answer.  First attempt to contact caller.  Follow-up call scheduled within 15 minutes.     Will attempt to call tomorrow, day 14    Additional Information   Negative: Caller is angry or rude (e.g., hangs up, verbally abusive, yelling)   Negative: Caller hangs up   Negative: Caller has already spoken with the PCP and has no further questions.   Negative: Caller has already spoken with another triager and has no further questions.   Negative: Caller has already spoken with another triager or PCP AND has further questions AND triager able to answer questions.    Protocols used: NO CONTACT OR DUPLICATE CONTACT CALL-A-

## 2020-06-29 NOTE — TELEPHONE ENCOUNTER
Day 14 post procedure follow up call attempted, no contact made. Left VM message. Provided number to OOC with instructions to call with questions or concerns.     Reason for Disposition   Message left on unidentified voice mail. Phone number verified.    Additional Information   Negative: Caller has already spoken with the PCP (or office), and has no further questions   Negative: Caller has already spoken with another triager and has no further questions   Negative: Caller has already spoken with another triager or PCP (or office), and has further questions and triager able to answer questions.   Negative: Message left on identified voicemail   Negative: Busy signal.  First attempt to contact caller.  Follow-up call scheduled within 15 minutes.   Negative: No answer.  First attempt to contact caller.  Follow-up call scheduled within 15 minutes.    Protocols used: NO CONTACT OR DUPLICATE CONTACT CALL-A-[place]

## 2020-07-08 ENCOUNTER — TELEPHONE (OUTPATIENT)
Dept: HEMATOLOGY/ONCOLOGY | Facility: CLINIC | Age: 65
End: 2020-07-08

## 2020-07-08 NOTE — TELEPHONE ENCOUNTER
Ced Smith MA   Caller: Unspecified (Today,  1:17 PM)             Juliet mrn [phone]    Previous Messages    ----- Message -----   From: Meli Smith MA   Sent: 7/8/2020   1:27 PM CDT   To: Ced Eubanks     I can schedule it, what's the pt mrn. There's no pt attached to the message   ----- Message -----   From: Ced Eubanks   Sent: 7/8/2020   1:17 PM CDT   To: Janet Farley Staff     Hello,     Dr. Matson would like to refer the following pt to Dr. Farley for pulmonary embolism. The referral and records are scanned into media manager. If there are any further questions in regards to the patient, please contact Dr. Matson's office at, [phone]. Please let me know if I can help schedule in any way.       Thank you   Ced Cotter

## 2020-07-09 ENCOUNTER — TELEPHONE (OUTPATIENT)
Dept: GASTROENTEROLOGY | Facility: CLINIC | Age: 65
End: 2020-07-09

## 2020-07-09 NOTE — TELEPHONE ENCOUNTER
"----- Message from Brown Go MD sent at 7/9/2020  1:05 PM CDT -----  Regarding: RE: ASAP appt  Ok,,  Sarah, looks like we have 1030 opening tomorrow as well... we could offer to see him then.  Thanks  ----- Message -----  From: Farhat Matson MD  Sent: 7/9/2020  11:32 AM CDT  To: Brown Go MD  Subject: RE: ASAP appt                                    I have about 20 pages I gave to patient to give to you - several EGDs and colonoscopies from 2 places.  He's getting a CBC tomorrow am at [facility] -    ----- Message -----  From: Brown Go MD  Sent: 7/9/2020  10:52 AM CDT  To: Farhat Matson MD, Sarah Parr MA  Subject: RE: ASAP appt                                    Thanks for the message.  I will forward this and get him in to see me...   Will definitely need records to determine next steps but likely will need pill cam.    Sarah, can we arrange him to be seen, whichever day works, It seems he will be in town Thursday and we can see him then. If he is seeing Dr. Matson on Monday, we could also offer to overbook him at like 730 on Monday am.    Thanks  Brown  [phone] (cell if you need me)  ----- Message -----  From: Farhat Matson MD  Sent: 7/9/2020  10:40 AM CDT  To: Brown Go MD  Subject: ASAP appt                                        Brown    Mr. Miky Esqueda was admitted twice to a hospital in [place] since he last left [facility] in June after you scoped him.  He did have another biopsy show H pylori and is still being treated with therapy from the GI in [place] (not sure of regimen).  Regardless, he bled again at his follow up and just got out for his second stay in two weeks in [place].  Transfused 2 units, recevied IV iron and had a "longer scope" with area of bleeding noted per wife.  I don't have records of this most recent (2nd [place] hospitalization) yet.    He's due to see me Monday, but would like you to see him in clinic ASAP to discuss/set up pill " endoscopy which was never done yet.    I have advised him to stay off ASA, only take Xarelto 20mg daily (needs it for his DVT and PE he had in May, 2020).  He will be seeing Hematology in [place] next Thrusday.  He could see you in [place] or [place] - his wife is his best contact at [phone] (Priscilla).      Luke Aldana276 [phone]      I had my asst send this to the  also via fax  I have a plethora of notes and procedures from [facility] of [place] - too much to fax.  I'll have them bring copies with them

## 2020-07-09 NOTE — TELEPHONE ENCOUNTER
"----- Message from Brown Go MD sent at 7/9/2020 10:52 AM CDT -----  Regarding: RE: ASA appt  Thanks for the message.  I will forward this and get him in to see me...   Will definitely need records to determine next steps but likely will need pill cam.    Sarah, can we arrange him to be seen, whichever day works, It seems he will be in town Thursday and we can see him then. If he is seeing Dr. Matson on Monday, we could also offer to overbook him at like 730 on Monday am.    Thanks  Brown  [phone] (cell if you need me)  ----- Message -----  From: Farhat Matson MD  Sent: 7/9/2020  10:40 AM CDT  To: Brown Go MD  Subject: ASAP appt                                        Brown,    Mr. Miky Esqueda was admitted twice to a hospital in [place] since he last left [facility] in June after you scoped him.  He did have another biopsy show H pylori and is still being treated with therapy from the GI in [place] (not sure of regimen).  Regardless, he bled again at his follow up and just got out for his second stay in two weeks in [place].  Transfused 2 units, recevied IV iron and had a "longer scope" with area of bleeding noted per wife.  I don't have records of this most recent (2nd [place] hospitalization) yet.    He's due to see me Monday, but would like you to see him in clinic ASAP to discuss/set up pill endoscopy which was never done yet.    I have advised him to stay off ASA, only take Xarelto 20mg daily (needs it for his DVT and PE he had in May, 2020).  He will be seeing Hematology in [place] next Thrusday.  He could see you in [place] or [facility] - his wife is his best contact at [phone] (Priscilla).      Luke  [phone]      I had my asst send this to the  also via fax  I have a plethora of notes and procedures from [facility] of [place] - too much to fax.  I'll have them bring copies with them      "

## 2020-07-10 ENCOUNTER — LAB VISIT (OUTPATIENT)
Dept: LAB | Facility: HOSPITAL | Age: 65
End: 2020-07-10
Attending: FAMILY MEDICINE
Payer: COMMERCIAL

## 2020-07-10 ENCOUNTER — OFFICE VISIT (OUTPATIENT)
Dept: GASTROENTEROLOGY | Facility: CLINIC | Age: 65
End: 2020-07-10
Payer: COMMERCIAL

## 2020-07-10 ENCOUNTER — TELEPHONE (OUTPATIENT)
Dept: ENDOSCOPY | Facility: HOSPITAL | Age: 65
End: 2020-07-10

## 2020-07-10 ENCOUNTER — LAB VISIT (OUTPATIENT)
Dept: FAMILY MEDICINE | Facility: CLINIC | Age: 65
End: 2020-07-10
Payer: COMMERCIAL

## 2020-07-10 VITALS — HEIGHT: 73 IN | WEIGHT: 212.75 LBS | BODY MASS INDEX: 28.2 KG/M2

## 2020-07-10 DIAGNOSIS — K55.20 JEJUNAL AV MALFORMATION: Primary | ICD-10-CM

## 2020-07-10 DIAGNOSIS — D50.0 IRON DEFICIENCY ANEMIA DUE TO CHRONIC BLOOD LOSS: ICD-10-CM

## 2020-07-10 DIAGNOSIS — Z01.812 PRE-PROCEDURE LAB EXAM: ICD-10-CM

## 2020-07-10 DIAGNOSIS — D50.0 CHRONIC BLOOD LOSS ANEMIA: Primary | ICD-10-CM

## 2020-07-10 DIAGNOSIS — Z03.818 ENCOUNTER FOR OBSERVATION FOR SUSPECTED EXPOSURE TO OTHER BIOLOGICAL AGENTS RULED OUT: ICD-10-CM

## 2020-07-10 DIAGNOSIS — R59.0 MESENTERIC LYMPHADENOPATHY: ICD-10-CM

## 2020-07-10 LAB
BASOPHILS # BLD AUTO: 0.04 K/UL (ref 0–0.2)
BASOPHILS NFR BLD: 0.5 % (ref 0–1.9)
DIFFERENTIAL METHOD: ABNORMAL
EOSINOPHIL # BLD AUTO: 0.1 K/UL (ref 0–0.5)
EOSINOPHIL NFR BLD: 1.7 % (ref 0–8)
ERYTHROCYTE [DISTWIDTH] IN BLOOD BY AUTOMATED COUNT: 15.5 % (ref 11.5–14.5)
HCT VFR BLD AUTO: 27.5 % (ref 40–54)
HGB BLD-MCNC: 8.2 G/DL (ref 14–18)
IMM GRANULOCYTES # BLD AUTO: 0.04 K/UL (ref 0–0.04)
IMM GRANULOCYTES NFR BLD AUTO: 0.5 % (ref 0–0.5)
LYMPHOCYTES # BLD AUTO: 1.5 K/UL (ref 1–4.8)
LYMPHOCYTES NFR BLD: 18.6 % (ref 18–48)
MCH RBC QN AUTO: 27.2 PG (ref 27–31)
MCHC RBC AUTO-ENTMCNC: 29.8 G/DL (ref 32–36)
MCV RBC AUTO: 91 FL (ref 82–98)
MONOCYTES # BLD AUTO: 0.7 K/UL (ref 0.3–1)
MONOCYTES NFR BLD: 8.9 % (ref 4–15)
NEUTROPHILS # BLD AUTO: 5.5 K/UL (ref 1.8–7.7)
NEUTROPHILS NFR BLD: 69.8 % (ref 38–73)
NRBC BLD-RTO: 1 /100 WBC
PLATELET # BLD AUTO: 176 K/UL (ref 150–350)
PMV BLD AUTO: 10.4 FL (ref 9.2–12.9)
RBC # BLD AUTO: 3.02 M/UL (ref 4.6–6.2)
WBC # BLD AUTO: 7.84 K/UL (ref 3.9–12.7)

## 2020-07-10 PROCEDURE — U0003 INFECTIOUS AGENT DETECTION BY NUCLEIC ACID (DNA OR RNA); SEVERE ACUTE RESPIRATORY SYNDROME CORONAVIRUS 2 (SARS-COV-2) (CORONAVIRUS DISEASE [COVID-19]), AMPLIFIED PROBE TECHNIQUE, MAKING USE OF HIGH THROUGHPUT TECHNOLOGIES AS DESCRIBED BY CMS-2020-01-R: HCPCS

## 2020-07-10 PROCEDURE — 85025 COMPLETE CBC W/AUTO DIFF WBC: CPT | Mod: PO

## 2020-07-10 PROCEDURE — 99999 PR PBB SHADOW E&M-EST. PATIENT-LVL III: CPT | Mod: PBBFAC,,, | Performed by: INTERNAL MEDICINE

## 2020-07-10 PROCEDURE — 36415 COLL VENOUS BLD VENIPUNCTURE: CPT | Mod: PO

## 2020-07-10 PROCEDURE — 99999 PR PBB SHADOW E&M-EST. PATIENT-LVL III: ICD-10-PCS | Mod: PBBFAC,,, | Performed by: INTERNAL MEDICINE

## 2020-07-10 PROCEDURE — 99215 OFFICE O/P EST HI 40 MIN: CPT | Mod: S$GLB,,, | Performed by: INTERNAL MEDICINE

## 2020-07-10 PROCEDURE — 99215 PR OFFICE/OUTPT VISIT, EST, LEVL V, 40-54 MIN: ICD-10-PCS | Mod: S$GLB,,, | Performed by: INTERNAL MEDICINE

## 2020-07-10 NOTE — TELEPHONE ENCOUNTER
Left message instructing patient to call dept @ [phone] between 8am-4pm.    Arrival time to be given @ 0800 on 7/13/2020

## 2020-07-10 NOTE — H&P (VIEW-ONLY)
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 Diagnoses of Iron deficiency anemia due to chronic blood loss, Encounter for observation for suspected exposure to other biological agents ruled out, and Mesenteric lymphadenopathy were also pertinent to this visit.  Referring provider/PCP: Farhat Matson MD    HPI:  Miky Esqueda is a 65 y.o. male here today for recurrent obscure occult GI bleeding.    He is here for follow-up of obscure occult bleeding.  He had a recent hospital stay at [facility] as well as [facility] where he was transferred to [facility].  He underwent a EGD and colonoscopy with Dr. Bolaños which were fairly unremarkable he did have a very small tubular adenoma that was removed from the sigmoid colon.  There was no source of bleeding found.  He then was transferred to [facility] had a push enteroscopy done with details as below.  There was a angio ectasia that was seen but this was too distal to the extent of enteroscopy reached and thus was not able to be treated.  He is currently asymptomatic besides some mild fatigue on exertion.  He has a decreased exercise tolerance.  He has over although feeling well without any abdominal pain.  There is no melena there is no hematochezia there is no hematemesis.  There is no abdominal pain. He had CBC today and this is stable.    I have reviewed those records.  He also had CT c/a/p which showed mildly enlarge mesenteric nodes throughout the abdomen.  CTAngio was negative for bleeding.    Most recent push enteroscopy done at [facility] 7/7/20  Impression:          - Normal esophagus.                       - Nodular mucosa in the gastric antrum. Biopsied. Jar 1.                       - Normal examined duodenum.                       - Outside the reach of the pediatric colonoscope, we                        could see a single, small, non-bleeding angiodysplastic       "                  lesion found in the jejunum. I attempted scope removal                        back into the stomach three times, loop reduction, and                        manual pressure, yet we were not able to get the                        pediatric colonoscope to a position that would make                        coagulation possible.                       - He has chronic occult, obscure GI bleeding and anemia                        from small bowel (and history of gastric)                        angiodysplasias.                       - Bleeding will likely continue in the presence of                        anticoagulation which he requires for bilateral                        pulmonary emboli and DVT. This is considered to be the                        "new normal" until he is safely able to stop                        anticoagulation.  Recommendation:      - Advance diet as tolerated                       - Restart Xarelto at full strength. As discussed with                        patient's wife, this is likely to cause recurrent                        bleeding. However, the benefit at this time outweighs                        the risks (better to receive intermittent pRBC                        transfusion and/or iron supplementation than to be                        completely off anticoagulation with his degree of                        thrombus burden).                       - Give IV iron sucrose 200mg now.                       - I would recommend IV iron as an outpatient instead of                        oral iron therapy. Once daily oral iron (his current                        dose) will not keep up with the ongoing occult, obscure                        losses.                       - I would recommend at least weekly or every-other-week                        assessment of H&H with attention towards outpatient pRBC                        transfusions.                       - He needs a video " capsule endoscopy (PillCam) that is                        already in the works in [place], [place] wtih Dr. Bolaños. This                        needs to be done in next 1-2 weeks.                       - Very likely, he will require double balloon                        enteroscopy, since the jejunal angiodysplasia was out of                        reach of our standard pediatric colonoscopy (push                        enteroscopy). This would have to be performed at [facility]                        with [facility].                       - Discharge home when H/H stable and no overt bleeding.                        I suspect this will be tomorrow morning.                       - All of the above will have to be coordinated by his                        existing physicians in [place], [place] and [place]. He                        was transferred to our facility since there was                        apparently no GI coverage in [place], [place] ([facility]) yesterday.  PATH:  Negative for HP ; chronic intestinal metaplasia without dysplasia.      EGD and colon with Dr. Bolaños at [facility] 7/2020  No source of bleeding  Small 6mm colon polyp removed, TA on path      Prior Endoscopy:  EGD:  6/15/20 with me  Biopsied.                         - Nodular mucosa in the greater curvature of the                         stomach. Biopsied.                         - Non-bleeding erosive gastropathy. Biopsied.                         - A single small non-bleeding angiodysplastic                         lesion in the stomach. Treated with argon plasma                         coagulation (APC). Ablated.                         - Normal duodenal bulb, first portion of the                         duodenum, second portion of the duodenum, third                         portion of the duodenum and fourth portion of the                         duodenum.   PATH positive for HP, sent  treatment    Colon:  6/15/20 with me  Impression:           - Hemorrhoids found on perianal exam.                         - One 6 mm polyp in the sigmoid colon, removed with                         a cold snare. Resected and retrieved.                         - One 2 mm polyp in the rectum, removed with a cold                         snare. Resected and retrieved.                         - The examined portion of the ileum was normal. At                         least 15 cm examined.                         - Non-bleeding internal hemorrhoids.                         - The examination was otherwise normal on direct                         and retroflexion views.   Recommendation:       - Return patient to hospital hall for ongoing care.                         - Resume previous diet.                         - Continue present medications.                         - Await pathology results.                         - Repeat colonoscopy in 5 years for surveillance.                         - Monitor CBC while on iron.                         - No obvious source on todays EGD / Colon. Await                         pathology, if iron def anemia persists in the near                         future, would consider VCE for further evaluation                         of possible small bowel AVMs.                         - Resume Xarelto (rivaroxaban) at prior dose                         tomorrow.  PATH TA    (Portions of this note were dictated using voice recognition software and may contain dictation related errors in spelling/grammar/syntax not found on text review)    Review of Systems   Constitutional: Positive for malaise/fatigue. Negative for fever.   Respiratory: Negative for cough and shortness of breath.    Cardiovascular: Negative for chest pain and palpitations.   Gastrointestinal: Negative for abdominal pain, blood in stool, nausea and vomiting.   Neurological: Negative for dizziness and headaches.       Past  "Medical History: has a past medical history of Bilateral pulmonary embolism, History of DVT (deep vein thrombosis), and Iron deficiency anemia.    Past Surgical History: has a past surgical history that includes Esophagogastroduodenoscopy (N/A, 6/15/2020) and Colonoscopy (N/A, 6/15/2020).    Family History:family history includes Heart disease in his father.    Allergies: Review of patient's allergies indicates:  No Known Allergies    Social History: reports that he has quit smoking. His smoking use included cigarettes. He has quit using smokeless tobacco. He reports that he does not drink alcohol or use drugs.    Home medications:   Current Outpatient Medications on File Prior to Visit   Medication Sig Dispense Refill    multivitamin capsule Take 1 capsule by mouth once daily.      rivaroxaban (XARELTO) 20 mg Tab Take 1 tablet (20 mg total) by mouth daily with dinner or evening meal. 30 tablet 0    aspirin 81 MG Chew Chew and swallow 1 tablet (81 mg total) by mouth once daily. (Patient not taking: Reported on 7/10/2020) 30 tablet 11    atorvastatin (LIPITOR) 40 MG tablet Take 1 tablet (40 mg total) by mouth once daily. (Patient not taking: Reported on 7/10/2020) 90 tablet 3    ferrous sulfate 324 mg (65 mg iron) TbEC Take 325 mg by mouth once daily.      pantoprazole (PROTONIX) 40 MG tablet Take 1 tablet (40 mg total) by mouth 2 (two) times daily. for 10 days 20 tablet 0     No current facility-administered medications on file prior to visit.        Vital signs:  Ht 6' 1" (1.854 m)   Wt 96.5 kg (212 lb 11.9 oz)   BMI 28.07 kg/m²     Physical Exam  Vitals signs reviewed.   Constitutional:       Appearance: He is not toxic-appearing.   HENT:      Head: Normocephalic and atraumatic.   Eyes:      General: No scleral icterus.     Conjunctiva/sclera: Conjunctivae normal.   Neurological:      Mental Status: He is alert and oriented to person, place, and time.      Gait: Gait normal.   Psychiatric:         Mood and " Affect: Mood normal.         Behavior: Behavior normal.         Routine labs:  Lab Results   Component Value Date    WBC 7.84 07/10/2020    HGB 8.2 (L) 07/10/2020    HCT 27.5 (L) 07/10/2020    MCV 91 07/10/2020     07/10/2020     Lab Results   Component Value Date    INR 1.0 06/13/2020     Lab Results   Component Value Date    IRON 50 06/12/2020    FERRITIN 333 (H) 06/12/2020    TIBC 306 06/12/2020    FESATURATED 16 (L) 06/12/2020     Lab Results   Component Value Date     06/16/2020    K 4.7 06/16/2020     06/16/2020    CO2 25 06/16/2020    BUN 18 06/16/2020    CREATININE 1.3 06/16/2020     Lab Results   Component Value Date    ALBUMIN 3.1 (L) 06/16/2020    ALT 16 06/16/2020    AST 23 06/16/2020    ALKPHOS 59 06/16/2020    BILITOT 0.4 06/16/2020     No results found for: GLUCOSE    I have reviewed prior labs, imaging, notes from last month      Assessment:  1. Jejunal AV malformation    2. Iron deficiency anemia due to chronic blood loss    3. Encounter for observation for suspected exposure to other biological agents ruled out    4. Mesenteric lymphadenopathy        Plan:  Orders Placed This Encounter    COVID-19 Routine Screening    Case request GI: IMAGING PROCEDURE, GI TRACT, INTRALUMINAL, VIA CAPSULE     Will proceed with pill cam  Endoscopy to determine extent of small bowel AVMs.  He will likely require balloon enteroscopy antegrade with Dr. Espinal after pill endoscopy  Ok to continue anticoag as this is high risk condition    I advised he discuss his mesenteric adenopathy findings on CT with Dr. Gonzales, especially in light of his newly diagnosed, seemingly unprovoked PTEs.  Keep Appt with Dr. Gonzales     He will need CT abd / pelv with contrast in 3 months for interval follow up of enlarge lymph nodes on CT - will place recall reminder    RTC pending pill endoscopy      Brown Go MD  [facility]

## 2020-07-10 NOTE — PATIENT INSTRUCTIONS
What is a Video Capsule Endoscopy?  A video capsule endoscopy is a procedure that uses a tiny wireless camera to take pictures of your digestive tract. A capsule endoscopy camera sits inside a vitamin-size capsule that you will swallow. As the capsule travels through your digestive tract, the camera takes thousands of pictures that are transmitted to a recorder you wear on a belt around your waist.       1. DAY BEFORE: 7/12/20  2.   You will start your clear liquid diet (see attached list) beginning at NOON. After 7pm you will take nothing by mouth except necessary medication with a small sip of water and your bowel prep.   3. At noon, mix the 4.1 oz bottle (119 gram) of Miralax and the 32oz Gatorade (use white/clear color) products, place in the refrigerator (do not add ice).   4. At 7pm you will drink 8oz of Miralax/Gatorade mixture every 15 minutes until mixture is gone (a total of 32oz)  5. Just before going to bed, take one dose of over the counter Phazyme or Simethicone as directed on the box.       Day of Swallowing Capsule:7/13/20    1. You will check in at the Admitting desk on the first floor of the [facility]. Do not wear any lipstick or chap stick to appointment.    2. After you check in, you will meet with a nurse or medical assistant who will go over instructions, ensure consent is complete, and give you a small video capsule to swallow.   3. You will be given water to drink when ingesting the capsule.   4. You may drink water throughout the test and are encouraged to walk as much as possible.   5. Oral medications (except for diabetic medication) may be taken at 10:00am  6. You may drink 8ozs of clear liquids (see attached list) at 10:00am and again at 2:00pm  7. Do not exercise Avoid heavy lifting. You can drive a car. You may return to work, if your work allows you to. Avoid unsuitable environments and/or physical movements.   8. Avoid getting the recorder or sensor belt wet  9. Observe  the LED light on the data recorder at least every 15 minutes. If the light stops blinking blue, document the time and call our office at [phone].   10. Return to our office at _________to have the equipment removed. Your physician will discuss with you the outcome of the test and any further course of action needed.   11. After ingesting the capsule and until it leaves your body, avoid being near any source of powerful electromagnetic fields such as one created near a MRI device for 30 days.       If you suffer from any abdominal pain, nausea, or vomiting during this test contact your physician immediately at [phone] or [phone].       CLEAR LIQUID DIET:   - Avoid Red, Orange, Purple, and/or Blue food coloring   - NO DAIRY   - You can have:  Coffee with sugar (no creamer), tea, water, soda, apple or white grape juice, chicken or beef broth/bouillon (no meat, noodles, or veggies), green/yellow popsicles, green/yellow Jell-O, lemonade.      [address] [place] [address] [facility] [phone]

## 2020-07-10 NOTE — PROGRESS NOTES
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 Diagnoses of Iron deficiency anemia due to chronic blood loss, Encounter for observation for suspected exposure to other biological agents ruled out, and Mesenteric lymphadenopathy were also pertinent to this visit.  Referring provider/PCP: Farhat Matson MD    HPI:  Miky Esqueda is a 65 y.o. male here today for recurrent obscure occult GI bleeding.    He is here for follow-up of obscure occult bleeding.  He had a recent hospital stay at [facility] as well as [facility] where he was transferred to [facility].  He underwent a EGD and colonoscopy with Dr. Bolaños which were fairly unremarkable he did have a very small tubular adenoma that was removed from the sigmoid colon.  There was no source of bleeding found.  He then was transferred to [facility] had a push enteroscopy done with details as below.  There was a angio ectasia that was seen but this was too distal to the extent of enteroscopy reached and thus was not able to be treated.  He is currently asymptomatic besides some mild fatigue on exertion.  He has a decreased exercise tolerance.  He has over although feeling well without any abdominal pain.  There is no melena there is no hematochezia there is no hematemesis.  There is no abdominal pain. He had CBC today and this is stable.    I have reviewed those records.  He also had CT c/a/p which showed mildly enlarge mesenteric nodes throughout the abdomen.  CTAngio was negative for bleeding.    Most recent push enteroscopy done at [facility] 7/7/20  Impression:          - Normal esophagus.                       - Nodular mucosa in the gastric antrum. Biopsied. Jar 1.                       - Normal examined duodenum.                       - Outside the reach of the pediatric colonoscope, we                        could see a single, small, non-bleeding angiodysplastic       "                  lesion found in the jejunum. I attempted scope removal                        back into the stomach three times, loop reduction, and                        manual pressure, yet we were not able to get the                        pediatric colonoscope to a position that would make                        coagulation possible.                       - He has chronic occult, obscure GI bleeding and anemia                        from small bowel (and history of gastric)                        angiodysplasias.                       - Bleeding will likely continue in the presence of                        anticoagulation which he requires for bilateral                        pulmonary emboli and DVT. This is considered to be the                        "new normal" until he is safely able to stop                        anticoagulation.  Recommendation:      - Advance diet as tolerated                       - Restart Xarelto at full strength. As discussed with                        patient's wife, this is likely to cause recurrent                        bleeding. However, the benefit at this time outweighs                        the risks (better to receive intermittent pRBC                        transfusion and/or iron supplementation than to be                        completely off anticoagulation with his degree of                        thrombus burden).                       - Give IV iron sucrose 200mg now.                       - I would recommend IV iron as an outpatient instead of                        oral iron therapy. Once daily oral iron (his current                        dose) will not keep up with the ongoing occult, obscure                        losses.                       - I would recommend at least weekly or every-other-week                        assessment of H&H with attention towards outpatient pRBC                        transfusions.                       - He needs a video " capsule endoscopy (PillCam) that is                        already in the works in [place], [place] wtih Dr. Bolaños. This                        needs to be done in next 1-2 weeks.                       - Very likely, he will require double balloon                        enteroscopy, since the jejunal angiodysplasia was out of                        reach of our standard pediatric colonoscopy (push                        enteroscopy). This would have to be performed at [facility]                        with Advanced Endsocopy.                       - Discharge home when H/H stable and no overt bleeding.                        I suspect this will be tomorrow morning.                       - All of the above will have to be coordinated by his                        existing physicians in [place], [place] and [place]. He                        was transferred to our facility since there was                        apparently no GI coverage in [place], [place] ([facility]) yesterday.  PATH:  Negative for HP ; chronic intestinal metaplasia without dysplasia.      EGD and colon with Dr. Bolaños at [facility] 7/2020  No source of bleeding  Small 6mm colon polyp removed, TA on path      Prior Endoscopy:  EGD:  6/15/20 with me  Biopsied.                         - Nodular mucosa in the greater curvature of the                         stomach. Biopsied.                         - Non-bleeding erosive gastropathy. Biopsied.                         - A single small non-bleeding angiodysplastic                         lesion in the stomach. Treated with argon plasma                         coagulation (APC). Ablated.                         - Normal duodenal bulb, first portion of the                         duodenum, second portion of the duodenum, third                         portion of the duodenum and fourth portion of the                         duodenum.   PATH positive for HP, sent  treatment    Colon:  6/15/20 with me  Impression:           - Hemorrhoids found on perianal exam.                         - One 6 mm polyp in the sigmoid colon, removed with                         a cold snare. Resected and retrieved.                         - One 2 mm polyp in the rectum, removed with a cold                         snare. Resected and retrieved.                         - The examined portion of the ileum was normal. At                         least 15 cm examined.                         - Non-bleeding internal hemorrhoids.                         - The examination was otherwise normal on direct                         and retroflexion views.   Recommendation:       - Return patient to hospital hall for ongoing care.                         - Resume previous diet.                         - Continue present medications.                         - Await pathology results.                         - Repeat colonoscopy in 5 years for surveillance.                         - Monitor CBC while on iron.                         - No obvious source on todays EGD / Colon. Await                         pathology, if iron def anemia persists in the near                         future, would consider VCE for further evaluation                         of possible small bowel AVMs.                         - Resume Xarelto (rivaroxaban) at prior dose                         tomorrow.  PATH TA    (Portions of this note were dictated using voice recognition software and may contain dictation related errors in spelling/grammar/syntax not found on text review)    Review of Systems   Constitutional: Positive for malaise/fatigue. Negative for fever.   Respiratory: Negative for cough and shortness of breath.    Cardiovascular: Negative for chest pain and palpitations.   Gastrointestinal: Negative for abdominal pain, blood in stool, nausea and vomiting.   Neurological: Negative for dizziness and headaches.       Past  "Medical History: has a past medical history of Bilateral pulmonary embolism, History of DVT (deep vein thrombosis), and Iron deficiency anemia.    Past Surgical History: has a past surgical history that includes Esophagogastroduodenoscopy (N/A, 6/15/2020) and Colonoscopy (N/A, 6/15/2020).    Family History:family history includes Heart disease in his father.    Allergies: Review of patient's allergies indicates:  No Known Allergies    Social History: reports that he has quit smoking. His smoking use included cigarettes. He has quit using smokeless tobacco. He reports that he does not drink alcohol or use drugs.    Home medications:   Current Outpatient Medications on File Prior to Visit   Medication Sig Dispense Refill    multivitamin capsule Take 1 capsule by mouth once daily.      rivaroxaban (XARELTO) 20 mg Tab Take 1 tablet (20 mg total) by mouth daily with dinner or evening meal. 30 tablet 0    aspirin 81 MG Chew Chew and swallow 1 tablet (81 mg total) by mouth once daily. (Patient not taking: Reported on 7/10/2020) 30 tablet 11    atorvastatin (LIPITOR) 40 MG tablet Take 1 tablet (40 mg total) by mouth once daily. (Patient not taking: Reported on 7/10/2020) 90 tablet 3    ferrous sulfate 324 mg (65 mg iron) TbEC Take 325 mg by mouth once daily.      pantoprazole (PROTONIX) 40 MG tablet Take 1 tablet (40 mg total) by mouth 2 (two) times daily. for 10 days 20 tablet 0     No current facility-administered medications on file prior to visit.        Vital signs:  Ht 6' 1" (1.854 m)   Wt 96.5 kg (212 lb 11.9 oz)   BMI 28.07 kg/m²     Physical Exam  Vitals signs reviewed.   Constitutional:       Appearance: He is not toxic-appearing.   HENT:      Head: Normocephalic and atraumatic.   Eyes:      General: No scleral icterus.     Conjunctiva/sclera: Conjunctivae normal.   Neurological:      Mental Status: He is alert and oriented to person, place, and time.      Gait: Gait normal.   Psychiatric:         Mood and " Affect: Mood normal.         Behavior: Behavior normal.         Routine labs:  Lab Results   Component Value Date    WBC 7.84 07/10/2020    HGB 8.2 (L) 07/10/2020    HCT 27.5 (L) 07/10/2020    MCV 91 07/10/2020     07/10/2020     Lab Results   Component Value Date    INR 1.0 06/13/2020     Lab Results   Component Value Date    IRON 50 06/12/2020    FERRITIN 333 (H) 06/12/2020    TIBC 306 06/12/2020    FESATURATED 16 (L) 06/12/2020     Lab Results   Component Value Date     06/16/2020    K 4.7 06/16/2020     06/16/2020    CO2 25 06/16/2020    BUN 18 06/16/2020    CREATININE 1.3 06/16/2020     Lab Results   Component Value Date    ALBUMIN 3.1 (L) 06/16/2020    ALT 16 06/16/2020    AST 23 06/16/2020    ALKPHOS 59 06/16/2020    BILITOT 0.4 06/16/2020     No results found for: GLUCOSE    I have reviewed prior labs, imaging, notes from last month      Assessment:  1. Jejunal AV malformation    2. Iron deficiency anemia due to chronic blood loss    3. Encounter for observation for suspected exposure to other biological agents ruled out    4. Mesenteric lymphadenopathy        Plan:  Orders Placed This Encounter    COVID-19 Routine Screening    Case request GI: IMAGING PROCEDURE, GI TRACT, INTRALUMINAL, VIA CAPSULE     Will proceed with pill cam  Endoscopy to determine extent of small bowel AVMs.  He will likely require balloon enteroscopy antegrade with Dr. Espinal after pill endoscopy  Ok to continue anticoag as this is high risk condition    I advised he discuss his mesenteric adenopathy findings on CT with Dr. Gonzales, especially in light of his newly diagnosed, seemingly unprovoked PTEs.  Keep Appt with Dr. Gonzales     He will need CT abd / pelv with contrast in 3 months for interval follow up of enlarge lymph nodes on CT - will place recall reminder    RTC pending pill endoscopy      Brown Go MD  [facility]

## 2020-07-11 LAB — SARS-COV-2 RNA RESP QL NAA+PROBE: NOT DETECTED

## 2020-07-13 ENCOUNTER — HOSPITAL ENCOUNTER (OUTPATIENT)
Facility: HOSPITAL | Age: 65
Discharge: HOME | End: 2020-07-13
Attending: INTERNAL MEDICINE | Admitting: INTERNAL MEDICINE
Payer: COMMERCIAL

## 2020-07-13 ENCOUNTER — TELEPHONE (OUTPATIENT)
Dept: CARDIOLOGY | Facility: CLINIC | Age: 65
End: 2020-07-13

## 2020-07-13 DIAGNOSIS — O22.30 DVT (DEEP VEIN THROMBOSIS) IN PREGNANCY: ICD-10-CM

## 2020-07-13 DIAGNOSIS — E78.5 HYPERLIPIDEMIA, UNSPECIFIED HYPERLIPIDEMIA TYPE: Primary | ICD-10-CM

## 2020-07-13 PROCEDURE — 91110 GI TRC IMG INTRAL ESOPH-ILE: CPT | Performed by: INTERNAL MEDICINE

## 2020-07-13 NOTE — PLAN OF CARE
Patient given instructions on Pillcam.  Consent obtained and patient swallowed pill without difficulty, all questions answered, instructed to return at 4:20 and leave at .

## 2020-07-14 ENCOUNTER — TELEPHONE (OUTPATIENT)
Dept: GASTROENTEROLOGY | Facility: CLINIC | Age: 65
End: 2020-07-14

## 2020-07-15 PROCEDURE — 91110 GI TRC IMG INTRAL ESOPH-ILE: CPT | Mod: 26,,, | Performed by: INTERNAL MEDICINE

## 2020-07-15 PROCEDURE — 91110 PR GI TRACT CAPSULE ENDOSCOPY: ICD-10-PCS | Mod: 26,,, | Performed by: INTERNAL MEDICINE

## 2020-07-15 NOTE — PROGRESS NOTES
Discussed results of VCE    2 proximal jejunal concerning spots for potential bleeding.  No active bleeding.     Will refer to dr reed for possible antegrade balloon enteroscopy.

## 2020-07-15 NOTE — Clinical Note
Emmett and staff,  Patient with couple admissions here and at hospital in [place] for occult obscure GI bleeding.  Pill cam with 2 spots in prox jejunum high risk for recurrent bleeding, he is on anticoag for recent PTE.  Can we set him up for antergrade balloon?  Let me know if you need more info or how you would like to proceed?  Thanks  Brown.

## 2020-07-15 NOTE — PROVATION PATIENT INSTRUCTIONS
Discharge Summary/Instructions after an Endoscopic Procedure  Patient Name: Miky Esqueda  Patient MRN: [id number]  Patient YOB: 1955  Wednesday, July 15, 2020  Brown Go MD  Your health is very important to us during the Covid Crisis. Following your   procedure today, you will receive a daily text for 2 weeks asking about   signs or symptoms of Covid 19.  Please respond to this text when you   receive it so we can follow up and keep you as safe as possible.   RESTRICTIONS:  During your procedure today, you received medications for sedation.  These   medications may affect your judgment, balance and coordination.  Therefore,   for 24 hours, you have the following restrictions:   - DO NOT drive a car, operate machinery, make legal/financial decisions,   sign important papers or drink alcohol.    ACTIVITY:  Today: no heavy lifting, straining or running due to procedural   sedation/anesthesia.  The following day: return to full activity including work.  DIET:  Eat and drink normally unless instructed otherwise.     TREATMENT FOR COMMON SIDE EFFECTS:  - Mild abdominal pain, nausea, belching, bloating or excessive gas:  rest,   eat lightly and use a heating pad.  - Sore Throat: treat with throat lozenges and/or gargle with warm salt   water.  - Because air was used during the procedure, expelling large amounts of air   from your rectum or belching is normal.  - If a bowel prep was taken, you may not have a bowel movement for 1-3 days.    This is normal.  SYMPTOMS TO WATCH FOR AND REPORT TO YOUR PHYSICIAN:  1. Abdominal pain or bloating, other than gas cramps.  2. Chest pain.  3. Back pain.  4. Signs of infection such as: chills or fever occurring within 24 hours   after the procedure.  5. Rectal bleeding, which would show as bright red, maroon, or black stools.   (A tablespoon of blood from the rectum is not serious, especially if   hemorrhoids are present.)  6. Vomiting.  7. Weakness or dizziness.  GO  DIRECTLY TO THE NEAREST EMERGENCY ROOM IF YOU HAVE ANY OF THE FOLLOWING:      Difficulty breathing              Chills and/or fever over 101 F   Persistent vomiting and/or vomiting blood   Severe abdominal pain   Severe chest pain   Black, tarry stools   Bleeding- more than one tablespoon   Any other symptom or condition that you feel may need urgent attention  Your doctor recommends these additional instructions:  If any biopsies were taken, your doctors clinic will contact you in 1 to 2   weeks with any results.  - Discharge patient to home.   - Resume previous diet.   - Perform an upper device-assisted enteroscopy at appointment to be   scheduled.   - will refer to Dr. Espinal for consideration of antegrade balloon   enteroscopy for ablation of AVM and lymphangiectasia in proximal jejunum.  For questions, problems or results please call your physician - Brown Go MD.  EMERGENCY PHONE NUMBER: [phone],  LAB RESULTS: [phone]  IF A COMPLICATION OR EMERGENCY SITUATION ARISES AND YOU ARE UNABLE TO REACH   YOUR PHYSICIAN - GO DIRECTLY TO THE EMERGENCY ROOM.  Brown Go MD  7/15/2020 2:00:29 PM  This report has been verified and signed electronically.  [employer]ATION

## 2020-07-16 ENCOUNTER — OFFICE VISIT (OUTPATIENT)
Dept: HEMATOLOGY/ONCOLOGY | Facility: CLINIC | Age: 65
End: 2020-07-16
Payer: COMMERCIAL

## 2020-07-16 ENCOUNTER — LAB VISIT (OUTPATIENT)
Dept: LAB | Facility: HOSPITAL | Age: 65
End: 2020-07-16
Attending: INTERNAL MEDICINE
Payer: COMMERCIAL

## 2020-07-16 ENCOUNTER — DOCUMENTATION ONLY (OUTPATIENT)
Dept: NEUROLOGY | Facility: HOSPITAL | Age: 65
End: 2020-07-16

## 2020-07-16 VITALS
DIASTOLIC BLOOD PRESSURE: 70 MMHG | HEART RATE: 88 BPM | BODY MASS INDEX: 27.78 KG/M2 | WEIGHT: 210.56 LBS | RESPIRATION RATE: 18 BRPM | OXYGEN SATURATION: 98 % | TEMPERATURE: 98 F | SYSTOLIC BLOOD PRESSURE: 114 MMHG

## 2020-07-16 DIAGNOSIS — D50.0 IRON DEFICIENCY ANEMIA DUE TO CHRONIC BLOOD LOSS: ICD-10-CM

## 2020-07-16 DIAGNOSIS — I26.99 BILATERAL PULMONARY EMBOLISM: Primary | ICD-10-CM

## 2020-07-16 DIAGNOSIS — I26.99 BILATERAL PULMONARY EMBOLISM: ICD-10-CM

## 2020-07-16 DIAGNOSIS — I82.433 ACUTE DEEP VEIN THROMBOSIS (DVT) OF POPLITEAL VEIN OF BOTH LOWER EXTREMITIES: ICD-10-CM

## 2020-07-16 DIAGNOSIS — E55.9 VITAMIN D DEFICIENCY: ICD-10-CM

## 2020-07-16 DIAGNOSIS — K55.20 AV MALFORMATION OF GASTROINTESTINAL TRACT: ICD-10-CM

## 2020-07-16 LAB
25(OH)D3+25(OH)D2 SERPL-MCNC: 15 NG/ML (ref 30–96)
ALBUMIN SERPL BCP-MCNC: 3 G/DL (ref 3.5–5.2)
ALP SERPL-CCNC: 81 U/L (ref 55–135)
ALT SERPL W/O P-5'-P-CCNC: 15 U/L (ref 10–44)
ANION GAP SERPL CALC-SCNC: 5 MMOL/L (ref 8–16)
AST SERPL-CCNC: 18 U/L (ref 10–40)
BASOPHILS # BLD AUTO: 0.04 K/UL (ref 0–0.2)
BASOPHILS NFR BLD: 0.5 % (ref 0–1.9)
BILIRUB SERPL-MCNC: 0.2 MG/DL (ref 0.1–1)
BUN SERPL-MCNC: 17 MG/DL (ref 8–23)
CALCIUM SERPL-MCNC: 8.7 MG/DL (ref 8.7–10.5)
CHLORIDE SERPL-SCNC: 107 MMOL/L (ref 95–110)
CO2 SERPL-SCNC: 26 MMOL/L (ref 23–29)
COMPLEXED PSA SERPL-MCNC: 0.69 NG/ML (ref 0–4)
CREAT SERPL-MCNC: 1.3 MG/DL (ref 0.5–1.4)
DIFFERENTIAL METHOD: ABNORMAL
EOSINOPHIL # BLD AUTO: 0.1 K/UL (ref 0–0.5)
EOSINOPHIL NFR BLD: 1.2 % (ref 0–8)
ERYTHROCYTE [DISTWIDTH] IN BLOOD BY AUTOMATED COUNT: 14 % (ref 11.5–14.5)
EST. GFR  (AFRICAN AMERICAN): >60 ML/MIN/1.73 M^2
EST. GFR  (NON AFRICAN AMERICAN): 58 ML/MIN/1.73 M^2
FERRITIN SERPL-MCNC: 131 NG/ML (ref 20–300)
GLUCOSE SERPL-MCNC: 90 MG/DL (ref 70–110)
HCT VFR BLD AUTO: 30 % (ref 40–54)
HGB BLD-MCNC: 9 G/DL (ref 14–18)
IMM GRANULOCYTES # BLD AUTO: 0.03 K/UL (ref 0–0.04)
IMM GRANULOCYTES NFR BLD AUTO: 0.4 % (ref 0–0.5)
IRON SERPL-MCNC: 52 UG/DL (ref 45–160)
LYMPHOCYTES # BLD AUTO: 1.8 K/UL (ref 1–4.8)
LYMPHOCYTES NFR BLD: 23.9 % (ref 18–48)
MCH RBC QN AUTO: 26.5 PG (ref 27–31)
MCHC RBC AUTO-ENTMCNC: 30 G/DL (ref 32–36)
MCV RBC AUTO: 88 FL (ref 82–98)
MONOCYTES # BLD AUTO: 0.7 K/UL (ref 0.3–1)
MONOCYTES NFR BLD: 9.6 % (ref 4–15)
NEUTROPHILS # BLD AUTO: 4.8 K/UL (ref 1.8–7.7)
NEUTROPHILS NFR BLD: 64.4 % (ref 38–73)
NRBC BLD-RTO: 0 /100 WBC
PLATELET # BLD AUTO: 281 K/UL (ref 150–350)
PMV BLD AUTO: 11.3 FL (ref 9.2–12.9)
POTASSIUM SERPL-SCNC: 4.4 MMOL/L (ref 3.5–5.1)
PROT SERPL-MCNC: 6.5 G/DL (ref 6–8.4)
RBC # BLD AUTO: 3.4 M/UL (ref 4.6–6.2)
SATURATED IRON: 17 % (ref 20–50)
SODIUM SERPL-SCNC: 138 MMOL/L (ref 136–145)
TOTAL IRON BINDING CAPACITY: 315 UG/DL (ref 250–450)
TRANSFERRIN SERPL-MCNC: 213 MG/DL (ref 200–375)
WBC # BLD AUTO: 7.4 K/UL (ref 3.9–12.7)

## 2020-07-16 PROCEDURE — 80053 COMPREHEN METABOLIC PANEL: CPT

## 2020-07-16 PROCEDURE — 82306 VITAMIN D 25 HYDROXY: CPT

## 2020-07-16 PROCEDURE — 84153 ASSAY OF PSA TOTAL: CPT

## 2020-07-16 PROCEDURE — 3008F BODY MASS INDEX DOCD: CPT | Mod: CPTII,S$GLB,, | Performed by: INTERNAL MEDICINE

## 2020-07-16 PROCEDURE — 99999 PR PBB SHADOW E&M-EST. PATIENT-LVL III: CPT | Mod: PBBFAC,,, | Performed by: INTERNAL MEDICINE

## 2020-07-16 PROCEDURE — 99999 PR PBB SHADOW E&M-EST. PATIENT-LVL III: ICD-10-PCS | Mod: PBBFAC,,, | Performed by: INTERNAL MEDICINE

## 2020-07-16 PROCEDURE — 99203 PR OFFICE/OUTPT VISIT, NEW, LEVL III, 30-44 MIN: ICD-10-PCS | Mod: S$GLB,,, | Performed by: INTERNAL MEDICINE

## 2020-07-16 PROCEDURE — 82728 ASSAY OF FERRITIN: CPT

## 2020-07-16 PROCEDURE — 3008F PR BODY MASS INDEX (BMI) DOCUMENTED: ICD-10-PCS | Mod: CPTII,S$GLB,, | Performed by: INTERNAL MEDICINE

## 2020-07-16 PROCEDURE — 99203 OFFICE O/P NEW LOW 30 MIN: CPT | Mod: S$GLB,,, | Performed by: INTERNAL MEDICINE

## 2020-07-16 PROCEDURE — 36415 COLL VENOUS BLD VENIPUNCTURE: CPT

## 2020-07-16 PROCEDURE — 85025 COMPLETE CBC W/AUTO DIFF WBC: CPT

## 2020-07-16 PROCEDURE — 83540 ASSAY OF IRON: CPT

## 2020-07-16 RX ORDER — CLARITHROMYCIN 500 MG/1
TABLET, FILM COATED ORAL
Status: ON HOLD | COMMUNITY
Start: 2020-06-25 | End: 2020-08-06 | Stop reason: CLARIF

## 2020-07-16 NOTE — PROGRESS NOTES
PATIENT: Miky Esqueda  MRN: [id number]  DATE: 7/16/2020    Diagnosis:   1. Bilateral pulmonary embolism    2. Acute deep vein thrombosis (DVT) of popliteal vein of both lower extremities    3. AV malformation of gastrointestinal tract    4. Iron deficiency anemia due to chronic blood loss      Chief Complaint: pulmonary embolism    Subjective:     History of Present Illness: He presented to the ER 05/25/2020 with acute onset of shortness of breath, diaphoresis and lightheadedness.  CT chest showed extensive bilateral pulmonary thromboemboli.  No evidence of central saddle embolus.  There was moderate pulmonary emphysema.  Lower extremity ultrasound also revealed nonocclusive thrombus in the right and left popliteal veins as well as the right femoral vein.    This is his 1st episode of pulmonary embolism/DVT.  He has been started on Xarelto and has been taking that religiously.    Then he had a syncopal episode in his PCP's office on 06/12/2020 which lasted about 30 sec followed by brief 15 sec seizure like episode.  He was taken to the ER where a CT head showed a hypodense area in the left parietal lobe concerning for infarct.  By this time he also developed significant anemia and was transfused 2 units packed red blood cells.    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    He is currently taking aspirin and Xarelto.  Unable to add Plavix secondary to symptomatic GI bleeding also.    He is a  but is sedentary at times.    Currently he is in a wheelchair, accompanied by his wife    Component Hemoglobin Hematocrit   Latest Ref Rng & Units 14.0 - 18.0 g/dL 40.0 - 54.0 %   7/10/2020 8.2 (L) 27.5 (L)   6/16/2020 8.3 (L) 26.3 (L)   6/15/2020 8.1 (L) 25.6 (L)   6/14/2020 8.2 (L) 25.6 (L)   6/13/2020 6.9 (L) 22.0 (L)   6/12/2020 6.8 (L) 22.5 (L)   5/26/2020 12.6 (L) 40.7   5/25/2020 13.3 (L) 43.3     To be noted, hemoglobin 5/26 was 12.6 and it dropped to 6.8 on 06/12    Past Medical  History:   Past Medical History:   Diagnosis Date    Bilateral pulmonary embolism 5/25/2020    History of DVT (deep vein thrombosis) 6/12/2020    Iron deficiency anemia        Past Surgical History:   Past Surgical History:   Procedure Laterality Date    COLONOSCOPY N/A 6/15/2020    Procedure: COLONOSCOPY;  Surgeon: Brown Go MD;  Location: [facility] ENDO;  Service: Endoscopy;  Laterality: N/A;    ESOPHAGOGASTRODUODENOSCOPY N/A 6/15/2020    Procedure: EGD (ESOPHAGOGASTRODUODENOSCOPY);  Surgeon: Brown Go MD;  Location: [facility] ENDO;  Service: Endoscopy;  Laterality: N/A;    INTRALUMINAL GASTROINTESTINAL TRACT IMAGING VIA CAPSULE N/A 7/13/2020    Procedure: IMAGING PROCEDURE, GI TRACT, INTRALUMINAL, VIA CAPSULE;  Surgeon: Brown Go MD;  Location: [facility] ENDO;  Service: Endoscopy;  Laterality: N/A;       Family History:   Family History   Problem Relation Age of Onset    Heart disease Father        Social History:  reports that he has quit smoking. His smoking use included cigarettes. He has quit using smokeless tobacco. He reports that he does not drink alcohol or use drugs.    Allergies:  Review of patient's allergies indicates:  No Known Allergies    Medications:  Current Outpatient Medications   Medication Sig Dispense Refill    aspirin 81 MG Chew Chew and swallow 1 tablet (81 mg total) by mouth once daily. 30 tablet 11    atorvastatin (LIPITOR) 40 MG tablet Take 1 tablet (40 mg total) by mouth once daily. 90 tablet 3    clarithromycin (BIAXIN) 500 MG tablet       multivitamin capsule Take 1 capsule by mouth once daily.      rivaroxaban (XARELTO) 20 mg Tab Take 1 tablet (20 mg total) by mouth daily with dinner or evening meal. 30 tablet 0    pantoprazole (PROTONIX) 40 MG tablet Take 1 tablet (40 mg total) by mouth 2 (two) times daily. for 10 days 20 tablet 0     No current facility-administered medications for this visit.        Review of Systems   Constitutional: Positive for fatigue.    Neurological: Positive for weakness.   Hematological: Bruises/bleeds easily.       Objective:     Vitals:    07/16/20 1019   BP: 114/70   Pulse: 88   Resp: 18   Temp: 98 °F (36.7 °C)   SpO2: 98%   Weight: 95.5 kg (210 lb 8.6 oz)     BMI: Body mass index is 27.78 kg/m².    Physical Exam  Vitals signs reviewed.   Constitutional:       General: He is not in acute distress.     Appearance: He is not toxic-appearing or diaphoretic.   HENT:      Mouth/Throat:      Mouth: Mucous membranes are not pale, not dry and not cyanotic. No lacerations.      Pharynx: No oropharyngeal exudate.   Eyes:      General: No scleral icterus.     Conjunctiva/sclera: Conjunctivae normal.   Neck:      Musculoskeletal: Neck supple.      Thyroid: No thyroid mass or thyromegaly.   Cardiovascular:      Rate and Rhythm: Normal rate and regular rhythm.      Heart sounds: Normal heart sounds and S1 normal.   Pulmonary:      Effort: Pulmonary effort is normal. No accessory muscle usage or respiratory distress.      Breath sounds: Normal breath sounds. No stridor. No wheezing or rales.   Abdominal:      Palpations: Abdomen is soft. There is no mass.      Tenderness: There is no abdominal tenderness.   Lymphadenopathy:      Head:      Right side of head: No submental or submandibular adenopathy.      Left side of head: No submental or submandibular adenopathy.      Cervical: No cervical adenopathy.   Skin:     Findings: No bruising, petechiae or rash.   Neurological:      Mental Status: He is alert and oriented to person, place, and time.      Cranial Nerves: No cranial nerve deficit.      Sensory: No sensory deficit.      Gait: Gait normal.   Psychiatric:         Thought Content: Thought content normal.         Judgment: Judgment normal.         Assessment:       1. Bilateral pulmonary embolism    2. Acute deep vein thrombosis (DVT) of popliteal vein of both lower extremities    3. AV malformation of gastrointestinal tract    4. Iron deficiency  anemia due to chronic blood loss      Plan:   Acute bilateral PE with bilateral DVT  -unprovoked  -continue Xarelto  -repeat CT chest and lower extremities to see if it is resolving because he is having trouble tolerating anticoagulation  -if the PE and DVT resolve then we can make a case to decrease the strength of Xarelto    New CVA  -diagnosed June 2020  -on aspirin and Xarelto  -cannot tolerate dual antiplatelet therapy    GI bleeding from AV malformations  -causing symptomatic anemia  -was transfused 2 units packed red blood cells during hospitalization in June  -will check CBC and iron studies  -will pursue IV iron therapy based on today's labs    Many thanks to Dr. Matson for the kind referral    7/17/2020 - addendum - labs show iron deficiency anemia with low iron saturation.  He is symptomatic and is weak.  Will administer 1 dose of Injectafer next week and repeat his labs in 2 weeks.  There is also evidence of vitamin-D deficiency.  Prescribed vitamin D3, 10137 units weekly.  Discussed with patient's wife over the phone about these results.

## 2020-07-16 NOTE — LETTER
July 16, 2020      Farhat Lucio MD  [address] [facility] [address]           [facility]  [address]  [facility] [address]  Phone: [phone]          Patient: Miky Esqueda   MR Number: [id number]   YOB: 1955   Date of Visit: 7/16/2020       Dear Dr. Farhat Lucio:    Thank you for referring Miky Esqueda to me for evaluation. Attached you will find relevant portions of my assessment and plan of care.    If you have questions, please do not hesitate to call me. I look forward to following Miky Esqueda along with you.    Sincerely,    Migueilto Gonzales MD    Enclosure  CC:  No Recipients    If you would like to receive this communication electronically, please contact [email or web address] or [phone] to request more information on [employer] access.    For providers and/or their staff who would like to refer a patient to [facility], please contact us through our one-stop-shop provider referral line, [facility], at [phone].    If you feel you have received this communication in error or would no longer like to receive these types of communications, please e-mail [email or web address]         
[] : Resident

## 2020-07-16 NOTE — PROGRESS NOTES
Video capsule reviewed. There is one small but classic angioectasia in the proximal jejunum. Will direct schedule for upper single balloon enteroscopy and check iron studies same day along with complete consult

## 2020-07-17 RX ORDER — SODIUM CHLORIDE 0.9 % (FLUSH) 0.9 %
10 SYRINGE (ML) INJECTION
Status: CANCELLED | OUTPATIENT
Start: 2020-07-17

## 2020-07-17 RX ORDER — HEPARIN 100 UNIT/ML
500 SYRINGE INTRAVENOUS
Status: CANCELLED | OUTPATIENT
Start: 2020-07-17

## 2020-07-17 RX ORDER — CHOLECALCIFEROL (VITAMIN D3) 1250 MCG
50000 TABLET ORAL WEEKLY
Qty: 12 TABLET | Refills: 3 | Status: SHIPPED | OUTPATIENT
Start: 2020-07-17 | End: 2020-09-29 | Stop reason: SDUPTHER

## 2020-07-17 NOTE — PHYSICIAN QUERY
PT Name: Miky Esqueda  MR #: [id number]     Documentation Clarification      CDS/: Nicole Quinn                 Contact information:[email or web address]    This form is a permanent document in the medical record.     Query Date: July 17, 2020    By submitting this query, we are merely seeking further clarification of documentation. Please utilize your independent clinical judgment when addressing the question(s) below.    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                         - Jejunal lymphangiectasia without any stigmata at                         45 minutes.        Procedure report                                                                            Provider, please clarify if angioectasia/lymphagiectasia bleeding was ruled in or ruled out base on the diagnosis or diagnoses associated with above clinical findings.    [   ] _________angioectasia as source of chronic blood loss____________   [   ] Other (please specify): ____________   [  ] Clinically undetermined                                                                                                           Present on admission (POA) status:   [ Y  ] Yes (Y)                          [  ] Clinically Undetermined (W)  [   ] No (N)                            [   ] Documentation insufficient to determine if condition is POA (U)

## 2020-07-20 ENCOUNTER — TELEPHONE (OUTPATIENT)
Dept: HEMATOLOGY/ONCOLOGY | Facility: CLINIC | Age: 65
End: 2020-07-20

## 2020-07-20 NOTE — TELEPHONE ENCOUNTER
msg sent to infusion center for update on the infusion ----- Message from Annika Quijano sent at 7/20/2020  9:38 AM CDT -----  Contact: Priscilla ( Wife)-[phone]  Type:  Needs Medical Advice    Who Called: Pt's wife Priscilla  Reason for Call:regarding the pt has been weak all weekend and when will the pt start his Iron through a IV  Would the patient rather a call back or a response via [facility]?  Call back  Best Call Back Number: [phone]

## 2020-07-21 ENCOUNTER — INFUSION (OUTPATIENT)
Dept: INFUSION THERAPY | Facility: HOSPITAL | Age: 65
End: 2020-07-21
Attending: INTERNAL MEDICINE
Payer: COMMERCIAL

## 2020-07-21 VITALS
BODY MASS INDEX: 27.91 KG/M2 | HEIGHT: 73 IN | DIASTOLIC BLOOD PRESSURE: 74 MMHG | RESPIRATION RATE: 18 BRPM | SYSTOLIC BLOOD PRESSURE: 121 MMHG | TEMPERATURE: 98 F | WEIGHT: 210.56 LBS | OXYGEN SATURATION: 99 % | HEART RATE: 103 BPM

## 2020-07-21 DIAGNOSIS — K55.20 AV MALFORMATION OF GASTROINTESTINAL TRACT: Primary | ICD-10-CM

## 2020-07-21 DIAGNOSIS — D50.0 IRON DEFICIENCY ANEMIA DUE TO CHRONIC BLOOD LOSS: ICD-10-CM

## 2020-07-21 PROCEDURE — 25000003 PHARM REV CODE 250: Performed by: INTERNAL MEDICINE

## 2020-07-21 PROCEDURE — 96365 THER/PROPH/DIAG IV INF INIT: CPT

## 2020-07-21 PROCEDURE — A4216 STERILE WATER/SALINE, 10 ML: HCPCS | Performed by: INTERNAL MEDICINE

## 2020-07-21 PROCEDURE — 63600175 PHARM REV CODE 636 W HCPCS: Mod: JG | Performed by: INTERNAL MEDICINE

## 2020-07-21 RX ORDER — HEPARIN 100 UNIT/ML
500 SYRINGE INTRAVENOUS
Status: DISCONTINUED | OUTPATIENT
Start: 2020-07-21 | End: 2020-07-21 | Stop reason: HOSPADM

## 2020-07-21 RX ORDER — SODIUM CHLORIDE 0.9 % (FLUSH) 0.9 %
10 SYRINGE (ML) INJECTION
Status: DISCONTINUED | OUTPATIENT
Start: 2020-07-21 | End: 2020-07-21 | Stop reason: HOSPADM

## 2020-07-21 RX ADMIN — FERRIC CARBOXYMALTOSE INJECTION 750 MG: 50 INJECTION, SOLUTION INTRAVENOUS at 12:07

## 2020-07-21 RX ADMIN — Medication 10 ML: at 01:07

## 2020-07-21 RX ADMIN — SODIUM CHLORIDE: 0.9 INJECTION, SOLUTION INTRAVENOUS at 12:07

## 2020-07-21 NOTE — NURSING
Pt tolerated infusion well. No adverse reaction noted. Pt education reinforced on _Injectafer_ , side effects, what to expect, and when to call _Janet_. Pt verbalized understanding. I reviewed pt calendar w/ pt and understanding verbalized. IV flushed w/ NS and D/C per protocol.

## 2020-07-27 ENCOUNTER — HOSPITAL ENCOUNTER (OUTPATIENT)
Dept: RADIOLOGY | Facility: HOSPITAL | Age: 65
Discharge: HOME | End: 2020-07-27
Attending: INTERNAL MEDICINE
Payer: COMMERCIAL

## 2020-07-27 DIAGNOSIS — I26.99 BILATERAL PULMONARY EMBOLISM: ICD-10-CM

## 2020-07-27 DIAGNOSIS — D50.0 IRON DEFICIENCY ANEMIA DUE TO CHRONIC BLOOD LOSS: ICD-10-CM

## 2020-07-27 PROCEDURE — 93970 US LOWER EXTREMITY VEINS BILATERAL: ICD-10-PCS | Mod: 26,,, | Performed by: RADIOLOGY

## 2020-07-27 PROCEDURE — 93970 EXTREMITY STUDY: CPT | Mod: TC

## 2020-07-27 PROCEDURE — 93970 EXTREMITY STUDY: CPT | Mod: 26,,, | Performed by: RADIOLOGY

## 2020-07-28 ENCOUNTER — HOSPITAL ENCOUNTER (OUTPATIENT)
Dept: RADIOLOGY | Facility: HOSPITAL | Age: 65
Discharge: HOME | End: 2020-07-28
Attending: INTERNAL MEDICINE
Payer: COMMERCIAL

## 2020-07-28 ENCOUNTER — TELEPHONE (OUTPATIENT)
Dept: HEMATOLOGY/ONCOLOGY | Facility: CLINIC | Age: 65
End: 2020-07-28

## 2020-07-28 DIAGNOSIS — I26.99 BILATERAL PULMONARY EMBOLISM: ICD-10-CM

## 2020-07-28 DIAGNOSIS — D50.0 IRON DEFICIENCY ANEMIA DUE TO CHRONIC BLOOD LOSS: ICD-10-CM

## 2020-07-28 PROCEDURE — 71275 CT ANGIOGRAPHY CHEST: CPT | Mod: 26,,, | Performed by: RADIOLOGY

## 2020-07-28 PROCEDURE — 25500020 PHARM REV CODE 255: Performed by: INTERNAL MEDICINE

## 2020-07-28 PROCEDURE — 71275 CTA CHEST NON CORONARY: ICD-10-PCS | Mod: 26,,, | Performed by: RADIOLOGY

## 2020-07-28 PROCEDURE — 71275 CT ANGIOGRAPHY CHEST: CPT | Mod: TC

## 2020-07-28 PROCEDURE — 74177 CT ABD & PELVIS W/CONTRAST: CPT | Mod: TC

## 2020-07-28 PROCEDURE — 74177 CT ABDOMEN PELVIS WITH CONTRAST: ICD-10-PCS | Mod: 26,,, | Performed by: RADIOLOGY

## 2020-07-28 PROCEDURE — 74177 CT ABD & PELVIS W/CONTRAST: CPT | Mod: 26,,, | Performed by: RADIOLOGY

## 2020-07-28 RX ADMIN — IOHEXOL 1000 ML: 12 SOLUTION ORAL at 03:07

## 2020-07-28 RX ADMIN — IOHEXOL 100 ML: 350 INJECTION, SOLUTION INTRAVENOUS at 03:07

## 2020-07-28 NOTE — TELEPHONE ENCOUNTER
----- Message from Kayla Lyn sent at 7/28/2020  4:09 PM CDT -----  Contact: Wife Priscilla [phone]  Patient's wife is requesting to speak with nurse regarding pt's blood count and iron levels. Please advise.

## 2020-07-30 ENCOUNTER — OFFICE VISIT (OUTPATIENT)
Dept: HEMATOLOGY/ONCOLOGY | Facility: CLINIC | Age: 65
End: 2020-07-30
Payer: COMMERCIAL

## 2020-07-30 VITALS
SYSTOLIC BLOOD PRESSURE: 120 MMHG | DIASTOLIC BLOOD PRESSURE: 74 MMHG | BODY MASS INDEX: 28.42 KG/M2 | WEIGHT: 215.38 LBS | HEART RATE: 81 BPM | RESPIRATION RATE: 18 BRPM | TEMPERATURE: 98 F | OXYGEN SATURATION: 98 %

## 2020-07-30 DIAGNOSIS — I82.433 ACUTE DEEP VEIN THROMBOSIS (DVT) OF POPLITEAL VEIN OF BOTH LOWER EXTREMITIES: ICD-10-CM

## 2020-07-30 DIAGNOSIS — D50.0 IRON DEFICIENCY ANEMIA DUE TO CHRONIC BLOOD LOSS: ICD-10-CM

## 2020-07-30 DIAGNOSIS — I26.99 BILATERAL PULMONARY EMBOLISM: Primary | ICD-10-CM

## 2020-07-30 DIAGNOSIS — E55.9 VITAMIN D DEFICIENCY: ICD-10-CM

## 2020-07-30 PROCEDURE — 3008F PR BODY MASS INDEX (BMI) DOCUMENTED: ICD-10-PCS | Mod: CPTII,S$GLB,, | Performed by: INTERNAL MEDICINE

## 2020-07-30 PROCEDURE — 99999 PR PBB SHADOW E&M-EST. PATIENT-LVL III: CPT | Mod: PBBFAC,,, | Performed by: INTERNAL MEDICINE

## 2020-07-30 PROCEDURE — 99214 OFFICE O/P EST MOD 30 MIN: CPT | Mod: S$GLB,,, | Performed by: INTERNAL MEDICINE

## 2020-07-30 PROCEDURE — 99214 PR OFFICE/OUTPT VISIT, EST, LEVL IV, 30-39 MIN: ICD-10-PCS | Mod: S$GLB,,, | Performed by: INTERNAL MEDICINE

## 2020-07-30 PROCEDURE — 99999 PR PBB SHADOW E&M-EST. PATIENT-LVL III: ICD-10-PCS | Mod: PBBFAC,,, | Performed by: INTERNAL MEDICINE

## 2020-07-30 PROCEDURE — 3008F BODY MASS INDEX DOCD: CPT | Mod: CPTII,S$GLB,, | Performed by: INTERNAL MEDICINE

## 2020-07-30 NOTE — PROGRESS NOTES
PATIENT: Miky Esqueda  MRN: [id number]  DATE: 7/30/2020    Diagnosis:   1. Bilateral pulmonary embolism    2. Acute deep vein thrombosis (DVT) of popliteal vein of both lower extremities    3. Iron deficiency anemia due to chronic blood loss    4. Vitamin D deficiency      Chief Complaint: pulmonary embolism    Subjective:     History of Present Illness: He presented to the ER 05/25/2020 with acute onset of shortness of breath, diaphoresis and lightheadedness.  CT chest showed extensive bilateral pulmonary thromboemboli.  No evidence of central saddle embolus.  There was moderate pulmonary emphysema.  Lower extremity ultrasound also revealed nonocclusive thrombus in the right and left popliteal veins as well as the right femoral vein.    This is his 1st episode of pulmonary embolism/DVT.  He has been started on Xarelto and has been taking that religiously.    Then he had a syncopal episode in his PCP's office on 06/12/2020 which lasted about 30 sec followed by brief 15 sec seizure like episode.  He was taken to the ER where a CT head showed a hypodense area in the left parietal lobe concerning for infarct.  By this time he also developed significant anemia and was transfused 2 units packed red blood cells.    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    INTERVAL HISTORY:   -he is here for follow-up of PE, lower extremity DVT, stroke and GI bleed  -his currently on Xarelto  -in the past he was on aspirin and Xarelto  -he got 1 dose of Injectafer 07/21/2020  -he feels better now  -is no longer in a wheelchair and he is now ambulating without the help of a cane or walker  -accompanied to the clinic by his wife    Past Medical, Surgical, Family and Social History Reviewed.    Medications and Allergies reviewed      Review of Systems   Constitutional: Positive for fatigue. Negative for fever and unexpected weight change.   HENT: Negative for mouth sores and nosebleeds.    Eyes: Negative for  photophobia and pain.   Respiratory: Negative for shortness of breath and wheezing.    Cardiovascular: Negative for chest pain and palpitations.   Gastrointestinal: Negative for abdominal pain and vomiting.   Genitourinary: Negative for flank pain and hematuria.   Musculoskeletal: Negative for back pain, neck pain and neck stiffness.   Skin: Negative for rash and wound.   Neurological: Negative for seizures, syncope and weakness.   Hematological: Negative for adenopathy. Bruises/bleeds easily.   Psychiatric/Behavioral: Negative for behavioral problems and confusion.   All other systems reviewed and are negative.      Objective:     Vitals:    07/30/20 1455   BP: 120/74   Pulse: 81   Resp: 18   Temp: 98 °F (36.7 °C)   SpO2: 98%   Weight: 97.7 kg (215 lb 6.2 oz)     BMI: Body mass index is 28.42 kg/m².    Physical Exam  Vitals signs reviewed.   Constitutional:       General: He is not in acute distress.     Appearance: He is not toxic-appearing or diaphoretic.   HENT:      Mouth/Throat:      Mouth: Mucous membranes are not pale, not dry and not cyanotic. No lacerations.      Pharynx: No oropharyngeal exudate.   Eyes:      General: No scleral icterus.     Conjunctiva/sclera: Conjunctivae normal.   Neck:      Musculoskeletal: Neck supple.      Thyroid: No thyroid mass or thyromegaly.   Cardiovascular:      Rate and Rhythm: Normal rate and regular rhythm.      Heart sounds: Normal heart sounds and S1 normal.   Pulmonary:      Effort: Pulmonary effort is normal. No accessory muscle usage or respiratory distress.      Breath sounds: Normal breath sounds. No stridor. No wheezing or rales.   Abdominal:      Palpations: Abdomen is soft. There is no mass.      Tenderness: There is no abdominal tenderness.   Lymphadenopathy:      Head:      Right side of head: No submental or submandibular adenopathy.      Left side of head: No submental or submandibular adenopathy.      Cervical: No cervical adenopathy.   Skin:     Findings:  No bruising, petechiae or rash.   Neurological:      Mental Status: He is alert and oriented to person, place, and time.      Cranial Nerves: No cranial nerve deficit.      Sensory: No sensory deficit.      Gait: Gait normal.   Psychiatric:         Thought Content: Thought content normal.         Judgment: Judgment normal.         Assessment:       1. Bilateral pulmonary embolism    2. Acute deep vein thrombosis (DVT) of popliteal vein of both lower extremities    3. Iron deficiency anemia due to chronic blood loss    4. Vitamin D deficiency      Plan:   Acute bilateral PE with bilateral DVT  -unprovoked  -continue Xarelto  -recent CT scan shows resolution of PE  -he is taking Xarelto well, will have him continue Xarelto indefinitely    New CVA  -diagnosed June 2020  -on Xarelto  -cannot tolerate antiplatelet therapy     GI bleeding from AV malformations  -he got 1 dose of Injectafer 07/21/2020  -still anemic but iron levels better  -will continue to monitor hemoglobin    Vitamin-D deficiency  -continue vitamin D3, 88802 units weekly

## 2020-08-03 ENCOUNTER — TELEPHONE (OUTPATIENT)
Dept: HEMATOLOGY/ONCOLOGY | Facility: CLINIC | Age: 65
End: 2020-08-03

## 2020-08-03 NOTE — TELEPHONE ENCOUNTER
Per dr gaona , no need for iron supplements    ----- Message from Lawson Lopez sent at 8/3/2020  1:08 PM CDT -----  Contact: Priscilla, wife/ [phone]  Patient's wife would like a call back from your office regarding iron supplements for the patient, also would like to discuss returning oxygen tanks for the patient. Please Advise.

## 2020-08-04 ENCOUNTER — TELEPHONE (OUTPATIENT)
Dept: HEMATOLOGY/ONCOLOGY | Facility: CLINIC | Age: 65
End: 2020-08-04

## 2020-08-04 NOTE — TELEPHONE ENCOUNTER
Pt wife called reporting pt more dizziness. Will consult with dr gaona    ----- Message from Lauren Ramirez sent at 8/4/2020  1:43 PM CDT -----  Type:  Needs Medical Advice    Who Called: wife  Reason:More dizzy today then he was yesterday and he said he feels like the last time his iron has gotten low   Would the patient rather a call back or a response via [facility]? callback  Best Call Back Number:[phone]  Additional Information: none

## 2020-08-05 ENCOUNTER — TELEPHONE (OUTPATIENT)
Dept: HEMATOLOGY/ONCOLOGY | Facility: CLINIC | Age: 65
End: 2020-08-05

## 2020-08-05 DIAGNOSIS — R11.0 NAUSEA: Primary | ICD-10-CM

## 2020-08-05 RX ORDER — ONDANSETRON 4 MG/1
4 TABLET, ORALLY DISINTEGRATING ORAL EVERY 6 HOURS PRN
Qty: 60 TABLET | Refills: 1 | Status: SHIPPED | OUTPATIENT
Start: 2020-08-05

## 2020-08-05 NOTE — TELEPHONE ENCOUNTER
rx sent to [facility]----- Message from Archana Miranda sent at 8/5/2020 12:31 PM CDT -----  Pt calling to speak with the nurse and has a question. Pt is a little dizzy and stomach up set      Pt can be reached [phone]    Thank you!

## 2020-08-06 ENCOUNTER — HOSPITAL ENCOUNTER (INPATIENT)
Facility: HOSPITAL | Age: 65
LOS: 4 days | Discharge: HOME | DRG: 357 | End: 2020-08-10
Attending: EMERGENCY MEDICINE | Admitting: FAMILY MEDICINE
Payer: COMMERCIAL

## 2020-08-06 ENCOUNTER — ANESTHESIA EVENT (OUTPATIENT)
Dept: ENDOSCOPY | Facility: HOSPITAL | Age: 65
DRG: 357 | End: 2020-08-06
Payer: COMMERCIAL

## 2020-08-06 DIAGNOSIS — D50.0 IRON DEFICIENCY ANEMIA DUE TO CHRONIC BLOOD LOSS: ICD-10-CM

## 2020-08-06 DIAGNOSIS — R40.20 LOC (LOSS OF CONSCIOUSNESS): ICD-10-CM

## 2020-08-06 DIAGNOSIS — I26.99 PULMONARY EMBOLISM, UNSPECIFIED CHRONICITY, UNSPECIFIED PULMONARY EMBOLISM TYPE, UNSPECIFIED WHETHER ACUTE COR PULMONALE PRESENT: ICD-10-CM

## 2020-08-06 DIAGNOSIS — R07.9 CHEST PAIN: ICD-10-CM

## 2020-08-06 DIAGNOSIS — K55.20 AV MALFORMATION OF GI TRACT: ICD-10-CM

## 2020-08-06 DIAGNOSIS — D64.9 SYMPTOMATIC ANEMIA: Primary | ICD-10-CM

## 2020-08-06 DIAGNOSIS — D64.9 ANEMIA: ICD-10-CM

## 2020-08-06 LAB
ABO + RH BLD: NORMAL
ALBUMIN SERPL BCP-MCNC: 2.7 G/DL (ref 3.5–5.2)
ALBUMIN SERPL BCP-MCNC: 2.8 G/DL (ref 3.5–5.2)
ALBUMIN SERPL BCP-MCNC: 3.1 G/DL (ref 3.5–5.2)
ALP SERPL-CCNC: 43 U/L (ref 55–135)
ALP SERPL-CCNC: 45 U/L (ref 55–135)
ALP SERPL-CCNC: 46 U/L (ref 38–126)
ALT SERPL W/O P-5'-P-CCNC: 12 U/L (ref 10–44)
ALT SERPL W/O P-5'-P-CCNC: 13 U/L (ref 10–44)
ALT SERPL W/O P-5'-P-CCNC: 14 U/L (ref 10–44)
ANION GAP SERPL CALC-SCNC: 10 MMOL/L (ref 8–16)
ANION GAP SERPL CALC-SCNC: 5 MMOL/L (ref 8–16)
ANION GAP SERPL CALC-SCNC: 6 MMOL/L (ref 8–16)
ANISOCYTOSIS BLD QL SMEAR: ABNORMAL
ANISOCYTOSIS BLD QL SMEAR: ABNORMAL
APTT BLDCRRT: 23.1 SEC (ref 21–32)
APTT BLDCRRT: <21 SEC (ref 21–32)
AST SERPL-CCNC: 17 U/L (ref 10–40)
AST SERPL-CCNC: 18 U/L (ref 10–40)
AST SERPL-CCNC: 26 U/L (ref 15–46)
BASOPHILS # BLD AUTO: 0.03 K/UL (ref 0–0.2)
BASOPHILS # BLD AUTO: 0.03 K/UL (ref 0–0.2)
BASOPHILS # BLD AUTO: 0.04 K/UL (ref 0–0.2)
BASOPHILS NFR BLD: 0.1 % (ref 0–1.9)
BASOPHILS NFR BLD: 0.1 % (ref 0–1.9)
BASOPHILS NFR BLD: 0.2 % (ref 0–1.9)
BILIRUB DIRECT SERPL-MCNC: 0.5 MG/DL (ref 0.1–0.3)
BILIRUB SERPL-MCNC: 0.5 MG/DL (ref 0.1–1)
BILIRUB SERPL-MCNC: 1.1 MG/DL (ref 0.1–1)
BILIRUB SERPL-MCNC: 1.9 MG/DL (ref 0.1–1)
BILIRUB SERPL-MCNC: 2.6 MG/DL (ref 0.1–1)
BILIRUB UR QL STRIP: NEGATIVE
BLD GP AB SCN CELLS X3 SERPL QL: NORMAL
BLD PROD TYP BPU: NORMAL
BLOOD GROUP ANTIBODIES SERPL: NORMAL
BLOOD UNIT EXPIRATION DATE: NORMAL
BLOOD UNIT TYPE CODE: 9500
BLOOD UNIT TYPE: NORMAL
BUN SERPL-MCNC: 34 MG/DL (ref 2–20)
BUN SERPL-MCNC: 35 MG/DL (ref 8–23)
BUN SERPL-MCNC: 36 MG/DL (ref 8–23)
CALCIUM SERPL-MCNC: 7.7 MG/DL (ref 8.7–10.5)
CALCIUM SERPL-MCNC: 7.9 MG/DL (ref 8.7–10.5)
CALCIUM SERPL-MCNC: 8.3 MG/DL (ref 8.7–10.5)
CHLORIDE SERPL-SCNC: 106 MMOL/L (ref 95–110)
CHLORIDE SERPL-SCNC: 108 MMOL/L (ref 95–110)
CHLORIDE SERPL-SCNC: 109 MMOL/L (ref 95–110)
CLARITY UR: CLEAR
CO2 SERPL-SCNC: 19 MMOL/L (ref 23–29)
CO2 SERPL-SCNC: 22 MMOL/L (ref 23–29)
CO2 SERPL-SCNC: 23 MMOL/L (ref 23–29)
CODING SYSTEM: NORMAL
COLOR UR: ABNORMAL
CREAT SERPL-MCNC: 1.3 MG/DL (ref 0.5–1.4)
CREAT SERPL-MCNC: 1.3 MG/DL (ref 0.5–1.4)
CREAT SERPL-MCNC: 1.35 MG/DL (ref 0.5–1.4)
DACRYOCYTES BLD QL SMEAR: ABNORMAL
DACRYOCYTES BLD QL SMEAR: ABNORMAL
DAT IGG-SP REAG RBC-IMP: NORMAL
DIFFERENTIAL METHOD: ABNORMAL
DISPENSE STATUS: NORMAL
EOSINOPHIL # BLD AUTO: 0 K/UL (ref 0–0.5)
EOSINOPHIL # BLD AUTO: 0 K/UL (ref 0–0.5)
EOSINOPHIL # BLD AUTO: 0.1 K/UL (ref 0–0.5)
EOSINOPHIL NFR BLD: 0 % (ref 0–8)
EOSINOPHIL NFR BLD: 0 % (ref 0–8)
EOSINOPHIL NFR BLD: 0.5 % (ref 0–8)
ERYTHROCYTE [DISTWIDTH] IN BLOOD BY AUTOMATED COUNT: 18.6 % (ref 11.5–14.5)
ERYTHROCYTE [DISTWIDTH] IN BLOOD BY AUTOMATED COUNT: 18.8 % (ref 11.5–14.5)
ERYTHROCYTE [DISTWIDTH] IN BLOOD BY AUTOMATED COUNT: 19.4 % (ref 11.5–14.5)
EST. GFR  (AFRICAN AMERICAN): >60 ML/MIN/1.73 M^2
EST. GFR  (NON AFRICAN AMERICAN): 55.5 ML/MIN/1.73 M^2
EST. GFR  (NON AFRICAN AMERICAN): 58 ML/MIN/1.73 M^2
EST. GFR  (NON AFRICAN AMERICAN): 58 ML/MIN/1.73 M^2
ESTIMATED AVG GLUCOSE: ABNORMAL MG/DL (ref 68–131)
FIBRINOGEN PPP-MCNC: 340 MG/DL (ref 182–366)
GLUCOSE SERPL-MCNC: 119 MG/DL (ref 70–110)
GLUCOSE SERPL-MCNC: 126 MG/DL (ref 70–110)
GLUCOSE SERPL-MCNC: 162 MG/DL (ref 70–110)
GLUCOSE UR QL STRIP: NEGATIVE
HBA1C MFR BLD HPLC: <4 % (ref 4–5.6)
HCT VFR BLD AUTO: 14.9 % (ref 40–54)
HCT VFR BLD AUTO: 17.1 % (ref 40–54)
HCT VFR BLD AUTO: 17.7 % (ref 40–54)
HGB BLD-MCNC: 4.4 G/DL (ref 14–18)
HGB BLD-MCNC: 4.6 G/DL (ref 14–18)
HGB BLD-MCNC: 5.1 G/DL (ref 14–18)
HGB UR QL STRIP: NEGATIVE
HYPOCHROMIA BLD QL SMEAR: ABNORMAL
HYPOCHROMIA BLD QL SMEAR: ABNORMAL
IMM GRANULOCYTES # BLD AUTO: 0.11 K/UL (ref 0–0.04)
IMM GRANULOCYTES # BLD AUTO: 0.19 K/UL (ref 0–0.04)
IMM GRANULOCYTES # BLD AUTO: 0.23 K/UL (ref 0–0.04)
IMM GRANULOCYTES NFR BLD AUTO: 0.8 % (ref 0–0.5)
INR PPP: 1.1 (ref 0.8–1.2)
INR PPP: 1.1 (ref 0.8–1.2)
KETONES UR QL STRIP: NEGATIVE
LACTATE SERPL-SCNC: 1.7 MMOL/L (ref 0.5–2.2)
LDH SERPL L TO P-CCNC: 199 U/L (ref 110–260)
LEUKOCYTE ESTERASE UR QL STRIP: NEGATIVE
LYMPHOCYTES # BLD AUTO: 1.6 K/UL (ref 1–4.8)
LYMPHOCYTES # BLD AUTO: 1.8 K/UL (ref 1–4.8)
LYMPHOCYTES # BLD AUTO: 3.5 K/UL (ref 1–4.8)
LYMPHOCYTES NFR BLD: 26.6 % (ref 18–48)
LYMPHOCYTES NFR BLD: 5.8 % (ref 18–48)
LYMPHOCYTES NFR BLD: 7.9 % (ref 18–48)
MAGNESIUM SERPL-MCNC: 1.8 MG/DL (ref 1.6–2.6)
MAGNESIUM SERPL-MCNC: 1.9 MG/DL (ref 1.6–2.6)
MCH RBC QN AUTO: 25.6 PG (ref 27–31)
MCH RBC QN AUTO: 26.2 PG (ref 27–31)
MCH RBC QN AUTO: 26.7 PG (ref 27–31)
MCHC RBC AUTO-ENTMCNC: 26.9 G/DL (ref 32–36)
MCHC RBC AUTO-ENTMCNC: 28.8 G/DL (ref 32–36)
MCHC RBC AUTO-ENTMCNC: 29.5 G/DL (ref 32–36)
MCV RBC AUTO: 90 FL (ref 82–98)
MCV RBC AUTO: 91 FL (ref 82–98)
MCV RBC AUTO: 95 FL (ref 82–98)
MONOCYTES # BLD AUTO: 1.1 K/UL (ref 0.3–1)
MONOCYTES # BLD AUTO: 1.6 K/UL (ref 0.3–1)
MONOCYTES # BLD AUTO: 1.8 K/UL (ref 0.3–1)
MONOCYTES NFR BLD: 6.5 % (ref 4–15)
MONOCYTES NFR BLD: 6.8 % (ref 4–15)
MONOCYTES NFR BLD: 8.3 % (ref 4–15)
NEUTROPHILS # BLD AUTO: 19.3 K/UL (ref 1.8–7.7)
NEUTROPHILS # BLD AUTO: 24.5 K/UL (ref 1.8–7.7)
NEUTROPHILS # BLD AUTO: 8.4 K/UL (ref 1.8–7.7)
NEUTROPHILS NFR BLD: 63.6 % (ref 38–73)
NEUTROPHILS NFR BLD: 84.4 % (ref 38–73)
NEUTROPHILS NFR BLD: 86.8 % (ref 38–73)
NITRITE UR QL STRIP: NEGATIVE
NRBC BLD-RTO: 0 /100 WBC
NRBC BLD-RTO: 1 /100 WBC
NRBC BLD-RTO: 1 /100 WBC
NT-PROBNP: 110 PG/ML (ref 5–900)
OB PNL STL: POSITIVE
OVALOCYTES BLD QL SMEAR: ABNORMAL
OVALOCYTES BLD QL SMEAR: ABNORMAL
PATH REV BLD -IMP: NORMAL
PH UR STRIP: 5 [PH] (ref 5–8)
PHOSPHATE SERPL-MCNC: 2.9 MG/DL (ref 2.7–4.5)
PHOSPHATE SERPL-MCNC: 3 MG/DL (ref 2.7–4.5)
PLATELET # BLD AUTO: 199 K/UL (ref 150–350)
PLATELET # BLD AUTO: 211 K/UL (ref 150–350)
PLATELET # BLD AUTO: 290 K/UL (ref 150–350)
PLATELET BLD QL SMEAR: ABNORMAL
PLATELET BLD QL SMEAR: ABNORMAL
PMV BLD AUTO: 10.6 FL (ref 9.2–12.9)
PMV BLD AUTO: 10.6 FL (ref 9.2–12.9)
PMV BLD AUTO: 11.6 FL (ref 9.2–12.9)
POIKILOCYTOSIS BLD QL SMEAR: ABNORMAL
POIKILOCYTOSIS BLD QL SMEAR: SLIGHT
POLYCHROMASIA BLD QL SMEAR: ABNORMAL
POLYCHROMASIA BLD QL SMEAR: ABNORMAL
POTASSIUM SERPL-SCNC: 3.9 MMOL/L (ref 3.5–5.1)
POTASSIUM SERPL-SCNC: 4.5 MMOL/L (ref 3.5–5.1)
POTASSIUM SERPL-SCNC: 4.5 MMOL/L (ref 3.5–5.1)
PROCALCITONIN SERPL IA-MCNC: 0.09 NG/ML
PROT SERPL-MCNC: 5.2 G/DL (ref 6–8.4)
PROT SERPL-MCNC: 5.3 G/DL (ref 6–8.4)
PROT SERPL-MCNC: 5.7 G/DL (ref 6–8.4)
PROT UR QL STRIP: NEGATIVE
PROTHROMBIN TIME: 11.6 SEC (ref 9–12.5)
PROTHROMBIN TIME: 12 SEC (ref 9–12.5)
RBC # BLD AUTO: 1.65 M/UL (ref 4.6–6.2)
RBC # BLD AUTO: 1.8 M/UL (ref 4.6–6.2)
RBC # BLD AUTO: 1.95 M/UL (ref 4.6–6.2)
SARS-COV-2 RDRP RESP QL NAA+PROBE: NEGATIVE
SCHISTOCYTES BLD QL SMEAR: PRESENT
SODIUM SERPL-SCNC: 135 MMOL/L (ref 136–145)
SODIUM SERPL-SCNC: 136 MMOL/L (ref 136–145)
SODIUM SERPL-SCNC: 137 MMOL/L (ref 136–145)
SP GR UR STRIP: 1.02 (ref 1–1.03)
TARGETS BLD QL SMEAR: ABNORMAL
TARGETS BLD QL SMEAR: ABNORMAL
TRANS ERYTHROCYTES VOL PATIENT: NORMAL ML
TROPONIN I SERPL DL<=0.01 NG/ML-MCNC: <0.012 NG/ML (ref 0.01–0.03)
TSH SERPL DL<=0.005 MIU/L-ACNC: 1.87 UIU/ML (ref 0.4–4)
URN SPEC COLLECT METH UR: ABNORMAL
UROBILINOGEN UR STRIP-ACNC: NEGATIVE EU/DL
WBC # BLD AUTO: 13.19 K/UL (ref 3.9–12.7)
WBC # BLD AUTO: 22.94 K/UL (ref 3.9–12.7)
WBC # BLD AUTO: 28.26 K/UL (ref 3.9–12.7)

## 2020-08-06 PROCEDURE — 86850 RBC ANTIBODY SCREEN: CPT

## 2020-08-06 PROCEDURE — 83010 ASSAY OF HAPTOGLOBIN QUANT: CPT

## 2020-08-06 PROCEDURE — 86905 BLOOD TYPING RBC ANTIGENS: CPT | Mod: 91

## 2020-08-06 PROCEDURE — 36430 TRANSFUSION BLD/BLD COMPNT: CPT | Mod: ER

## 2020-08-06 PROCEDURE — 86922 COMPATIBILITY TEST ANTIGLOB: CPT

## 2020-08-06 PROCEDURE — 83615 LACTATE (LD) (LDH) ENZYME: CPT

## 2020-08-06 PROCEDURE — 20000000 HC ICU ROOM

## 2020-08-06 PROCEDURE — 81003 URINALYSIS AUTO W/O SCOPE: CPT

## 2020-08-06 PROCEDURE — 84443 ASSAY THYROID STIM HORMONE: CPT

## 2020-08-06 PROCEDURE — 84100 ASSAY OF PHOSPHORUS: CPT | Mod: 91

## 2020-08-06 PROCEDURE — 85025 COMPLETE CBC W/AUTO DIFF WBC: CPT | Mod: 91

## 2020-08-06 PROCEDURE — 86870 RBC ANTIBODY IDENTIFICATION: CPT

## 2020-08-06 PROCEDURE — 85025 COMPLETE CBC W/AUTO DIFF WBC: CPT | Mod: ER

## 2020-08-06 PROCEDURE — 80053 COMPREHEN METABOLIC PANEL: CPT | Mod: 91

## 2020-08-06 PROCEDURE — 83605 ASSAY OF LACTIC ACID: CPT

## 2020-08-06 PROCEDURE — 85384 FIBRINOGEN ACTIVITY: CPT

## 2020-08-06 PROCEDURE — 82247 BILIRUBIN TOTAL: CPT

## 2020-08-06 PROCEDURE — 84484 ASSAY OF TROPONIN QUANT: CPT | Mod: ER

## 2020-08-06 PROCEDURE — 82248 BILIRUBIN DIRECT: CPT

## 2020-08-06 PROCEDURE — 83036 HEMOGLOBIN GLYCOSYLATED A1C: CPT

## 2020-08-06 PROCEDURE — 85060 PATHOLOGIST REVIEW: ICD-10-PCS | Mod: ,,, | Performed by: PATHOLOGY

## 2020-08-06 PROCEDURE — 93010 ELECTROCARDIOGRAM REPORT: CPT | Mod: ,,, | Performed by: INTERNAL MEDICINE

## 2020-08-06 PROCEDURE — 80053 COMPREHEN METABOLIC PANEL: CPT | Mod: ER

## 2020-08-06 PROCEDURE — 25000003 PHARM REV CODE 250: Performed by: STUDENT IN AN ORGANIZED HEALTH CARE EDUCATION/TRAINING PROGRAM

## 2020-08-06 PROCEDURE — P9021 RED BLOOD CELLS UNIT: HCPCS

## 2020-08-06 PROCEDURE — 85610 PROTHROMBIN TIME: CPT | Mod: ER

## 2020-08-06 PROCEDURE — 93005 ELECTROCARDIOGRAM TRACING: CPT | Mod: ER

## 2020-08-06 PROCEDURE — 86880 COOMBS TEST DIRECT: CPT

## 2020-08-06 PROCEDURE — 93010 EKG 12-LEAD: ICD-10-PCS | Mod: ,,, | Performed by: INTERNAL MEDICINE

## 2020-08-06 PROCEDURE — 82272 OCCULT BLD FECES 1-3 TESTS: CPT | Mod: ER

## 2020-08-06 PROCEDURE — 85060 BLOOD SMEAR INTERPRETATION: CPT | Mod: ,,, | Performed by: PATHOLOGY

## 2020-08-06 PROCEDURE — 85610 PROTHROMBIN TIME: CPT | Mod: 91

## 2020-08-06 PROCEDURE — 36415 COLL VENOUS BLD VENIPUNCTURE: CPT

## 2020-08-06 PROCEDURE — 93005 ELECTROCARDIOGRAM TRACING: CPT

## 2020-08-06 PROCEDURE — 84145 PROCALCITONIN (PCT): CPT

## 2020-08-06 PROCEDURE — U0002 COVID-19 LAB TEST NON-CDC: HCPCS | Mod: ER

## 2020-08-06 PROCEDURE — 99285 EMERGENCY DEPT VISIT HI MDM: CPT | Mod: 25,ER

## 2020-08-06 PROCEDURE — 85730 THROMBOPLASTIN TIME PARTIAL: CPT | Mod: ER

## 2020-08-06 PROCEDURE — 63600175 PHARM REV CODE 636 W HCPCS: Performed by: STUDENT IN AN ORGANIZED HEALTH CARE EDUCATION/TRAINING PROGRAM

## 2020-08-06 PROCEDURE — 85730 THROMBOPLASTIN TIME PARTIAL: CPT | Mod: 91

## 2020-08-06 PROCEDURE — 94761 N-INVAS EAR/PLS OXIMETRY MLT: CPT

## 2020-08-06 PROCEDURE — 83880 ASSAY OF NATRIURETIC PEPTIDE: CPT | Mod: ER

## 2020-08-06 PROCEDURE — 83735 ASSAY OF MAGNESIUM: CPT

## 2020-08-06 PROCEDURE — C9113 INJ PANTOPRAZOLE SODIUM, VIA: HCPCS | Performed by: STUDENT IN AN ORGANIZED HEALTH CARE EDUCATION/TRAINING PROGRAM

## 2020-08-06 RX ORDER — HYDROCODONE BITARTRATE AND ACETAMINOPHEN 500; 5 MG/1; MG/1
TABLET ORAL
Status: DISCONTINUED | OUTPATIENT
Start: 2020-08-06 | End: 2020-08-10 | Stop reason: HOSPADM

## 2020-08-06 RX ORDER — PANTOPRAZOLE SODIUM 40 MG/10ML
80 INJECTION, POWDER, LYOPHILIZED, FOR SOLUTION INTRAVENOUS 2 TIMES DAILY
Status: DISCONTINUED | OUTPATIENT
Start: 2020-08-06 | End: 2020-08-10 | Stop reason: HOSPADM

## 2020-08-06 RX ORDER — PANTOPRAZOLE SODIUM 40 MG/10ML
40 INJECTION, POWDER, LYOPHILIZED, FOR SOLUTION INTRAVENOUS 2 TIMES DAILY
Status: DISCONTINUED | OUTPATIENT
Start: 2020-08-06 | End: 2020-08-06

## 2020-08-06 RX ORDER — SODIUM CHLORIDE 0.9 % (FLUSH) 0.9 %
5 SYRINGE (ML) INJECTION
Status: DISCONTINUED | OUTPATIENT
Start: 2020-08-06 | End: 2020-08-10 | Stop reason: HOSPADM

## 2020-08-06 RX ORDER — SODIUM CHLORIDE 0.9 % (FLUSH) 0.9 %
10 SYRINGE (ML) INJECTION
Status: DISCONTINUED | OUTPATIENT
Start: 2020-08-06 | End: 2020-08-10 | Stop reason: HOSPADM

## 2020-08-06 RX ADMIN — PANTOPRAZOLE SODIUM 80 MG: 40 INJECTION, POWDER, FOR SOLUTION INTRAVENOUS at 10:08

## 2020-08-06 RX ADMIN — SODIUM CHLORIDE 1000 ML: 0.9 INJECTION, SOLUTION INTRAVENOUS at 08:08

## 2020-08-06 NOTE — ED NOTES
Belongings given to pt and EMS in bag. 1 cell phone, 1 wallet, 1 pr genna slippers, 1 pr jeans. Pt aware.      Jenny Ibrahim RN  08/06/20 8518

## 2020-08-06 NOTE — H&P
[facility] ICU 5th Floor  Hospital Medicine  History & Physical    Patient Name: Miky Esqueda  MRN: [id number]  Admission Date: 8/6/2020  Attending Physician: Guillermina Loredo MD   Primary Care Provider: Farhat Matson MD         Patient information was obtained from patient, past medical records and ER records.     Subjective:     Principal Problem: Acute symptomatic anemia    Chief Complaint:   Chief Complaint   Patient presents with    Seizures     Pt brought in per [facility]. Pt brought in from the clinic. Pt was being seen for blurry vision and headache. While at the clinic pt had a witnessed seizure that lasted approx 30 secs, LOC x2, [id number] and hypotensive with systolic in the 70's. Upon arrival to ED, pt appears lethargic and pale. Pt answering questions appropriately.         HPI: Mr. 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 Patient was at PCP's office when he experienced an episode of syncope for a sick visit. Patient had been feeling fatigued and lightheaded for the past few days. Per ED note, he had two episodes of LOC at the clinic in which the first event lasted approximately 25 seconds and the second lasting 15 seconds. In between episodes he had an occurrence of non-bloody, non-bilious emesis, in which the second LOC subsequently followed the event. During the second episode, his eyes rolled back and exhibited seizure-like activity with generalized shaking. He was hypotensive, diaphoretic, and pale. On arrival he was alert and oriented and reported weakness and headache with blurry vision. Denied focal weakness or numbness.    Previous workup include an endoscopic workup including upper and lower endoscopy 6/2020 showing one gastric AVM and H.pylori on biopsy, as well as two colon polyps. He again underwent EGD at [facility] which showed a jejunal AVM  which was unable to be reached for treatment and one colon polyp. A VCE was then done at [facility] 7/16/2020 showing 2 proximal AVMs that may have been the source of bleeding. The patient was then referred to Dr. Espinal with [facility] for further evaluation with an upper single balloon enteroscopy at the time.    Per interview with the patient, he confirms that the reported series of events have to his admission today. He reports no trauma to the head. Last bowel movement was a day or two ago and reports it was soft and brown. Denies any trials of new foods or sick contacts. Denies chest pain, abdominal pain, hematuria, hematochezia. Denies weakness or numbness.    In the ED, BP 90/50, H/H 4.6/17.1, Bun/Cr 34/1.35, positive for occult blood in stool, Covid negative, normal cxr. CT head shows no evidence of an acute ischemic event or intracranial hemorrhage. Xarelto was held. Two large bore IVs started. Patient to be admitted to the ICU for close monitoring. One unit of unmatched blood was transfused and a type and screen was done. GI has been consulted and patient is scheduled for a push enteroscopy vs. upper single balloon tomorrow. Patient NPO at midnight. Family medicine consulted for admission and medical management.    Past Medical History:   Diagnosis Date    Bilateral pulmonary embolism 5/25/2020    History of DVT (deep vein thrombosis) 6/12/2020    Iron deficiency anemia     Stroke        Past Surgical History:   Procedure Laterality Date    COLONOSCOPY N/A 6/15/2020    Procedure: COLONOSCOPY;  Surgeon: Brown Go MD;  Location: [facility] ENDO;  Service: Endoscopy;  Laterality: N/A;    ESOPHAGOGASTRODUODENOSCOPY N/A 6/15/2020    Procedure: EGD (ESOPHAGOGASTRODUODENOSCOPY);  Surgeon: Brown Go MD;  Location: [facility] ENDO;  Service: Endoscopy;  Laterality: N/A;    INTRALUMINAL GASTROINTESTINAL TRACT IMAGING VIA CAPSULE N/A 7/13/2020    Procedure: IMAGING PROCEDURE, GI TRACT, INTRALUMINAL, VIA  CAPSULE;  Surgeon: Brown Go MD;  Location: [facility];  Service: Endoscopy;  Laterality: N/A;       Review of patient's allergies indicates:  No Known Allergies    No current facility-administered medications on file prior to encounter.      Current Outpatient Medications on File Prior to Encounter   Medication Sig    cholecalciferol, vitamin D3, 1,250 mcg (50,000 unit) Tab Take 50,000 Units by mouth once a week.    multivitamin capsule Take 1 capsule by mouth once daily.    ondansetron (ZOFRAN-ODT) 4 MG TbDL Take 1 tablet (4 mg total) by mouth every 6 (six) hours as needed (nausea).    pantoprazole (PROTONIX) 40 MG tablet Take 1 tablet (40 mg total) by mouth 2 (two) times daily. for 10 days    [DISCONTINUED] aspirin 81 MG Chew Chew and swallow 1 tablet (81 mg total) by mouth once daily.    [DISCONTINUED] atorvastatin (LIPITOR) 40 MG tablet Take 1 tablet (40 mg total) by mouth once daily.    [DISCONTINUED] clarithromycin (BIAXIN) 500 MG tablet      Family History     Problem Relation (Age of Onset)    Heart disease Father        Tobacco Use    Smoking status: Former Smoker     Types: Cigarettes    Smokeless tobacco: Former User   Substance and Sexual Activity    Alcohol use: No    Drug use: No    Sexual activity: Not Currently     Partners: Female     Review of Systems   Constitutional: Positive for diaphoresis and fatigue. Negative for chills and fever.   HENT: Negative.    Eyes: Positive for visual disturbance.   Respiratory: Negative.  Negative for cough and shortness of breath.    Cardiovascular: Negative for chest pain and palpitations.   Gastrointestinal: Positive for nausea and vomiting. Negative for abdominal pain, blood in stool, constipation, diarrhea and rectal pain.   Genitourinary: Negative for hematuria.   Musculoskeletal: Negative for arthralgias.   Skin: Positive for pallor.   Allergic/Immunologic: Negative.    Neurological: Positive for dizziness, light-headedness and headaches.    Hematological: Negative.    Psychiatric/Behavioral: Negative.      Objective:     Vital Signs (Most Recent):  Temp: 97.7 °F (36.5 °C) (08/06/20 1500)  Pulse: 107 (08/06/20 1600)  Resp: (!) 31 (08/06/20 1600)  BP: (!) 91/49 (08/06/20 1600)  SpO2: (!) 94 % (08/06/20 1600) Vital Signs (24h Range):  Temp:  [97.4 °F (36.3 °C)-97.7 °F (36.5 °C)] 97.7 °F (36.5 °C)  Pulse:  [] 107  Resp:  [17-31] 31  SpO2:  [91 %-100 %] 94 %  BP: ()/(49-89) 91/49     Weight: 97.5 kg (215 lb)  Body mass index is 29.16 kg/m².    Physical Exam  Constitutional:       General: He is not in acute distress.     Appearance: Normal appearance. He is normal weight.   HENT:      Head: Normocephalic and atraumatic.      Right Ear: External ear normal.      Left Ear: External ear normal.      Nose: Nose normal.      Mouth/Throat:      Mouth: Mucous membranes are moist.      Pharynx: Oropharynx is clear.      Comments: Top dentures in place  Eyes:      Extraocular Movements: Extraocular movements intact.      Conjunctiva/sclera: Conjunctivae normal.      Pupils: Pupils are equal, round, and reactive to light.   Neck:      Musculoskeletal: Neck supple.   Cardiovascular:      Rate and Rhythm: Regular rhythm. Tachycardia present.      Heart sounds: Murmur present.   Pulmonary:      Effort: Pulmonary effort is normal.      Breath sounds: Normal breath sounds.   Abdominal:      General: Abdomen is flat. Bowel sounds are normal.      Palpations: Abdomen is soft.      Tenderness: There is no abdominal tenderness.   Musculoskeletal: Normal range of motion.   Skin:     General: Skin is warm and dry.      Coloration: Skin is pale.   Neurological:      General: No focal deficit present.      Mental Status: He is alert and oriented to person, place, and time. Mental status is at baseline.           CRANIAL NERVES     CN III, IV, VI   Pupils are equal, round, and reactive to light.       Significant Labs:   CBC:   Recent Labs   Lab 08/06/20  1025   WBC  13.19*   HGB 4.6*   HCT 17.1*        CMP:   Recent Labs   Lab 08/06/20  1025 08/06/20  1507   * 137   K 3.9 4.5    109   CO2 19* 22*   * 126*   BUN 34* 35*   CREATININE 1.35 1.3   CALCIUM 8.3* 7.9*   PROT 5.7* 5.3*   ALBUMIN 3.1* 2.8*   BILITOT 0.5 2.6*   ALKPHOS 46 45*   AST 26 17   ALT 14 13   ANIONGAP 10 6*   EGFRNONAA 55.5* 58*     Cardiac Markers: No results for input(s): CKMB, MYOGLOBIN, BNP, TROPISTAT in the last 48 hours.  Coagulation:   Recent Labs   Lab 08/06/20  1025   INR 1.1   APTT <21.0     Lactic Acid: No results for input(s): LACTATE in the last 48 hours.  Magnesium:   Recent Labs   Lab 08/06/20  1507   MG 1.9     TSH:   Recent Labs   Lab 08/06/20  1507   TSH 1.871       Significant Imaging: I have reviewed all pertinent imaging results/findings within the past 24 hours.     X-Ray Chest AP Portable   Final Result      Normal study.         Electronically signed by: Long Herman MD   Date:    08/06/2020   Time:    10:46      CT Head Without Contrast   Final Result      1. There is no evidence of an acute ischemic event.   2. There is no intracranial hemorrhage.   3. There is mild partial opacification of the left ethmoidal the left sphenoidal sinuses.  There is mild partial opacification of the maxillary sinuses bilaterally.  This is characteristic of sinusitis.   4. There is mild partial opacification of the left mastoid air cells.   All CT scans at this facility use dose modulation, iterative reconstruction, and/or weight base dosing when appropriate to reduce radiation dose when appropriate to reduce radiation dose to as low as reasonably achievable.         Electronically signed by: Long Herman MD   Date:    08/06/2020   Time:    10:44        Assessment/Plan:     Symptomatic anemia  -Patient H/H of 4.6/17.1  -Transfused 1U  emergently at [facility]  -Will receive additional 2U transfusion, prepping 3rdU  - Will recheck H/H 1 hour after 3rd unit transfused  -Patient  stable, mildly tachycardic, denies any symptoms at present  -Continue to monitor      AV malformation of gastrointestinal tract  -[facility] consulted, recs appreciated  -Patient has extensive history of GI bleed with duodenal AVMs, last admitted 6/2020 for similar presentation  -Patient denies melena or hematemesis, but given copious volume loss in   -CLD until Midnight when NPO, hold Xarelto, anticipate GI study in AM  -Recs appreciated    History of pulmonary embolus (PE)  -Patient PE in 5/25  -Was on Xarelto at home, will hold in setting of severe anemia with suspected GI bleed  -Continue to monitor      Right to left cardiac shunt  -Patient on Xarelto at home, will hold in setting of acute suspected GI bleed  -Monitor closely for SOB, DVT. SCDs placed.        VTE Risk Mitigation (From admission, onward)         Ordered     IP VTE HIGH RISK PATIENT  Once      08/06/20 1349     Place sequential compression device  Until discontinued      08/06/20 1349     Place sequential compression device  Until discontinued      08/06/20 1323                   Sylvia Draper MD  Department of Hospital Medicine   [facility] ICU 5th Floor

## 2020-08-06 NOTE — CONSULTS
[facility] ICU 5th Floor  Critical Care - Medicine  Consult Note    Patient Name: Miky Esqueda  MRN: [id number]  Admission Date: 8/6/2020  Hospital Length of Stay: 0 days  Code Status: Full Code  Attending Physician: Guillermina Loredo MD  Primary Care Provider: Farhat Matson MD   Principal Problem: <principal problem not specified>    Consults  Subjective:     HPI:   Per GI note  65 y.o. 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 Pylori, SUBHASH, and jejunal AVM who presents w/ 2 days of symptomatic anemia w/ associated anticoagulation, dizziness and w/o diarrhea or rectal bleeding. He reports roughly two days of dizziness and mild nausea for which he was seeing his PCP for earlier today, before being seen he had a syncopal episode while sitting in the wheel chair w/ blurry vision and brief ?LOC followed by several episodes of clear emesis. He denies rectal bleeding, dark stools, or diarrhea and reports his last bm was 48 hours and he thinks was brown and soft. He denies abdominal pain, admits to a few lbs of weight loss over the last few months.     Previously has had endoscopic w/u including upper and lower endoscopy 6/20/20 showing one gastric avm and bx + for H. Pylori, as well as two colon polyps. He again underwent EGD w/ push and colon at [facility] which showed a jejunal AVM which was unable to be reached for treatment and one colon polyp. Subsequent VCE here on 7/16/20 showed 2 proximal AVMs that may have been source of bleeding. He was referred to see. Dr. Espinal for further evaluation w/ an upper single balloon enteroscopy at that time but has not yet been followed up.    On my evaluation of the pt he noted that he hasn't noticed any significant change in his stools recently. Sated he felt in his usual state of health until the last few days when he began to feel progressively weak and then dizzy today. Reports one episode of non bloody, non bilious  emesis. Denies any CP, SOB, headache, fever, chills, diarrhea, abdominal pain.     He is admitted to the ICU for close monitoring and evaluation. Has received 1 unit of unmatched blood, is being type and screened for more blood.          Past Medical History:   Diagnosis Date    Bilateral pulmonary embolism 5/25/2020    History of DVT (deep vein thrombosis) 6/12/2020    Iron deficiency anemia     Stroke        Past Surgical History:   Procedure Laterality Date    COLONOSCOPY N/A 6/15/2020    Procedure: COLONOSCOPY;  Surgeon: Brown Go MD;  Location: [facility];  Service: Endoscopy;  Laterality: N/A;    ESOPHAGOGASTRODUODENOSCOPY N/A 6/15/2020    Procedure: EGD (ESOPHAGOGASTRODUODENOSCOPY);  Surgeon: Brown Go MD;  Location: [facility];  Service: Endoscopy;  Laterality: N/A;    INTRALUMINAL GASTROINTESTINAL TRACT IMAGING VIA CAPSULE N/A 7/13/2020    Procedure: IMAGING PROCEDURE, GI TRACT, INTRALUMINAL, VIA CAPSULE;  Surgeon: Brown Go MD;  Location: [facility];  Service: Endoscopy;  Laterality: N/A;       Review of patient's allergies indicates:  No Known Allergies    Family History     Problem Relation (Age of Onset)    Heart disease Father        Tobacco Use    Smoking status: Former Smoker     Types: Cigarettes    Smokeless tobacco: Former User   Substance and Sexual Activity    Alcohol use: No    Drug use: No    Sexual activity: Not Currently     Partners: Female      Review of Systems   Constitutional: Negative for chills and fever.   HENT: Negative for rhinorrhea and sore throat.    Eyes: Negative for pain and discharge.   Respiratory: Negative for cough, shortness of breath and wheezing.    Cardiovascular: Negative for chest pain and leg swelling.   Gastrointestinal: Positive for nausea and vomiting. Negative for abdominal pain, constipation and diarrhea.        Denies seeing any change in stool color. Last BM 2 days ago   Genitourinary: Negative for flank pain and hematuria.    Musculoskeletal: Negative for joint swelling and neck stiffness.   Skin: Negative for rash and wound.   Neurological: Positive for dizziness. Negative for syncope and headaches.   Psychiatric/Behavioral: Negative for hallucinations and suicidal ideas.     Objective:     Vital Signs (Most Recent):  Temp: 97.7 °F (36.5 °C) (08/06/20 1330)  Pulse: 104 (08/06/20 1328)  Resp: 17 (08/06/20 1328)  BP: (!) 124/59 (08/06/20 1328)  SpO2: (!) 94 % (08/06/20 1328) Vital Signs (24h Range):  Temp:  [97.4 °F (36.3 °C)-97.7 °F (36.5 °C)] 97.7 °F (36.5 °C)  Pulse:  [] 104  Resp:  [17-20] 17  SpO2:  [94 %-100 %] 94 %  BP: ()/(51-89) 124/59     Weight: 97.5 kg (215 lb)  Body mass index is 29.16 kg/m².    No intake or output data in the 24 hours ending 08/06/20 1517    Physical Exam  Constitutional:       Appearance: Normal appearance.   HENT:      Head: Normocephalic and atraumatic.      Nose: Nose normal. No congestion.      Mouth/Throat:      Mouth: Mucous membranes are moist.      Pharynx: Oropharynx is clear.   Eyes:      General:         Right eye: No discharge.         Left eye: No discharge.      Pupils: Pupils are equal, round, and reactive to light.   Neck:      Musculoskeletal: Normal range of motion and neck supple.   Cardiovascular:      Rate and Rhythm: Regular rhythm. Tachycardia present.      Pulses: Normal pulses.      Heart sounds: Murmur (possible flow murmur appreciated) present.   Pulmonary:      Effort: Pulmonary effort is normal. No respiratory distress.      Breath sounds: Normal breath sounds.   Abdominal:      General: Abdomen is flat.      Palpations: Abdomen is soft.      Tenderness: There is no abdominal tenderness.   Musculoskeletal:         General: No swelling or tenderness.      Left lower leg: No edema.   Skin:     General: Skin is warm and dry.   Neurological:      General: No focal deficit present.      Mental Status: He is alert and oriented to person, place, and time. Mental status is  at baseline.   Psychiatric:         Mood and Affect: Mood normal.         Behavior: Behavior normal.         Vents:  None        Lines/Drains/Airways     Peripheral Intravenous Line                 Peripheral IV - Single Lumen 08/06/20 1016 20 G Right Hand less than 1 day         Peripheral IV - Single Lumen 08/06/20 1022 20 G Left Antecubital less than 1 day                Significant Labs:    CBC/Anemia Profile:  Recent Labs   Lab 08/06/20  1025 08/06/20  1043   WBC 13.19*  --    HGB 4.6*  --    HCT 17.1*  --      --    MCV 95  --    RDW 19.4*  --    OCCULTBLOOD  --  Positive*        Chemistries:  Recent Labs   Lab 08/06/20  1025   *   K 3.9      CO2 19*   BUN 34*   CREATININE 1.35   CALCIUM 8.3*   ALBUMIN 3.1*   PROT 5.7*   BILITOT 0.5   ALKPHOS 46   ALT 14   AST 26       Significant Imaging: CXR 8/6  No acute cardiopulmonary processes       Assessment/Plan:     Active Diagnoses:    Diagnosis Date Noted POA    AV malformation of gastrointestinal tract [K55.20] 07/16/2020 Yes    Right to left cardiac shunt [I28.0] 06/17/2020 Yes    Symptomatic anemia [D64.9] 06/12/2020 Yes    History of pulmonary embolus (PE) [Z86.711] 05/26/2020 Yes      Problems Resolved During this Admission:       CNS  - hx of CVA (no deficits) in 6/2020.   - Was previously on ASA 81, would continue holding   - no acute issues at this time.    Pulm  - sating upper 80's to low 90's while I was in room with pt. Has Hx of smoking from age 11 to a few months ago, denies hx of COPD or use of inhalers.   - has Hx of unprovoked PE/DVT 5/2020, on xarelto. Agree with holding for now   - continue to monitor. If he becomes SOB consider duonebs and NC O2 to sats 88%    CV  - tachycardia and possible flow murmur appreciated, likely 2/2 hyperdynamic state.  - no BB     GI/FEN  - history of multiple AVMs and has had upper and lower scope 6/2020 showing gastric AVM as well as colon polys. Had second EGD at [facility] showing jejunal AVM  and colon polyp. VCE on 7/16 showed 2 proximal AVMs. He had recently been referred to see Dr. Espinal for upper single balloon enteroscopy but hadn't had follow up yet.   - GI is consulted, agree with their recs of trend h/h, transfuse to >7, hold xarelto, CLD okay for now.   - he was also on ASA 81, would continue to hold   - GI is planning scope in morning.     Heme/ID  - no infectious processes suspected at this time.   - anemia with H of 4.6.  - normal MCV    Renal  - Cr 1.35 at baseline, no acute issues.     PPx: SCDs    Critical secondary to Patient has a condition that poses threat to life and bodily function: GI bleed     Critical care was time spent personally by me on the following activities: development of treatment plan with patient or surrogate and bedside caregivers, discussions with consultants, evaluation of patient's response to treatment, examination of patient, ordering and performing treatments and interventions, ordering and review of laboratory studies, ordering and review of radiographic studies, pulse oximetry, re-evaluation of patient's condition.  This critical care time did not overlap with that of any other provider or involve time for any procedures.    Thank you for your consult. I will follow-up with patient. Please contact us if you have any additional questions.     Alexandru Rosenberg MD PGY1  Critical Care - Medicine  [facility] ICU 5th Floor

## 2020-08-06 NOTE — PT/OT/SLP PROGRESS
Physical Therapy      Patient Name:  Miky Esqueda   MRN:  [id number]    Patient not seen today secondary to nurse, Bernadette placing therapy on hold 2/2 low blood count with symptomatic dizziness. Will follow-up tomorrow for evaluation.    Anel Cohen, PT

## 2020-08-06 NOTE — ASSESSMENT & PLAN NOTE
-Patient on Xarelto at home, will hold in setting of acute suspected GI bleed  -Monitor closely for SOB, DVT. SCDs placed.
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
## 2020-08-06 NOTE — ASSESSMENT & PLAN NOTE
-Patient PE in 5/25  -Was on Xarelto at home, will hold in setting of severe anemia with suspected GI bleed  -Continue to monitor

## 2020-08-06 NOTE — PLAN OF CARE
Blood completed, labs and type and match will be drawn now, instead of waiting hour, per MD Dr Draper

## 2020-08-06 NOTE — CARE UPDATE
Reviewed notes and labs  Spoke with Dr. Cowley, [facility] - asked to consult [facility] - Dr. Espinal when patient arrives in [place] so they can proceed with previously discussed enteroscopy  This info was also relayed to [facility], Dr. Baron Matson

## 2020-08-06 NOTE — ED NOTES
"Ist unit O neg blood verified with Ana RN and Marlene RN - unit #[id number] P.  RBC's infusing over 2 hours to left AC. Site w/o diff. Pt abi well. [facility] here for transport to [place] ICU. Pt AAOx4, skin PWD, resp even nonlabored. Pt denies pain or SOB. "I feel a little better than when I came in here".      Jenny Ibrahim RN  08/06/20 1227       Jenny Ibrahim RN  08/06/20 1257    "

## 2020-08-06 NOTE — CONSULTS
[facility]    CC: Anemia    HPI 65 y.o. 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 Pylori, SUBHASH, and jejunal AVM who presents w/ 2 days of symptomatic anemia w/ associated anticoagulation, dizziness and w/o diarrhea or rectal bleeding. He reports roughly two days of dizziness and mild nausea for which he was seeing his PCP for earlier today, before being seen he had a syncopal episode while sitting in the wheel chair w/ blurry vision and brief ?LOC followed by several episodes of clear emesis. He denies rectal bleeding, dark stools, or diarrhea and reports his last bm was 48 hours and he thinks was brown and soft. He denies abdominal pain, admits to a few lbs of weight loss over the last few months.    Previously has had endoscopic w/u including upper and lower endoscopy 6/20/20 showing one gastric avm and bx + for H. Pylori, as well as two colon polyps. He again underwent EGD w/ push and colon at [facility] which showed a jejunal AVM which was unable to be reached for treatment and one colon polyp. Subsequent VCE here on 7/16/20 showed 2 proximal AVMs that may have been source of bleeding. He was referred to see. Dr. Espinal for further evaluation w/ an upper single balloon enteroscopy at that time but has not yet been followed up.    Tachycardic but otherwise HDS upon evaluation in ICU.    Chart reviewed and summarized here.    Past Medical History  Past Medical History:   Diagnosis Date    Bilateral pulmonary embolism 5/25/2020    History of DVT (deep vein thrombosis) 6/12/2020    Iron deficiency anemia     Stroke        Past Surgical History  Past Surgical History:   Procedure Laterality Date    COLONOSCOPY N/A 6/15/2020    Procedure: COLONOSCOPY;  Surgeon: Brown Go MD;  Location: [facility];  Service: Endoscopy;  Laterality: N/A;    ESOPHAGOGASTRODUODENOSCOPY N/A 6/15/2020    Procedure: EGD (ESOPHAGOGASTRODUODENOSCOPY);  Surgeon: Brown Go MD;   Location: [facility] ENDO;  Service: Endoscopy;  Laterality: N/A;    INTRALUMINAL GASTROINTESTINAL TRACT IMAGING VIA CAPSULE N/A 7/13/2020    Procedure: IMAGING PROCEDURE, GI TRACT, INTRALUMINAL, VIA CAPSULE;  Surgeon: Brown Go MD;  Location: [facility] ENDO;  Service: Endoscopy;  Laterality: N/A;   no prior abdominal surgeries    Social History  Social History     Tobacco Use    Smoking status: Former Smoker     Types: Cigarettes    Smokeless tobacco: Former User   Substance Use Topics    Alcohol use: No    Drug use: No       Family History  Family History   Problem Relation Age of Onset    Heart disease Father    Grandfather w/ colon cancer    Review of Systems  General ROS: negative for chills, fever or weight loss, +syncope, dizziness  Psychological ROS: negative for hallucination, depression or suicidal ideation  Ophthalmic ROS: negative for blurry vision, photophobia or eye pain  ENT ROS: negative for epistaxis, sore throat or rhinorrhea  Respiratory ROS: no cough, shortness of breath, or wheezing  Cardiovascular ROS: no chest pain or dyspnea on exertion  Gastrointestinal ROS: no abdominal pain, change in bowel habits, or black/ bloody stools, +N/V  Genito-Urinary ROS: no dysuria, trouble voiding, or hematuria  Musculoskeletal ROS: negative for gait disturbance or muscular weakness  Neurological ROS: no syncope or seizures; no ataxia  Dermatological ROS: negative for pruritis, rash and jaundice    Physical Examination  BP (!) 124/59   Pulse 104   Temp 97.4 °F (36.3 °C) (Oral)   Resp 17   Ht 6' (1.829 m)   Wt 97.5 kg (215 lb)   SpO2 (!) 94%   BMI 29.16 kg/m²   General appearance: alert, cooperative, no distress  HENT: Normocephalic, atraumatic, neck symmetrical, no nasal discharge   Eyes: conjunctivae/corneas clear, PERRL, EOM's intact, pale sclera  Lungs: clear to auscultation bilaterally, no dullness to percussion bilaterally  Heart: tachycardic rate and regular rhythm without rub; no displacement of  the PMI   Abdomen: soft, non-tender; bowel sounds normoactive; no organomegaly, rectal exam exquisite  Extremities: extremities symmetric; no clubbing, cyanosis, or edema  Integument: Skin color, texture, turgor normal; no rashes; hair distrubution normal  Neurologic: Alert and oriented X 3, normal strength, normal coordination and gait  Psychiatric: no pressured speech; normal affect; no evidence of impaired cognition     Labs:   hgb 4.6 from 8 two weeks ago, plt 290, INR 1.1    Imaging:  CXR today w/o focal consolidation    I have personally reviewed and interpreted these images.    Assessment:   65 y.o. 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 Pylori, SUBHASH, and jejunal AVM who presents w/ 2 days of symptomatic anemia w/ associated anticoagulation, dizziness and w/o diarrhea or rectal bleeding. Hgb 4.6 from 8 at last check, denies rectal bleeding but has melena on exam and had ?syncopal event w/ subsequent clear emesis thereafter. Has had extensive w/u detailed above but most recently referred for upper single balloon for two proximal AVMs found on VCE, however this had not yet been performed.    Plan:   - Two large bore IVs  - Maintain active type and screen  - Trend hgb, transfuse for goal hgb >7  - Hold xarelto  - CLD ok for now, NPO at midnight  - Plan for push enteroscopy vs. Upper single balloon tomorrow  - Call on call GI with any issues

## 2020-08-06 NOTE — PLAN OF CARE
Gi md here, hx obtained, rectal exam, done, dk black stool, pt had great deal of pain with the exam, states from doing it again, does not normally have pain in the rectal area, even with bm's

## 2020-08-06 NOTE — PLAN OF CARE
CC: Anemia     HPI 65 y.o. 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 Pylori, SUBHASH, and jejunal AVM who presents w/ 2 days of symptomatic anemia w/ associated anticoagulation, dizziness and w/o diarrhea or rectal bleeding. He reports roughly two days of dizziness and mild nausea for which he was seeing his PCP for earlier today, before being seen he had a syncopal episode while sitting in the wheel chair w/ blurry vision and brief ?LOC followed by several episodes of clear emesis. He denies rectal bleeding, dark stools, or diarrhea and reports his last bm was 48 hours and he thinks was brown and soft. He denies abdominal pain, admits to a few lbs of weight loss over the last few months.     Previously has had endoscopic w/u including upper and lower endoscopy 6/20/20 showing one gastric avm and bx + for H. Pylori, as well as two colon polyps. He again underwent EGD w/ push and colon at [facility] which showed a jejunal AVM which was unable to be reached for treatment and one colon polyp. Subsequent VCE here on 7/16/20 showed 2 proximal AVMs that may have been source of bleeding. He was referred to see. Dr. Espinal for further evaluation w/ an upper single balloon enteroscopy at that time but has not yet been followed up.     The pt's currently in ICU. The Sw met with the pt to complete the assessment. The pt was transferred from [facility]. The pt lives in [place] with his wife Priscilla Esqueda [phone]. The pt's wife will transport him home at d/c. The pt's independent with his adl's and he has o2 at home but states he hasn't used it in a couple of months. The Sw gave the pt a d/c brochure with her name and contact info on it. The Sw encouraged the pt to call if he has any questions or concerns. The Sw will continue to follow the pt throughout his transitions of care and will assist with any d/c needs.        08/06/20 0629   Discharge Assessment   Assessment Type  Discharge Planning Assessment   Confirmed/corrected address and phone number on facesheet? Yes   Assessment information obtained from? Patient   Prior to hospitilization cognitive status: Alert/Oriented   Prior to hospitalization functional status: Independent   Current cognitive status: Alert/Oriented   Current Functional Status: Independent   Facility Arrived From: [facility]   Lives With spouse   Able to Return to Prior Arrangements yes   Is patient able to care for self after discharge? Yes   Who are your caregiver(s) and their phone number(s)? Priscilla Esqueda(wife)[phone]   Patient's perception of discharge disposition home or selfcare   Readmission Within the Last 30 Days no previous admission in last 30 days   Patient currently being followed by outpatient case management? No   Patient currently receives any other outside agency services? No   Equipment Currently Used at Home none   Do you have any problems affording any of your prescribed medications? No  (The pt receives his meds affordably at [facility])   Is the patient taking medications as prescribed? yes   Does the patient have transportation home? Yes   Transportation Anticipated family or friend will provide   Does the patient receive services at the [facility]? No   Discharge Plan A Home with family   Discharge Plan B Home Health   DME Needed Upon Discharge  other (see comments)  (TBD)   Patient/Family in Agreement with Plan yes

## 2020-08-06 NOTE — PLAN OF CARE
Admitted to icu from, [facility], er, blood not in progress, emt states it was burning him, and they tried other iv and that burned, so they disconnected the blood, iv  Left  ac, has blood return, flushes without difficulty,  Iv rt hand  has bld return, and burns when flushed, did it slower, and it was better, blood restarted to iv left  ac, pale, cool, dry skin, no co of pain, sob or chest pain, pulse 100 st, resp 28 , shallow, diminished breath sounds post bases, course rt base, loose non prod cough, at this time, but states it just  Started, and he had mucous this morning, does not know what color, abd rounded, distended, semi firm, bowel sounds audible, no pain, no nausea now, bm 8/5, states he thought it was dk brown, no blood, vomiting earlier, but vomited no blood, on xarelto for pulm emboli and dvt both legs in may, took this am

## 2020-08-06 NOTE — SUBJECTIVE & OBJECTIVE
Past Medical History:   Diagnosis Date    Bilateral pulmonary embolism 5/25/2020    History of DVT (deep vein thrombosis) 6/12/2020    Iron deficiency anemia     Stroke        Past Surgical History:   Procedure Laterality Date    COLONOSCOPY N/A 6/15/2020    Procedure: COLONOSCOPY;  Surgeon: Brown Go MD;  Location: [facility] ENDO;  Service: Endoscopy;  Laterality: N/A;    ESOPHAGOGASTRODUODENOSCOPY N/A 6/15/2020    Procedure: EGD (ESOPHAGOGASTRODUODENOSCOPY);  Surgeon: Brown Go MD;  Location: [facility] ENDO;  Service: Endoscopy;  Laterality: N/A;    INTRALUMINAL GASTROINTESTINAL TRACT IMAGING VIA CAPSULE N/A 7/13/2020    Procedure: IMAGING PROCEDURE, GI TRACT, INTRALUMINAL, VIA CAPSULE;  Surgeon: Brown Go MD;  Location: [facility];  Service: Endoscopy;  Laterality: N/A;       Review of patient's allergies indicates:  No Known Allergies    No current facility-administered medications on file prior to encounter.      Current Outpatient Medications on File Prior to Encounter   Medication Sig    cholecalciferol, vitamin D3, 1,250 mcg (50,000 unit) Tab Take 50,000 Units by mouth once a week.    multivitamin capsule Take 1 capsule by mouth once daily.    ondansetron (ZOFRAN-ODT) 4 MG TbDL Take 1 tablet (4 mg total) by mouth every 6 (six) hours as needed (nausea).    pantoprazole (PROTONIX) 40 MG tablet Take 1 tablet (40 mg total) by mouth 2 (two) times daily. for 10 days    [DISCONTINUED] aspirin 81 MG Chew Chew and swallow 1 tablet (81 mg total) by mouth once daily.    [DISCONTINUED] atorvastatin (LIPITOR) 40 MG tablet Take 1 tablet (40 mg total) by mouth once daily.    [DISCONTINUED] clarithromycin (BIAXIN) 500 MG tablet      Family History     Problem Relation (Age of Onset)    Heart disease Father        Tobacco Use    Smoking status: Former Smoker     Types: Cigarettes    Smokeless tobacco: Former User   Substance and Sexual Activity    Alcohol use: No    Drug use: No    Sexual  activity: Not Currently     Partners: Female     Review of Systems   Constitutional: Positive for diaphoresis and fatigue. Negative for chills and fever.   HENT: Negative.    Eyes: Positive for visual disturbance.   Respiratory: Negative.  Negative for cough and shortness of breath.    Cardiovascular: Negative for chest pain and palpitations.   Gastrointestinal: Positive for nausea and vomiting. Negative for abdominal pain, blood in stool, constipation, diarrhea and rectal pain.   Genitourinary: Negative for hematuria.   Musculoskeletal: Negative for arthralgias.   Skin: Positive for pallor.   Allergic/Immunologic: Negative.    Neurological: Positive for dizziness, light-headedness and headaches.   Hematological: Negative.    Psychiatric/Behavioral: Negative.      Objective:     Vital Signs (Most Recent):  Temp: 97.7 °F (36.5 °C) (08/06/20 1500)  Pulse: 107 (08/06/20 1600)  Resp: (!) 31 (08/06/20 1600)  BP: (!) 91/49 (08/06/20 1600)  SpO2: (!) 94 % (08/06/20 1600) Vital Signs (24h Range):  Temp:  [97.4 °F (36.3 °C)-97.7 °F (36.5 °C)] 97.7 °F (36.5 °C)  Pulse:  [] 107  Resp:  [17-31] 31  SpO2:  [91 %-100 %] 94 %  BP: ()/(49-89) 91/49     Weight: 97.5 kg (215 lb)  Body mass index is 29.16 kg/m².    Physical Exam  Constitutional:       General: He is not in acute distress.     Appearance: Normal appearance. He is normal weight.   HENT:      Head: Normocephalic and atraumatic.      Right Ear: External ear normal.      Left Ear: External ear normal.      Nose: Nose normal.      Mouth/Throat:      Mouth: Mucous membranes are moist.      Pharynx: Oropharynx is clear.      Comments: Top dentures in place  Eyes:      Extraocular Movements: Extraocular movements intact.      Conjunctiva/sclera: Conjunctivae normal.      Pupils: Pupils are equal, round, and reactive to light.   Neck:      Musculoskeletal: Neck supple.   Cardiovascular:      Rate and Rhythm: Regular rhythm. Tachycardia present.      Heart sounds:  Murmur present.   Pulmonary:      Effort: Pulmonary effort is normal.      Breath sounds: Normal breath sounds.   Abdominal:      General: Abdomen is flat. Bowel sounds are normal.      Palpations: Abdomen is soft.      Tenderness: There is no abdominal tenderness.   Musculoskeletal: Normal range of motion.   Skin:     General: Skin is warm and dry.      Coloration: Skin is pale.   Neurological:      General: No focal deficit present.      Mental Status: He is alert and oriented to person, place, and time. Mental status is at baseline.           CRANIAL NERVES     CN III, IV, VI   Pupils are equal, round, and reactive to light.       Significant Labs:   CBC:   Recent Labs   Lab 08/06/20  1025   WBC 13.19*   HGB 4.6*   HCT 17.1*        CMP:   Recent Labs   Lab 08/06/20  1025 08/06/20  1507   * 137   K 3.9 4.5    109   CO2 19* 22*   * 126*   BUN 34* 35*   CREATININE 1.35 1.3   CALCIUM 8.3* 7.9*   PROT 5.7* 5.3*   ALBUMIN 3.1* 2.8*   BILITOT 0.5 2.6*   ALKPHOS 46 45*   AST 26 17   ALT 14 13   ANIONGAP 10 6*   EGFRNONAA 55.5* 58*     Cardiac Markers: No results for input(s): CKMB, MYOGLOBIN, BNP, TROPISTAT in the last 48 hours.  Coagulation:   Recent Labs   Lab 08/06/20  1025   INR 1.1   APTT <21.0     Lactic Acid: No results for input(s): LACTATE in the last 48 hours.  Magnesium:   Recent Labs   Lab 08/06/20  1507   MG 1.9     TSH:   Recent Labs   Lab 08/06/20  1507   TSH 1.871       Significant Imaging: I have reviewed all pertinent imaging results/findings within the past 24 hours.     X-Ray Chest AP Portable   Final Result      Normal study.         Electronically signed by: Long Herman MD   Date:    08/06/2020   Time:    10:46      CT Head Without Contrast   Final Result      1. There is no evidence of an acute ischemic event.   2. There is no intracranial hemorrhage.   3. There is mild partial opacification of the left ethmoidal the left sphenoidal sinuses.  There is mild partial  opacification of the maxillary sinuses bilaterally.  This is characteristic of sinusitis.   4. There is mild partial opacification of the left mastoid air cells.   All CT scans at this facility use dose modulation, iterative reconstruction, and/or weight base dosing when appropriate to reduce radiation dose when appropriate to reduce radiation dose to as low as reasonably achievable.         Electronically signed by: Long Herman MD   Date:    08/06/2020   Time:    10:44

## 2020-08-06 NOTE — ASSESSMENT & PLAN NOTE
-Patient H/H of 4.6/17.1  -Transfused 1U  emergently at [facility]  -Will receive additional 2U transfusion, prepping 3rdU  - Will recheck H/H 1 hour after 3rd unit transfused  -Patient stable, mildly tachycardic, denies any symptoms at present  -Continue to monitor

## 2020-08-06 NOTE — ED NOTES
Pt left via [facility] no change in status. Blood infusing to rt hand IV. Site no redness no swelling.      Jenny Ibrahim RN  08/06/20 9526

## 2020-08-06 NOTE — ED NOTES
"Spoke to wife Priscilla Esqueda. Informed her of status and admit to [facility]. She verb understanding. Allowed for questions. States "please tell him I love him and I will follow up at [facility]. Have them call my cell for anything [phone]. Informed pt spoke to his wife. Pt verb understanding.      Jenny Ibrahim RN  08/06/20 1203       Jenny Ibrahim RN  08/06/20 1203       Jenny Ibrahim RN  08/06/20 1204    "

## 2020-08-07 ENCOUNTER — ANESTHESIA (OUTPATIENT)
Dept: ENDOSCOPY | Facility: HOSPITAL | Age: 65
DRG: 357 | End: 2020-08-07
Payer: COMMERCIAL

## 2020-08-07 ENCOUNTER — ANESTHESIA EVENT (OUTPATIENT)
Dept: ENDOSCOPY | Facility: HOSPITAL | Age: 65
DRG: 357 | End: 2020-08-07
Payer: COMMERCIAL

## 2020-08-07 LAB
ALBUMIN SERPL BCP-MCNC: 2.6 G/DL (ref 3.5–5.2)
ALP SERPL-CCNC: 40 U/L (ref 55–135)
ALT SERPL W/O P-5'-P-CCNC: 11 U/L (ref 10–44)
ANION GAP SERPL CALC-SCNC: 5 MMOL/L (ref 8–16)
AST SERPL-CCNC: 16 U/L (ref 10–40)
BASOPHILS # BLD AUTO: 0.06 K/UL (ref 0–0.2)
BASOPHILS # BLD AUTO: 0.06 K/UL (ref 0–0.2)
BASOPHILS # BLD AUTO: 0.07 K/UL (ref 0–0.2)
BASOPHILS NFR BLD: 0.3 % (ref 0–1.9)
BASOPHILS NFR BLD: 0.4 % (ref 0–1.9)
BASOPHILS NFR BLD: 0.4 % (ref 0–1.9)
BILIRUB SERPL-MCNC: 1.7 MG/DL (ref 0.1–1)
BLD PROD TYP BPU: NORMAL
BLOOD UNIT EXPIRATION DATE: NORMAL
BLOOD UNIT TYPE CODE: 1700
BLOOD UNIT TYPE CODE: 1700
BLOOD UNIT TYPE CODE: 9500
BLOOD UNIT TYPE: NORMAL
BUN SERPL-MCNC: 31 MG/DL (ref 8–23)
CALCIUM SERPL-MCNC: 7.8 MG/DL (ref 8.7–10.5)
CHLORIDE SERPL-SCNC: 109 MMOL/L (ref 95–110)
CO2 SERPL-SCNC: 22 MMOL/L (ref 23–29)
CODING SYSTEM: NORMAL
CREAT SERPL-MCNC: 1.3 MG/DL (ref 0.5–1.4)
DIFFERENTIAL METHOD: ABNORMAL
DISPENSE STATUS: NORMAL
EOSINOPHIL # BLD AUTO: 0 K/UL (ref 0–0.5)
EOSINOPHIL # BLD AUTO: 0.1 K/UL (ref 0–0.5)
EOSINOPHIL # BLD AUTO: 0.1 K/UL (ref 0–0.5)
EOSINOPHIL NFR BLD: 0.2 % (ref 0–8)
EOSINOPHIL NFR BLD: 0.4 % (ref 0–8)
EOSINOPHIL NFR BLD: 0.7 % (ref 0–8)
ERYTHROCYTE [DISTWIDTH] IN BLOOD BY AUTOMATED COUNT: 15.9 % (ref 11.5–14.5)
ERYTHROCYTE [DISTWIDTH] IN BLOOD BY AUTOMATED COUNT: 16 % (ref 11.5–14.5)
ERYTHROCYTE [DISTWIDTH] IN BLOOD BY AUTOMATED COUNT: 17 % (ref 11.5–14.5)
EST. GFR  (AFRICAN AMERICAN): >60 ML/MIN/1.73 M^2
EST. GFR  (NON AFRICAN AMERICAN): 58 ML/MIN/1.73 M^2
GLUCOSE SERPL-MCNC: 101 MG/DL (ref 70–110)
HAPTOGLOB SERPL-MCNC: 78 MG/DL (ref 30–250)
HCT VFR BLD AUTO: 21.5 % (ref 40–54)
HCT VFR BLD AUTO: 22.5 % (ref 40–54)
HCT VFR BLD AUTO: 23.9 % (ref 40–54)
HGB BLD-MCNC: 6.6 G/DL (ref 14–18)
HGB BLD-MCNC: 7.2 G/DL (ref 14–18)
HGB BLD-MCNC: 7.6 G/DL (ref 14–18)
IMM GRANULOCYTES # BLD AUTO: 0.09 K/UL (ref 0–0.04)
IMM GRANULOCYTES # BLD AUTO: 0.1 K/UL (ref 0–0.04)
IMM GRANULOCYTES # BLD AUTO: 0.18 K/UL (ref 0–0.04)
IMM GRANULOCYTES NFR BLD AUTO: 0.6 % (ref 0–0.5)
IMM GRANULOCYTES NFR BLD AUTO: 0.6 % (ref 0–0.5)
IMM GRANULOCYTES NFR BLD AUTO: 0.8 % (ref 0–0.5)
LYMPHOCYTES # BLD AUTO: 1.6 K/UL (ref 1–4.8)
LYMPHOCYTES # BLD AUTO: 1.9 K/UL (ref 1–4.8)
LYMPHOCYTES # BLD AUTO: 2 K/UL (ref 1–4.8)
LYMPHOCYTES NFR BLD: 10.6 % (ref 18–48)
LYMPHOCYTES NFR BLD: 11 % (ref 18–48)
LYMPHOCYTES NFR BLD: 9.4 % (ref 18–48)
MAGNESIUM SERPL-MCNC: 1.9 MG/DL (ref 1.6–2.6)
MCH RBC QN AUTO: 28.1 PG (ref 27–31)
MCH RBC QN AUTO: 28.9 PG (ref 27–31)
MCH RBC QN AUTO: 29 PG (ref 27–31)
MCHC RBC AUTO-ENTMCNC: 30.7 G/DL (ref 32–36)
MCHC RBC AUTO-ENTMCNC: 31.8 G/DL (ref 32–36)
MCHC RBC AUTO-ENTMCNC: 32 G/DL (ref 32–36)
MCV RBC AUTO: 91 FL (ref 82–98)
MCV RBC AUTO: 91 FL (ref 82–98)
MCV RBC AUTO: 92 FL (ref 82–98)
MONOCYTES # BLD AUTO: 1.1 K/UL (ref 0.3–1)
MONOCYTES # BLD AUTO: 1.2 K/UL (ref 0.3–1)
MONOCYTES # BLD AUTO: 1.9 K/UL (ref 0.3–1)
MONOCYTES NFR BLD: 7.3 % (ref 4–15)
MONOCYTES NFR BLD: 7.3 % (ref 4–15)
MONOCYTES NFR BLD: 8.7 % (ref 4–15)
NEUTROPHILS # BLD AUTO: 12.3 K/UL (ref 1.8–7.7)
NEUTROPHILS # BLD AUTO: 13.8 K/UL (ref 1.8–7.7)
NEUTROPHILS # BLD AUTO: 17.2 K/UL (ref 1.8–7.7)
NEUTROPHILS NFR BLD: 80.4 % (ref 38–73)
NEUTROPHILS NFR BLD: 80.4 % (ref 38–73)
NEUTROPHILS NFR BLD: 80.5 % (ref 38–73)
NRBC BLD-RTO: 1 /100 WBC
NRBC BLD-RTO: 1 /100 WBC
NRBC BLD-RTO: 2 /100 WBC
PATH REV BLD -IMP: NORMAL
PHOSPHATE SERPL-MCNC: 2.9 MG/DL (ref 2.7–4.5)
PLATELET # BLD AUTO: 165 K/UL (ref 150–350)
PLATELET # BLD AUTO: 172 K/UL (ref 150–350)
PLATELET # BLD AUTO: 174 K/UL (ref 150–350)
PMV BLD AUTO: 10.2 FL (ref 9.2–12.9)
PMV BLD AUTO: 10.6 FL (ref 9.2–12.9)
PMV BLD AUTO: 10.8 FL (ref 9.2–12.9)
POTASSIUM SERPL-SCNC: 4.3 MMOL/L (ref 3.5–5.1)
PROT SERPL-MCNC: 4.9 G/DL (ref 6–8.4)
RBC # BLD AUTO: 2.35 M/UL (ref 4.6–6.2)
RBC # BLD AUTO: 2.48 M/UL (ref 4.6–6.2)
RBC # BLD AUTO: 2.63 M/UL (ref 4.6–6.2)
SODIUM SERPL-SCNC: 136 MMOL/L (ref 136–145)
TRANS ERYTHROCYTES VOL PATIENT: NORMAL ML
WBC # BLD AUTO: 15.24 K/UL (ref 3.9–12.7)
WBC # BLD AUTO: 17.08 K/UL (ref 3.9–12.7)
WBC # BLD AUTO: 21.39 K/UL (ref 3.9–12.7)

## 2020-08-07 PROCEDURE — 27000221 HC OXYGEN, UP TO 24 HOURS

## 2020-08-07 PROCEDURE — 37000009 HC ANESTHESIA EA ADD 15 MINS: Performed by: INTERNAL MEDICINE

## 2020-08-07 PROCEDURE — 25000003 PHARM REV CODE 250: Performed by: NURSE ANESTHETIST, CERTIFIED REGISTERED

## 2020-08-07 PROCEDURE — 36415 COLL VENOUS BLD VENIPUNCTURE: CPT

## 2020-08-07 PROCEDURE — 86902 BLOOD TYPE ANTIGEN DONOR EA: CPT

## 2020-08-07 PROCEDURE — 94761 N-INVAS EAR/PLS OXIMETRY MLT: CPT

## 2020-08-07 PROCEDURE — 27201040 HC RC 50 FILTER: Mod: 91

## 2020-08-07 PROCEDURE — 25000003 PHARM REV CODE 250: Performed by: INTERNAL MEDICINE

## 2020-08-07 PROCEDURE — 83735 ASSAY OF MAGNESIUM: CPT

## 2020-08-07 PROCEDURE — P9021 RED BLOOD CELLS UNIT: HCPCS

## 2020-08-07 PROCEDURE — C9113 INJ PANTOPRAZOLE SODIUM, VIA: HCPCS | Performed by: STUDENT IN AN ORGANIZED HEALTH CARE EDUCATION/TRAINING PROGRAM

## 2020-08-07 PROCEDURE — 97802 MEDICAL NUTRITION INDIV IN: CPT

## 2020-08-07 PROCEDURE — 85025 COMPLETE CBC W/AUTO DIFF WBC: CPT | Mod: 91

## 2020-08-07 PROCEDURE — 84100 ASSAY OF PHOSPHORUS: CPT

## 2020-08-07 PROCEDURE — 63600175 PHARM REV CODE 636 W HCPCS: Performed by: NURSE ANESTHETIST, CERTIFIED REGISTERED

## 2020-08-07 PROCEDURE — 44376 SMALL BOWEL ENDOSCOPY: CPT | Performed by: INTERNAL MEDICINE

## 2020-08-07 PROCEDURE — 63600175 PHARM REV CODE 636 W HCPCS: Performed by: STUDENT IN AN ORGANIZED HEALTH CARE EDUCATION/TRAINING PROGRAM

## 2020-08-07 PROCEDURE — 37000008 HC ANESTHESIA 1ST 15 MINUTES: Performed by: INTERNAL MEDICINE

## 2020-08-07 PROCEDURE — 99900035 HC TECH TIME PER 15 MIN (STAT)

## 2020-08-07 PROCEDURE — 20000000 HC ICU ROOM

## 2020-08-07 PROCEDURE — 80053 COMPREHEN METABOLIC PANEL: CPT

## 2020-08-07 PROCEDURE — 27201238 HC BALLOON, OVERTUBE (ANY): Performed by: INTERNAL MEDICINE

## 2020-08-07 RX ORDER — VASOPRESSIN 20 [USP'U]/ML
INJECTION, SOLUTION INTRAMUSCULAR; SUBCUTANEOUS
Status: DISCONTINUED | OUTPATIENT
Start: 2020-08-07 | End: 2020-08-07

## 2020-08-07 RX ORDER — GLYCOPYRROLATE 0.2 MG/ML
INJECTION INTRAMUSCULAR; INTRAVENOUS
Status: DISCONTINUED | OUTPATIENT
Start: 2020-08-07 | End: 2020-08-07

## 2020-08-07 RX ORDER — PHENYLEPHRINE HYDROCHLORIDE 10 MG/ML
INJECTION INTRAVENOUS
Status: DISCONTINUED | OUTPATIENT
Start: 2020-08-07 | End: 2020-08-07

## 2020-08-07 RX ORDER — SODIUM CHLORIDE 9 MG/ML
INJECTION, SOLUTION INTRAVENOUS CONTINUOUS PRN
Status: DISCONTINUED | OUTPATIENT
Start: 2020-08-07 | End: 2020-08-07

## 2020-08-07 RX ORDER — LIDOCAINE HCL/PF 100 MG/5ML
SYRINGE (ML) INTRAVENOUS
Status: DISCONTINUED | OUTPATIENT
Start: 2020-08-07 | End: 2020-08-07

## 2020-08-07 RX ORDER — FENTANYL CITRATE 50 UG/ML
INJECTION, SOLUTION INTRAMUSCULAR; INTRAVENOUS
Status: DISCONTINUED | OUTPATIENT
Start: 2020-08-07 | End: 2020-08-07

## 2020-08-07 RX ORDER — ETOMIDATE 2 MG/ML
INJECTION INTRAVENOUS
Status: DISCONTINUED | OUTPATIENT
Start: 2020-08-07 | End: 2020-08-07

## 2020-08-07 RX ORDER — POLYETHYLENE GLYCOL 3350, SODIUM SULFATE ANHYDROUS, SODIUM BICARBONATE, SODIUM CHLORIDE, POTASSIUM CHLORIDE 236; 22.74; 6.74; 5.86; 2.97 G/4L; G/4L; G/4L; G/4L; G/4L
4000 POWDER, FOR SOLUTION ORAL ONCE
Status: COMPLETED | OUTPATIENT
Start: 2020-08-07 | End: 2020-08-07

## 2020-08-07 RX ORDER — SUCCINYLCHOLINE CHLORIDE 20 MG/ML
INJECTION INTRAMUSCULAR; INTRAVENOUS
Status: DISCONTINUED | OUTPATIENT
Start: 2020-08-07 | End: 2020-08-07

## 2020-08-07 RX ORDER — ONDANSETRON 2 MG/ML
INJECTION INTRAMUSCULAR; INTRAVENOUS
Status: DISCONTINUED | OUTPATIENT
Start: 2020-08-07 | End: 2020-08-07

## 2020-08-07 RX ADMIN — PANTOPRAZOLE SODIUM 80 MG: 40 INJECTION, POWDER, FOR SOLUTION INTRAVENOUS at 08:08

## 2020-08-07 RX ADMIN — ETOMIDATE 14 MG: 2 INJECTION, SOLUTION INTRAVENOUS at 06:08

## 2020-08-07 RX ADMIN — PANTOPRAZOLE SODIUM 80 MG: 40 INJECTION, POWDER, FOR SOLUTION INTRAVENOUS at 09:08

## 2020-08-07 RX ADMIN — VASOPRESSIN 1 UNITS: 20 INJECTION INTRAVENOUS at 07:08

## 2020-08-07 RX ADMIN — SUCCINYLCHOLINE CHLORIDE 120 MG: 20 INJECTION, SOLUTION INTRAMUSCULAR; INTRAVENOUS at 06:08

## 2020-08-07 RX ADMIN — VASOPRESSIN 1 UNITS: 20 INJECTION INTRAVENOUS at 06:08

## 2020-08-07 RX ADMIN — POLYETHYLENE GLYCOL 3350, SODIUM SULFATE ANHYDROUS, SODIUM BICARBONATE, SODIUM CHLORIDE, POTASSIUM CHLORIDE 4000 ML: 236; 22.74; 6.74; 5.86; 2.97 POWDER, FOR SOLUTION ORAL at 09:08

## 2020-08-07 RX ADMIN — PHENYLEPHRINE HYDROCHLORIDE 100 MCG: 10 INJECTION INTRAVENOUS at 06:08

## 2020-08-07 RX ADMIN — LIDOCAINE HYDROCHLORIDE 100 MG: 20 INJECTION, SOLUTION INTRAVENOUS at 06:08

## 2020-08-07 RX ADMIN — GLYCOPYRROLATE 0.2 MG: 0.2 INJECTION, SOLUTION INTRAMUSCULAR; INTRAVENOUS at 06:08

## 2020-08-07 RX ADMIN — SODIUM CHLORIDE: 0.9 INJECTION, SOLUTION INTRAVENOUS at 06:08

## 2020-08-07 RX ADMIN — ONDANSETRON 8 MG: 2 INJECTION, SOLUTION INTRAMUSCULAR; INTRAVENOUS at 06:08

## 2020-08-07 RX ADMIN — FENTANYL CITRATE 100 MCG: 50 INJECTION, SOLUTION INTRAMUSCULAR; INTRAVENOUS at 06:08

## 2020-08-07 NOTE — NURSING
Call placed to blood bank to discuss unit number verification of blood. PRBC currently transfusing was assigned under the O negative emergent release blood from earlier in the day. Unit number and all information matched between patient and blood as well as scanned into computer, but blood was assigned under wrong column in computer. Spoke with blood bank. Waiting for return call.

## 2020-08-07 NOTE — PLAN OF CARE
Pt npo for egd today, denies, abd pain,nausea, sob or chest pain,heart rate 100 st not sure if I hear a gallop, dec breath sounds bases, greater rt, latosha rt post upper lobe, has not been coughing and bringing up sputum since last night,abd distended , distended into left lat abd,  semi firm, no pain or tenderness with palpitation,

## 2020-08-07 NOTE — PLAN OF CARE
Dr Monge by, informed of status, talked with pt, stated the procedure he would do today would be the same as he had in [place], and wants to wait until Dr Espinal can do the balloon enterostomy on Monday pt agrees, stated pt can eat

## 2020-08-07 NOTE — NURSING
"Patient was given an unmatched unit of blood at [facility] ED in which charting was incomplete. 2 units of blood were assigned to be given at [facility] but first blood had to be cross-matched at [facility] and sent over. Upon arrival, all patient identifiers were correct and unit number of blood was correct. When scanned in, it automatically assigned the current unit in EPIC under the old unmatched O-negative column. Current unit infusing [id number] should be assigned under the first released "Transfuse RBC 2 Unit" column.     Call placed to [place] (House supervisor) who is aware. Call placed to IT and work order placed. Current unit will be documented on via paper charting using Epic downtime paperwork. See paper chart for details.   "

## 2020-08-07 NOTE — NURSING
Called and spoke to  Dr. Cindy Bartholomew.  Updated MD on status of blood product infusion.  Stated pt still tachycardic, hypotensive and lethargic. See new orders.

## 2020-08-07 NOTE — SUBJECTIVE & OBJECTIVE
Interval History: Patient's MAPs 65-67 while waiting for blood transfusion last night, which improved when given 1L NS bolus. Patient received 2 units of blood. There were concerns for an acute hemolytic transfusion reaction overnight but workup negative.     Review of Systems   Constitutional: Positive for fatigue. Negative for chills, diaphoresis and fever.   HENT: Negative.    Eyes: Negative for visual disturbance.   Respiratory: Negative.  Negative for cough and shortness of breath.    Cardiovascular: Negative for chest pain and palpitations.   Gastrointestinal: Negative for abdominal pain, blood in stool, constipation, diarrhea, nausea, rectal pain and vomiting.   Genitourinary: Negative for hematuria.   Musculoskeletal: Negative for arthralgias.   Skin: Positive for pallor.   Allergic/Immunologic: Negative.    Neurological: Negative for dizziness, light-headedness and headaches.   Hematological: Negative.    Psychiatric/Behavioral: Negative.      Objective:     Vital Signs (Most Recent):  Temp: 98.2 °F (36.8 °C) (08/07/20 0731)  Pulse: 96 (08/07/20 0845)  Resp: (!) 24 (08/07/20 0845)  BP: (!) 103/55 (08/07/20 0830)  SpO2: (!) 93 % (08/07/20 0845) Vital Signs (24h Range):  Temp:  [97.4 °F (36.3 °C)-98.4 °F (36.9 °C)] 98.2 °F (36.8 °C)  Pulse:  [] 96  Resp:  [16-31] 24  SpO2:  [91 %-100 %] 93 %  BP: ()/(47-89) 103/55     Weight: 97.5 kg (215 lb)  Body mass index is 29.16 kg/m².    Intake/Output Summary (Last 24 hours) at 8/7/2020 0903  Last data filed at 8/7/2020 0445  Gross per 24 hour   Intake 2819 ml   Output 650 ml   Net 2169 ml      Physical Exam  Constitutional:       General: He is not in acute distress.     Appearance: Normal appearance. He is normal weight.   HENT:      Head: Normocephalic and atraumatic.      Right Ear: External ear normal.      Left Ear: External ear normal.      Nose: Nose normal.      Mouth/Throat:      Mouth: Mucous membranes are moist.      Pharynx: Oropharynx is  clear.      Comments: Top dentures in place  Eyes:      Extraocular Movements: Extraocular movements intact.      Conjunctiva/sclera: Conjunctivae normal.      Pupils: Pupils are equal, round, and reactive to light.   Neck:      Musculoskeletal: Neck supple.   Cardiovascular:      Rate and Rhythm: Regular rhythm. Tachycardia present.      Heart sounds: Murmur present.   Pulmonary:      Effort: Pulmonary effort is normal.      Breath sounds: Normal breath sounds.   Abdominal:      General: Abdomen is flat. Bowel sounds are normal. There is distension.      Palpations: Abdomen is soft.      Tenderness: There is no abdominal tenderness.   Musculoskeletal: Normal range of motion.   Skin:     General: Skin is warm and dry.      Coloration: Skin is pale.   Neurological:      General: No focal deficit present.      Mental Status: He is alert and oriented to person, place, and time. Mental status is at baseline.         Significant Labs:   CBC:   Recent Labs   Lab 08/06/20 2015 08/06/20 2228 08/07/20  0642   WBC 28.26* 22.94* 17.08*   HGB 5.1* 4.4* 6.6*   HCT 17.7* 14.9* 21.5*    199 174     CMP:   Recent Labs   Lab 08/06/20  1507 08/06/20 2015 08/06/20 2228 08/07/20  0642    136  --  136   K 4.5 4.5  --  4.3    108  --  109   CO2 22* 23  --  22*   * 119*  --  101   BUN 35* 36*  --  31*   CREATININE 1.3 1.3  --  1.3   CALCIUM 7.9* 7.7*  --  7.8*   PROT 5.3* 5.2*  --  4.9*   ALBUMIN 2.8* 2.7*  --  2.6*   BILITOT 2.6* 1.9* 1.1* 1.7*   ALKPHOS 45* 43*  --  40*   AST 17 18  --  16   ALT 13 12  --  11   ANIONGAP 6* 5*  --  5*   EGFRNONAA 58* 58*  --  58*     Lactic Acid:   Recent Labs   Lab 08/06/20  2155   LACTATE 1.7     All pertinent labs within the past 24 hours have been reviewed.    Significant Imaging: I have reviewed and interpreted all pertinent imaging results/findings within the past 24 hours.   X-Ray Chest AP Portable   Final Result      Normal study.         Electronically signed  by: Long Herman MD   Date:    08/06/2020   Time:    10:46      CT Head Without Contrast   Final Result      1. There is no evidence of an acute ischemic event.   2. There is no intracranial hemorrhage.   3. There is mild partial opacification of the left ethmoidal the left sphenoidal sinuses.  There is mild partial opacification of the maxillary sinuses bilaterally.  This is characteristic of sinusitis.   4. There is mild partial opacification of the left mastoid air cells.   All CT scans at this facility use dose modulation, iterative reconstruction, and/or weight base dosing when appropriate to reduce radiation dose when appropriate to reduce radiation dose to as low as reasonably achievable.         Electronically signed by: Long Herman MD   Date:    08/06/2020   Time:    10:44

## 2020-08-07 NOTE — HOSPITAL COURSE
In the ICU, patient received 1L NS bolus for MAPS 65-67, which improved >65. Patient received a total of 4 units of blood during his hospital course thus far. H/H stable ~7/25. Xarelto held during hospital course. Only 1 dose given for VCE study. GI performed a colonoscopy, DBE, and VCE unrevealing.  Patient's anemia likely related to intermittent bleeding from angioectasias vs. Dieulafoy lesion in which bleeding has been exacerbated by Xarelto therapy for DVT/PTE. Patient potential for withdrawal of Xarelto with IVC filter placement. GI recommends a 6 month trial of sandostatin monthly injections if his anemia failed to improve off Xarelto. Follow up iron stores and plan parenteral iron if indicated. Future management may include CTA vs. Emergent repeat video capsule study if bleeding recurs followed by repeat DBE based upon CTA/VCE findings. Patient tolerated GI workup, H/H stable, and stepped down to the floor. Patient tolerating PO intake and PT/OT. IVC placed 8/10, tolerated procedure well. Stable for D/C with no anticoagulation and no asprin, close follow up with Dr. Gonzales and Dr. Matson.

## 2020-08-07 NOTE — ASSESSMENT & PLAN NOTE
-Patient H/H of 6.6/21.5 this am  -Receiving 4th total unit this AM, will check H/H, will notify GI so may go to endo  -Patient stable, mildly tachycardic, denies any symptoms at present  -Continue to monitor

## 2020-08-07 NOTE — PT/OT/SLP PROGRESS
Occupational Therapy      Patient Name:  Miky Esqueda   MRN:  [id number]    Patient not seen today secondary to Other (Comment)(nursing hold at this time 2/2 low H&H and getting blood). Will follow-up .    2nd attempt @ 4681--nurse reports pt about to go for EGD.    Nato Cervantes, OT  8/7/2020

## 2020-08-07 NOTE — PLAN OF CARE
Pt repositioned, set up for clear liquids, pt's bp now lower 90's mean 65-67, will need to monitor, no active bleeding noted, denies abd pain remains distended, semi firm, no chest pain or sob

## 2020-08-07 NOTE — PLAN OF CARE
Dr Abad phoned, Dr Espinal can do the procedure today at 1800, pt to remain npo, told gave him some water to drink, did not eat, pt and wife informed

## 2020-08-07 NOTE — ANESTHESIA PROCEDURE NOTES
Intubation  Performed by: Thi Samuels CRNA  Authorized by: Trent Neri MD     Intubation:     Induction:  Intravenous    Intubated:  Postinduction    Mask Ventilation:  Easy mask    Attempts:  1    Attempted By:  CRNA    Method of Intubation:  Direct    Blade:  Marlene 3    Laryngeal View Grade: Grade I - full view of chords      Difficult Airway Encountered?: No      Complications:  None    Airway Device Size:  7.5    Style/Cuff Inflation:  Cuffed    Inflation Amount (mL):  5    Tube secured:  23    Placement Verified By:  Capnometry    Complicating Factors:  None

## 2020-08-07 NOTE — PLAN OF CARE
Recommendation:   1. Initiate Clear Liquid diet when medically acceptable.   2. Add Boost Breeze when diet started.   3. RD to follow to monitor po intake    Goals:   Diet to be started by RD follow up  Nutrition Goal Status: new  Communication of RD Recs: reviewed with RN(Bernadette)

## 2020-08-07 NOTE — CONSULTS
[facility] ICU 5th Floor  Adult Nutrition  Consult Note    SUMMARY     Recommendations    Recommendation:   1. Initiate Clear Liquid diet when medically acceptable.   2. Add Boost Breeze when diet started.   3. RD to follow to monitor po intake    Goals:   Diet to be started by RD follow up  Nutrition Goal Status: new  Communication of RD Recs: reviewed with RN(Bernadette)    Reason for Assessment  Reason For Assessment: consult(poor appetite, weak)  Diagnosis: (symptomaatic anemia)  Relevant Medical History: iron deficiency anemia, DVT, stroke  General Information Comments: Pt NPO for EGD today to eval GI bleed. Pt asleep at visit. NFPE completed today 8/7-appears nourished at this time.   Nutrition Discharge Planning: d/c diet to be determined    Nutrition Risk Screen  Nutrition Risk Screen: other (see comments)(decreased appetite when not feeling well, exp this week)    Nutrition/Diet History  Food Preferences: no Anabaptism or cultural food prefs identified  Spiritual, Cultural Beliefs, Sabianism Practices, Values that Affect Care: yes  Factors Affecting Nutritional Intake: NPO    Anthropometrics  Temp: 98.4 °F (36.9 °C)  Height Method: Estimated  Height: 6' (182.9 cm)  Height (inches): 72 in  Weight Method: Bed Scale  Weight: 97.5 kg (214 lb 15.2 oz)  Weight (lb): 214.95 lb  Ideal Body Weight (IBW), Male: 178 lb  % Ideal Body Weight, Male (lb): 120.76 %  BMI (Calculated): 29.1  BMI Grade: 25 - 29.9 - overweight     Lab/Procedures/Meds  Pertinent Labs Reviewed: reviewed  Pertinent Labs Comments: BUN 31H, Ca 7.8L, Alb 2.6L  Pertinent Medications Reviewed: reviewed  Pertinent Medications Comments: pantoprazole    Estimated/Assessed Needs  Weight Used For Calorie Calculations: 97.5 kg (214 lb 15.2 oz)  Energy Calorie Requirements (kcal): 2350  Energy Need Method: [place](x1.3)  Protein Requirements: 78g (0.8g/kg)  Weight Used For Protein Calculations: 97.5 kg (214 lb 15.2 oz)  Estimated  Fluid Requirement Method: RDA Method  RDA Method (mL): 2350     Nutrition Prescription Ordered  Current Diet Order: NPO    Evaluation of Received Nutrient/Fluid Intake  I/O: 2819/650  Energy Calories Required: not meeting needs  Protein Required: not meeting needs  Fluid Required: not meeting needs  Comments: LBM 8/4  % Intake of Estimated Energy Needs: Other: NPO  % Meal Intake: NPO    Nutrition Risk  Level of Risk/Frequency of Follow-up: (2xweekly)     Assessment and Plan  * Symptomatic anemia  Contributing Nutrition Diagnosis  Altered GI Function    Related to (etiology):   GI bleed    Signs and Symptoms (as evidenced by):   NPO     Interventions:  Collaboration with other providers    Nutrition Diagnosis Status:   New      Monitor and Evaluation  Food and Nutrient Intake: food and beverage intake  Food and Nutrient Adminstration: diet order  Physical Activity and Function: nutrition-related ADLs and IADLs  Anthropometric Measurements: weight  Biochemical Data, Medical Tests and Procedures: electrolyte and renal panel  Nutrition-Focused Physical Findings: overall appearance     Malnutrition Assessment  Subcutaneous Fat Loss (Final Summary): well nourished  Muscle Loss Evaluation (Final Summary): well nourished       Nutrition Follow-Up  RD Follow-up?: Yes

## 2020-08-07 NOTE — EICU
Called about automated blood smear positive for schistocytes, WBC elevation from 13K to 29K after  PRBC transfusion, concern for  AHTR.  -Schistocytes were noted to be present on CBC prior to transfusion.  -There is no signs or symptoms concerning for AHTR or any other type of transfusion reaction at the moment  - There are no obvious signs of infection.  Plan: Send manual blood smear, check Haptoglobin, LDH, retic, fractionated bili  Ok to continue PRBC transfusion, awaiting PRBC from   Monitor closely for signs of infection, check UA, hold off on ab for now, low threshold to initiating ab in case of developing hemodynamic instability.  D/w Resident.

## 2020-08-07 NOTE — ASSESSMENT & PLAN NOTE
-Patient on Xarelto at home, will hold in setting of acute suspected GI bleed  -Monitor closely for SOB, DVT. SCDs placed.  - Consider heparin drip after procedure today

## 2020-08-07 NOTE — PLAN OF CARE
Dr Draper by notified that pt has antibody and blood will be delayed, bld to be sent to main lab,

## 2020-08-07 NOTE — ANESTHESIA PREPROCEDURE EVALUATION
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     Past Medical History:   Diagnosis Date    Bilateral pulmonary embolism 5/25/2020    History of DVT (deep vein thrombosis) 6/12/2020    Iron deficiency anemia     Stroke      Past Surgical History:   Procedure Laterality Date    COLONOSCOPY N/A 6/15/2020    Procedure: COLONOSCOPY;  Surgeon: Brown oG MD;  Location: [facility];  Service: Endoscopy;  Laterality: N/A;    ESOPHAGOGASTRODUODENOSCOPY N/A 6/15/2020    Procedure: EGD (ESOPHAGOGASTRODUODENOSCOPY);  Surgeon: Brown Go MD;  Location: [facility];  Service: Endoscopy;  Laterality: N/A;    INTRALUMINAL GASTROINTESTINAL TRACT IMAGING VIA CAPSULE N/A 7/13/2020    Procedure: IMAGING PROCEDURE, GI TRACT, INTRALUMINAL, VIA CAPSULE;  Surgeon: Brown Go MD;  Location: [facility];  Service: Endoscopy;  Laterality: N/A;     Review of patient's allergies indicates:  No Known Allergies      Anesthesia Evaluation    I have reviewed the Patient Summary Reports.    I have reviewed the Nursing Notes. I have reviewed the NPO Status.   I have reviewed the Medications.     Review of Systems  Anesthesia Hx:   Denies Personal Hx of Anesthesia complications.   Social:  Former Smoker    Hematology/Oncology:         -- Anemia:   Cardiovascular:   PVD ECG has been reviewed. Hx of bilateral PE and DVT, on xarelto ; carotid stenosis   Neurological:   CVA        Physical Exam  General:  Well nourished    Airway/Jaw/Neck:  Airway Findings: Mouth Opening: Normal Tongue: Normal  General Airway Assessment: Adult  Mallampati: III  TM Distance: Normal, at least 6 cm  Jaw/Neck Findings:  Neck ROM: Normal ROM      Dental:  Dental Findings: Upper Dentures   Chest/Lungs:  Chest/Lungs Findings:  Clear to auscultation, Normal Respiratory Rate     Heart/Vascular:  Heart Findings: Rate: Tachycardia  Rhythm: Regular Rhythm        Mental Status:  Mental Status Findings:  Alert and Oriented       Lab Results   Component Value Date    WBC 17.08 (H) 08/07/2020    HGB 6.6 (L) 08/07/2020    HCT 21.5 (L) 08/07/2020     08/07/2020    CHOL 177 06/12/2020    TRIG 89 06/12/2020    HDL 41 06/12/2020    ALT 11 08/07/2020    AST 16 08/07/2020     08/07/2020    K 4.3 08/07/2020     08/07/2020    CREATININE 1.3 08/07/2020    BUN 31 (H) 08/07/2020    CO2 22 (L) 08/07/2020    TSH 1.871 08/06/2020    PSA 0.69 07/16/2020    INR 1.1 08/06/2020    HGBA1C <4.0 (A) 08/06/2020     TTE 6/13/2020  Conclusion    · There is no evidence of intracardiac shunting.  · Left ventricular systolic function. The estimated ejection fraction is 60%.  · Grade I (mild) left ventricular diastolic dysfunction consistent with impaired relaxation.  · Normal right ventricular systolic function.  · Echo is not diagnostic for an intracardiac shunt; most likely artifact, will repeat in am.         Anesthesia Plan  Type of Anesthesia, risks & benefits discussed:  Anesthesia Type:  MAC, general  Patient's Preference:   Intra-op Monitoring Plan: standard ASA monitors  Intra-op Monitoring Plan Comments:   Post Op Pain Control Plan:   Post Op Pain Control Plan Comments:   Induction:   IV  Beta Blocker:         Informed Consent: Patient understands risks and agrees with Anesthesia plan.  Questions answered. Anesthesia consent signed with patient.  ASA Score: 3     Day of Surgery Review of History & Physical:        Anesthesia Plan Notes: Hemoglobin <7. Awaiting transfusion.         Ready For Surgery From Anesthesia Perspective.

## 2020-08-07 NOTE — ASSESSMENT & PLAN NOTE
-Patient PE in 5/25  -Was on Xarelto at home, will hold in setting of severe anemia with suspected GI bleed  -Continue to monitor  -Consider heparin drip after procedure

## 2020-08-07 NOTE — PLAN OF CARE
Awaiting blood, for transfusion,pt has antibodies, blood spec to main camp ,  call bell easy reach, side rails up x3, bed alarm, on, pt made comfortable, repositioning in bed, heels elevated off bed, foam to coccyx area, for protection, pt informed of plan of care, clear liquids now, but needs to be npo past midnight for egd in am, pt understands

## 2020-08-07 NOTE — PLAN OF CARE
Pt to have double balloon endoscopy this evening at 1800 with Dr Espinal, pt rec one unit of blood this am, h and h up 7.5 22.5, cbc continues q 4hrs, had one soft mushy stool today, pt abi being up to bsc without difficulty, dk black, brown, no blood noted, no dizziness, weakness, pt remains npo until past procedure, bp still running 90's low 100's, , tachycardia at times, pt instructed to call for assistance at all times, call bell easy reach, side rails up x3, bed low position,

## 2020-08-07 NOTE — PT/OT/SLP PROGRESS
Physical Therapy      Patient Name:  Miky Esqueda   MRN:  [id number]  [id number]  Patient not seen today secondary to (Patient about to go to EGD; earlier in am, pt with low H/H and getting blood). Will follow-up as able    Asya Angel, PT   8/7/2020

## 2020-08-07 NOTE — PROGRESS NOTES
[facility] ICU 5th Floor  Hospital Medicine  Progress Note    Patient Name: Miky Esqueda  MRN: [id number]  Patient Class: IP- Inpatient   Admission Date: 8/6/2020  Length of Stay: 1 days  Attending Physician: Guillermina Loredo MD  Primary Care Provider: Farhat Matson MD        Subjective:     Principal Problem:Symptomatic anemia        HPI:  Mr. 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 Patient was at PCP's office when he experienced an episode of syncope for a sick visit. Patient had been feeling fatigued and lightheaded for the past few days. Per ED note, he had two episodes of LOC at the clinic in which the first event lasted approximately 25 seconds and the second lasting 15 seconds. In between episodes he had an occurrence of non-bloody, non-bilious emesis, in which the second LOC subsequently followed the event. During the second episode, his eyes rolled back and exhibited seizure-like activity with generalized shaking. He was hypotensive, diaphoretic, and pale. On arrival he was alert and oriented and reported weakness and headache with blurry vision. Denied focal weakness or numbness.    Previous workup include an endoscopic workup including upper and lower endoscopy 6/2020 showing one gastric AVM and H.pylori on biopsy, as well as two colon polyps. He again underwent EGD at [facility] which showed a jejunal AVM which was unable to be reached for treatment and one colon polyp. A VCE was then done at [facility] 7/16/2020 showing 2 proximal AVMs that may have been the source of bleeding. The patient was then referred to Dr. Espinal with [facility] for further evaluation with an upper single balloon enteroscopy at the time.    Per interview with the patient, he confirms that the reported series of events have to his admission today. He reports no trauma to the head.  Last bowel movement was a day or two ago and reports it was soft and brown. Denies any trials of new foods or sick contacts. Denies chest pain, abdominal pain, hematuria, hematochezia. Denies weakness or numbness.    In the ED, BP 90/50, H/H 4.6/17.1, Bun/Cr 34/1.35, positive for occult blood in stool, Covid negative, normal cxr. CT head shows no evidence of an acute ischemic event or intracranial hemorrhage. Xarelto was held. Two large bore IVs started. Patient to be admitted to the ICU for close monitoring. One unit of unmatched blood was transfused and a type and screen was done. GI has been consulted and patient is scheduled for a push enteroscopy vs. upper single balloon tomorrow. Patient NPO at midnight. Family medicine consulted for admission and medical management.    Overview/Hospital Course:  No notes on file    Interval History: Patient's MAPs 65-67 while waiting for blood transfusion last night, which improved when given 1L NS bolus. Patient received 2 units of blood. There were concerns for an acute hemolytic transfusion reaction overnight but workup negative.     Review of Systems   Constitutional: Positive for fatigue. Negative for chills, diaphoresis and fever.   HENT: Negative.    Eyes: Negative for visual disturbance.   Respiratory: Negative.  Negative for cough and shortness of breath.    Cardiovascular: Negative for chest pain and palpitations.   Gastrointestinal: Negative for abdominal pain, blood in stool, constipation, diarrhea, nausea, rectal pain and vomiting.   Genitourinary: Negative for hematuria.   Musculoskeletal: Negative for arthralgias.   Skin: Positive for pallor.   Allergic/Immunologic: Negative.    Neurological: Negative for dizziness, light-headedness and headaches.   Hematological: Negative.    Psychiatric/Behavioral: Negative.      Objective:     Vital Signs (Most Recent):  Temp: 98.2 °F (36.8 °C) (08/07/20 0731)  Pulse: 96 (08/07/20 0845)  Resp: (!) 24 (08/07/20 0845)  BP: (!)  103/55 (08/07/20 0830)  SpO2: (!) 93 % (08/07/20 0845) Vital Signs (24h Range):  Temp:  [97.4 °F (36.3 °C)-98.4 °F (36.9 °C)] 98.2 °F (36.8 °C)  Pulse:  [] 96  Resp:  [16-31] 24  SpO2:  [91 %-100 %] 93 %  BP: ()/(47-89) 103/55     Weight: 97.5 kg (215 lb)  Body mass index is 29.16 kg/m².    Intake/Output Summary (Last 24 hours) at 8/7/2020 0903  Last data filed at 8/7/2020 0445  Gross per 24 hour   Intake 2819 ml   Output 650 ml   Net 2169 ml      Physical Exam  Constitutional:       General: He is not in acute distress.     Appearance: Normal appearance. He is normal weight.   HENT:      Head: Normocephalic and atraumatic.      Right Ear: External ear normal.      Left Ear: External ear normal.      Nose: Nose normal.      Mouth/Throat:      Mouth: Mucous membranes are moist.      Pharynx: Oropharynx is clear.      Comments: Top dentures in place  Eyes:      Extraocular Movements: Extraocular movements intact.      Conjunctiva/sclera: Conjunctivae normal.      Pupils: Pupils are equal, round, and reactive to light.   Neck:      Musculoskeletal: Neck supple.   Cardiovascular:      Rate and Rhythm: Regular rhythm. Tachycardia present.      Heart sounds: Murmur present.   Pulmonary:      Effort: Pulmonary effort is normal.      Breath sounds: Normal breath sounds.   Abdominal:      General: Abdomen is flat. Bowel sounds are normal. There is distension.      Palpations: Abdomen is soft.      Tenderness: There is no abdominal tenderness.   Musculoskeletal: Normal range of motion.   Skin:     General: Skin is warm and dry.      Coloration: Skin is pale.   Neurological:      General: No focal deficit present.      Mental Status: He is alert and oriented to person, place, and time. Mental status is at baseline.         Significant Labs:   CBC:   Recent Labs   Lab 08/06/20 2015 08/06/20  2228 08/07/20  0642   WBC 28.26* 22.94* 17.08*   HGB 5.1* 4.4* 6.6*   HCT 17.7* 14.9* 21.5*    199 174     CMP:    Recent Labs   Lab 08/06/20  1507 08/06/20 2015 08/06/20  2228 08/07/20  0642    136  --  136   K 4.5 4.5  --  4.3    108  --  109   CO2 22* 23  --  22*   * 119*  --  101   BUN 35* 36*  --  31*   CREATININE 1.3 1.3  --  1.3   CALCIUM 7.9* 7.7*  --  7.8*   PROT 5.3* 5.2*  --  4.9*   ALBUMIN 2.8* 2.7*  --  2.6*   BILITOT 2.6* 1.9* 1.1* 1.7*   ALKPHOS 45* 43*  --  40*   AST 17 18  --  16   ALT 13 12  --  11   ANIONGAP 6* 5*  --  5*   EGFRNONAA 58* 58*  --  58*     Lactic Acid:   Recent Labs   Lab 08/06/20  2155   LACTATE 1.7     All pertinent labs within the past 24 hours have been reviewed.    Significant Imaging: I have reviewed and interpreted all pertinent imaging results/findings within the past 24 hours.   X-Ray Chest AP Portable   Final Result      Normal study.         Electronically signed by: Long Herman MD   Date:    08/06/2020   Time:    10:46      CT Head Without Contrast   Final Result      1. There is no evidence of an acute ischemic event.   2. There is no intracranial hemorrhage.   3. There is mild partial opacification of the left ethmoidal the left sphenoidal sinuses.  There is mild partial opacification of the maxillary sinuses bilaterally.  This is characteristic of sinusitis.   4. There is mild partial opacification of the left mastoid air cells.   All CT scans at this facility use dose modulation, iterative reconstruction, and/or weight base dosing when appropriate to reduce radiation dose when appropriate to reduce radiation dose to as low as reasonably achievable.         Electronically signed by: Long Herman MD   Date:    08/06/2020   Time:    10:44              Assessment/Plan:      * Symptomatic anemia  -Patient H/H of 6.6/21.5 this am  -Receiving 4th total unit this AM, will check H/H, will notify GI so may go to endo  -Patient stable, mildly tachycardic, denies any symptoms at present  -Continue to monitor      AV malformation of gastrointestinal  tract  -Endocscopy today after H>7  -Recs appreciated    History of pulmonary embolus (PE)  -Patient PE in 5/25  -Was on Xarelto at home, will hold in setting of severe anemia with suspected GI bleed  -Continue to monitor  -Consider heparin drip after procedure      Right to left cardiac shunt  -Patient on Xarelto at home, will hold in setting of acute suspected GI bleed  -Monitor closely for SOB, DVT. SCDs placed.  - Consider heparin drip after procedure today      VTE Risk Mitigation (From admission, onward)         Ordered     IP VTE HIGH RISK PATIENT  Once      08/06/20 1349     Place sequential compression device  Until discontinued      08/06/20 1349     Place sequential compression device  Until discontinued      08/06/20 1323                      Sylvia Draper MD  Department of Hospital Medicine   [facility] ICU 5th Floor

## 2020-08-07 NOTE — PROGRESS NOTES
[facility] ICU 5th Floor  Critical Care - Medicine  Progress Note    Patient Name: Miky Esqueda  MRN: [id number]  Admission Date: 8/6/2020  Hospital Length of Stay: 1 days  Code Status: Full Code  Attending Provider: Guillermina Loredo MD  Primary Care Provider: Farhat Matson MD   Principal Problem: Symptomatic anemia    Subjective:     HPI: Per GI note  65 y.o. 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 Pylori, SUBHASH, and jejunal AVM who presents w/ 2 days of symptomatic anemia w/ associated anticoagulation, dizziness and w/o diarrhea or rectal bleeding. He reports roughly two days of dizziness and mild nausea for which he was seeing his PCP for earlier today, before being seen he had a syncopal episode while sitting in the wheel chair w/ blurry vision and brief ?LOC followed by several episodes of clear emesis. He denies rectal bleeding, dark stools, or diarrhea and reports his last bm was 48 hours and he thinks was brown and soft. He denies abdominal pain, admits to a few lbs of weight loss over the last few months.     Previously has had endoscopic w/u including upper and lower endoscopy 6/20/20 showing one gastric avm and bx + for H. Pylori, as well as two colon polyps. He again underwent EGD w/ push and colon at [facility] which showed a jejunal AVM which was unable to be reached for treatment and one colon polyp. Subsequent VCE here on 7/16/20 showed 2 proximal AVMs that may have been source of bleeding. He was referred to see. Dr. Espinal for further evaluation w/ an upper single balloon enteroscopy at that time but has not yet been followed up.     On my evaluation of the pt he noted that he hasn't noticed any significant change in his stools recently. Sated he felt in his usual state of health until the last few days when he began to feel progressively weak and then dizzy today. Reports one episode of non bloody, non bilious emesis. Denies any CP, SOB,  headache, fever, chills, diarrhea, abdominal pain.      He is admitted to the ICU for close monitoring and evaluation. Has received 1 unit of unmatched blood, is being type and screened for more blood.     Interval History/Significant Events: Over night pt received 2 more units of blood. There was delay in blood last night 2/2 antibody but pt eventually did receive blood. Labs negative for hemolysis. No episodes N/V/D, bloody bowel movement, CP/SOB over night.     Review of Systems   Negative other than mentioned above  Objective:     Vital Signs (Most Recent):  Temp: 98.2 °F (36.8 °C) (08/07/20 0731)  Pulse: 93 (08/07/20 0745)  Resp: 19 (08/07/20 0745)  BP: (!) 98/56 (08/07/20 0730)  SpO2: 98 % (08/07/20 0745) Vital Signs (24h Range):  Temp:  [97.4 °F (36.3 °C)-98.4 °F (36.9 °C)] 98.2 °F (36.8 °C)  Pulse:  [] 93  Resp:  [16-31] 19  SpO2:  [91 %-100 %] 98 %  BP: ()/(47-89) 98/56     Weight: 97.5 kg (215 lb)  Body mass index is 29.16 kg/m².      Intake/Output Summary (Last 24 hours) at 8/7/2020 0830  Last data filed at 8/7/2020 0445  Gross per 24 hour   Intake 2819 ml   Output 650 ml   Net 2169 ml       Physical Exam  Constitutional:       Appearance: Normal appearance.   HENT:      Head: Normocephalic and atraumatic.      Nose: Nose normal. No congestion.      Mouth/Throat:      Mouth: Mucous membranes are moist.      Pharynx: Oropharynx is clear.   Eyes:      General:         Right eye: No discharge.         Left eye: No discharge.      Pupils: Pupils are equal, round, and reactive to light.   Neck:      Musculoskeletal: Normal range of motion and neck supple.   Cardiovascular:      Rate and Rhythm: Regular rhythm. Tachycardia present.      Pulses: Normal pulses.      Heart sounds: no murmurs present.   Pulmonary:      Effort: Pulmonary effort is normal. No respiratory distress.      Breath sounds: Normal breath sounds.   Abdominal:      General: Abdomen is flat.      Palpations: Abdomen is soft.       Tenderness: There is no abdominal tenderness.   Musculoskeletal:         General: No swelling or tenderness.      Left lower leg: No edema.   Skin:     General: Skin is warm and dry.   Neurological:      General: No focal deficit present.      Mental Status: He is alert and oriented to person, place, and time. Mental status is at baseline.   Psychiatric:         Mood and Affect: Mood normal.         Behavior: Behavior normal.       Vents:  None        Lines/Drains/Airways     Peripheral Intravenous Line                 Peripheral IV - Single Lumen 08/06/20 1016 20 G Right Hand less than 1 day         Peripheral IV - Single Lumen 08/06/20 1022 20 G Left Antecubital less than 1 day                Significant Labs:    CBC/Anemia Profile:  Recent Labs   Lab 08/06/20  1043 08/06/20 2015 08/06/20  2228 08/07/20  0642   WBC  --  28.26* 22.94* 17.08*   HGB  --  5.1* 4.4* 6.6*   HCT  --  17.7* 14.9* 21.5*   PLT  --  211 199 174   MCV  --  91 90 92   RDW  --  18.8* 18.6* 17.0*   OCCULTBLOOD Positive*  --   --   --         Chemistries:  Recent Labs   Lab 08/06/20  1507 08/06/20 2015 08/06/20  2228 08/07/20  0642    136  --  136   K 4.5 4.5  --  4.3    108  --  109   CO2 22* 23  --  22*   BUN 35* 36*  --  31*   CREATININE 1.3 1.3  --  1.3   CALCIUM 7.9* 7.7*  --  7.8*   ALBUMIN 2.8* 2.7*  --  2.6*   PROT 5.3* 5.2*  --  4.9*   BILITOT 2.6* 1.9* 1.1* 1.7*   ALKPHOS 45* 43*  --  40*   ALT 13 12  --  11   AST 17 18  --  16   MG 1.9 1.8  --  1.9   PHOS 2.9 3.0  --  2.9         Assessment/Plan:     Active Diagnoses:    Diagnosis Date Noted POA    PRINCIPAL PROBLEM:  Symptomatic anemia [D64.9] 06/12/2020 Yes    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   - Was previously on ASA 81, would  continue holding   - no acute issues at this time.     Pulm  - sating upper 90's on room air.  Has Hx of smoking from age 11 to a few months ago, denies hx of COPD or use of inhalers.   - has Hx of unprovoked PE/DVT 5/2020, on xarelto. Agree with holding for now   - continue to monitor. If he becomes SOB consider duonebs and NC O2 to sats 88%     CV  - tachycardia, stable.  - no BB      GI/FEN  - history of multiple AVMs and has had upper and lower scope 6/2020 showing gastric AVM as well as colon polys. Had second EGD at [facility] showing jejunal AVM and colon polyp. VCE on 7/16 showed 2 proximal AVMs. He had recently been referred to see Dr. Espinal for upper single balloon enteroscopy but hadn't had follow up yet.   - GI is consulted, agree with their recs of trend h/h, transfuse to >7, hold xarelto, NPO now  - H/H 6.6 this morning after total of 3 units.   - Recommend another unit now before GI scope  - he was also on ASA 81, would continue to hold   - GI is planning scope in morning.      Heme/ID  - no infectious processes suspected at this time.   - anemia with H of 4.6.  - normal MCV  - there was concern for transfusion reaction over night, haptoglobin, LDH, fibrinogen all normal.      Renal  - Cr 1.35 at baseline, no acute issues.      PPx: SCDs      Critical secondary to Patient has a condition that poses threat to life and bodily function: GI bleed      Critical care was time spent personally by me on the following activities: development of treatment plan with patient or surrogate and bedside caregivers, discussions with consultants, evaluation of patient's response to treatment, examination of patient, ordering and performing treatments and interventions, ordering and review of laboratory studies, ordering and review of radiographic studies, pulse oximetry, re-evaluation of patient's condition.  This critical care time did not overlap with that of any other provider or involve time for any procedures.      Alexandru Rosenberg MD, PGY1  Critical Care - Medicine  [facility] ICU 5th Floor

## 2020-08-07 NOTE — NURSING
Called and spoke to Ekta in Blood bank for ETA on blood for transfusion. States they are awaiting additional testing to be performed at main campus before they can release PRBC. States to call back at 2200 for blood status.

## 2020-08-07 NOTE — PLAN OF CARE
Spoke to md perez poss keep ahead for blood, stated that if he bleeds will follow  The protocol for massive blood infusion at that time, but will get h and h past this unit of blood,and then decide

## 2020-08-07 NOTE — NURSING
New critical H/H results reported to Dr. Cindy Bartholomew. MD aware. See results for details. Still awaiting PRBC from blood bank. Will continue to monitor.

## 2020-08-07 NOTE — PROGRESS NOTES
[facility]    Interval History: Patient with recorded hypotension overnight. S/p 3 units this morning. Stats he feels well no N/V/D, melena, blood in stool. Had no bowel movements.    Past Medical History  Past Medical History:   Diagnosis Date    Bilateral pulmonary embolism 5/25/2020    History of DVT (deep vein thrombosis) 6/12/2020    Iron deficiency anemia     Stroke        Review of Systems  General ROS: negative for chills, fever or weight loss  Cardiovascular ROS: no chest pain or dyspnea on exertion      Physical Examination  BP (!) 88/51   Pulse 89   Temp 98.4 °F (36.9 °C) (Oral)   Resp 16   Ht 6' (1.829 m)   Wt 97.5 kg (215 lb)   SpO2 99%   BMI 29.16 kg/m²   General appearance: alert, cooperative, no distress  HENT: Normocephalic, atraumatic, neck symmetrical, no nasal discharge   Lungs: clear to auscultation bilaterally, no dullness to percussion bilaterally  Heart: regular rate and rhythm without rub; no displacement of the PMI   Abdomen: soft, non-tender; bowel sounds normoactive; no organomegaly  Extremities: extremities symmetric; no clubbing, cyanosis, or edema    Labs:  Recent Labs   Lab 08/07/20  0642   WBC 17.08*   RBC 2.35*   HGB 6.6*   HCT 21.5*      MCV 92   MCH 28.1   MCHC 30.7*       Assessment:   65 y.o. 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 Pylori, SUBHASH, and jejunal AVM who presents w/ 2 days of symptomatic anemia w/ associated anticoagulation, dizziness and w/o diarrhea or rectal bleeding. Hgb 4.6 from 8 at last check, denies rectal bleeding but has melena on exam and had ?syncopal event w/ subsequent clear emesis thereafter. Has had extensive w/u detailed above but most recently referred for upper single balloon for two proximal AVMs found on VCE, however this had not yet been performed.    Plan:  - Maintain 2 large bore IVs. Transfuse to goal >7. 1u given this AM  - Continue to hold anticoagulation.  - Hypotensive  overnight requiring IV fluids but no pressors. Maintain normotension.  - OK by anesthesia for scope today. Plan for DBE at 6 pm. Please keep NPO. Plan of care note to follow with findings.  (Communicated updates with primary team member Dr Draper)

## 2020-08-07 NOTE — ASSESSMENT & PLAN NOTE
Contributing Nutrition Diagnosis  Altered GI Function    Related to (etiology):   GI bleed    Signs and Symptoms (as evidenced by):   NPO     Interventions:  Collaboration with other providers    Nutrition Diagnosis Status:   Improving (on Cardiac diet)

## 2020-08-07 NOTE — PLAN OF CARE
Pt had to have bm, wants up to bsc, pt assisted to side of bed, no dizziness, or weakness, bp 128/78 pulse up; slightly to 98 st, up to bsc, feels fine, bp 132 sys sitting      Pt had large, soft, mushy stool, dk black, brown, no blood noted, pt cleaned,and assisted back to bed , abd slightly less firm, no pain, no weakness, dizziness, felt fine

## 2020-08-07 NOTE — CARE UPDATE
Received call from RN regarding delay in obtaining blood products due to antibody testing - ETA ~11:00PM. Pt hypotensive with MAP ~65 and tachycardic. Will bolus with 1L NS while awaiting blood from main campus.     Cindy Bartholomew MD  [facility] PGY-2  08/06/2020

## 2020-08-07 NOTE — NURSING
Pt AAOx4, opens eyes spontaneously, and able to follow commands. Pt remained afebrile. RR 16-26 breaths per minute. Pulse ox measuring > 95% on room air. Oral suctioning provided PRN. Pt able to cough up secretions. 2 units of PRBC's transfused. 1 liter NS bolus transfused. Pt repositioned Q2H with use of pillows. Heels floated off mattress at all times.Tubing and other devices remain free from under the patient. Pt voided per urinal throughout shift. Frequent rounds made to assess for safety and discomfort, fall precautions maintained. Call bell within reach. Will continue to monitor

## 2020-08-08 ENCOUNTER — ANESTHESIA (OUTPATIENT)
Dept: ENDOSCOPY | Facility: HOSPITAL | Age: 65
DRG: 357 | End: 2020-08-08
Payer: COMMERCIAL

## 2020-08-08 LAB
ALBUMIN SERPL BCP-MCNC: 2.6 G/DL (ref 3.5–5.2)
ALP SERPL-CCNC: 41 U/L (ref 55–135)
ALT SERPL W/O P-5'-P-CCNC: 10 U/L (ref 10–44)
ANION GAP SERPL CALC-SCNC: 4 MMOL/L (ref 8–16)
AST SERPL-CCNC: 17 U/L (ref 10–40)
BASOPHILS # BLD AUTO: 0.03 K/UL (ref 0–0.2)
BASOPHILS # BLD AUTO: 0.04 K/UL (ref 0–0.2)
BASOPHILS # BLD AUTO: 0.04 K/UL (ref 0–0.2)
BASOPHILS NFR BLD: 0.2 % (ref 0–1.9)
BASOPHILS NFR BLD: 0.3 % (ref 0–1.9)
BILIRUB SERPL-MCNC: 0.8 MG/DL (ref 0.1–1)
BUN SERPL-MCNC: 22 MG/DL (ref 8–23)
CALCIUM SERPL-MCNC: 7.7 MG/DL (ref 8.7–10.5)
CHLORIDE SERPL-SCNC: 109 MMOL/L (ref 95–110)
CO2 SERPL-SCNC: 24 MMOL/L (ref 23–29)
CREAT SERPL-MCNC: 1.3 MG/DL (ref 0.5–1.4)
DIFFERENTIAL METHOD: ABNORMAL
EOSINOPHIL # BLD AUTO: 0.1 K/UL (ref 0–0.5)
EOSINOPHIL NFR BLD: 0.5 % (ref 0–8)
EOSINOPHIL NFR BLD: 0.6 % (ref 0–8)
EOSINOPHIL NFR BLD: 0.8 % (ref 0–8)
EOSINOPHIL NFR BLD: 1.2 % (ref 0–8)
ERYTHROCYTE [DISTWIDTH] IN BLOOD BY AUTOMATED COUNT: 16 % (ref 11.5–14.5)
ERYTHROCYTE [DISTWIDTH] IN BLOOD BY AUTOMATED COUNT: 16 % (ref 11.5–14.5)
ERYTHROCYTE [DISTWIDTH] IN BLOOD BY AUTOMATED COUNT: 16.1 % (ref 11.5–14.5)
ERYTHROCYTE [DISTWIDTH] IN BLOOD BY AUTOMATED COUNT: 16.2 % (ref 11.5–14.5)
ERYTHROCYTE [DISTWIDTH] IN BLOOD BY AUTOMATED COUNT: 16.3 % (ref 11.5–14.5)
ERYTHROCYTE [DISTWIDTH] IN BLOOD BY AUTOMATED COUNT: 16.3 % (ref 11.5–14.5)
EST. GFR  (AFRICAN AMERICAN): >60 ML/MIN/1.73 M^2
EST. GFR  (NON AFRICAN AMERICAN): 58 ML/MIN/1.73 M^2
GLUCOSE SERPL-MCNC: 102 MG/DL (ref 70–110)
HCT VFR BLD AUTO: 21.6 % (ref 40–54)
HCT VFR BLD AUTO: 23 % (ref 40–54)
HCT VFR BLD AUTO: 23.1 % (ref 40–54)
HCT VFR BLD AUTO: 23.3 % (ref 40–54)
HCT VFR BLD AUTO: 24.9 % (ref 40–54)
HCT VFR BLD AUTO: 26.4 % (ref 40–54)
HGB BLD-MCNC: 7 G/DL (ref 14–18)
HGB BLD-MCNC: 7.1 G/DL (ref 14–18)
HGB BLD-MCNC: 7.4 G/DL (ref 14–18)
HGB BLD-MCNC: 7.4 G/DL (ref 14–18)
HGB BLD-MCNC: 7.7 G/DL (ref 14–18)
HGB BLD-MCNC: 8.3 G/DL (ref 14–18)
IMM GRANULOCYTES # BLD AUTO: 0.05 K/UL (ref 0–0.04)
IMM GRANULOCYTES # BLD AUTO: 0.06 K/UL (ref 0–0.04)
IMM GRANULOCYTES # BLD AUTO: 0.06 K/UL (ref 0–0.04)
IMM GRANULOCYTES # BLD AUTO: 0.09 K/UL (ref 0–0.04)
IMM GRANULOCYTES # BLD AUTO: 0.09 K/UL (ref 0–0.04)
IMM GRANULOCYTES # BLD AUTO: 0.18 K/UL (ref 0–0.04)
IMM GRANULOCYTES NFR BLD AUTO: 0.4 % (ref 0–0.5)
IMM GRANULOCYTES NFR BLD AUTO: 0.5 % (ref 0–0.5)
IMM GRANULOCYTES NFR BLD AUTO: 0.6 % (ref 0–0.5)
IMM GRANULOCYTES NFR BLD AUTO: 0.6 % (ref 0–0.5)
IMM GRANULOCYTES NFR BLD AUTO: 0.7 % (ref 0–0.5)
IMM GRANULOCYTES NFR BLD AUTO: 1.4 % (ref 0–0.5)
LYMPHOCYTES # BLD AUTO: 1.3 K/UL (ref 1–4.8)
LYMPHOCYTES # BLD AUTO: 1.4 K/UL (ref 1–4.8)
LYMPHOCYTES # BLD AUTO: 1.4 K/UL (ref 1–4.8)
LYMPHOCYTES # BLD AUTO: 1.5 K/UL (ref 1–4.8)
LYMPHOCYTES # BLD AUTO: 1.5 K/UL (ref 1–4.8)
LYMPHOCYTES # BLD AUTO: 1.8 K/UL (ref 1–4.8)
LYMPHOCYTES NFR BLD: 10.9 % (ref 18–48)
LYMPHOCYTES NFR BLD: 11.3 % (ref 18–48)
LYMPHOCYTES NFR BLD: 11.7 % (ref 18–48)
LYMPHOCYTES NFR BLD: 11.8 % (ref 18–48)
LYMPHOCYTES NFR BLD: 12.4 % (ref 18–48)
LYMPHOCYTES NFR BLD: 12.5 % (ref 18–48)
MAGNESIUM SERPL-MCNC: 1.9 MG/DL (ref 1.6–2.6)
MCH RBC QN AUTO: 28.2 PG (ref 27–31)
MCH RBC QN AUTO: 28.2 PG (ref 27–31)
MCH RBC QN AUTO: 28.3 PG (ref 27–31)
MCH RBC QN AUTO: 28.6 PG (ref 27–31)
MCH RBC QN AUTO: 28.7 PG (ref 27–31)
MCH RBC QN AUTO: 28.9 PG (ref 27–31)
MCHC RBC AUTO-ENTMCNC: 30.9 G/DL (ref 32–36)
MCHC RBC AUTO-ENTMCNC: 30.9 G/DL (ref 32–36)
MCHC RBC AUTO-ENTMCNC: 31.4 G/DL (ref 32–36)
MCHC RBC AUTO-ENTMCNC: 31.8 G/DL (ref 32–36)
MCHC RBC AUTO-ENTMCNC: 32 G/DL (ref 32–36)
MCHC RBC AUTO-ENTMCNC: 32.4 G/DL (ref 32–36)
MCV RBC AUTO: 89 FL (ref 82–98)
MCV RBC AUTO: 90 FL (ref 82–98)
MCV RBC AUTO: 91 FL (ref 82–98)
MCV RBC AUTO: 92 FL (ref 82–98)
MONOCYTES # BLD AUTO: 1.2 K/UL (ref 0.3–1)
MONOCYTES # BLD AUTO: 1.4 K/UL (ref 0.3–1)
MONOCYTES # BLD AUTO: 1.5 K/UL (ref 0.3–1)
MONOCYTES NFR BLD: 10 % (ref 4–15)
MONOCYTES NFR BLD: 10.1 % (ref 4–15)
MONOCYTES NFR BLD: 11.2 % (ref 4–15)
MONOCYTES NFR BLD: 11.6 % (ref 4–15)
MONOCYTES NFR BLD: 9.2 % (ref 4–15)
MONOCYTES NFR BLD: 9.5 % (ref 4–15)
NEUTROPHILS # BLD AUTO: 10.1 K/UL (ref 1.8–7.7)
NEUTROPHILS # BLD AUTO: 12 K/UL (ref 1.8–7.7)
NEUTROPHILS # BLD AUTO: 7.7 K/UL (ref 1.8–7.7)
NEUTROPHILS # BLD AUTO: 9 K/UL (ref 1.8–7.7)
NEUTROPHILS # BLD AUTO: 9.2 K/UL (ref 1.8–7.7)
NEUTROPHILS # BLD AUTO: 9.9 K/UL (ref 1.8–7.7)
NEUTROPHILS NFR BLD: 73.8 % (ref 38–73)
NEUTROPHILS NFR BLD: 75.1 % (ref 38–73)
NEUTROPHILS NFR BLD: 76 % (ref 38–73)
NEUTROPHILS NFR BLD: 76.7 % (ref 38–73)
NEUTROPHILS NFR BLD: 77.6 % (ref 38–73)
NEUTROPHILS NFR BLD: 78 % (ref 38–73)
NRBC BLD-RTO: 1 /100 WBC
PHOSPHATE SERPL-MCNC: 2.2 MG/DL (ref 2.7–4.5)
PLATELET # BLD AUTO: 149 K/UL (ref 150–350)
PLATELET # BLD AUTO: 154 K/UL (ref 150–350)
PLATELET # BLD AUTO: 156 K/UL (ref 150–350)
PLATELET # BLD AUTO: 157 K/UL (ref 150–350)
PLATELET # BLD AUTO: 172 K/UL (ref 150–350)
PLATELET # BLD AUTO: 173 K/UL (ref 150–350)
PMV BLD AUTO: 10.5 FL (ref 9.2–12.9)
PMV BLD AUTO: 10.5 FL (ref 9.2–12.9)
PMV BLD AUTO: 10.6 FL (ref 9.2–12.9)
PMV BLD AUTO: 10.6 FL (ref 9.2–12.9)
PMV BLD AUTO: 10.8 FL (ref 9.2–12.9)
PMV BLD AUTO: 11 FL (ref 9.2–12.9)
POTASSIUM SERPL-SCNC: 4 MMOL/L (ref 3.5–5.1)
PROT SERPL-MCNC: 5 G/DL (ref 6–8.4)
RBC # BLD AUTO: 2.44 M/UL (ref 4.6–6.2)
RBC # BLD AUTO: 2.52 M/UL (ref 4.6–6.2)
RBC # BLD AUTO: 2.56 M/UL (ref 4.6–6.2)
RBC # BLD AUTO: 2.59 M/UL (ref 4.6–6.2)
RBC # BLD AUTO: 2.72 M/UL (ref 4.6–6.2)
RBC # BLD AUTO: 2.94 M/UL (ref 4.6–6.2)
SODIUM SERPL-SCNC: 137 MMOL/L (ref 136–145)
WBC # BLD AUTO: 10.37 K/UL (ref 3.9–12.7)
WBC # BLD AUTO: 11.86 K/UL (ref 3.9–12.7)
WBC # BLD AUTO: 11.94 K/UL (ref 3.9–12.7)
WBC # BLD AUTO: 12.89 K/UL (ref 3.9–12.7)
WBC # BLD AUTO: 13.18 K/UL (ref 3.9–12.7)
WBC # BLD AUTO: 15.47 K/UL (ref 3.9–12.7)

## 2020-08-08 PROCEDURE — 63600175 PHARM REV CODE 636 W HCPCS: Performed by: STUDENT IN AN ORGANIZED HEALTH CARE EDUCATION/TRAINING PROGRAM

## 2020-08-08 PROCEDURE — 37000009 HC ANESTHESIA EA ADD 15 MINS: Performed by: INTERNAL MEDICINE

## 2020-08-08 PROCEDURE — 25000003 PHARM REV CODE 250: Performed by: STUDENT IN AN ORGANIZED HEALTH CARE EDUCATION/TRAINING PROGRAM

## 2020-08-08 PROCEDURE — 84100 ASSAY OF PHOSPHORUS: CPT

## 2020-08-08 PROCEDURE — 85025 COMPLETE CBC W/AUTO DIFF WBC: CPT | Mod: 91

## 2020-08-08 PROCEDURE — 36415 COLL VENOUS BLD VENIPUNCTURE: CPT

## 2020-08-08 PROCEDURE — 45378 DIAGNOSTIC COLONOSCOPY: CPT | Performed by: INTERNAL MEDICINE

## 2020-08-08 PROCEDURE — 83735 ASSAY OF MAGNESIUM: CPT

## 2020-08-08 PROCEDURE — C9113 INJ PANTOPRAZOLE SODIUM, VIA: HCPCS | Performed by: STUDENT IN AN ORGANIZED HEALTH CARE EDUCATION/TRAINING PROGRAM

## 2020-08-08 PROCEDURE — 37000008 HC ANESTHESIA 1ST 15 MINUTES: Performed by: INTERNAL MEDICINE

## 2020-08-08 PROCEDURE — 97161 PT EVAL LOW COMPLEX 20 MIN: CPT

## 2020-08-08 PROCEDURE — 94761 N-INVAS EAR/PLS OXIMETRY MLT: CPT

## 2020-08-08 PROCEDURE — 80053 COMPREHEN METABOLIC PANEL: CPT

## 2020-08-08 PROCEDURE — 20000000 HC ICU ROOM

## 2020-08-08 PROCEDURE — 97165 OT EVAL LOW COMPLEX 30 MIN: CPT

## 2020-08-08 RX ORDER — SODIUM,POTASSIUM PHOSPHATES 280-250MG
1 POWDER IN PACKET (EA) ORAL ONCE
Status: COMPLETED | OUTPATIENT
Start: 2020-08-08 | End: 2020-08-08

## 2020-08-08 RX ORDER — SODIUM CHLORIDE, SODIUM LACTATE, POTASSIUM CHLORIDE, CALCIUM CHLORIDE 600; 310; 30; 20 MG/100ML; MG/100ML; MG/100ML; MG/100ML
INJECTION, SOLUTION INTRAVENOUS CONTINUOUS
Status: DISCONTINUED | OUTPATIENT
Start: 2020-08-08 | End: 2020-08-09

## 2020-08-08 RX ORDER — METOCLOPRAMIDE HYDROCHLORIDE 5 MG/5ML
10 SOLUTION ORAL ONCE
Status: COMPLETED | OUTPATIENT
Start: 2020-08-08 | End: 2020-08-08

## 2020-08-08 RX ORDER — HEPARIN SODIUM,PORCINE/D5W 25000/250
12 INTRAVENOUS SOLUTION INTRAVENOUS CONTINUOUS
Status: DISCONTINUED | OUTPATIENT
Start: 2020-08-08 | End: 2020-08-08

## 2020-08-08 RX ORDER — SODIUM CHLORIDE 9 MG/ML
INJECTION, SOLUTION INTRAVENOUS CONTINUOUS PRN
Status: DISCONTINUED | OUTPATIENT
Start: 2020-08-08 | End: 2020-08-08

## 2020-08-08 RX ORDER — PROPOFOL 10 MG/ML
INJECTION, EMULSION INTRAVENOUS
Status: DISCONTINUED | OUTPATIENT
Start: 2020-08-08 | End: 2020-08-08

## 2020-08-08 RX ORDER — LIDOCAINE HCL/PF 100 MG/5ML
SYRINGE (ML) INTRAVENOUS
Status: DISCONTINUED | OUTPATIENT
Start: 2020-08-08 | End: 2020-08-08

## 2020-08-08 RX ORDER — ETOMIDATE 2 MG/ML
INJECTION INTRAVENOUS
Status: DISCONTINUED | OUTPATIENT
Start: 2020-08-08 | End: 2020-08-08

## 2020-08-08 RX ORDER — PROPOFOL 10 MG/ML
INJECTION, EMULSION INTRAVENOUS CONTINUOUS PRN
Status: DISCONTINUED | OUTPATIENT
Start: 2020-08-08 | End: 2020-08-08

## 2020-08-08 RX ADMIN — PROPOFOL 150 MCG/KG/MIN: 10 INJECTION, EMULSION INTRAVENOUS at 09:08

## 2020-08-08 RX ADMIN — PROPOFOL 20 MG: 10 INJECTION, EMULSION INTRAVENOUS at 09:08

## 2020-08-08 RX ADMIN — METOCLOPRAMIDE HYDROCHLORIDE 10 MG: 5 SOLUTION ORAL at 02:08

## 2020-08-08 RX ADMIN — PANTOPRAZOLE SODIUM 80 MG: 40 INJECTION, POWDER, FOR SOLUTION INTRAVENOUS at 08:08

## 2020-08-08 RX ADMIN — RIVAROXABAN 20 MG: 10 TABLET, FILM COATED ORAL at 02:08

## 2020-08-08 RX ADMIN — SODIUM CHLORIDE: 9 INJECTION, SOLUTION INTRAVENOUS at 08:08

## 2020-08-08 RX ADMIN — ETOMIDATE 10 MG: 2 INJECTION, SOLUTION INTRAVENOUS at 09:08

## 2020-08-08 RX ADMIN — SODIUM CHLORIDE, SODIUM LACTATE, POTASSIUM CHLORIDE, AND CALCIUM CHLORIDE: .6; .31; .03; .02 INJECTION, SOLUTION INTRAVENOUS at 06:08

## 2020-08-08 RX ADMIN — LIDOCAINE HYDROCHLORIDE 100 MG: 20 INJECTION, SOLUTION INTRAVENOUS at 09:08

## 2020-08-08 RX ADMIN — PROPOFOL 30 MG: 10 INJECTION, EMULSION INTRAVENOUS at 09:08

## 2020-08-08 RX ADMIN — POTASSIUM & SODIUM PHOSPHATES POWDER PACK 280-160-250 MG 1 PACKET: 280-160-250 PACK at 10:08

## 2020-08-08 RX ADMIN — SODIUM CHLORIDE, SODIUM LACTATE, POTASSIUM CHLORIDE, AND CALCIUM CHLORIDE: .6; .31; .03; .02 INJECTION, SOLUTION INTRAVENOUS at 08:08

## 2020-08-08 NOTE — PLAN OF CARE
[facility]:    Colonoscopy performed today 8/8 with findings below:  - Fair prep  - Entire examined colon including right-side without evidence of bleeding.  - Briefly intubated terminal ileum without evidence of bleeding seen.  - Mild non-bleeding hemorrhoids.    Plan:  - Given colonoscopy without evidence of bleeding in right colon or terminal ileum to explain iron deficiency anemia and melena will proceed with VCE today forfurther evaluation of bleeding source.   - Keep NPO but can have sips of water with medications.  - Will after placing video capsule today 8/8 with further dietary instructions to follow.      Elvia Cummins MD  [facility] PGY IV

## 2020-08-08 NOTE — PLAN OF CARE
GI Plan of Care Note:    Upper double balloon assisted small bowel enteroscopy findings:    Findings:        The esophagus was normal.        The stomach was normal.        The examined duodenum was normal.        There was no evidence of significant pathology in the entire jejunum.        The proximal ileum appeared normal.     Impression:           65yrMN with a reported chronic history of iron                         deficiency anemia over 5 years in duration now has                         recurrent, severe anemia with occult blood loss                         which is exacerbated over the last 3 months on                         xarelto therapy for DVT/PTE. There is a report of                         previous angioectasia in the jejunum on push                         enteroscopy (but not able to clearly see the                         lesion) and report of a small angioectasia in the                         proximal jejunum by recent VCE. This patient's                         anemia is likely related to chronic GI blood loss                         from angioectasias in the small bowel or right                         colon vs a Dieulafoy lesion. This lesion may be                         difficult or impossible to treat unless actively                         bleeding at the time of endoscopy.   Recommendation:       Colonoscopy tomorrow to re-evaluated for                         angioectasias in the right colon and any other                         bleeding sources. If negative plan to restart                         xarelto on Sunday AM then VCE Sunday night to                         determine if there is evidence of bleeding on                         xarelto reinstitution. If VCE on Sunday evening is                         negative, patient may be discharged on Monday.                         - Discharge patient to home.     CLD ok now, NPO at midnight, start golytely prep w/ 2L from 10pm to MN and  "remaining 2L from 2am to 4am. Continue to trend Hgb and transfuse for hgb goal >7.     Fransisco Acevedo (Lee)  [facility] Fellow, PGY IV  Pager: [phone]    "

## 2020-08-08 NOTE — PLAN OF CARE
Video capsule given to him by Dr galvan, gi, may have liquids 1500, to be discontinued 9pm tonight

## 2020-08-08 NOTE — PLAN OF CARE
Dr lourdes gómez phoned to say she ordered a heparin drip, instead of xarelto , told gave the med after she called me

## 2020-08-08 NOTE — TRANSFER OF CARE
Anesthesia Transfer of Care Note    Patient: Miky Esqueda    Procedure(s) Performed: Procedure(s):  ENTEROSCOPY, DOUBLE BALLOON, ANTEGRADE    Patient location: ICU    Anesthesia Type: general    Transport from OR: Continuous ECG monitoring in transport. Continuous SpO2 monitoring in transport. Transported from OR on 6-10 L/min O2 by face mask with adequate spontaneous ventilation    Post pain: adequate analgesia    Post assessment: no apparent anesthetic complications and tolerated procedure well    Post vital signs: stable    Level of consciousness: awake, alert and oriented    Nausea/Vomiting: no nausea/vomiting    Complications: none    Transfer of care protocol was followed      Last vitals:   Visit Vitals  BP (!) 97/58   Pulse 85   Temp 36.4 °C (97.6 °F) (Oral)   Resp (!) 22   Ht 6' (1.829 m)   Wt 97.5 kg (214 lb 15.2 oz)   SpO2 99%   BMI 29.15 kg/m²

## 2020-08-08 NOTE — TRANSFER OF CARE
Anesthesia Transfer of Care Note    Patient: Miky Esqueda    Procedure(s) Performed: Procedure(s) (LRB):  COLONOSCOPY (N/A)    Patient location: ICU    Anesthesia Type: MAC    Transport from OR: Transported from OR on 6-10 L/min O2 by face mask with adequate spontaneous ventilation. Continuous ECG monitoring in transport. Continuous SpO2 monitoring in transport    Post pain: adequate analgesia    Post assessment: no apparent anesthetic complications and tolerated procedure well    Post vital signs: stable    Level of consciousness: awake, alert and oriented    Nausea/Vomiting: no nausea/vomiting    Complications: none    Transfer of care protocol was followed      Last vitals:   Visit Vitals  BP 97/63   Pulse 94   Temp 37.1 °C (98.8 °F)   Resp (!) 28   Ht 6' (1.829 m)   Wt 97.5 kg (214 lb 15.2 oz)   SpO2 (!) 93%   BMI 29.15 kg/m²

## 2020-08-08 NOTE — PLAN OF CARE
Returned from endo, pt groggy but easily arouseable, no co of pain, abd unchanged, semi firm, bowel sounds, no nausea, pt transitioned to nasal cannula,2l but when asleep, sat's down to 89% nc inc to 4l

## 2020-08-08 NOTE — PLAN OF CARE
Dr Placido Bailey phoned to tell me she ordered xarelto and to give now, told will give once entered in pyxis, also noted order for reglan,

## 2020-08-08 NOTE — ANESTHESIA PREPROCEDURE EVALUATION
08/08/2020  Miky Esqueda is a 65 y.o., male w/ PMHx of bilateral DVT/PE on xarelto, CVA (6/2020), right to left shunt, SUBHASH, and jejunal AVM s/p enteroscopy 8/7/20 under GETA without anesthetic complication now scheduled for colonoscopy.      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    Bilateral pulmonary embolism 5/25/2020    History of DVT (deep vein thrombosis) 6/12/2020    Iron deficiency anemia     Stroke      Past Surgical History:   Procedure Laterality Date    COLONOSCOPY N/A 6/15/2020    Procedure: COLONOSCOPY;  Surgeon: Brown Go MD;  Location: [facility];  Service: Endoscopy;  Laterality: N/A;    ESOPHAGOGASTRODUODENOSCOPY N/A 6/15/2020    Procedure: EGD (ESOPHAGOGASTRODUODENOSCOPY);  Surgeon: Brown Go MD;  Location: [facility];  Service: Endoscopy;  Laterality: N/A;    INTRALUMINAL GASTROINTESTINAL TRACT IMAGING VIA CAPSULE N/A 7/13/2020    Procedure: IMAGING PROCEDURE, GI TRACT, INTRALUMINAL, VIA CAPSULE;  Surgeon: Brown Go MD;  Location: [facility];  Service: Endoscopy;  Laterality: N/A;     Review of patient's allergies indicates:  No Known Allergies      Anesthesia Evaluation    I have reviewed the Patient Summary Reports.    I have reviewed the Nursing Notes. I have reviewed the NPO Status.   I have reviewed the Medications.     Review of Systems  Anesthesia Hx:   Denies Personal Hx of Anesthesia complications.   Social:  Former Smoker    Hematology/Oncology:         -- Anemia:   Cardiovascular:   PVD ECG has been reviewed. Hx of bilateral PE and DVT, on xarelto ; carotid stenosis   Neurological:   CVA        Physical Exam  General:  Well nourished    Airway/Jaw/Neck:  Airway Findings: Mouth Opening: Normal Tongue: Normal  General Airway Assessment: Adult  Mallampati: III  TM Distance: Normal, at least 6 cm   Jaw/Neck Findings:  Neck ROM: Normal ROM      Dental:  Dental Findings: Upper Dentures   Chest/Lungs:  Chest/Lungs Findings: Clear to auscultation, Normal Respiratory Rate     Heart/Vascular:  Heart Findings: Rate: Tachycardia  Rhythm: Regular Rhythm        Mental Status:  Mental Status Findings:  Alert and Oriented       Lab Results   Component Value Date    WBC 12.89 (H) 08/08/2020    HGB 7.7 (L) 08/08/2020    HCT 24.9 (L) 08/08/2020     08/08/2020    CHOL 177 06/12/2020    TRIG 89 06/12/2020    HDL 41 06/12/2020    ALT 10 08/08/2020    AST 17 08/08/2020     08/08/2020    K 4.0 08/08/2020     08/08/2020    CREATININE 1.3 08/08/2020    BUN 22 08/08/2020    CO2 24 08/08/2020    TSH 1.871 08/06/2020    PSA 0.69 07/16/2020    INR 1.1 08/06/2020    HGBA1C <4.0 (A) 08/06/2020     TTE 6/13/2020  Conclusion    · There is no evidence of intracardiac shunting.  · Left ventricular systolic function. The estimated ejection fraction is 60%.  · Grade I (mild) left ventricular diastolic dysfunction consistent with impaired relaxation.  · Normal right ventricular systolic function.  · Echo is not diagnostic for an intracardiac shunt; most likely artifact, will repeat in am.         Anesthesia Plan  Type of Anesthesia, risks & benefits discussed:  Anesthesia Type:  MAC  Patient's Preference:   Intra-op Monitoring Plan: standard ASA monitors  Intra-op Monitoring Plan Comments:   Post Op Pain Control Plan:   Post Op Pain Control Plan Comments:   Induction:   IV  Beta Blocker:  Patient is not currently on a Beta-Blocker (No further documentation required).       Informed Consent: Patient understands risks and agrees with Anesthesia plan.  Questions answered. Anesthesia consent signed with patient.  ASA Score: 3     Day of Surgery Review of History & Physical: I have interviewed and examined the patient. I have reviewed the patient's H&P dated:  There are no significant changes.  H&P update referred to the provider.          Ready For Surgery From Anesthesia Perspective.

## 2020-08-08 NOTE — PLAN OF CARE
Notified Dr Eubanks that pt is npo and can not take the oral k phos, also notified of yeni 7.2 , 7.7 yest rec 4 units blood total      Started the lactated ringers 125cc hr

## 2020-08-08 NOTE — PLAN OF CARE
Problem: Occupational Therapy Goal  Goal: Occupational Therapy Goal  Outcome: Adequate for Care Transition       Limited evaluation as pt tachy and being taken for endo procedure, however, able to perform ADLs and functional mobility with supervision. Educated pt on UE/LE therex to perform while supine. Pt with no further OT needs at this time. D/c OT

## 2020-08-08 NOTE — PT/OT/SLP EVAL
Occupational Therapy   Evaluation and Discharge Note    Name: Miky Esqueda  MRN: [id number]  Admitting Diagnosis:  Symptomatic anemia Day of Surgery    Recommendations:     Discharge Recommendations: home  Discharge Equipment Recommendations:  none  Barriers to discharge:  None    Assessment:     Miky Esqueda is a 65 y.o. male with a medical diagnosis of Symptomatic anemia. At this time, patient is functioning at their prior level of function and does not require further acute OT services.     Limited evaluation as pt tachy and being taken for endo procedure, however, able to perform ADLs and functional mobility with supervision. Educated pt on UE/LE therex to perform while supine. Pt with no further OT needs at this time. D/c OT     Plan:     During this hospitalization, patient does not require further acute OT services.  Please re-consult if situation changes.    · Plan of Care Reviewed with: patient    Subjective     Chief Complaint: none stated; reports syncope episode prior to recent hospitalization; states does not currently use O2  Patient/Family Comments/goals: none stated    Occupational Profile:  Living Environment: with spouse in [facility], no steps to enter, T/s combo   Previous level of function: pt reports independence; not currently using O2; drives; recently retired 2/2 medical issues   Equipment Used at home:  none(has access to O2)  Assistance upon Discharge: form spouse     Pain/Comfort:  · Pain Rating 1: 0/10    Patients cultural, spiritual, Jehovah's witness conflicts given the current situation:      Objective:     Communicated with: michelle prior to session.     General Precautions: Standard, fall   Orthopedic Precautions:N/A   Braces: N/A     Occupational Performance:    Bed Mobility:    · Patient completed Scooting/Bridging with supervision  · Patient completed Supine to Sit with supervision  · Patient completed Sit to Supine with supervision    Functional Mobility/Transfers:  · Patient completed Sit <>  Stand Transfer with supervision  with  no assistive device   · Functional Mobility: supervision without AD; see PT note     Activities of Daily Living:  · Toileting: supervision per pt and chart     Cognitive/Visual Perceptual:  WFL     Physical Exam:  Balance:    -       WFL    Postural examination/scapula alignment:    -       Rounded shoulders  Upper Extremity Range of Motion:    BUE WFL for pt's needs based on observed function   Upper Extremity Strength:   BUE WFL for pt's needs based on observed function     AMPAC 6 Click ADL:  AMPAC Total Score: 23    Treatment & Education:  Pt with elevated HR with bed mobility and minimal functional mobility (111)   Performing axs supervision level   Educated on UE/LE therex to perform while supine and impt of OOB axs   Education:    Patient left supine with all lines intact, call button in reach and nsg notified    GOALS:   Multidisciplinary Problems     Occupational Therapy Goals        Problem: Occupational Therapy Goal    Goal Priority Disciplines Outcome Interventions   Occupational Therapy Goal     OT, PT/OT Adequate for Care Transition                    History:     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    Bilateral pulmonary embolism 5/25/2020    History of DVT (deep vein thrombosis) 6/12/2020    Iron deficiency anemia     Stroke        Past Surgical History:   Procedure Laterality Date    COLONOSCOPY N/A 6/15/2020    Procedure: COLONOSCOPY;  Surgeon: Brown Go MD;  Location: [facility] ENDO;  Service: Endoscopy;  Laterality: N/A;    ESOPHAGOGASTRODUODENOSCOPY N/A 6/15/2020    Procedure: EGD (ESOPHAGOGASTRODUODENOSCOPY);  Surgeon: Brown Go MD;  Location: [facility];  Service: Endoscopy;  Laterality: N/A;    INTRALUMINAL GASTROINTESTINAL TRACT IMAGING VIA CAPSULE N/A 7/13/2020    Procedure: IMAGING PROCEDURE, GI TRACT, INTRALUMINAL, VIA CAPSULE;  Surgeon: Brown Go MD;  Location: [facility]  ENDO;  Service: Endoscopy;  Laterality: N/A;       Time Tracking:     OT Date of Treatment: 08/08/20  OT Start Time: 0840  OT Stop Time: 0848  OT Total Time (min): 8 min    Billable Minutes:Evaluation 8    Sasha Moyer OT  8/8/2020

## 2020-08-08 NOTE — ASSESSMENT & PLAN NOTE
-Patient H/H 7.7/24.9 this am  -Received total of 4 units  -Upper DBE with Dr. Espinal unremarkable yesterday, think most likely related to chronic GI blood loss from angioectasias in the small bowel or right colon vs a Dieulafoy lesion  -GI plan for colonoscopy today, VCE Monday, if negative possible dc Monday?  -NPO since midnight, bowel prepped  -Trending CBC, transfuse to >7  -started maintenance IVF this am for soft pressures

## 2020-08-08 NOTE — PLAN OF CARE
Noted some courseness left post base, possibly fine crackles, not sure, not distinct, crackles , diminished, still diminished all bases, had courseness in left post base this am,

## 2020-08-08 NOTE — PROGRESS NOTES
[facility] ICU 5th Floor  Hospital Medicine  Progress Note    Patient Name: Miky Esqueda  MRN: [id number]  Patient Class: IP- Inpatient   Admission Date: 8/6/2020  Length of Stay: 2 days  Attending Physician: Guillermina Loredo MD  Primary Care Provider: Farhat Matson MD        Subjective:     Principal Problem:Symptomatic anemia        HPI:  Mr. Miky Esqueda is a 66 y/o man with PMH of b/l DVT/PE (on Xarelto), CVA (6/2020), right to left shunt, treated H. 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 Patient was at PCP's office when he experienced an episode of syncope for a sick visit. Patient had been feeling fatigued and lightheaded for the past few days. Per ED note, he had two episodes of LOC at the clinic in which the first event lasted approximately 25 seconds and the second lasting 15 seconds. In between episodes he had an occurrence of non-bloody, non-bilious emesis, in which the second LOC subsequently followed the event. During the second episode, his eyes rolled back and exhibited seizure-like activity with generalized shaking. He was hypotensive, diaphoretic, and pale. On arrival he was alert and oriented and reported weakness and headache with blurry vision. Denied focal weakness or numbness.    Previous workup include an endoscopic workup including upper and lower endoscopy 6/2020 showing one gastric AVM and H.pylori on biopsy, as well as two colon polyps. He again underwent EGD at [facility] which showed a jejunal AVM which was unable to be reached for treatment and one colon polyp. A VCE was then done at [facility] 7/16/2020 showing 2 proximal AVMs that may have been the source of bleeding. The patient was then referred to Dr. Espinal with [facility] for further evaluation with an upper single balloon enteroscopy at the time.    Per interview with the patient, he confirms that the reported series of events have to his admission today. He reports no trauma to the head.  Last bowel movement was a day or two ago and reports it was soft and brown. Denies any trials of new foods or sick contacts. Denies chest pain, abdominal pain, hematuria, hematochezia. Denies weakness or numbness.    In the ED, BP 90/50, H/H 4.6/17.1, Bun/Cr 34/1.35, positive for occult blood in stool, Covid negative, normal cxr. CT head shows no evidence of an acute ischemic event or intracranial hemorrhage. Xarelto was held. Two large bore IVs started. Patient to be admitted to the ICU for close monitoring. One unit of unmatched blood was transfused and a type and screen was done. GI has been consulted and patient is scheduled for a push enteroscopy vs. upper single balloon tomorrow. Patient NPO at midnight. Family medicine consulted for admission and medical management.    Interval History: NAEO. Upper DBE yesterday unremarkable. Going for colonoscopy today, NPO since midnight, pressures a little soft started maintenance IVF. Pt feels well this am. H/H stable.     Review of Systems   Constitutional: Positive for fatigue. Negative for chills, diaphoresis and fever.   HENT: Negative.    Eyes: Negative for visual disturbance.   Respiratory: Negative.  Negative for cough and shortness of breath.    Cardiovascular: Negative for chest pain and palpitations.   Gastrointestinal: Negative for abdominal pain, blood in stool, constipation, diarrhea, nausea, rectal pain and vomiting.   Genitourinary: Negative for hematuria.   Musculoskeletal: Negative for arthralgias.   Skin: Positive for pallor.   Allergic/Immunologic: Negative.    Neurological: Negative for dizziness, light-headedness and headaches.   Hematological: Negative.    Psychiatric/Behavioral: Negative.      Objective:     Vital Signs (Most Recent):  Temp: 97.9 °F (36.6 °C) (08/08/20 0301)  Pulse: 87 (08/08/20 0600)  Resp: 16 (08/08/20 0600)  BP: (!) 92/58 (08/08/20 0600)  SpO2: (!) 92 % (08/08/20 0600) Vital Signs (24h Range):  Temp:  [97.6 °F (36.4 °C)-98.4 °F  (36.9 °C)] 97.9 °F (36.6 °C)  Pulse:  [] 87  Resp:  [12-29] 16  SpO2:  [90 %-100 %] 92 %  BP: ()/(51-66) 92/58     Weight: 97.5 kg (214 lb 15.2 oz)  Body mass index is 29.15 kg/m².    Intake/Output Summary (Last 24 hours) at 8/8/2020 0718  Last data filed at 8/8/2020 0500  Gross per 24 hour   Intake 625 ml   Output 1325 ml   Net -700 ml      Physical Exam  Constitutional:       General: He is not in acute distress.     Appearance: Normal appearance. He is normal weight.   HENT:      Head: Normocephalic and atraumatic.      Right Ear: External ear normal.      Left Ear: External ear normal.      Nose: Nose normal.      Mouth/Throat:      Mouth: Mucous membranes are moist.      Pharynx: Oropharynx is clear.      Comments: Top dentures in place  Eyes:      Extraocular Movements: Extraocular movements intact.      Conjunctiva/sclera: Conjunctivae normal.      Pupils: Pupils are equal, round, and reactive to light.   Neck:      Musculoskeletal: Neck supple.   Cardiovascular:      Rate and Rhythm: Normal rate and regular rhythm.      Heart sounds: Murmur present.   Pulmonary:      Effort: Pulmonary effort is normal.      Breath sounds: Normal breath sounds.   Abdominal:      General: Abdomen is flat. Bowel sounds are normal.      Palpations: Abdomen is soft.      Tenderness: There is no abdominal tenderness.   Musculoskeletal: Normal range of motion.   Skin:     General: Skin is warm and dry.   Neurological:      General: No focal deficit present.      Mental Status: He is alert and oriented to person, place, and time. Mental status is at baseline.         Significant Labs:   CBC:   Recent Labs   Lab 08/07/20  1947 08/08/20  0010 08/08/20  0349   WBC 15.24* 15.47* 12.89*   HGB 7.6* 8.3* 7.7*   HCT 23.9* 26.4* 24.9*    172 173     CMP:   Recent Labs   Lab 08/06/20 2015 08/06/20  2228 08/07/20  0642 08/08/20  0349     --  136 137   K 4.5  --  4.3 4.0     --  109 109   CO2 23  --  22* 24   GLU  119*  --  101 102   BUN 36*  --  31* 22   CREATININE 1.3  --  1.3 1.3   CALCIUM 7.7*  --  7.8* 7.7*   PROT 5.2*  --  4.9* 5.0*   ALBUMIN 2.7*  --  2.6* 2.6*   BILITOT 1.9* 1.1* 1.7* 0.8   ALKPHOS 43*  --  40* 41*   AST 18  --  16 17   ALT 12  --  11 10   ANIONGAP 5*  --  5* 4*   EGFRNONAA 58*  --  58* 58*       Significant Imaging: I have reviewed all pertinent imaging results/findings within the past 24 hours.      Assessment/Plan:      * Symptomatic anemia  -Patient H/H 7.7/24.9 this am  -Received total of 4 units  -Upper DBE with Dr. Espinal unremarkable yesterday, think most likely related to chronic GI blood loss from angioectasias in the small bowel or right colon vs a Dieulafoy lesion  -GI plan for colonoscopy today, VCE Monday, if negative possible dc Monday?  -NPO since midnight, bowel prepped  -Trending CBC, transfuse to >7  -started maintenance IVF this am for soft pressures    AV malformation of gastrointestinal tract  -Endocscopy today after H>7  -Recs appreciated    Right to left cardiac shunt  -Patient on Xarelto at home, will hold in setting of acute suspected GI bleed  -Monitor closely for SOB, DVT. SCDs placed.    History of pulmonary embolus (PE)  -Patient PE in 5/25  -Was on Xarelto at home, will hold in setting of severe anemia with suspected GI bleed  -Gi suggesting to restart Xarelto Sudnay if colonoscopy negative tomorrow, will decide if anticoagulation benefits outweigh risks at this point with pts history of bleeding        VTE Risk Mitigation (From admission, onward)         Ordered     IP VTE HIGH RISK PATIENT  Once      08/06/20 1349     Place sequential compression device  Until discontinued      08/06/20 1349     Place sequential compression device  Until discontinued      08/06/20 1323              Dispo: Colonoscopy today with GI        Mike Eubanks MD  Department of Hospital Medicine   [facility] ICU 5th Floor

## 2020-08-08 NOTE — ANESTHESIA POSTPROCEDURE EVALUATION
Anesthesia Post Evaluation    Patient: Miky Esqueda    Procedure(s) Performed: Procedure(s):  ENTEROSCOPY, DOUBLE BALLOON, ANTEGRADE    Final Anesthesia Type: general    Patient location during evaluation: ICU  Patient participation: Yes- Able to Participate  Level of consciousness: awake and alert and oriented  Post-procedure vital signs: reviewed and stable  Pain management: adequate  Airway patency: patent    PONV status at discharge: No PONV  Anesthetic complications: no      Cardiovascular status: blood pressure returned to baseline, hemodynamically stable and stable  Respiratory status: unassisted, spontaneous ventilation and room air  Hydration status: euvolemic  Follow-up not needed.          Vitals Value Taken Time   /60 08/08/20 1002   Temp 37.1 °C (98.8 °F) 08/08/20 0745   Pulse 80 08/08/20 1004   Resp 22 08/08/20 1004   SpO2 98 % 08/08/20 1004   Vitals shown include unvalidated device data.      No case tracking events are documented in the log.      Pain/Randolph Score: No data recorded

## 2020-08-08 NOTE — PLAN OF CARE
Back from endo ,pt groggy , easily arouseable, dozing off, pt on simple mask, 6l, transitioned to nc,2l, but once pt fell asleep ,sat's down to 88-90% , inc 02 to 4l, and had pt take deep breaths, will wean as abi, denies sob

## 2020-08-08 NOTE — ANESTHESIA POSTPROCEDURE EVALUATION
Anesthesia Post Evaluation    Patient: Miky Esqueda    Procedure(s) Performed: Procedure(s) (LRB):  COLONOSCOPY (N/A)    Final Anesthesia Type: MAC    Patient location during evaluation: ICU  Patient participation: Yes- Able to Participate  Level of consciousness: awake and alert and oriented  Post-procedure vital signs: reviewed and stable  Pain management: adequate  Airway patency: patent    PONV status at discharge: No PONV  Anesthetic complications: no      Cardiovascular status: blood pressure returned to baseline, hemodynamically stable and stable  Respiratory status: unassisted, spontaneous ventilation and room air  Hydration status: euvolemic  Follow-up not needed.          Vitals Value Taken Time   /57 08/08/20 1232   Temp  08/08/20 1258   Pulse 80 08/08/20 1258   Resp 20 08/08/20 1258   SpO2 98 % 08/08/20 1258   Vitals shown include unvalidated device data.      No case tracking events are documented in the log.      Pain/Randolph Score: No data recorded

## 2020-08-08 NOTE — PLAN OF CARE
[facility]:    Video capsule placed today 8/8 at 1 PM. Consent obtained and placed in chart. Instructions discussed with patient and nurse at bedside.    Plan:  - Will start heparin gtt as safest option to provoke recurrent bleeding. Per ICU team, will use as this is readily reversible and easier to stop compared to home Xarelto.  - Okay for clear liquid diet (colorless liquids) 2 hours after procedure (3 PM).  - Okay for light snack like a sandwich 4 hours after procedure (5 PM).  - Will leave in place for 8 hours (study complete at 8 PM).  - Please leave equipment and recorder at bedside after study completed.  - Call GI on call physician with any additional questions.      Elvia Cummins MD  [facility] PGY IV  Pager: [phone]

## 2020-08-08 NOTE — NURSING
Pt AAO x4, opens eyes spontaneously, and able to follow commands. RR 16-30 breaths per minute. Pulse ox measuring > 95% on RA.  Bowel prep administered. Effective. Pt repositioned Q2H with use of pillows. Heels floated off mattress at all times.Tubing and other devices remain free from under the patient. Pt voided per urinal. Frequent rounds made to assess for safety and discomfort, fall precautions maintained. Call bell within reach. Will continue to monitor

## 2020-08-08 NOTE — [employer] PATIENT INSTRUCTIONS
Discharge Summary/Instructions after an Endoscopic Procedure  Patient Name: Miky Esqueda  Patient MRN: [id number]  Patient YOB: 1955 Saturday, August 8, 2020  Orlando Hannon MD  Your health is very important to us during the Covid Crisis. Following your   procedure today, you will receive a daily text for 2 weeks asking about   signs or symptoms of Covid 19.  Please respond to this text when you   receive it so we can follow up and keep you as safe as possible.   RESTRICTIONS:  During your procedure today, you received medications for sedation.  These   medications may affect your judgment, balance and coordination.  Therefore,   for 24 hours, you have the following restrictions:   - DO NOT drive a car, operate machinery, make legal/financial decisions,   sign important papers or drink alcohol.    ACTIVITY:  Today: no heavy lifting, straining or running due to procedural   sedation/anesthesia.  The following day: return to full activity including work.  DIET:  Eat and drink normally unless instructed otherwise.     TREATMENT FOR COMMON SIDE EFFECTS:  - Mild abdominal pain, nausea, belching, bloating or excessive gas:  rest,   eat lightly and use a heating pad.  - Sore Throat: treat with throat lozenges and/or gargle with warm salt   water.  - Because air was used during the procedure, expelling large amounts of air   from your rectum or belching is normal.  - If a bowel prep was taken, you may not have a bowel movement for 1-3 days.    This is normal.  SYMPTOMS TO WATCH FOR AND REPORT TO YOUR PHYSICIAN:  1. Abdominal pain or bloating, other than gas cramps.  2. Chest pain.  3. Back pain.  4. Signs of infection such as: chills or fever occurring within 24 hours   after the procedure.  5. Rectal bleeding, which would show as bright red, maroon, or black stools.   (A tablespoon of blood from the rectum is not serious, especially if   hemorrhoids are present.)  6. Vomiting.  7. Weakness or dizziness.  GO  DIRECTLY TO THE NEAREST EMERGENCY ROOM IF YOU HAVE ANY OF THE FOLLOWING:      Difficulty breathing              Chills and/or fever over 101 F   Persistent vomiting and/or vomiting blood   Severe abdominal pain   Severe chest pain   Black, tarry stools   Bleeding- more than one tablespoon   Any other symptom or condition that you feel may need urgent attention  Your doctor recommends these additional instructions:  If any biopsies were taken, your doctors clinic will contact you in 1 to 2   weeks with any results.  - Return patient to ICU for ongoing care.   - Resume previous diet.   - Continue present medications.   - Proceed with VCE today for further investigation of bleeding source.  For questions, problems or results please call your physician - Orlando Hannon MD.  EMERGENCY PHONE NUMBER: [phone],  LAB RESULTS: [phone]  IF A COMPLICATION OR EMERGENCY SITUATION ARISES AND YOU ARE UNABLE TO REACH   YOUR PHYSICIAN - GO DIRECTLY TO THE EMERGENCY ROOM.  MD Orlando Greene MD  8/8/2020 9:45:30 AM  This report has been verified and signed electronically.  [employer]

## 2020-08-08 NOTE — SUBJECTIVE & OBJECTIVE
Interval History: NAEO. Upper DBE yesterday unremarkable. Going for colonoscopy today, NPO since midnight, pressures a little soft started maintenance IVF. Pt feels well this am. H/H stable.     Review of Systems   Constitutional: Positive for fatigue. Negative for chills, diaphoresis and fever.   HENT: Negative.    Eyes: Negative for visual disturbance.   Respiratory: Negative.  Negative for cough and shortness of breath.    Cardiovascular: Negative for chest pain and palpitations.   Gastrointestinal: Negative for abdominal pain, blood in stool, constipation, diarrhea, nausea, rectal pain and vomiting.   Genitourinary: Negative for hematuria.   Musculoskeletal: Negative for arthralgias.   Skin: Positive for pallor.   Allergic/Immunologic: Negative.    Neurological: Negative for dizziness, light-headedness and headaches.   Hematological: Negative.    Psychiatric/Behavioral: Negative.      Objective:     Vital Signs (Most Recent):  Temp: 97.9 °F (36.6 °C) (08/08/20 0301)  Pulse: 87 (08/08/20 0600)  Resp: 16 (08/08/20 0600)  BP: (!) 92/58 (08/08/20 0600)  SpO2: (!) 92 % (08/08/20 0600) Vital Signs (24h Range):  Temp:  [97.6 °F (36.4 °C)-98.4 °F (36.9 °C)] 97.9 °F (36.6 °C)  Pulse:  [] 87  Resp:  [12-29] 16  SpO2:  [90 %-100 %] 92 %  BP: ()/(51-66) 92/58     Weight: 97.5 kg (214 lb 15.2 oz)  Body mass index is 29.15 kg/m².    Intake/Output Summary (Last 24 hours) at 8/8/2020 0718  Last data filed at 8/8/2020 0500  Gross per 24 hour   Intake 625 ml   Output 1325 ml   Net -700 ml      Physical Exam  Constitutional:       General: He is not in acute distress.     Appearance: Normal appearance. He is normal weight.   HENT:      Head: Normocephalic and atraumatic.      Right Ear: External ear normal.      Left Ear: External ear normal.      Nose: Nose normal.      Mouth/Throat:      Mouth: Mucous membranes are moist.      Pharynx: Oropharynx is clear.      Comments: Top dentures in place  Eyes:      Extraocular  Movements: Extraocular movements intact.      Conjunctiva/sclera: Conjunctivae normal.      Pupils: Pupils are equal, round, and reactive to light.   Neck:      Musculoskeletal: Neck supple.   Cardiovascular:      Rate and Rhythm: Normal rate and regular rhythm.      Heart sounds: Murmur present.   Pulmonary:      Effort: Pulmonary effort is normal.      Breath sounds: Normal breath sounds.   Abdominal:      General: Abdomen is flat. Bowel sounds are normal.      Palpations: Abdomen is soft.      Tenderness: There is no abdominal tenderness.   Musculoskeletal: Normal range of motion.   Skin:     General: Skin is warm and dry.   Neurological:      General: No focal deficit present.      Mental Status: He is alert and oriented to person, place, and time. Mental status is at baseline.         Significant Labs:   CBC:   Recent Labs   Lab 08/07/20  1947 08/08/20  0010 08/08/20  0349   WBC 15.24* 15.47* 12.89*   HGB 7.6* 8.3* 7.7*   HCT 23.9* 26.4* 24.9*    172 173     CMP:   Recent Labs   Lab 08/06/20 2015 08/06/20 2228 08/07/20  0642 08/08/20  0349     --  136 137   K 4.5  --  4.3 4.0     --  109 109   CO2 23  --  22* 24   *  --  101 102   BUN 36*  --  31* 22   CREATININE 1.3  --  1.3 1.3   CALCIUM 7.7*  --  7.8* 7.7*   PROT 5.2*  --  4.9* 5.0*   ALBUMIN 2.7*  --  2.6* 2.6*   BILITOT 1.9* 1.1* 1.7* 0.8   ALKPHOS 43*  --  40* 41*   AST 18  --  16 17   ALT 12  --  11 10   ANIONGAP 5*  --  5* 4*   EGFRNONAA 58*  --  58* 58*       Significant Imaging: I have reviewed all pertinent imaging results/findings within the past 24 hours.

## 2020-08-08 NOTE — PROGRESS NOTES
[facility] ICU 5th Floor  Critical Care - Medicine  Progress Note    Patient Name: Miky Esqueda  MRN: [id number]  Admission Date: 8/6/2020  Hospital Length of Stay: 2 days  Code Status: Full Code  Attending Provider: Guillermina Loredo MD  Primary Care Provider: Farhat Matson MD   Principal Problem: Symptomatic anemia    Subjective:     Interval History/Significant Events: Overnight the patient was prepped for colonoscopy this AM.  His HgB remains at approximately 7, but still is fluctuating.  He is mildly hypotensive, but appears otherwise HD stable.  He is alert and appropriate otherwise.     Review of Systems   Constitutional: Positive for fatigue. Negative for activity change, appetite change, chills and diaphoresis.   HENT: Negative for mouth sores.    Respiratory: Negative for apnea, cough and shortness of breath.    Cardiovascular: Negative for chest pain and leg swelling.   Gastrointestinal: Positive for blood in stool. Negative for abdominal distention and abdominal pain.   Skin: Negative for color change.   Psychiatric/Behavioral: Negative for agitation, behavioral problems and confusion.          Objective:     Vital Signs (Most Recent):  Temp: 98.8 °F (37.1 °C) (08/08/20 0745)  Pulse: 94 (08/08/20 0800)  Resp: (!) 28 (08/08/20 0800)  BP: 97/63 (08/08/20 0800)  SpO2: (!) 93 % (08/08/20 0800) Vital Signs (24h Range):  Temp:  [97.6 °F (36.4 °C)-98.8 °F (37.1 °C)] 98.8 °F (37.1 °C)  Pulse:  [] 94  Resp:  [12-33] 28  SpO2:  [89 %-100 %] 93 %  BP: ()/(46-66) 97/63     Weight: 97.5 kg (214 lb 15.2 oz)  Body mass index is 29.15 kg/m².      Intake/Output Summary (Last 24 hours) at 8/8/2020 0906  Last data filed at 8/8/2020 0800  Gross per 24 hour   Intake 625 ml   Output 1525 ml   Net -900 ml       Physical Exam  Constitutional:       General: He is not in acute distress.     Appearance: Normal appearance. He is normal weight. He is not ill-appearing, toxic-appearing or  diaphoretic.   HENT:      Head: Normocephalic and atraumatic.      Nose: Nose normal.   Eyes:      Extraocular Movements: Extraocular movements intact.      Pupils: Pupils are equal, round, and reactive to light.   Neck:      Musculoskeletal: Normal range of motion.   Cardiovascular:      Rate and Rhythm: Normal rate and regular rhythm.   Pulmonary:      Effort: Pulmonary effort is normal. No respiratory distress.   Abdominal:      General: There is no distension.      Palpations: Abdomen is soft.   Skin:     General: Skin is warm and dry.   Neurological:      General: No focal deficit present.      Mental Status: He is alert and oriented to person, place, and time. Mental status is at baseline.   Psychiatric:         Mood and Affect: Mood normal.         Behavior: Behavior normal.         Thought Content: Thought content normal.         Judgment: Judgment normal.         Vents:  None        Lines/Drains/Airways     Peripheral Intravenous Line                 Peripheral IV - Single Lumen 08/07/20 1445 20 G Right Wrist less than 1 day                Significant Labs:    CBC/Anemia Profile:  Recent Labs   Lab 08/06/20  1043  08/08/20  0010 08/08/20  0349 08/08/20  0822   WBC  --    < > 15.47* 12.89* 11.86   HGB  --    < > 8.3* 7.7* 7.4*   HCT  --    < > 26.4* 24.9* 23.1*   PLT  --    < > 172 173 157   MCV  --    < > 90 92 90   RDW  --    < > 16.1* 16.3* 16.0*   OCCULTBLOOD Positive*  --   --   --   --     < > = values in this interval not displayed.        Chemistries:  Recent Labs   Lab 08/06/20 2015 08/06/20  2228 08/07/20  0642 08/08/20  0349     --  136 137   K 4.5  --  4.3 4.0     --  109 109   CO2 23  --  22* 24   BUN 36*  --  31* 22   CREATININE 1.3  --  1.3 1.3   CALCIUM 7.7*  --  7.8* 7.7*   ALBUMIN 2.7*  --  2.6* 2.6*   PROT 5.2*  --  4.9* 5.0*   BILITOT 1.9* 1.1* 1.7* 0.8   ALKPHOS 43*  --  40* 41*   ALT 12  --  11 10   AST 18  --  16 17   MG 1.8  --  1.9 1.9   PHOS 3.0  --  2.9 2.2*          Assessment/Plan:     Active Diagnoses:    Diagnosis Date Noted POA    PRINCIPAL PROBLEM:  Symptomatic anemia [D64.9] 06/12/2020 Yes    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  - no active issue     Pulm  History of tobacco abuse (now former)  History of PE on xarelto (dx 5/2020)  - continue to hold xarelto at this time until approved by GI.  - maintain sats >88% with supplemental O2 as needed.     CV  Hypotension  Tachycardia  - tachycardia, stable.  - monitor BP and if drops this may be a manifestation of bleed.  Would ensure blood available for transfusion should this arise.     GI/FEN  - GI bleed  - history of AVM 6/20  - history of multiple AVMs and has had upper and lower scope 6/2020 showing gastric AVM as well as colon polys. Had second EGD at [facility] showing jejunal AVM and colon polyp. VCE on 7/16 showed 2 proximal AVMs. Enteroscopy yesterday without location of culprit lesion.  - colonoscopy this AM was negative.  - H/H 7.4 this morning  - if GI is considering anticoagulation and VCE for evaluation of recurrent bleed, I may suggest using an agent that is readily reversible and easy to stop (ie. Heparin gtt), instead of xarelto.  The patient has difficult to match blood type, so massive transfusion requirement would be a challenging condition to manage.     Heme/ID  - no infectious processes suspected at this time.   - anemia   - normal MCV     Renal  - Cr 1.35 at baseline, no acute issues.      PPx: SCDs    Trent Lopez MD, PGY6  Critical Care - Medicine  [facility] ICU 5th Floor  [phone]

## 2020-08-08 NOTE — [employer]OVATION PATIENT INSTRUCTIONS
Discharge Summary/Instructions after an Endoscopic Procedure  Patient Name: Miky Esqueda  Patient MRN: [id number]  Patient YOB: 1955 Friday, August 7, 2020  Alexandru Espinal MD  Your health is very important to us during the Covid Crisis. Following your   procedure today, you will receive a daily text for 2 weeks asking about   signs or symptoms of Covid 19.  Please respond to this text when you   receive it so we can follow up and keep you as safe as possible.   RESTRICTIONS:  During your procedure today, you received medications for sedation.  These   medications may affect your judgment, balance and coordination.  Therefore,   for 24 hours, you have the following restrictions:   - DO NOT drive a car, operate machinery, make legal/financial decisions,   sign important papers or drink alcohol.    ACTIVITY:  Today: no heavy lifting, straining or running due to procedural   sedation/anesthesia.  The following day: return to full activity including work.  DIET:  Eat and drink normally unless instructed otherwise.     TREATMENT FOR COMMON SIDE EFFECTS:  - Mild abdominal pain, nausea, belching, bloating or excessive gas:  rest,   eat lightly and use a heating pad.  - Sore Throat: treat with throat lozenges and/or gargle with warm salt   water.  - Because air was used during the procedure, expelling large amounts of air   from your rectum or belching is normal.  - If a bowel prep was taken, you may not have a bowel movement for 1-3 days.    This is normal.  SYMPTOMS TO WATCH FOR AND REPORT TO YOUR PHYSICIAN:  1. Abdominal pain or bloating, other than gas cramps.  2. Chest pain.  3. Back pain.  4. Signs of infection such as: chills or fever occurring within 24 hours   after the procedure.  5. Rectal bleeding, which would show as bright red, maroon, or black stools.   (A tablespoon of blood from the rectum is not serious, especially if   hemorrhoids are present.)  6. Vomiting.  7. Weakness or dizziness.  GO  DIRECTLY TO THE NEAREST EMERGENCY ROOM IF YOU HAVE ANY OF THE FOLLOWING:      Difficulty breathing              Chills and/or fever over 101 F   Persistent vomiting and/or vomiting blood   Severe abdominal pain   Severe chest pain   Black, tarry stools   Bleeding- more than one tablespoon   Any other symptom or condition that you feel may need urgent attention  Your doctor recommends these additional instructions:  If any biopsies were taken, your doctors clinic will contact you in 1 to 2   weeks with any results.  Colonoscopy tomorrow to re-evaluated for angioectasias in the right colon   and any other bleeding sources. If negative plan to restart xarelto on   Sunday AM then VCE Sunday night to determine if there is evidence of   bleeding on xarelto reinstitution. If VCE on Sunday evening is negative,   patient may be discharged on Monday.   - Discharge patient to home.  For questions, problems or results please call your physician - Alexandru Espinal MD.  EMERGENCY PHONE NUMBER: [phone],  LAB RESULTS: [phone]  IF A COMPLICATION OR EMERGENCY SITUATION ARISES AND YOU ARE UNABLE TO REACH   YOUR PHYSICIAN - GO DIRECTLY TO THE EMERGENCY ROOM.  MD Alexandru Villavicencio MD  8/7/2020 7:22:00 PM  This report has been verified and signed electronically.  Brown Go MD  [employer]

## 2020-08-08 NOTE — PLAN OF CARE
Problem: Physical Therapy Goal  Goal: Physical Therapy Goal  Description: Goals to be met by: 20    Patient will increase functional independence with mobility by performin. Supine to sit with Modified [place] -not met  2. Sit to stand transfer with Modified [place] -not met  3. Gait  x 250 feet with Supervision -not met  Outcome: Ongoing, Progressing   Evaluation completed and goals appropriate. Zenia Plata, PT  2020

## 2020-08-08 NOTE — PT/OT/SLP EVAL
Physical Therapy Evaluation    Patient Name:  Miky Esqueda   MRN:  [id number]    Recommendations:     Discharge Recommendations:  (home no needs)   Discharge Equipment Recommendations: none   Barriers to discharge: None    Assessment:     Miky Esqueda is a 65 y.o. male admitted with a medical diagnosis of Symptomatic anemia.  He presents with the following impairments/functional limitations:  impaired endurance, gait instability pt abi treatment well and will benefit from skilled PT 3x/wk to progress physically. Pt will be able to discharge home with no needs when medically stable. Pt was transferred from [facility].     Rehab Prognosis: Good; patient would benefit from acute skilled PT services to address these deficits and reach maximum level of function.    Recent Surgery: Procedure(s) (LRB):  COLONOSCOPY (N/A) Day of Surgery    Plan:     During this hospitalization, patient to be seen 3 x/week to address the identified rehab impairments via gait training, therapeutic activities and progress toward the following goals:    · Plan of Care Expires:  09/05/20    Subjective     Chief Complaint: pt had no complaints during treatment.   Patient/Family Comments/goals:  To get better and go home.   Pain/Comfort:  · Pain Rating 1: 0/10  · Pain Rating Post-Intervention 1: 0/10    Patients cultural, spiritual, Jew conflicts given the current situation: no    Living Environment:  Pt is retired and lives with his wife in 1 story slab. Pt wife works as  and can take some time off.   Prior to admission, patients level of function was Independent.  Equipment used at home: none.  DME owned (not currently used): none.  Upon discharge, patient will have assistance from wife.    Objective:     Communicated with nurse prior to session.  Patient found supine with telemetry, blood pressure cuff, pulse ox (continuous), SCD, peripheral IV  upon PT entry to room.    General Precautions: Standard, fall   Orthopedic  Precautions:    Braces:       Exams:  · Cognitive Exam:  Patient is oriented to Person, Place, Time and Situation  · RLE ROM: WFL  · RLE Strength: WFL  · LLE ROM: WFL  · LLE Strength: WFL    Functional Mobility:  · Bed Mobility:     · Rolling Left:  supervision  · Supine to Sit: supervision  · Sit to Supine: supervision  ·   · Transfers:     · Sit to Stand:  supervision with hand-held assist  ·   · Gait: pt received gait training ~ 20 ft with supervision. Distance pt ambulated limited due to pt going for a test.     Therapeutic Activities and Exercises:   pt received verbal instruction for role of PT and POC.  Pt verbally expressed understanding of such.     AM-PAC 6 CLICK MOBILITY  Total Score:18     Patient left supine with all lines intact and call button in reach.    GOALS:   Multidisciplinary Problems     Physical Therapy Goals        Problem: Physical Therapy Goal    Goal Priority Disciplines Outcome Goal Variances Interventions   Physical Therapy Goal     PT, PT/OT Ongoing, Progressing     Description: Goals to be met by: 20    Patient will increase functional independence with mobility by performin. Supine to sit with Modified [place] -not met  2. Sit to stand transfer with Modified [place] -not met  3. Gait  x 250 feet with Supervision -not met                   History:     Past Medical History:   Diagnosis Date    AV malformation of gastrointestinal tract 2020    Bilateral pulmonary embolism 2020    History of DVT (deep vein thrombosis) 2020    Iron deficiency anemia     Stroke        Past Surgical History:   Procedure Laterality Date    COLONOSCOPY N/A 6/15/2020    Procedure: COLONOSCOPY;  Surgeon: Brown Go MD;  Location: [facility];  Service: Endoscopy;  Laterality: N/A;    ESOPHAGOGASTRODUODENOSCOPY N/A 6/15/2020    Procedure: EGD (ESOPHAGOGASTRODUODENOSCOPY);  Surgeon: Brown Go MD;  Location: [facility];  Service: Endoscopy;  Laterality: N/A;     INTRALUMINAL GASTROINTESTINAL TRACT IMAGING VIA CAPSULE N/A 7/13/2020    Procedure: IMAGING PROCEDURE, GI TRACT, INTRALUMINAL, VIA CAPSULE;  Surgeon: Brown Go MD;  Location: [facility];  Service: Endoscopy;  Laterality: N/A;       Time Tracking:     PT Received On: 08/08/20  PT Start Time: 0840     PT Stop Time: 0848  PT Total Time (min): 8 min     Billable Minutes: Evaluation 8 min      Zenia Plata, PT  08/08/2020

## 2020-08-08 NOTE — PLAN OF CARE
Gi called, plan to do video capsule, pt to remain npo, may give k phos po now as previously ordered

## 2020-08-08 NOTE — ASSESSMENT & PLAN NOTE
-Patient PE in 5/25  -Was on Xarelto at home, will hold in setting of severe anemia with suspected GI bleed  -Gi suggesting to restart Xarelto Sudnay if colonoscopy negative tomorrow, will decide if anticoagulation benefits outweigh risks at this point with pts history of bleeding

## 2020-08-08 NOTE — PLAN OF CARE
02 was weaned to one liter, sat's 93-94%, now off, sat's 88-90% still diminished, bases greater left, no crackles heard but still course left post base,will turn 02 back on

## 2020-08-09 LAB
ALBUMIN SERPL BCP-MCNC: 2.2 G/DL (ref 3.5–5.2)
ALP SERPL-CCNC: 36 U/L (ref 55–135)
ALT SERPL W/O P-5'-P-CCNC: 8 U/L (ref 10–44)
ANION GAP SERPL CALC-SCNC: 3 MMOL/L (ref 8–16)
AST SERPL-CCNC: 13 U/L (ref 10–40)
BASOPHILS # BLD AUTO: 0.02 K/UL (ref 0–0.2)
BASOPHILS # BLD AUTO: 0.03 K/UL (ref 0–0.2)
BASOPHILS # BLD AUTO: 0.03 K/UL (ref 0–0.2)
BASOPHILS NFR BLD: 0.2 % (ref 0–1.9)
BASOPHILS NFR BLD: 0.2 % (ref 0–1.9)
BASOPHILS NFR BLD: 0.3 % (ref 0–1.9)
BILIRUB SERPL-MCNC: 0.6 MG/DL (ref 0.1–1)
BUN SERPL-MCNC: 14 MG/DL (ref 8–23)
CALCIUM SERPL-MCNC: 7.3 MG/DL (ref 8.7–10.5)
CHLORIDE SERPL-SCNC: 108 MMOL/L (ref 95–110)
CO2 SERPL-SCNC: 25 MMOL/L (ref 23–29)
CREAT SERPL-MCNC: 1.1 MG/DL (ref 0.5–1.4)
DIFFERENTIAL METHOD: ABNORMAL
EOSINOPHIL # BLD AUTO: 0.1 K/UL (ref 0–0.5)
EOSINOPHIL # BLD AUTO: 0.2 K/UL (ref 0–0.5)
EOSINOPHIL # BLD AUTO: 0.2 K/UL (ref 0–0.5)
EOSINOPHIL NFR BLD: 1.3 % (ref 0–8)
EOSINOPHIL NFR BLD: 1.5 % (ref 0–8)
EOSINOPHIL NFR BLD: 1.5 % (ref 0–8)
EOSINOPHIL NFR BLD: 1.7 % (ref 0–8)
EOSINOPHIL NFR BLD: 1.9 % (ref 0–8)
EOSINOPHIL NFR BLD: 1.9 % (ref 0–8)
ERYTHROCYTE [DISTWIDTH] IN BLOOD BY AUTOMATED COUNT: 15.7 % (ref 11.5–14.5)
ERYTHROCYTE [DISTWIDTH] IN BLOOD BY AUTOMATED COUNT: 15.8 % (ref 11.5–14.5)
ERYTHROCYTE [DISTWIDTH] IN BLOOD BY AUTOMATED COUNT: 15.9 % (ref 11.5–14.5)
ERYTHROCYTE [DISTWIDTH] IN BLOOD BY AUTOMATED COUNT: 15.9 % (ref 11.5–14.5)
ERYTHROCYTE [DISTWIDTH] IN BLOOD BY AUTOMATED COUNT: 16.1 % (ref 11.5–14.5)
ERYTHROCYTE [DISTWIDTH] IN BLOOD BY AUTOMATED COUNT: 16.2 % (ref 11.5–14.5)
EST. GFR  (AFRICAN AMERICAN): >60 ML/MIN/1.73 M^2
EST. GFR  (NON AFRICAN AMERICAN): >60 ML/MIN/1.73 M^2
GLUCOSE SERPL-MCNC: 99 MG/DL (ref 70–110)
HCT VFR BLD AUTO: 22.7 % (ref 40–54)
HCT VFR BLD AUTO: 23.1 % (ref 40–54)
HCT VFR BLD AUTO: 23.4 % (ref 40–54)
HCT VFR BLD AUTO: 25.1 % (ref 40–54)
HCT VFR BLD AUTO: 25.5 % (ref 40–54)
HCT VFR BLD AUTO: 29.8 % (ref 40–54)
HGB BLD-MCNC: 6.9 G/DL (ref 14–18)
HGB BLD-MCNC: 7.2 G/DL (ref 14–18)
HGB BLD-MCNC: 7.2 G/DL (ref 14–18)
HGB BLD-MCNC: 7.9 G/DL (ref 14–18)
HGB BLD-MCNC: 8.2 G/DL (ref 14–18)
HGB BLD-MCNC: 9.2 G/DL (ref 14–18)
IMM GRANULOCYTES # BLD AUTO: 0.02 K/UL (ref 0–0.04)
IMM GRANULOCYTES # BLD AUTO: 0.02 K/UL (ref 0–0.04)
IMM GRANULOCYTES # BLD AUTO: 0.03 K/UL (ref 0–0.04)
IMM GRANULOCYTES # BLD AUTO: 0.04 K/UL (ref 0–0.04)
IMM GRANULOCYTES # BLD AUTO: 0.06 K/UL (ref 0–0.04)
IMM GRANULOCYTES # BLD AUTO: 0.32 K/UL (ref 0–0.04)
IMM GRANULOCYTES NFR BLD AUTO: 0.3 % (ref 0–0.5)
IMM GRANULOCYTES NFR BLD AUTO: 0.3 % (ref 0–0.5)
IMM GRANULOCYTES NFR BLD AUTO: 0.4 % (ref 0–0.5)
IMM GRANULOCYTES NFR BLD AUTO: 0.5 % (ref 0–0.5)
IMM GRANULOCYTES NFR BLD AUTO: 0.7 % (ref 0–0.5)
IMM GRANULOCYTES NFR BLD AUTO: 3.6 % (ref 0–0.5)
LYMPHOCYTES # BLD AUTO: 1 K/UL (ref 1–4.8)
LYMPHOCYTES # BLD AUTO: 1.1 K/UL (ref 1–4.8)
LYMPHOCYTES # BLD AUTO: 1.2 K/UL (ref 1–4.8)
LYMPHOCYTES # BLD AUTO: 1.2 K/UL (ref 1–4.8)
LYMPHOCYTES # BLD AUTO: 1.4 K/UL (ref 1–4.8)
LYMPHOCYTES # BLD AUTO: 1.4 K/UL (ref 1–4.8)
LYMPHOCYTES NFR BLD: 13 % (ref 18–48)
LYMPHOCYTES NFR BLD: 13.1 % (ref 18–48)
LYMPHOCYTES NFR BLD: 13.1 % (ref 18–48)
LYMPHOCYTES NFR BLD: 14.9 % (ref 18–48)
LYMPHOCYTES NFR BLD: 15.4 % (ref 18–48)
LYMPHOCYTES NFR BLD: 16.2 % (ref 18–48)
MAGNESIUM SERPL-MCNC: 1.8 MG/DL (ref 1.6–2.6)
MCH RBC QN AUTO: 27.8 PG (ref 27–31)
MCH RBC QN AUTO: 28.1 PG (ref 27–31)
MCH RBC QN AUTO: 28.2 PG (ref 27–31)
MCH RBC QN AUTO: 28.3 PG (ref 27–31)
MCH RBC QN AUTO: 28.3 PG (ref 27–31)
MCH RBC QN AUTO: 28.5 PG (ref 27–31)
MCHC RBC AUTO-ENTMCNC: 30.4 G/DL (ref 32–36)
MCHC RBC AUTO-ENTMCNC: 30.8 G/DL (ref 32–36)
MCHC RBC AUTO-ENTMCNC: 30.9 G/DL (ref 32–36)
MCHC RBC AUTO-ENTMCNC: 31.2 G/DL (ref 32–36)
MCHC RBC AUTO-ENTMCNC: 31.5 G/DL (ref 32–36)
MCHC RBC AUTO-ENTMCNC: 32.2 G/DL (ref 32–36)
MCV RBC AUTO: 89 FL (ref 82–98)
MCV RBC AUTO: 89 FL (ref 82–98)
MCV RBC AUTO: 91 FL (ref 82–98)
MCV RBC AUTO: 91 FL (ref 82–98)
MCV RBC AUTO: 92 FL (ref 82–98)
MCV RBC AUTO: 92 FL (ref 82–98)
MONOCYTES # BLD AUTO: 0.7 K/UL (ref 0.3–1)
MONOCYTES # BLD AUTO: 0.8 K/UL (ref 0.3–1)
MONOCYTES # BLD AUTO: 0.9 K/UL (ref 0.3–1)
MONOCYTES # BLD AUTO: 0.9 K/UL (ref 0.3–1)
MONOCYTES NFR BLD: 10.5 % (ref 4–15)
MONOCYTES NFR BLD: 10.6 % (ref 4–15)
MONOCYTES NFR BLD: 11 % (ref 4–15)
MONOCYTES NFR BLD: 8.4 % (ref 4–15)
MONOCYTES NFR BLD: 8.4 % (ref 4–15)
MONOCYTES NFR BLD: 9.6 % (ref 4–15)
NEUTROPHILS # BLD AUTO: 5.4 K/UL (ref 1.8–7.7)
NEUTROPHILS # BLD AUTO: 5.7 K/UL (ref 1.8–7.7)
NEUTROPHILS # BLD AUTO: 6 K/UL (ref 1.8–7.7)
NEUTROPHILS # BLD AUTO: 6.1 K/UL (ref 1.8–7.7)
NEUTROPHILS # BLD AUTO: 6.5 K/UL (ref 1.8–7.7)
NEUTROPHILS # BLD AUTO: 6.8 K/UL (ref 1.8–7.7)
NEUTROPHILS NFR BLD: 71.2 % (ref 38–73)
NEUTROPHILS NFR BLD: 71.3 % (ref 38–73)
NEUTROPHILS NFR BLD: 72.8 % (ref 38–73)
NEUTROPHILS NFR BLD: 73.7 % (ref 38–73)
NEUTROPHILS NFR BLD: 74.2 % (ref 38–73)
NEUTROPHILS NFR BLD: 75.4 % (ref 38–73)
NRBC BLD-RTO: 1 /100 WBC
NRBC BLD-RTO: 2 /100 WBC
PHOSPHATE SERPL-MCNC: 3.1 MG/DL (ref 2.7–4.5)
PLATELET # BLD AUTO: 129 K/UL (ref 150–350)
PLATELET # BLD AUTO: 142 K/UL (ref 150–350)
PLATELET # BLD AUTO: 151 K/UL (ref 150–350)
PLATELET # BLD AUTO: 160 K/UL (ref 150–350)
PLATELET # BLD AUTO: 161 K/UL (ref 150–350)
PLATELET # BLD AUTO: 167 K/UL (ref 150–350)
PMV BLD AUTO: 10.5 FL (ref 9.2–12.9)
PMV BLD AUTO: 10.7 FL (ref 9.2–12.9)
PMV BLD AUTO: 11 FL (ref 9.2–12.9)
PMV BLD AUTO: 11.3 FL (ref 9.2–12.9)
PMV BLD AUTO: 9.8 FL (ref 9.2–12.9)
PMV BLD AUTO: 9.9 FL (ref 9.2–12.9)
POTASSIUM SERPL-SCNC: 3.8 MMOL/L (ref 3.5–5.1)
PROT SERPL-MCNC: 4.5 G/DL (ref 6–8.4)
RBC # BLD AUTO: 2.48 M/UL (ref 4.6–6.2)
RBC # BLD AUTO: 2.54 M/UL (ref 4.6–6.2)
RBC # BLD AUTO: 2.54 M/UL (ref 4.6–6.2)
RBC # BLD AUTO: 2.81 M/UL (ref 4.6–6.2)
RBC # BLD AUTO: 2.88 M/UL (ref 4.6–6.2)
RBC # BLD AUTO: 3.26 M/UL (ref 4.6–6.2)
SODIUM SERPL-SCNC: 136 MMOL/L (ref 136–145)
WBC # BLD AUTO: 7.49 K/UL (ref 3.9–12.7)
WBC # BLD AUTO: 7.53 K/UL (ref 3.9–12.7)
WBC # BLD AUTO: 8.31 K/UL (ref 3.9–12.7)
WBC # BLD AUTO: 8.44 K/UL (ref 3.9–12.7)
WBC # BLD AUTO: 8.92 K/UL (ref 3.9–12.7)
WBC # BLD AUTO: 9.14 K/UL (ref 3.9–12.7)

## 2020-08-09 PROCEDURE — 99900035 HC TECH TIME PER 15 MIN (STAT)

## 2020-08-09 PROCEDURE — 11000001 HC ACUTE MED/SURG PRIVATE ROOM

## 2020-08-09 PROCEDURE — 27000221 HC OXYGEN, UP TO 24 HOURS

## 2020-08-09 PROCEDURE — 85025 COMPLETE CBC W/AUTO DIFF WBC: CPT | Mod: 91

## 2020-08-09 PROCEDURE — 83735 ASSAY OF MAGNESIUM: CPT

## 2020-08-09 PROCEDURE — 94761 N-INVAS EAR/PLS OXIMETRY MLT: CPT

## 2020-08-09 PROCEDURE — 84100 ASSAY OF PHOSPHORUS: CPT

## 2020-08-09 PROCEDURE — C9113 INJ PANTOPRAZOLE SODIUM, VIA: HCPCS | Performed by: STUDENT IN AN ORGANIZED HEALTH CARE EDUCATION/TRAINING PROGRAM

## 2020-08-09 PROCEDURE — 80053 COMPREHEN METABOLIC PANEL: CPT

## 2020-08-09 PROCEDURE — 63600175 PHARM REV CODE 636 W HCPCS: Performed by: STUDENT IN AN ORGANIZED HEALTH CARE EDUCATION/TRAINING PROGRAM

## 2020-08-09 PROCEDURE — 36415 COLL VENOUS BLD VENIPUNCTURE: CPT

## 2020-08-09 RX ADMIN — PANTOPRAZOLE SODIUM 80 MG: 40 INJECTION, POWDER, FOR SOLUTION INTRAVENOUS at 09:08

## 2020-08-09 RX ADMIN — PANTOPRAZOLE SODIUM 80 MG: 40 INJECTION, POWDER, FOR SOLUTION INTRAVENOUS at 08:08

## 2020-08-09 NOTE — NURSING
Pt up to floor. Brother at bedside. Report taken from Faye in ICU. Vital signs recorded. Pt saturating well at 98% on room air. NC removed. Pt walked to bathroom with stand by assist. O2 sats maintained.     Pt had one BM today, loose, green in color and medium sized. Safety maintained, call light w/in reach.

## 2020-08-09 NOTE — PLAN OF CARE
Pt dozing, awakened easily, denies pain, no stools today, will monitor  h and h and for any bleeding, 02 off at present , sat's 98% did cough up thick green sputum x2 instructed to give sample next time

## 2020-08-09 NOTE — PLAN OF CARE
VN rounds:  VN cued into pt's room with pt's permission, role of VN explained to pt.  Pt resting in bed In low position with bed alarm in place, call bell within reach.  VN instructed to call for assistance.    No acute distress noted.  Pt's chart, labs and vital signs reviewed.  Allowed time for questions.  Will continue to be available and intervene as needed.

## 2020-08-09 NOTE — ASSESSMENT & PLAN NOTE
-Patient H/H 6.9/22.7 this am  -Received total of 4 units thus far, 1 u prepared just in case symptoms worsen  -Continue to monitor  -Await VC results  -Negative DBE, negative Colonoscopy  -started maintenance IVF this am for soft pressures

## 2020-08-09 NOTE — NURSING
Pt AAO x4, opens eyes spontaneously, and able to follow commands. RR 16-30 breaths per minute. Pulse ox measuring > 95% on 2L NC.  Video Capsule completed at 2045.  No BM this shift.  Pt repositioned Q2H with use of pillows. Heels floated off mattress at all times.Tubing and other devices remain free from under the patient. Pt voided per urinal. Frequent rounds made to assess for safety and discomfort, fall precautions maintained. Call bell within reach. Will continue to monitor

## 2020-08-09 NOTE — PROGRESS NOTES
[facility] ICU 5th Floor  Critical Care - Medicine  Progress Note    Patient Name: Miky Esqueda  MRN: [id number]  Admission Date: 8/6/2020  Hospital Length of Stay: 3 days  Code Status: Full Code  Attending Provider: Guillermina Loredo MD  Primary Care Provider: Farhat Matson MD   Principal Problem: Symptomatic anemia    Subjective:     Interval History/Significant Events: The patient still is having fluctuations of his HgB with average around 7.  He dropped as low as 6.9 and most recently is 7.2.  He feels good and is hemodynamically stable.  Completed capsule endoscopy overnight.  No further bleeding seen at this time.  Seems to have tolerated xarelto ok overnight.      Review of Systems   Constitutional: Positive for fatigue. Negative for activity change, appetite change, chills and diaphoresis.   HENT: Negative for mouth sores.    Respiratory: Negative for apnea, cough and shortness of breath.    Cardiovascular: Negative for chest pain and leg swelling.   Gastrointestinal: Negative for abdominal distention, abdominal pain and blood in stool.   Skin: Negative for color change.   Psychiatric/Behavioral: Negative for agitation, behavioral problems and confusion.          Objective:     Vital Signs (Most Recent):  Temp: 98 °F (36.7 °C) (08/09/20 1100)  Pulse: 70 (08/09/20 1145)  Resp: 16 (08/09/20 1145)  BP: 102/63 (08/09/20 1130)  SpO2: 100 % (08/09/20 1145) Vital Signs (24h Range):  Temp:  [98 °F (36.7 °C)-98.4 °F (36.9 °C)] 98 °F (36.7 °C)  Pulse:  [] 70  Resp:  [6-31] 16  SpO2:  [89 %-100 %] 100 %  BP: ()/(50-73) 102/63     Weight: 97.5 kg (214 lb 15.2 oz)  Body mass index is 29.15 kg/m².      Intake/Output Summary (Last 24 hours) at 8/9/2020 1231  Last data filed at 8/9/2020 1000  Gross per 24 hour   Intake 2050 ml   Output 1175 ml   Net 875 ml       Physical Exam  Constitutional:       General: He is not in acute distress.     Appearance: Normal appearance. He is normal weight.  He is not ill-appearing, toxic-appearing or diaphoretic.   HENT:      Head: Normocephalic and atraumatic.      Nose: Nose normal.   Eyes:      Extraocular Movements: Extraocular movements intact.      Pupils: Pupils are equal, round, and reactive to light.   Neck:      Musculoskeletal: Normal range of motion.   Cardiovascular:      Rate and Rhythm: Normal rate and regular rhythm.   Pulmonary:      Effort: Pulmonary effort is normal. No respiratory distress.   Abdominal:      General: There is no distension.      Palpations: Abdomen is soft.   Skin:     General: Skin is warm and dry.   Neurological:      General: No focal deficit present.      Mental Status: He is alert and oriented to person, place, and time. Mental status is at baseline.   Psychiatric:         Mood and Affect: Mood normal.         Behavior: Behavior normal.         Thought Content: Thought content normal.         Judgment: Judgment normal.         Vents:  None        Lines/Drains/Airways     Peripheral Intravenous Line                 Peripheral IV - Single Lumen 08/07/20 1445 20 G Right Wrist 1 day         Peripheral IV - Single Lumen 08/08/20 0930 20 G Left Hand 1 day                Significant Labs:    CBC/Anemia Profile:  Recent Labs   Lab 08/09/20  0404 08/09/20  0825 08/09/20  1222   WBC 8.44 8.31 7.53   HGB 6.9* 7.2* 7.2*   HCT 22.7* 23.4* 23.1*   * 160 161   MCV 92 92 91   RDW 16.1* 15.9* 15.9*        Chemistries:  Recent Labs   Lab 08/08/20  0349 08/09/20  0404    136   K 4.0 3.8    108   CO2 24 25   BUN 22 14   CREATININE 1.3 1.1   CALCIUM 7.7* 7.3*   ALBUMIN 2.6* 2.2*   PROT 5.0* 4.5*   BILITOT 0.8 0.6   ALKPHOS 41* 36*   ALT 10 8*   AST 17 13   MG 1.9 1.8   PHOS 2.2* 3.1         Assessment/Plan:     Active Diagnoses:    Diagnosis Date Noted POA    PRINCIPAL PROBLEM:  Symptomatic anemia [D64.9] 06/12/2020 Yes    Right to left cardiac shunt [I28.0] 06/17/2020 Yes    History of pulmonary embolus (PE) [Z86.711]  05/26/2020 Yes      Problems Resolved During this Admission:       CNS  CVA in past  - holding asa for now.  - no active issue     Pulm  History of tobacco abuse (now former)  History of PE on xarelto (dx 5/2020)  -s/p trial of xarelto yesterday.  Recommend follow up with GI results of capsule endoscopy before resuming completely.  - if unable to tolerate chronic anticoagulation, he will need IVC filter placed.  - maintain sats >88% with supplemental O2 as needed.     CV  Hypotension  Tachycardia  - tachycardia, stable.  - monitor BP and if drops this may be a manifestation of bleed.  Would ensure blood available for transfusion should this arise.     GI/FEN  - GI bleed  - history of AVM 6/20  - history of multiple AVMs and has had upper and lower scope 6/2020 showing gastric AVM as well as colon polys. Had second EGD at [facility] showing jejunal AVM and colon polyp. VCE on 7/16 showed 2 proximal AVMs. Enteroscopy without location of culprit lesion.  - colonoscopy this admission was negative.  - H/H 7.2 this morning  - F/u capsule endoscopy results.  If no evidence of bleed, may be able to tolerate xarelto (at GI discretion).  Also may consider step down.     Heme/ID  - no infectious processes suspected at this time.   - anemia   - normal MCV     Renal  - Cr at baseline, no acute issues.      PPx: SCDs +/- xarelto based on GI opinion.    Trent Lopez MD, PGY6  Critical Care - Medicine  [facility] ICU 5th Floor  [phone]

## 2020-08-09 NOTE — PLAN OF CARE
[facility]:    65 year old man with a reported chronic history of iron deficiency anemia over 5 years in duration now has recurrent, severe anemia with occult blood loss which is exacerbated over the last 3 months on xarelto therapy for DVT/PTE. There is a report of previous angioectasia in the jejunum on push enteroscopy (but not able to clearly see the  lesion) and report of a small angioectasia in the proximal jejunum by recent VCE. This patient's anemia is likely related to chronic GI blood loss from angioectasias in the small bowel or right colon vs a Dieulafoy lesion.     No acute events overnight. The patient reports no bowel movements in the last 24 hours, denies melena or hematochezia. Tolerating eating breakfast this morning. Last blood transfusion on 8/7.    Vitals:    08/09/20 0515   BP: 103/63   Pulse: 82   Resp: 15   Temp:    Physical Exam   Constitutional: He is oriented to person, place, and time and well-developed, well-nourished, and in no distress. No distress.   HENT:   Head: Normocephalic and atraumatic.   Eyes: Pupils are equal, round, and reactive to light. No scleral icterus.   Neck: Normal range of motion.   Cardiovascular: Normal rate and regular rhythm.   No murmur heard.  Pulmonary/Chest: Effort normal and breath sounds normal. No respiratory distress.   Abdominal: Soft. Bowel sounds are normal. He exhibits no distension. There is no abdominal tenderness. There is no rebound.   Musculoskeletal: Normal range of motion.   Neurological: He is alert and oriented to person, place, and time.   Skin: Skin is warm and dry. He is not diaphoretic. No pallor.   Psychiatric: Affect and judgment normal.     Hgb 6.9, Hct 22.7, plts 142    Colonoscopy 8/8: Entire examined colon appeared normal, TI briefly intubated without evidence of bleeding seen, mild non-bleeding hemorrhoids    Plan:  - Follow up video capsule results (s/p Xarelto reinstitution).  - Maintain 2 large bore IVs. Transfuse to  goal >7.  - If VCE negative, may be discharged tomorrow if stable.      Elvia Cummins MD  [facility] PGY IV

## 2020-08-09 NOTE — ASSESSMENT & PLAN NOTE
-Patient on Xarelto at home, will hold in setting of acute suspected GI bleed  -Monitor closely for SOB, DVT. SCDs placed.  STABLE

## 2020-08-09 NOTE — PLAN OF CARE
Pt had neg colonoscopy, presently having video capsule study, rec xarelto, to see if any bleeding occurs, monitoring h and h,, side rails up x3, call bell easy, reach, instructed to call for assistance to get to bsc, understood, continue to monitor sat's

## 2020-08-09 NOTE — NURSING TRANSFER
Nursing Transfer Note      8/9/2020     Transfer {TRANSFER TO Room 516    Transfer via bed    Transfer with  to O2, cardiac monitoring    Transported by Nurse and Transporter    Medicines sent: none    Chart send with patient: Yes    Notified: spouse    Patient reassessed at:  08/09/20, 1505    Upon arrival to floor: cardiac monitor applied, patient oriented to room, call bell in reach and bed in lowest position

## 2020-08-09 NOTE — SUBJECTIVE & OBJECTIVE
Interval History: Awaiting results of VC study. Patient colonoscopy negative. Patient H/H dropped to 6.9 today from 9.2 yesterday, rechecking CBC. Preparing 1 U PRBCs, patient asymptomatic at present, but as patient has antibodies, takes time to prepare which could delay urgent transfusion if needed. Patient diet may be advanced, and will consider stepdown if 2nd CBC. If patient requires transfusion, will be 5th unit PRBCs, will have to initiate passive transfusion protocol (Platelets after 5th, cryoprecipitate after 7th).    Review of Systems   Constitutional: Negative for chills, diaphoresis, fatigue and fever.   HENT: Negative.    Eyes: Negative for visual disturbance.   Respiratory: Negative.  Negative for cough and shortness of breath.    Cardiovascular: Negative for chest pain and palpitations.   Gastrointestinal: Negative for abdominal pain, blood in stool, constipation, diarrhea, nausea, rectal pain and vomiting.   Genitourinary: Negative for hematuria.   Musculoskeletal: Negative for arthralgias.   Skin: Positive for pallor.   Allergic/Immunologic: Negative.    Neurological: Negative for dizziness, light-headedness and headaches.   Hematological: Negative.    Psychiatric/Behavioral: Negative.      Objective:     Vital Signs (Most Recent):  Temp: 98.1 °F (36.7 °C) (08/09/20 0301)  Pulse: 82 (08/09/20 0515)  Resp: 15 (08/09/20 0515)  BP: 103/63 (08/09/20 0515)  SpO2: (!) 94 % (08/09/20 0515) Vital Signs (24h Range):  Temp:  [98 °F (36.7 °C)-98.4 °F (36.9 °C)] 98.1 °F (36.7 °C)  Pulse:  [] 82  Resp:  [6-31] 15  SpO2:  [89 %-100 %] 94 %  BP: ()/(50-64) 103/63     Weight: 97.5 kg (214 lb 15.2 oz)  Body mass index is 29.15 kg/m².    Intake/Output Summary (Last 24 hours) at 8/9/2020 0840  Last data filed at 8/8/2020 2301  Gross per 24 hour   Intake 2050 ml   Output 875 ml   Net 1175 ml      Physical Exam  Constitutional:       General: He is not in acute distress.     Appearance: Normal appearance. He  is normal weight.   HENT:      Head: Normocephalic and atraumatic.      Right Ear: External ear normal.      Left Ear: External ear normal.      Nose: Nose normal.      Mouth/Throat:      Mouth: Mucous membranes are moist.      Pharynx: Oropharynx is clear.      Comments: Top dentures in place  Eyes:      Extraocular Movements: Extraocular movements intact.      Conjunctiva/sclera: Conjunctivae normal.      Pupils: Pupils are equal, round, and reactive to light.   Neck:      Musculoskeletal: Neck supple.   Cardiovascular:      Rate and Rhythm: Normal rate and regular rhythm.      Heart sounds: Murmur present.   Pulmonary:      Effort: Pulmonary effort is normal.      Breath sounds: Normal breath sounds.   Abdominal:      General: Abdomen is flat. Bowel sounds are normal.      Palpations: Abdomen is soft.      Tenderness: There is no abdominal tenderness.   Musculoskeletal: Normal range of motion.   Skin:     General: Skin is warm and dry.   Neurological:      General: No focal deficit present.      Mental Status: He is alert and oriented to person, place, and time. Mental status is at baseline.         Significant Labs:   CBC:   Recent Labs   Lab 08/08/20 2007 08/08/20  2347 08/09/20  0404   WBC 10.37 7.49 8.44   HGB 7.0* 9.2* 6.9*   HCT 21.6* 29.8* 22.7*   * 129* 142*     CMP:   Recent Labs   Lab 08/08/20  0349 08/09/20  0404    136   K 4.0 3.8    108   CO2 24 25    99   BUN 22 14   CREATININE 1.3 1.1   CALCIUM 7.7* 7.3*   PROT 5.0* 4.5*   ALBUMIN 2.6* 2.2*   BILITOT 0.8 0.6   ALKPHOS 41* 36*   AST 17 13   ALT 10 8*   ANIONGAP 4* 3*   EGFRNONAA 58* >60     All pertinent labs within the past 24 hours have been reviewed.    Significant Imaging: I have reviewed and interpreted all pertinent imaging results/findings within the past 24 hours.   X-Ray Chest AP Portable   Final Result      Normal study.         Electronically signed by: Long Herman MD   Date:    08/06/2020   Time:    10:46       CT Head Without Contrast   Final Result      1. There is no evidence of an acute ischemic event.   2. There is no intracranial hemorrhage.   3. There is mild partial opacification of the left ethmoidal the left sphenoidal sinuses.  There is mild partial opacification of the maxillary sinuses bilaterally.  This is characteristic of sinusitis.   4. There is mild partial opacification of the left mastoid air cells.   All CT scans at this facility use dose modulation, iterative reconstruction, and/or weight base dosing when appropriate to reduce radiation dose when appropriate to reduce radiation dose to as low as reasonably achievable.         Electronically signed by: Long Herman MD   Date:    08/06/2020   Time:    10:44

## 2020-08-09 NOTE — PROGRESS NOTES
[facility] ICU 5th Floor  Hospital Medicine  Progress Note    Patient Name: Miky Esqueda  MRN: [id number]  Patient Class: IP- Inpatient   Admission Date: 8/6/2020  Length of Stay: 3 days  Attending Physician: Guillermina Loredo MD  Primary Care Provider: Farhat Matson MD        Subjective:     Principal Problem:Symptomatic anemia        HPI:  Mr. 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 Patient was at PCP's office when he experienced an episode of syncope for a sick visit. Patient had been feeling fatigued and lightheaded for the past few days. Per ED note, he had two episodes of LOC at the clinic in which the first event lasted approximately 25 seconds and the second lasting 15 seconds. In between episodes he had an occurrence of non-bloody, non-bilious emesis, in which the second LOC subsequently followed the event. During the second episode, his eyes rolled back and exhibited seizure-like activity with generalized shaking. He was hypotensive, diaphoretic, and pale. On arrival he was alert and oriented and reported weakness and headache with blurry vision. Denied focal weakness or numbness.    Previous workup include an endoscopic workup including upper and lower endoscopy 6/2020 showing one gastric AVM and H.pylori on biopsy, as well as two colon polyps. He again underwent EGD at [facility] which showed a jejunal AVM which was unable to be reached for treatment and one colon polyp. A VCE was then done at [facility] 7/16/2020 showing 2 proximal AVMs that may have been the source of bleeding. The patient was then referred to Dr. Espinal with [facility] for further evaluation with an upper single balloon enteroscopy at the time.    Per interview with the patient, he confirms that the reported series of events have to his admission today. He reports no trauma to the head.  Last bowel movement was a day or two ago and reports it was soft and brown. Denies any trials of new foods or sick contacts. Denies chest pain, abdominal pain, hematuria, hematochezia. Denies weakness or numbness.    In the ED, BP 90/50, H/H 4.6/17.1, Bun/Cr 34/1.35, positive for occult blood in stool, Covid negative, normal cxr. CT head shows no evidence of an acute ischemic event or intracranial hemorrhage. Xarelto was held. Two large bore IVs started. Patient to be admitted to the ICU for close monitoring. One unit of unmatched blood was transfused and a type and screen was done. GI has been consulted and patient is scheduled for a push enteroscopy vs. upper single balloon tomorrow. Patient NPO at midnight. Family medicine consulted for admission and medical management.    Overview/Hospital Course:  Patient received 1L NS bolus for MAPS 65-67. Received a total of 4 units of blood thus far. H/H improved to 7.2/22.5. There were concerns for an acute hemolytic transfusion reaction overnight but workup negative. Pending GI recommendations.    Interval History: Awaiting results of VC study. Patient colonoscopy negative. Patient H/H dropped to 6.9 today from 9.2 yesterday, rechecking CBC. Preparing 1 U PRBCs, patient asymptomatic at present, but as patient has antibodies, takes time to prepare which could delay urgent transfusion if needed. Patient diet may be advanced, and will consider stepdown if 2nd CBC. If patient requires transfusion, will be 5th unit PRBCs, will have to initiate passive transfusion protocol (Platelets after 5th, cryoprecipitate after 7th).    Review of Systems   Constitutional: Negative for chills, diaphoresis, fatigue and fever.   HENT: Negative.    Eyes: Negative for visual disturbance.   Respiratory: Negative.  Negative for cough and shortness of breath.    Cardiovascular: Negative for chest pain and palpitations.   Gastrointestinal: Negative for abdominal pain, blood in stool, constipation,  diarrhea, nausea, rectal pain and vomiting.   Genitourinary: Negative for hematuria.   Musculoskeletal: Negative for arthralgias.   Skin: Positive for pallor.   Allergic/Immunologic: Negative.    Neurological: Negative for dizziness, light-headedness and headaches.   Hematological: Negative.    Psychiatric/Behavioral: Negative.      Objective:     Vital Signs (Most Recent):  Temp: 98.1 °F (36.7 °C) (08/09/20 0301)  Pulse: 82 (08/09/20 0515)  Resp: 15 (08/09/20 0515)  BP: 103/63 (08/09/20 0515)  SpO2: (!) 94 % (08/09/20 0515) Vital Signs (24h Range):  Temp:  [98 °F (36.7 °C)-98.4 °F (36.9 °C)] 98.1 °F (36.7 °C)  Pulse:  [] 82  Resp:  [6-31] 15  SpO2:  [89 %-100 %] 94 %  BP: ()/(50-64) 103/63     Weight: 97.5 kg (214 lb 15.2 oz)  Body mass index is 29.15 kg/m².    Intake/Output Summary (Last 24 hours) at 8/9/2020 0827  Last data filed at 8/8/2020 2301  Gross per 24 hour   Intake 2050 ml   Output 875 ml   Net 1175 ml      Physical Exam  Constitutional:       General: He is not in acute distress.     Appearance: Normal appearance. He is normal weight.   HENT:      Head: Normocephalic and atraumatic.      Right Ear: External ear normal.      Left Ear: External ear normal.      Nose: Nose normal.      Mouth/Throat:      Mouth: Mucous membranes are moist.      Pharynx: Oropharynx is clear.      Comments: Top dentures in place  Eyes:      Extraocular Movements: Extraocular movements intact.      Conjunctiva/sclera: Conjunctivae normal.      Pupils: Pupils are equal, round, and reactive to light.   Neck:      Musculoskeletal: Neck supple.   Cardiovascular:      Rate and Rhythm: Normal rate and regular rhythm.      Heart sounds: Murmur present.   Pulmonary:      Effort: Pulmonary effort is normal.      Breath sounds: Normal breath sounds.   Abdominal:      General: Abdomen is flat. Bowel sounds are normal.      Palpations: Abdomen is soft.      Tenderness: There is no abdominal tenderness.   Musculoskeletal:  Normal range of motion.   Skin:     General: Skin is warm and dry.   Neurological:      General: No focal deficit present.      Mental Status: He is alert and oriented to person, place, and time. Mental status is at baseline.         Significant Labs:   CBC:   Recent Labs   Lab 08/08/20 2007 08/08/20  2347 08/09/20  0404   WBC 10.37 7.49 8.44   HGB 7.0* 9.2* 6.9*   HCT 21.6* 29.8* 22.7*   * 129* 142*     CMP:   Recent Labs   Lab 08/08/20  0349 08/09/20  0404    136   K 4.0 3.8    108   CO2 24 25    99   BUN 22 14   CREATININE 1.3 1.1   CALCIUM 7.7* 7.3*   PROT 5.0* 4.5*   ALBUMIN 2.6* 2.2*   BILITOT 0.8 0.6   ALKPHOS 41* 36*   AST 17 13   ALT 10 8*   ANIONGAP 4* 3*   EGFRNONAA 58* >60     All pertinent labs within the past 24 hours have been reviewed.    Significant Imaging: I have reviewed and interpreted all pertinent imaging results/findings within the past 24 hours.   X-Ray Chest AP Portable   Final Result      Normal study.         Electronically signed by: Long Herman MD   Date:    08/06/2020   Time:    10:46      CT Head Without Contrast   Final Result      1. There is no evidence of an acute ischemic event.   2. There is no intracranial hemorrhage.   3. There is mild partial opacification of the left ethmoidal the left sphenoidal sinuses.  There is mild partial opacification of the maxillary sinuses bilaterally.  This is characteristic of sinusitis.   4. There is mild partial opacification of the left mastoid air cells.   All CT scans at this facility use dose modulation, iterative reconstruction, and/or weight base dosing when appropriate to reduce radiation dose when appropriate to reduce radiation dose to as low as reasonably achievable.         Electronically signed by: Long Herman MD   Date:    08/06/2020   Time:    10:44              Assessment/Plan:      * Symptomatic anemia  -Patient H/H 6.9/22.7 this am  -Received total of 4 units thus far, 1 u prepared just in  case symptoms worsen  -Continue to monitor  -Await VC results  -Negative DBE, negative Colonoscopy  -started maintenance IVF this am for soft pressures    AV malformation of gastrointestinal tract  -Endocscopy today after H>7  -Recs appreciated    History of pulmonary embolus (PE)  -Patient PE in 5/25  -Was on Xarelto at home, will hold in setting of severe anemia with suspected GI bleed  -Colonoscopy negative, will consider discontinuation of Xarelto given increased risk of GI bleeds, and 3 months since PE.     Right to left cardiac shunt  -Patient on Xarelto at home, will hold in setting of acute suspected GI bleed  -Monitor closely for SOB, DVT. SCDs placed.  STABLE      VTE Risk Mitigation (From admission, onward)         Ordered     IP VTE HIGH RISK PATIENT  Once      08/06/20 1349     Place sequential compression device  Until discontinued      08/06/20 1349     Place sequential compression device  Until discontinued      08/06/20 1323                      Sylvia Draper MD   [facility] PGY2  Department of Hospital Medicine   [facility] ICU 5th Floor

## 2020-08-09 NOTE — ASSESSMENT & PLAN NOTE
-Patient PE in 5/25  -Was on Xarelto at home, will hold in setting of severe anemia with suspected GI bleed  -Colonoscopy negative, will consider discontinuation of Xarelto given increased risk of GI bleeds, and 3 months since PE.

## 2020-08-10 VITALS
RESPIRATION RATE: 20 BRPM | BODY MASS INDEX: 29.11 KG/M2 | OXYGEN SATURATION: 96 % | HEIGHT: 72 IN | TEMPERATURE: 98 F | HEART RATE: 82 BPM | DIASTOLIC BLOOD PRESSURE: 67 MMHG | SYSTOLIC BLOOD PRESSURE: 122 MMHG | WEIGHT: 214.94 LBS

## 2020-08-10 LAB
ALBUMIN SERPL BCP-MCNC: 2.2 G/DL (ref 3.5–5.2)
ALP SERPL-CCNC: 44 U/L (ref 55–135)
ALT SERPL W/O P-5'-P-CCNC: 8 U/L (ref 10–44)
ANION GAP SERPL CALC-SCNC: 4 MMOL/L (ref 8–16)
AST SERPL-CCNC: 17 U/L (ref 10–40)
BASOPHILS # BLD AUTO: 0.02 K/UL (ref 0–0.2)
BASOPHILS # BLD AUTO: 0.03 K/UL (ref 0–0.2)
BASOPHILS # BLD AUTO: 0.04 K/UL (ref 0–0.2)
BASOPHILS NFR BLD: 0.2 % (ref 0–1.9)
BASOPHILS NFR BLD: 0.3 % (ref 0–1.9)
BASOPHILS NFR BLD: 0.3 % (ref 0–1.9)
BASOPHILS NFR BLD: 0.4 % (ref 0–1.9)
BASOPHILS NFR BLD: 0.5 % (ref 0–1.9)
BILIRUB SERPL-MCNC: 0.4 MG/DL (ref 0.1–1)
BLD PROD TYP BPU: NORMAL
BLOOD UNIT EXPIRATION DATE: NORMAL
BLOOD UNIT TYPE CODE: 1700
BLOOD UNIT TYPE: NORMAL
BUN SERPL-MCNC: 15 MG/DL (ref 8–23)
CALCIUM SERPL-MCNC: 7.6 MG/DL (ref 8.7–10.5)
CHLORIDE SERPL-SCNC: 108 MMOL/L (ref 95–110)
CO2 SERPL-SCNC: 25 MMOL/L (ref 23–29)
CODING SYSTEM: NORMAL
CREAT SERPL-MCNC: 1.2 MG/DL (ref 0.5–1.4)
DIFFERENTIAL METHOD: ABNORMAL
DISPENSE STATUS: NORMAL
EOSINOPHIL # BLD AUTO: 0.1 K/UL (ref 0–0.5)
EOSINOPHIL # BLD AUTO: 0.2 K/UL (ref 0–0.5)
EOSINOPHIL # BLD AUTO: 0.3 K/UL (ref 0–0.5)
EOSINOPHIL NFR BLD: 2 % (ref 0–8)
EOSINOPHIL NFR BLD: 2.1 % (ref 0–8)
EOSINOPHIL NFR BLD: 2.1 % (ref 0–8)
EOSINOPHIL NFR BLD: 2.2 % (ref 0–8)
EOSINOPHIL NFR BLD: 2.7 % (ref 0–8)
ERYTHROCYTE [DISTWIDTH] IN BLOOD BY AUTOMATED COUNT: 15.4 % (ref 11.5–14.5)
ERYTHROCYTE [DISTWIDTH] IN BLOOD BY AUTOMATED COUNT: 15.6 % (ref 11.5–14.5)
ERYTHROCYTE [DISTWIDTH] IN BLOOD BY AUTOMATED COUNT: 15.6 % (ref 11.5–14.5)
ERYTHROCYTE [DISTWIDTH] IN BLOOD BY AUTOMATED COUNT: 15.8 % (ref 11.5–14.5)
ERYTHROCYTE [DISTWIDTH] IN BLOOD BY AUTOMATED COUNT: 16 % (ref 11.5–14.5)
EST. GFR  (AFRICAN AMERICAN): >60 ML/MIN/1.73 M^2
EST. GFR  (NON AFRICAN AMERICAN): >60 ML/MIN/1.73 M^2
GLUCOSE SERPL-MCNC: 100 MG/DL (ref 70–110)
HCT VFR BLD AUTO: 23.8 % (ref 40–54)
HCT VFR BLD AUTO: 24.7 % (ref 40–54)
HCT VFR BLD AUTO: 25.6 % (ref 40–54)
HCT VFR BLD AUTO: 26.6 % (ref 40–54)
HCT VFR BLD AUTO: 50.3 % (ref 40–54)
HGB BLD-MCNC: 15.7 G/DL (ref 14–18)
HGB BLD-MCNC: 7.5 G/DL (ref 14–18)
HGB BLD-MCNC: 7.6 G/DL (ref 14–18)
HGB BLD-MCNC: 7.9 G/DL (ref 14–18)
HGB BLD-MCNC: 8.3 G/DL (ref 14–18)
IMM GRANULOCYTES # BLD AUTO: 0.02 K/UL (ref 0–0.04)
IMM GRANULOCYTES # BLD AUTO: 0.03 K/UL (ref 0–0.04)
IMM GRANULOCYTES # BLD AUTO: 0.03 K/UL (ref 0–0.04)
IMM GRANULOCYTES NFR BLD AUTO: 0.3 % (ref 0–0.5)
IMM GRANULOCYTES NFR BLD AUTO: 0.4 % (ref 0–0.5)
LYMPHOCYTES # BLD AUTO: 1.1 K/UL (ref 1–4.8)
LYMPHOCYTES # BLD AUTO: 1.2 K/UL (ref 1–4.8)
LYMPHOCYTES # BLD AUTO: 1.3 K/UL (ref 1–4.8)
LYMPHOCYTES # BLD AUTO: 1.4 K/UL (ref 1–4.8)
LYMPHOCYTES # BLD AUTO: 1.4 K/UL (ref 1–4.8)
LYMPHOCYTES NFR BLD: 12 % (ref 18–48)
LYMPHOCYTES NFR BLD: 16.6 % (ref 18–48)
LYMPHOCYTES NFR BLD: 17.7 % (ref 18–48)
LYMPHOCYTES NFR BLD: 18 % (ref 18–48)
LYMPHOCYTES NFR BLD: 18.4 % (ref 18–48)
MAGNESIUM SERPL-MCNC: 1.8 MG/DL (ref 1.6–2.6)
MCH RBC QN AUTO: 27.7 PG (ref 27–31)
MCH RBC QN AUTO: 27.7 PG (ref 27–31)
MCH RBC QN AUTO: 28 PG (ref 27–31)
MCH RBC QN AUTO: 28.1 PG (ref 27–31)
MCH RBC QN AUTO: 28.3 PG (ref 27–31)
MCHC RBC AUTO-ENTMCNC: 30.8 G/DL (ref 32–36)
MCHC RBC AUTO-ENTMCNC: 30.9 G/DL (ref 32–36)
MCHC RBC AUTO-ENTMCNC: 31.2 G/DL (ref 32–36)
MCHC RBC AUTO-ENTMCNC: 31.2 G/DL (ref 32–36)
MCHC RBC AUTO-ENTMCNC: 31.5 G/DL (ref 32–36)
MCV RBC AUTO: 89 FL (ref 82–98)
MCV RBC AUTO: 90 FL (ref 82–98)
MCV RBC AUTO: 91 FL (ref 82–98)
MONOCYTES # BLD AUTO: 0.6 K/UL (ref 0.3–1)
MONOCYTES # BLD AUTO: 0.7 K/UL (ref 0.3–1)
MONOCYTES NFR BLD: 10.2 % (ref 4–15)
MONOCYTES NFR BLD: 6 % (ref 4–15)
MONOCYTES NFR BLD: 8.8 % (ref 4–15)
MONOCYTES NFR BLD: 9.3 % (ref 4–15)
MONOCYTES NFR BLD: 9.9 % (ref 4–15)
NEUTROPHILS # BLD AUTO: 4.9 K/UL (ref 1.8–7.7)
NEUTROPHILS # BLD AUTO: 5.1 K/UL (ref 1.8–7.7)
NEUTROPHILS # BLD AUTO: 5.2 K/UL (ref 1.8–7.7)
NEUTROPHILS # BLD AUTO: 5.4 K/UL (ref 1.8–7.7)
NEUTROPHILS # BLD AUTO: 7.2 K/UL (ref 1.8–7.7)
NEUTROPHILS NFR BLD: 69.2 % (ref 38–73)
NEUTROPHILS NFR BLD: 69.4 % (ref 38–73)
NEUTROPHILS NFR BLD: 70.5 % (ref 38–73)
NEUTROPHILS NFR BLD: 70.8 % (ref 38–73)
NEUTROPHILS NFR BLD: 78.8 % (ref 38–73)
NRBC BLD-RTO: 0 /100 WBC
NRBC BLD-RTO: 1 /100 WBC
PHOSPHATE SERPL-MCNC: 3.4 MG/DL (ref 2.7–4.5)
PLATELET # BLD AUTO: 175 K/UL (ref 150–350)
PLATELET # BLD AUTO: 177 K/UL (ref 150–350)
PLATELET # BLD AUTO: 185 K/UL (ref 150–350)
PLATELET # BLD AUTO: 191 K/UL (ref 150–350)
PLATELET # BLD AUTO: 67 K/UL (ref 150–350)
PMV BLD AUTO: 10.8 FL (ref 9.2–12.9)
PMV BLD AUTO: 11 FL (ref 9.2–12.9)
PMV BLD AUTO: 11 FL (ref 9.2–12.9)
PMV BLD AUTO: 11.1 FL (ref 9.2–12.9)
PMV BLD AUTO: 12.7 FL (ref 9.2–12.9)
POTASSIUM SERPL-SCNC: 4 MMOL/L (ref 3.5–5.1)
PROT SERPL-MCNC: 4.8 G/DL (ref 6–8.4)
RBC # BLD AUTO: 2.65 M/UL (ref 4.6–6.2)
RBC # BLD AUTO: 2.74 M/UL (ref 4.6–6.2)
RBC # BLD AUTO: 2.81 M/UL (ref 4.6–6.2)
RBC # BLD AUTO: 2.96 M/UL (ref 4.6–6.2)
RBC # BLD AUTO: 5.67 M/UL (ref 4.6–6.2)
SODIUM SERPL-SCNC: 137 MMOL/L (ref 136–145)
TRANS ERYTHROCYTES VOL PATIENT: NORMAL ML
WBC # BLD AUTO: 7.07 K/UL (ref 3.9–12.7)
WBC # BLD AUTO: 7.19 K/UL (ref 3.9–12.7)
WBC # BLD AUTO: 7.56 K/UL (ref 3.9–12.7)
WBC # BLD AUTO: 7.64 K/UL (ref 3.9–12.7)
WBC # BLD AUTO: 9.14 K/UL (ref 3.9–12.7)

## 2020-08-10 PROCEDURE — 25500020 PHARM REV CODE 255: Performed by: FAMILY MEDICINE

## 2020-08-10 PROCEDURE — 80053 COMPREHEN METABOLIC PANEL: CPT

## 2020-08-10 PROCEDURE — 99223 PR INITIAL HOSPITAL CARE,LEVL III: ICD-10-PCS | Mod: ,,, | Performed by: INTERNAL MEDICINE

## 2020-08-10 PROCEDURE — 85025 COMPLETE CBC W/AUTO DIFF WBC: CPT | Mod: 91

## 2020-08-10 PROCEDURE — C9113 INJ PANTOPRAZOLE SODIUM, VIA: HCPCS | Performed by: STUDENT IN AN ORGANIZED HEALTH CARE EDUCATION/TRAINING PROGRAM

## 2020-08-10 PROCEDURE — 94761 N-INVAS EAR/PLS OXIMETRY MLT: CPT

## 2020-08-10 PROCEDURE — 36415 COLL VENOUS BLD VENIPUNCTURE: CPT

## 2020-08-10 PROCEDURE — 99223 1ST HOSP IP/OBS HIGH 75: CPT | Mod: ,,, | Performed by: INTERNAL MEDICINE

## 2020-08-10 PROCEDURE — 63600175 PHARM REV CODE 636 W HCPCS: Performed by: STUDENT IN AN ORGANIZED HEALTH CARE EDUCATION/TRAINING PROGRAM

## 2020-08-10 PROCEDURE — 97530 THERAPEUTIC ACTIVITIES: CPT | Mod: CQ

## 2020-08-10 PROCEDURE — 91110 GI TRC IMG INTRAL ESOPH-ILE: CPT | Performed by: INTERNAL MEDICINE

## 2020-08-10 PROCEDURE — 84100 ASSAY OF PHOSPHORUS: CPT

## 2020-08-10 PROCEDURE — 83735 ASSAY OF MAGNESIUM: CPT

## 2020-08-10 PROCEDURE — 97116 GAIT TRAINING THERAPY: CPT | Mod: CQ

## 2020-08-10 PROCEDURE — 97803 MED NUTRITION INDIV SUBSEQ: CPT

## 2020-08-10 RX ADMIN — PANTOPRAZOLE SODIUM 80 MG: 40 INJECTION, POWDER, FOR SOLUTION INTRAVENOUS at 08:08

## 2020-08-10 RX ADMIN — IOHEXOL 10 ML: 350 INJECTION, SOLUTION INTRAVENOUS at 03:08

## 2020-08-10 NOTE — PROCEDURES
Interventional Radiology Post-Procedure Note    Pre Op Diagnosis: DVT/PE  Post Op Diagnosis: Same    Procedure: IVC filter placement    Procedure performed by: Nury    Written Informed Consent Obtained: Yes  Specimen Sent: No  Estimated Blood Loss: Minimal    Findings:   Widely patent IVC. Argon Option Elite RETRIEVABLE IVC filter placed with apex at the level of the renal veins.    No immediate complications. Patient tolerated procedure well. Please see full dictated procedure report for additional details and recommendations.    Recs:  An attempt should be made to remove this filter once no longer deemed necessary and/or once anticoagulation can be safely started. This can be arranged through interventional radiology.    Above discussed with patient and with Dr. Sylvia Draper.      Compa Arrington MD  [facility]  Pager [phone]

## 2020-08-10 NOTE — NURSING
Pt being discharged home in stable condition. AVS packet given to pt. SHAJI Evans to go over discharge instructions with pt and pt's spouse.

## 2020-08-10 NOTE — DISCHARGE SUMMARY
[facility] Hospital Medicine  Discharge Summary      Patient Name: Miky Esqueda  MRN: [id number]  Admission Date: 8/6/2020  Hospital Length of Stay: 4 days  Discharge Date and Time: No discharge date for patient encounter.  Attending Physician: Guillermina Loredo MD   Discharging Provider: Sylvia Draper MD  Primary Care Provider: Farhat Matson MD      HPI:   Mr. 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 Patient was at PCP's office when he experienced an episode of syncope for a sick visit. Patient had been feeling fatigued and lightheaded for the past few days. Per ED note, he had two episodes of LOC at the clinic in which the first event lasted approximately 25 seconds and the second lasting 15 seconds. In between episodes he had an occurrence of non-bloody, non-bilious emesis, in which the second LOC subsequently followed the event. During the second episode, his eyes rolled back and exhibited seizure-like activity with generalized shaking. He was hypotensive, diaphoretic, and pale. On arrival he was alert and oriented and reported weakness and headache with blurry vision. Denied focal weakness or numbness.    Previous workup include an endoscopic workup including upper and lower endoscopy 6/2020 showing one gastric AVM and H.pylori on biopsy, as well as two colon polyps. He again underwent EGD at [facility] which showed a jejunal AVM which was unable to be reached for treatment and one colon polyp. A VCE was then done at [facility] 7/16/2020 showing 2 proximal AVMs that may have been the source of bleeding. The patient was then referred to Dr. Espinal with [facility] for further evaluation with an upper single balloon enteroscopy at the time.    Per interview with the patient, he confirms that the reported series of events have to his admission today. He reports no  trauma to the head. Last bowel movement was a day or two ago and reports it was soft and brown. Denies any trials of new foods or sick contacts. Denies chest pain, abdominal pain, hematuria, hematochezia. Denies weakness or numbness.    In the ED, BP 90/50, H/H 4.6/17.1, Bun/Cr 34/1.35, positive for occult blood in stool, Covid negative, normal cxr. CT head shows no evidence of an acute ischemic event or intracranial hemorrhage. Xarelto was held. Two large bore IVs started. Patient to be admitted to the ICU for close monitoring. One unit of unmatched blood was transfused and a type and screen was done. GI has been consulted and patient is scheduled for a push enteroscopy vs. upper single balloon tomorrow. Patient NPO at midnight. Family medicine consulted for admission and medical management.    Procedure(s) (LRB):  IMAGING PROCEDURE, GI TRACT, INTRALUMINAL, VIA CAPSULE (N/A)      Hospital Course:   In the ICU, patient received 1L NS bolus for MAPS 65-67, which improved >65. Patient received a total of 4 units of blood during his hospital course thus far. H/H stable ~7/25. Xarelto held during hospital course. Only 1 dose given for VCE study. GI performed a colonoscopy, DBE, and VCE unrevealing.  Patient's anemia likely related to intermittent bleeding from angioectasias vs. Dieulafoy lesion in which bleeding has been exacerbated by Xarelto therapy for DVT/PTE. Patient potential for withdrawal of Xarelto with IVC filter placement. GI recommends a 6 month trial of sandostatin monthly injections if his anemia failed to improve off Xarelto. Follow up iron stores and plan parenteral iron if indicated. Future management may include CTA vs. Emergent repeat video capsule study if bleeding recurs followed by repeat DBE based upon CTA/VCE findings. Patient tolerated GI workup, H/H stable, and stepped down to the floor. Patient tolerating PO intake and PT/OT. IVC placed 8/10, tolerated procedure well. Stable for D/C with no  anticoagulation and no asprin, close follow up with Dr. Gonzales and Dr. Matson.        Consults:   Consults (From admission, onward)        Status Ordering Provider     Case Management  Once     Provider:  (Not yet assigned)    Acknowledged DERRICK WALTER     Inpatient consult to Anesthesiology  Once     Provider:  (Not yet assigned)    Acknowledged MATHIEU CAMARA     Inpatient consult to [facility]  Once     Provider:  Alexandru Espinal MD    Completed DERRICK WALTER     Inpatient consult to Hematology/Oncology  Once     Provider:  Miguelito Gonzales MD    Acknowledged DERRICK WALTER     Inpatient consult to Interventional Radiology  Once     Provider:  (Not yet assigned)    Completed DERRICK WALTER     Inpatient consult to Pulmonology  Once     Provider:  (Not yet assigned)    Completed DERRICK WALTER     Inpatient consult to Registered Dietitian/Nutritionist  Once     Provider:  (Not yet assigned)    Completed ALFREDO EVANS          No new Assessment & Plan notes have been filed under this hospital service since the last note was generated.  Service: Hospital Medicine    Final Active Diagnoses:    Diagnosis Date Noted POA    PRINCIPAL PROBLEM:  Symptomatic anemia [D64.9] 06/12/2020 Yes    History of pulmonary embolus (PE) [Z86.711] 05/26/2020 Yes    Right to left cardiac shunt [I28.0] 06/17/2020 Yes      Problems Resolved During this Admission:       Discharged Condition: stable    Disposition: Home or Self Care    Follow Up:  Follow-up Information     Farhat Matson MD. Go on 8/17/2020.    Specialty: Family Medicine  Why: time:10:00am Hospital follow up   Contact information:  [address]  [address]  [place] [address]  [phone]             Miguelito Gonzales MD. Go on 8/17/2020.    Specialties: Hematology and Oncology, Hematology  Why: Time: 2:40pm  Contact information:  [address]  [address]  [place] [address]  [phone]                 Patient  Instructions:      Diet Adult Regular     Notify your health care provider if you experience any of the following:  increased confusion or weakness     Notify your health care provider if you experience any of the following:  persistent dizziness, light-headedness, or visual disturbances     Notify your health care provider if you experience any of the following:  worsening rash     Notify your health care provider if you experience any of the following:  severe persistent headache     Notify your health care provider if you experience any of the following:  temperature >100.4     Notify your health care provider if you experience any of the following:  persistent nausea and vomiting or diarrhea     Notify your health care provider if you experience any of the following:  severe uncontrolled pain     Notify your health care provider if you experience any of the following:  redness, tenderness, or signs of infection (pain, swelling, redness, odor or green/yellow discharge around incision site)     Notify your health care provider if you experience any of the following:  difficulty breathing or increased cough     Notify your health care provider if you experience any of the following:   Order Comments: Dark or bloody stool, weakness, dizziness, fainting     Activity as tolerated       Significant Diagnostic Studies: Labs:   CMP   Recent Labs   Lab 08/09/20  0404 08/10/20  0653    137   K 3.8 4.0    108   CO2 25 25   GLU 99 100   BUN 14 15   CREATININE 1.1 1.2   CALCIUM 7.3* 7.6*   PROT 4.5* 4.8*   ALBUMIN 2.2* 2.2*   BILITOT 0.6 0.4   ALKPHOS 36* 44*   AST 13 17   ALT 8* 8*   ANIONGAP 3* 4*   ESTGFRAFRICA >60 >60   EGFRNONAA >60 >60   , CBC   Recent Labs   Lab 08/10/20  0653 08/10/20  0802 08/10/20  1230   WBC 7.19 7.64 7.07   HGB 7.6* 7.9* 7.5*   HCT 24.7* 25.6* 23.8*    175 177    and All labs within the past 24 hours have been reviewed    Pending Diagnostic Studies:     Procedure Component Value  Units Date/Time    CBC auto differential [[id number]] Collected: 08/10/20 1645    Order Status: Sent Lab Status: In process Updated: 08/10/20 1645    Specimen: Blood     Narrative:      Collection has been rescheduled by HARINDER at 08/10/2020 15:47 Reason:   Tiesha RN - Will call when patient returns from procedure.    CBC auto differential [[id number]]     Order Status: Sent Lab Status: No result     Specimen: Blood     IR IVC Filter Insertion [[id number]] Resulted: 08/10/20 1547    Order Status: Sent Lab Status: In process Updated: 08/10/20 1552         Medications:  Reconciled Home Medications:      Medication List      CONTINUE taking these medications    cholecalciferol (vitamin D3) 1,250 mcg (50,000 unit) Tab  Take 50,000 Units by mouth once a week.     multivitamin capsule  Take 1 capsule by mouth once daily.     ondansetron 4 MG Tbdl  Commonly known as: ZOFRAN-ODT  Take 1 tablet (4 mg total) by mouth every 6 (six) hours as needed (nausea).        STOP taking these medications    pantoprazole 40 MG tablet  Commonly known as: PROTONIX     rivaroxaban 20 mg Tab  Commonly known as: XARELTO            Indwelling Lines/Drains at time of discharge:   Lines/Drains/Airways     None                 Time spent on the discharge of patient: 66 minutes  Patient was seen and examined on the date of discharge and determined to be suitable for discharge.         Sylvia Draper MD   [facility] PGY2  Department of Hospital Medicine  [facility]

## 2020-08-10 NOTE — SUBJECTIVE & OBJECTIVE
Interval History: NAEON. Patient stepped down to the floor 8/9. In good spirits this AM, tolerating PO. Reports a soft BM yesterday, non-bloody. Denies any headaches, lightheadedness, palpitations.     Review of Systems  Objective:     Vital Signs (Most Recent):  Temp: 97.6 °F (36.4 °C) (08/10/20 0806)  Pulse: 86 (08/10/20 0806)  Resp: 20 (08/10/20 0806)  BP: (!) 102/58 (08/10/20 0806)  SpO2: (!) 93 % (08/10/20 0531) Vital Signs (24h Range):  Temp:  [97.5 °F (36.4 °C)-98.3 °F (36.8 °C)] 97.6 °F (36.4 °C)  Pulse:  [] 86  Resp:  [13-27] 20  SpO2:  [92 %-100 %] 93 %  BP: (101-131)/(58-75) 102/58     Weight: 97.5 kg (214 lb 15.2 oz)  Body mass index is 29.15 kg/m².    Intake/Output Summary (Last 24 hours) at 8/10/2020 0815  Last data filed at 8/10/2020 0600  Gross per 24 hour   Intake 260 ml   Output 1100 ml   Net -840 ml      Physical Exam  Constitutional:       General: He is not in acute distress.     Appearance: Normal appearance. He is normal weight.   HENT:      Head: Normocephalic and atraumatic.      Right Ear: External ear normal.      Left Ear: External ear normal.      Nose: Nose normal.      Mouth/Throat:      Mouth: Mucous membranes are moist.      Pharynx: Oropharynx is clear.      Comments: Top dentures in place  Eyes:      Extraocular Movements: Extraocular movements intact.      Conjunctiva/sclera: Conjunctivae normal.      Pupils: Pupils are equal, round, and reactive to light.   Neck:      Musculoskeletal: Neck supple.   Cardiovascular:      Rate and Rhythm: Normal rate and regular rhythm.      Heart sounds: Murmur present.   Pulmonary:      Effort: Pulmonary effort is normal.      Breath sounds: Normal breath sounds.   Abdominal:      General: Abdomen is flat. Bowel sounds are normal.      Palpations: Abdomen is soft.      Tenderness: There is no abdominal tenderness.   Musculoskeletal: Normal range of motion.   Skin:     General: Skin is warm and dry.   Neurological:      General: No focal  deficit present.      Mental Status: He is alert and oriented to person, place, and time. Mental status is at baseline.         Significant Labs:   CBC:   Recent Labs   Lab 08/09/20  1941 08/10/20  0034 08/10/20  0653   WBC 8.92 9.14 7.19   HGB 7.9* 15.7 7.6*   HCT 25.1* 50.3 24.7*    67* 185     CMP:   Recent Labs   Lab 08/09/20  0404      K 3.8      CO2 25   GLU 99   BUN 14   CREATININE 1.1   CALCIUM 7.3*   PROT 4.5*   ALBUMIN 2.2*   BILITOT 0.6   ALKPHOS 36*   AST 13   ALT 8*   ANIONGAP 3*   EGFRNONAA >60       Significant Imaging: I have reviewed all pertinent imaging results/findings within the past 24 hours.

## 2020-08-10 NOTE — PLAN OF CARE
Patient stepped down from the ICU.  HgB is relatively stable and >7.  We will sign off given patient no longer has critical care or pulmonary needs.  Please re-consult if he re-bleeds or has any other change in his clinical condition.    Trent Lopez MD  [facility] PGY6  [phone]

## 2020-08-10 NOTE — PLAN OF CARE
TN met with pt and pt's spouse rashaun at bedside for continued discharge planning. Pt's spouse to be pt's help at home on d/c. Pt verbalized no d/c needs or DME needs on d/c. Pt possible d/c later on today. Pt prefers bedside delivery for d/c meds.TN to schedule follow up appointments with pt's PCP and Dr. Gonzales.       08/10/20 5172   Discharge Reassessment   Assessment Type Discharge Planning Reassessment   Provided patient/caregiver education on the expected discharge date and the discharge plan Yes   Do you have any problems affording any of your prescribed medications? No   Discharge Plan A Home with family   Discharge Plan B Home Health   DME Needed Upon Discharge  none   Patient choice form signed by patient/caregiver N/A   Anticipated Discharge Disposition Home   Can the patient/caregiver answer the patient profile reliably? Yes, cognitively intact

## 2020-08-10 NOTE — PROGRESS NOTES
[facility]    CC: Symptomatic anemia    HPI 65 y.o. 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 Pylori, SUBHASH, and jejunal AVM who presented on 6/6/20 w/ 2 days of symptomatic anemia w/ associated anticoagulation, dizziness, and melena w/o associated endoscopic stigmata of bleeding. Antegrade DBE 6/7 w/o any possible sources of bleeding or bleeding stigmata, colonoscopy 6/8 including visualization of the TI showed no stigmata of bleeding as well. VCE deployed afterwards on 6/8 while anticoagulated w/ xarleto w/ preliminary read showing no stigmata of bleeding or obvious sources either.    Today states he feels back to normal and has no complaints. 1 bm in last 24 hours, soft and brown.    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    Bilateral pulmonary embolism 5/25/2020    History of DVT (deep vein thrombosis) 6/12/2020    Iron deficiency anemia     Stroke          Review of Systems  General ROS: negative for chills, fever or weight loss  Cardiovascular ROS: no chest pain or dyspnea on exertion      Physical Examination  BP (!) 102/58   Pulse 86   Temp 97.6 °F (36.4 °C)   Resp 20   Ht 6' (1.829 m)   Wt 97.5 kg (214 lb 15.2 oz)   SpO2 (!) 93%   BMI 29.15 kg/m²   General appearance: alert, cooperative, no distress  HENT: Normocephalic, atraumatic, neck symmetrical, no nasal discharge   Lungs: clear to auscultation bilaterally, no dullness to percussion bilaterally  Heart: regular rate and rhythm without rub; no displacement of the PMI   Abdomen: soft, non-tender; bowel sounds normoactive; no organomegaly  Extremities: extremities symmetric; no clubbing, cyanosis, or edema    Labs:  Hgb 7.6 from 7.9      Assessment:   65 y.o. male w/ pmh of bilateral DVT/PE on xarelto (5/2020), CVA (6/2020), 2 days of symptomatic anemia w/ associated anticoagulation, dizziness, and melena w/o associated  endoscopic stigmata of bleeding. Antegrade DBE 6/7 w/o any possible sources of bleeding or bleeding stigmata, colonoscopy 6/8 including visualization of the TI showed no stigmata of bleeding as well. VCE deployed afterwards on 6/8 while anticoagulated w/ xarleto w/ preliminary read showing no stigmata of bleeding or obvious sources either. This patient has an undiagnosed bleeding source but bleeding from a Dieulafoy lesion or angioectasia seems likely. There is a report of a small jejunal angioectasia on previous DBE but no angioectasias were found on upper DBE     Plan:  - VCE prelim read w/o evidence of bleeding stigmata or potential prior culprit  - No indication for IV PPI at this time  - Transfuse for goal hgb > 7, maintain active T&S, two large bore IVs as always  - Primary team is pursuing possibility of IVC filter and anticoagulation is being held currently

## 2020-08-10 NOTE — [employer]ATION PATIENT INSTRUCTIONS
Discharge Summary/Instructions after an Endoscopic Procedure  Patient Name: Miky Esqueda  Patient MRN: [id number]  Patient YOB: 1955  Monday, August 10, 2020  Alexandru Espinal MD  Your health is very important to us during the Covid Crisis. Following your   procedure today, you will receive a daily text for 2 weeks asking about   signs or symptoms of Covid 19.  Please respond to this text when you   receive it so we can follow up and keep you as safe as possible.   RESTRICTIONS:  During your procedure today, you received medications for sedation.  These   medications may affect your judgment, balance and coordination.  Therefore,   for 24 hours, you have the following restrictions:   - DO NOT drive a car, operate machinery, make legal/financial decisions,   sign important papers or drink alcohol.    ACTIVITY:  Today: no heavy lifting, straining or running due to procedural   sedation/anesthesia.  The following day: return to full activity including work.  DIET:  Eat and drink normally unless instructed otherwise.     TREATMENT FOR COMMON SIDE EFFECTS:  - Mild abdominal pain, nausea, belching, bloating or excessive gas:  rest,   eat lightly and use a heating pad.  - Sore Throat: treat with throat lozenges and/or gargle with warm salt   water.  - Because air was used during the procedure, expelling large amounts of air   from your rectum or belching is normal.  - If a bowel prep was taken, you may not have a bowel movement for 1-3 days.    This is normal.  SYMPTOMS TO WATCH FOR AND REPORT TO YOUR PHYSICIAN:  1. Abdominal pain or bloating, other than gas cramps.  2. Chest pain.  3. Back pain.  4. Signs of infection such as: chills or fever occurring within 24 hours   after the procedure.  5. Rectal bleeding, which would show as bright red, maroon, or black stools.   (A tablespoon of blood from the rectum is not serious, especially if   hemorrhoids are present.)  6. Vomiting.  7. Weakness or dizziness.  GO  DIRECTLY TO THE NEAREST EMERGENCY ROOM IF YOU HAVE ANY OF THE FOLLOWING:      Difficulty breathing              Chills and/or fever over 101 F   Persistent vomiting and/or vomiting blood   Severe abdominal pain   Severe chest pain   Black, tarry stools   Bleeding- more than one tablespoon   Any other symptom or condition that you feel may need urgent attention  Your doctor recommends these additional instructions:  If any biopsies were taken, your doctors clinic will contact you in 1 to 2   weeks with any results.  Potential for withdrawal of xarelto with IVC filter placement   Future management should include a 6 month trial of sandostatin monthly   injections if his anemia fails to improve off xarelto   Follow up iron stores and plan parenteral iron if indicated   Future management may include CTA vs emergent repeat video capsule study if   bleeding recurs followed by repeat DBE based upon CTA/VCE findings  For questions, problems or results please call your physician - Alexandru Espinal MD.  EMERGENCY PHONE NUMBER: [phone],  LAB RESULTS: [phone]  IF A COMPLICATION OR EMERGENCY SITUATION ARISES AND YOU ARE UNABLE TO REACH   YOUR PHYSICIAN - GO DIRECTLY TO THE EMERGENCY ROOM.  MD Alexandru Villavicencio MD  8/10/2020 9:52:01 AM  This report has been verified and signed electronically.  [employer]

## 2020-08-10 NOTE — ASSESSMENT & PLAN NOTE
-Patient H/H 7.9/25.6 this am  -Received total of 4 units thus far, 1 u prepared just in case symptoms worsen  -Continue to monitor  -Await VC official read, per GI no blood on first pass review  -Negative DBE, negative Colonoscopy  -Await final GI recs

## 2020-08-10 NOTE — SUBJECTIVE & OBJECTIVE
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;  Service: Endoscopy;  Laterality: N/A;    ESOPHAGOGASTRODUODENOSCOPY N/A 6/15/2020    Procedure: EGD (ESOPHAGOGASTRODUODENOSCOPY);  Surgeon: Brown Go MD;  Location: [facility];  Service: Endoscopy;  Laterality: N/A;    FUSION, SPINE, MINIMALLY INVASIVE, USING COMPUTER-ASSISTED NAVIGATION  8/7/2020    Procedure: ENTEROSCOPY, DOUBLE BALLOON, ANTEGRADE;  Surgeon: Alexandru Espinal MD;  Location: [facility];  Service: Endoscopy;;    INTRALUMINAL GASTROINTESTINAL TRACT IMAGING VIA CAPSULE N/A 7/13/2020    Procedure: IMAGING PROCEDURE, GI TRACT, INTRALUMINAL, VIA CAPSULE;  Surgeon: Brown Go MD;  Location: [facility];  Service: Endoscopy;  Laterality: N/A;     Family History     Problem Relation (Age of Onset)    Heart disease Father        Tobacco Use    Smoking status: Former Smoker     Types: Cigarettes    Smokeless tobacco: Former User   Substance and Sexual Activity    Alcohol use: No    Drug use: No    Sexual activity: Not Currently     Partners: Female       Review of Systems   Constitutional: Positive for activity change and fatigue. Negative for fever and  unexpected weight change.   HENT: Negative for mouth sores and nosebleeds.    Cardiovascular: Negative for chest pain and palpitations.   Gastrointestinal: Negative for abdominal pain and vomiting.   Neurological: Positive for weakness.   Hematological: Negative for adenopathy. Bruises/bleeds easily.   Psychiatric/Behavioral: The patient is nervous/anxious.      Objective:     Vital Signs (Most Recent):  Temp: 97.5 °F (36.4 °C) (08/10/20 1614)  Pulse: 82 (08/10/20 1614)  Resp: 20 (08/10/20 1614)  BP: 122/67 (08/10/20 1614)  SpO2: 96 % (08/10/20 1606) Vital Signs (24h Range):  Temp:  [97.3 °F (36.3 °C)-98.3 °F (36.8 °C)] 97.5 °F (36.4 °C)  Pulse:  [] 82  Resp:  [15-20] 20  SpO2:  [92 %-96 %] 96 %  BP: (102-140)/(58-73) 122/67     Weight: 97.5 kg (214 lb 15.2 oz)  Body mass index is 29.15 kg/m².  Body surface area is 2.23 meters squared.      Intake/Output Summary (Last 24 hours) at 8/10/2020 1637  Last data filed at 8/10/2020 0800  Gross per 24 hour   Intake 240 ml   Output 725 ml   Net -485 ml       Physical Exam  Vitals signs reviewed.   Constitutional:       General: He is not in acute distress.     Appearance: He is not toxic-appearing or diaphoretic.   HENT:      Mouth/Throat:      Mouth: Mucous membranes are not pale, not dry and not cyanotic. No lacerations.      Pharynx: No oropharyngeal exudate.   Eyes:      General: No scleral icterus.     Conjunctiva/sclera: Conjunctivae normal.   Neck:      Musculoskeletal: Neck supple.      Thyroid: No thyroid mass or thyromegaly.   Cardiovascular:      Rate and Rhythm: Normal rate and regular rhythm.      Heart sounds: Normal heart sounds and S1 normal.   Pulmonary:      Effort: Pulmonary effort is normal. No accessory muscle usage or respiratory distress.      Breath sounds: Normal breath sounds. No stridor. No wheezing or rales.   Abdominal:      Palpations: Abdomen is soft. There is no mass.      Tenderness: There is no abdominal tenderness.   Lymphadenopathy:       Head:      Right side of head: No submental or submandibular adenopathy.      Left side of head: No submental or submandibular adenopathy.      Cervical: No cervical adenopathy.   Skin:     Findings: No bruising, petechiae or rash.   Neurological:      Mental Status: He is alert and oriented to person, place, and time.      Cranial Nerves: No cranial nerve deficit.      Sensory: No sensory deficit.      Gait: Gait normal.   Psychiatric:         Thought Content: Thought content normal.         Judgment: Judgment normal.         Significant Labs:   All pertinent labs from the last 24 hours have been reviewed.    Diagnostic Results:  I have reviewed all pertinent imaging results/findings within the past 24 hours.

## 2020-08-10 NOTE — DISCHARGE INSTRUCTIONS
"Patient education: Angiodysplasia of the GI tract (The Basics)     What is angiodysplasia of the GI tract? -- "Angiodysplasia" is the medical term for blood vessels that become abnormal. These abnormal blood vessels can also be called "angioectasias," "arteriovenous malformations," or "AVMs."  The gastrointestinal tract, or "GI tract," includes all the organs in the body that digest food   In angiodysplasia of the GI tract, blood vessels along the GI tract become abnormal. This condition can lead to problems. When blood vessels are abnormal, they can bleed very easily and people can have bleeding in their GI tract. The medical term for bleeding in the GI tract is a "GI bleed."  Angiodysplasia of the GI tract happens most often in older adults.  What are the symptoms of angiodysplasia of the GI tract? -- Symptoms depend on whether the abnormal blood vessels bleed or not.  If the abnormal blood vessels do not bleed, people do not have any symptoms. They find out they have the condition after their doctor does tests for another reason.  If the abnormal blood vessels bleed, people might or might not realize they have a GI bleed. When a GI bleed causes symptoms, it can cause bloody or tar-colored bowel movements. But some people with a GI bleed have no symptoms and don't see any blood. People might not see any blood when the bleeding happens very slowly over time.  A GI bleed sometimes leads to a condition called "anemia," which is when the body has too few red blood cells. Anemia can make people feel tired or weak.  Is there a test for angiodysplasia of the GI tract? -- Yes. Doctors can do different tests to check for angiodysplasia of the GI tract. The test you have depends on which part of the GI tract your doctor wants to check. The different tests can include:  ?An upper endoscopy - This procedure lets the doctor look at the lining of your esophagus (the tube from the mouth to the stomach), stomach, and duodenum " "(the first part of the small intestine). During this test, the doctor puts a thin tube with a camera and light on the end into your mouth and down your esophagus    ?A colonoscopy - This procedure lets the doctor look at the inside of the large intestine (colon). During this test, the doctor puts a thin tube with a camera and light on the end into your anus and up your rectum and colon   ?A capsule endoscopy - This test uses a small camera about the size of a pill. You swallow the camera and it sends pictures of your small intestine to a recording device that you wear on a belt for 8 hours or longer. After the test, the camera leaves your body in a bowel movement.  ?A CT scan - A CT scan is an imaging test that creates pictures of the inside of the body.  How is angiodysplasia of the GI tract treated? -- Treatment depends on whether the abnormal blood vessels are bleeding, and if you have anemia.  In most cases, people only need treatment if the abnormal blood vessels are bleeding or they have anemia. People who do not have bleeding or anemia usually don't need any treatment.  When treatment is needed, it usually involves a procedure to stop the bleeding. Your doctor can do this procedure during your colonoscopy or upper endoscopy.  If this procedure doesn't stop the bleeding, your doctor will talk with you about other possible treatments to stop the bleeding. These might include:  ?A procedure called "angiography" to block the bleeding blood vessel  ?A procedure called "enteroscopy" - This is like an upper endoscopy, but the doctor puts the tube further into the small intestine.  ?Surgery - Most people do not need surgery. But people with a lot of bleeding might need surgery to remove part of their intestine.  ?Other medicines - Certain medicines might help stop the bleeding. But these medicines don't always work and they can have harmful side effects.  People with anemia or bleeding might also need treatment " with:  ?Extra iron, which usually comes in pills  ?Blood transfusions - During a blood transfusion, you will get blood that has been donated by someone else. The donated blood goes into your vein.

## 2020-08-10 NOTE — PLAN OF CARE
Per Dr. Gonzales, patient would be a candidate for IVC filter, given his multiple near-fatal GI bleeds. IR consulted for IVC filter placement.    Sylvia Draper MD  [facility] PGY2

## 2020-08-10 NOTE — CONSULTS
Interventional Radiology Consult/Pre-Procedure Note      Chief Complaint/Reason for Consult: DVT/PE    History of Present Illness:  Miky Esqueda is a 65 y.o. male with the PMH listed below who presents with DVT/PE, now with GIB. Anticoagulation felt contraindicated at this time. IR consulted IVC filter placement.    Admission H&P reviewed.    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    Bilateral pulmonary embolism 5/25/2020    History of DVT (deep vein thrombosis) 6/12/2020    Iron deficiency anemia     Stroke      Past Surgical History:   Procedure Laterality Date    COLONOSCOPY N/A 6/15/2020    Procedure: COLONOSCOPY;  Surgeon: Brown Go MD;  Location: [facility];  Service: Endoscopy;  Laterality: N/A;    COLONOSCOPY N/A 8/8/2020    Procedure: COLONOSCOPY;  Surgeon: Orlando Hannon MD;  Location: [facility];  Service: Endoscopy;  Laterality: N/A;    ESOPHAGOGASTRODUODENOSCOPY N/A 6/15/2020    Procedure: EGD (ESOPHAGOGASTRODUODENOSCOPY);  Surgeon: Brown Go MD;  Location: [facility];  Service: Endoscopy;  Laterality: N/A;    FUSION, SPINE, MINIMALLY INVASIVE, USING COMPUTER-ASSISTED NAVIGATION  8/7/2020    Procedure: ENTEROSCOPY, DOUBLE BALLOON, ANTEGRADE;  Surgeon: Alexandru Espinal MD;  Location: [facility];  Service: Endoscopy;;    INTRALUMINAL GASTROINTESTINAL TRACT IMAGING VIA CAPSULE N/A 7/13/2020    Procedure: IMAGING PROCEDURE, GI TRACT, INTRALUMINAL, VIA CAPSULE;  Surgeon: Brown Go MD;  Location: [facility];  Service: Endoscopy;  Laterality: N/A;       Allergies:   Review of patient's allergies indicates:  No Known Allergies    Scheduled Meds:    pantoprazole  80 mg Intravenous BID     Continuous Infusions:   PRN Meds:sodium chloride, sodium chloride, sodium chloride 0.9%, sodium chloride 0.9%    Anticoagulation/Antiplatelet Meds: no anticoagulation    Review of Systems:   As documented in admission H&P.    Physical  Exam:  Temp: 97.3 °F (36.3 °C) (08/10/20 1141)  Pulse: 81 (08/10/20 1141)  Resp: 20 (08/10/20 1141)  BP: 104/73 (08/10/20 1141)  SpO2: (!) 93 % (08/10/20 0531)     General: WNWD, NAD  HEENT: Normocephalic, sclera anicteric, oropharynx clear  Neck: Supple, no palpable lymphadenopathy  Heart: RRR  Lungs: Symmetric excursions, breathing unlabored  Abd: NTND, soft  Extremities: MAEW, no CCE  Neuro: Gross nonfocal    Labs:  Recent Labs   Lab 08/06/20 2228   INR 1.1       Recent Labs   Lab 08/10/20  0802   WBC 7.64   HGB 7.9*   HCT 25.6*   MCV 91         Recent Labs   Lab 08/06/20 2228 08/09/20  0404   GLU  --    < > 99   NA  --    < > 136   K  --    < > 3.8   CL  --    < > 108   CO2  --    < > 25   BUN  --    < > 14   CREATININE  --    < > 1.1   CALCIUM  --    < > 7.3*   MG  --    < > 1.8   ALT  --    < > 8*   AST  --    < > 13   ALBUMIN  --    < > 2.2*   BILITOT 1.1*   < > 0.6   BILIDIR 0.5*  --   --     < > = values in this interval not displayed.       Imaging:  CT AP 7/28/20 reviewed.    Assessment/Plan:   DVT/PE with an accepted contraindication to anticoagulation. Will undergo IVC filter placement today.    Sedation: None    Risks (including, but not limited to, pain, bleeding, infection, damage to nearby structures, perforation, fracture, or embolism of the failure, failure, the need for additional procedures, death), potential benefits, and alternatives were discussed with the patient. I explained at length that at attempt should be made to remove this filter in the future, particularly if anticoagulation can be safely started. All questions were answered to the best of my abilities. The patient wishes to proceed. Written informed consent was obtained.      Compa Arrington MD  [facility]  Pager [phone]

## 2020-08-10 NOTE — ASSESSMENT & PLAN NOTE
-Patient PE in 5/25  -Was on Xarelto at home, will hold in setting of severe anemia with suspected GI bleed  -Was given 1x Xarelto on the 8th, held since  -Awaiting cardiology recs, consider placement of IVC filter rather than anticoagulation due to patient multiple near-fatal bleeds

## 2020-08-10 NOTE — PT/OT/SLP PROGRESS
Physical Therapy Treatment    Patient Name:  Miky Esqueda   MRN:  [id number]    Recommendations:     Discharge Recommendations:  home   Discharge Equipment Recommendations: none   Barriers to discharge: decreased endurance    Assessment:     Miky Esqueda is a 65 y.o. male admitted with a medical diagnosis of Symptomatic anemia.  He presents with the following impairments/functional limitations:  impaired endurance, gait instability,pt with improving status and goals met,PT to assess for discharge.    Rehab Prognosis: Good; patient would benefit from acute skilled PT services to address these deficits and reach maximum level of function.    Recent Surgery: Procedure(s):  ENTEROSCOPY, DOUBLE BALLOON, ANTEGRADE 3 Days Post-Op    Plan:     During this hospitalization, patient to be seen 3 x/week to address the identified rehab impairments via gait training, therapeutic exercises and progress toward the following goals:    · Plan of Care Expires:  09/05/20    Subjective     Chief Complaint: n/a  Patient/Family Comments/goals: pt may go home today pending test results.  Pain/Comfort:  · Pain Rating 1: 0/10      Objective:     Communicated with nsg prior to session.  Patient found supine with SCD, telemetry upon PT entry to room.     General Precautions: Standard, fall   Orthopedic Precautions:N/A   Braces: N/A     Functional Mobility:  · Bed Mobility:     · Supine to Sit: modified independence  · Sit to Supine: modified independence  · Transfers:     · Sit to Stand:  modified independence with no AD  · Gait: amb ~100' and ~450' w/o AD and S  · Balance: good standing balance      AM-PAC 6 CLICK MOBILITY  Turning over in bed (including adjusting bedclothes, sheets and blankets)?: 4  Sitting down on and standing up from a chair with arms (e.g., wheelchair, bedside commode, etc.): 4  Moving from lying on back to sitting on the side of the bed?: 4  Moving to and from a bed to a chair (including a wheelchair)?: 4  Need to walk  in hospital room?: 4  Climbing 3-5 steps with a railing?: 3  Basic Mobility Total Score: 23       Therapeutic Activities and Exercises: pt stood outside on balcony a little while for fresh air.       Patient left supine with all lines intact and call button in reach..    GOALS: see general POC  Multidisciplinary Problems     Physical Therapy Goals        Problem: Physical Therapy Goal    Goal Priority Disciplines Outcome Goal Variances Interventions   Physical Therapy Goal     PT, PT/OT Ongoing, Progressing     Description: Goals to be met by: 20    Patient will increase functional independence with mobility by performin. Supine to sit with Modified [place] -not met  2. Sit to stand transfer with Modified [place] -not met  3. Gait  x 250 feet with Supervision -not met                   Time Tracking:     PT Received On: 08/10/20  PT Start Time: 821     PT Stop Time: 844  PT Total Time (min): 23 min     Billable Minutes: Gait Training 15 and Therapeutic Activity 8    Treatment Type: Treatment  PT/PTA: PTA     PTA Visit Number: 1     Jaya Jaime, PTA  08/10/2020

## 2020-08-10 NOTE — PLAN OF CARE
Problem: Physical Therapy Goal  Goal: Physical Therapy Goal  Description: Goals to be met by: 20    Patient will increase functional independence with mobility by performin. Supine to sit with Modified [place]  MET 8/10 20  2. Sit to stand transfer with Modified [place]  MET 8/10/20  3. Gait  x 250 feet with Supervision  MET 8/10/20  Outcome: Ongoing, Progressing

## 2020-08-10 NOTE — PROGRESS NOTES
[facility] Hospital Medicine  Progress Note    Patient Name: Miky Esqueda  MRN: [id number]  Patient Class: IP- Inpatient   Admission Date: 8/6/2020  Length of Stay: 4 days  Attending Physician: Guillermina Loredo MD  Primary Care Provider: Farhat Matson MD        Subjective:     Principal Problem:Symptomatic anemia        HPI:  Mr. Miky Esqueda is a 66 y/o man with PMH of b/l DVT/PE (on Xarelto), CVA (6/2020), right to left shunt, treated H. 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 Patient was at PCP's office when he experienced an episode of syncope for a sick visit. Patient had been feeling fatigued and lightheaded for the past few days. Per ED note, he had two episodes of LOC at the clinic in which the first event lasted approximately 25 seconds and the second lasting 15 seconds. In between episodes he had an occurrence of non-bloody, non-bilious emesis, in which the second LOC subsequently followed the event. During the second episode, his eyes rolled back and exhibited seizure-like activity with generalized shaking. He was hypotensive, diaphoretic, and pale. On arrival he was alert and oriented and reported weakness and headache with blurry vision. Denied focal weakness or numbness.    Previous workup include an endoscopic workup including upper and lower endoscopy 6/2020 showing one gastric AVM and H.pylori on biopsy, as well as two colon polyps. He again underwent EGD at [facility] which showed a jejunal AVM which was unable to be reached for treatment and one colon polyp. A VCE was then done at [facility] 7/16/2020 showing 2 proximal AVMs that may have been the source of bleeding. The patient was then referred to Dr. Espinal with [facility] for further evaluation with an upper single balloon enteroscopy at the time.    Per interview with the patient, he confirms that the reported series of events have to his admission today. He reports no trauma to the head. Last bowel  movement was a day or two ago and reports it was soft and brown. Denies any trials of new foods or sick contacts. Denies chest pain, abdominal pain, hematuria, hematochezia. Denies weakness or numbness.    In the ED, BP 90/50, H/H 4.6/17.1, Bun/Cr 34/1.35, positive for occult blood in stool, Covid negative, normal cxr. CT head shows no evidence of an acute ischemic event or intracranial hemorrhage. Xarelto was held. Two large bore IVs started. Patient to be admitted to the ICU for close monitoring. One unit of unmatched blood was transfused and a type and screen was done. GI has been consulted and patient is scheduled for a push enteroscopy vs. upper single balloon tomorrow. Patient NPO at midnight. Family medicine consulted for admission and medical management.    Overview/Hospital Course:  Patient received 1L NS bolus for MAPS 65-67. Received a total of 4 units of blood thus far. H/H improved to 7.2/22.5. There were concerns for an acute hemolytic transfusion reaction overnight but workup negative. Pending GI recommendations.    Interval History: NAEON. Patient stepped down to the floor 8/9. In good spirits this AM, tolerating PO. Reports a soft BM yesterday, non-bloody. Denies any headaches, lightheadedness, palpitations.     Review of Systems  Objective:     Vital Signs (Most Recent):  Temp: 97.6 °F (36.4 °C) (08/10/20 0806)  Pulse: 86 (08/10/20 0806)  Resp: 20 (08/10/20 0806)  BP: (!) 102/58 (08/10/20 0806)  SpO2: (!) 93 % (08/10/20 0531) Vital Signs (24h Range):  Temp:  [97.5 °F (36.4 °C)-98.3 °F (36.8 °C)] 97.6 °F (36.4 °C)  Pulse:  [] 86  Resp:  [13-27] 20  SpO2:  [92 %-100 %] 93 %  BP: (101-131)/(58-75) 102/58     Weight: 97.5 kg (214 lb 15.2 oz)  Body mass index is 29.15 kg/m².    Intake/Output Summary (Last 24 hours) at 8/10/2020 0815  Last data filed at 8/10/2020 0600  Gross per 24 hour   Intake 260 ml   Output 1100 ml   Net -840 ml      Physical Exam  Constitutional:       General: He is not in  acute distress.     Appearance: Normal appearance. He is normal weight.   HENT:      Head: Normocephalic and atraumatic.      Right Ear: External ear normal.      Left Ear: External ear normal.      Nose: Nose normal.      Mouth/Throat:      Mouth: Mucous membranes are moist.      Pharynx: Oropharynx is clear.      Comments: Top dentures in place  Eyes:      Extraocular Movements: Extraocular movements intact.      Conjunctiva/sclera: Conjunctivae normal.      Pupils: Pupils are equal, round, and reactive to light.   Neck:      Musculoskeletal: Neck supple.   Cardiovascular:      Rate and Rhythm: Normal rate and regular rhythm.      Heart sounds: Murmur present.   Pulmonary:      Effort: Pulmonary effort is normal.      Breath sounds: Normal breath sounds.   Abdominal:      General: Abdomen is flat. Bowel sounds are normal.      Palpations: Abdomen is soft.      Tenderness: There is no abdominal tenderness.   Musculoskeletal: Normal range of motion.   Skin:     General: Skin is warm and dry.   Neurological:      General: No focal deficit present.      Mental Status: He is alert and oriented to person, place, and time. Mental status is at baseline.         Significant Labs:   CBC:   Recent Labs   Lab 08/09/20  1941 08/10/20  0034 08/10/20  0653   WBC 8.92 9.14 7.19   HGB 7.9* 15.7 7.6*   HCT 25.1* 50.3 24.7*    67* 185     CMP:   Recent Labs   Lab 08/09/20  0404      K 3.8      CO2 25   GLU 99   BUN 14   CREATININE 1.1   CALCIUM 7.3*   PROT 4.5*   ALBUMIN 2.2*   BILITOT 0.6   ALKPHOS 36*   AST 13   ALT 8*   ANIONGAP 3*   EGFRNONAA >60       Significant Imaging: I have reviewed all pertinent imaging results/findings within the past 24 hours.      Assessment/Plan:      * Symptomatic anemia  -Patient H/H 7.9/25.6 this am  -Received total of 4 units thus far, 1 u prepared just in case symptoms worsen  -Continue to monitor  -Await VC official read, per GI no blood on first pass review  -Negative DBE,  negative Colonoscopy  -Await final GI recs    AV malformation of gastrointestinal tract  -Endocscopy today after H>7  -Recs appreciated    History of pulmonary embolus (PE)  -Patient PE in 5/25  -Was on Xarelto at home, will hold in setting of severe anemia with suspected GI bleed  -Was given 1x Xarelto on the 8th, held since  -Awaiting cardiology recs, consider placement of IVC filter rather than anticoagulation due to patient multiple near-fatal bleeds       Right to left cardiac shunt  -Patient on Xarelto at home, will hold in setting of acute suspected GI bleed  -Monitor closely for SOB, DVT. SCDs placed.  See 'History of PE'      VTE Risk Mitigation (From admission, onward)         Ordered     IP VTE HIGH RISK PATIENT  Once      08/06/20 1349     Place sequential compression device  Until discontinued      08/06/20 1323                      Sylvia Draper MD   [facility]  Department of Hospital Medicine   [facility]

## 2020-08-10 NOTE — PLAN OF CARE
"GI Plan of Care Note:    VCE Report:    Findings:        Images of the esophagus, stomach and small bowel were obtained from        the swallowed capsule and reviewed.        The esophagus was normal.        The stomach was normal.        The duodenum was normal.        The jejunum was normal.        The ileum was normal.   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 This                         patient's anemia is likely related to intermittent                         bleeding from angioectasia(s) vs a Dieulafoy                         lesion. Bleeding has been exacerbated by xarelto                         therapy for DVT/PTE   Recommendation:       Potential for withdrawal of xarelto with IVC filter                         placement                         Future management should include a 6 month trial of                         sandostatin monthly injections if his anemia fails                         to improve off xarelto                         Follow up iron stores and plan parenteral iron if                         indicated                         Future management may include CTA vs emergent                         repeat video capsule study if bleeding recurs                         followed by repeat DBE based upon CTA/VCE findings     Fransisco Acevedo (Lee)  [facility] Fellow, PGY IV  Pager: [phone]    "

## 2020-08-10 NOTE — NURSING
Pt arrived via wheelchair with transport, remote telemetry in use, VS stable afebrile, denies pain, neuro intact, FC, room air, continue to monitor, PIV intact

## 2020-08-10 NOTE — PROGRESS NOTES
[facility]  Adult Nutrition  Progress Note    SUMMARY       Recommendations    Recommendation:   1. Encourage intake at meals as tolerated.   2. RD to follow to monitor po intake    Goals:   Pt will consume at least 50-75% intake at meals by RD follow up  Nutrition Goal Status: new  Communication of RD Recs: reviewed with RN(Bernadette)    Reason for Assessment  Reason For Assessment: RD follow-up  Diagnosis: (symptomaatic anemia)  Relevant Medical History: iron deficiency anemia, DVT, stroke  General Information Comments: Pt on Cardiac diet. Pt with 50-75% intake at meals. No complaints. s/p double balloon small bowel enteroscpy 8/7 and colonoscopy 8/8. NFPE completed 8/7-nourished  Nutrition Discharge Planning: pt to d/c on Cardiac diet    Nutrition Risk Screen  Nutrition Risk Screen: no indicators present    Nutrition/Diet History  Food Preferences: no Orthodox or cultural food prefs identified  Spiritual, Cultural Beliefs, Mormonism Practices, Values that Affect Care: no  Factors Affecting Nutritional Intake: None identified at this time    Anthropometrics  Temp: 97.6 °F (36.4 °C)  Height Method: Estimated  Height: 6' (182.9 cm)  Height (inches): 72 in  Weight Method: Bed Scale  Weight: 97.5 kg (214 lb 15.2 oz)  Weight (lb): 214.95 lb  Ideal Body Weight (IBW), Male: 178 lb  % Ideal Body Weight, Male (lb): 120.76 %  BMI (Calculated): 29.1  BMI Grade: 25 - 29.9 - overweight     Lab/Procedures/Meds  Pertinent Labs Reviewed: reviewed  Pertinent Labs Comments: Ca 7.3L, Alb 2.2L  Pertinent Medications Reviewed: reviewed  Pertinent Medications Comments: pantoprazole    Estimated/Assessed Needs  Weight Used For Calorie Calculations: 97.5 kg (214 lb 15.2 oz)  Energy Calorie Requirements (kcal): 2350  Energy Need Method: Keith-St Jeor(x1.3)  Protein Requirements: 78g (0.8g/kg)  Weight Used For Protein Calculations: 97.5 kg (214 lb 15.2 oz)  Estimated Fluid Requirement Method: RDA Method  RDA Method (mL):  6570     Nutrition Prescription Ordered  Current Diet Order: Cardiac    Evaluation of Received Nutrient/Fluid Intake  I/O: 510/1100  Energy Calories Required: meeting needs  Protein Required: meeting needs  Fluid Required: meeting needs  Comments: LBM 8/9  % Intake of Estimated Energy Needs: 50 - 75 %  % Meal Intake: 50 - 75 %    Nutrition Risk  Level of Risk/Frequency of Follow-up: (1xweekly)     Assessment and Plan  * Symptomatic anemia  Contributing Nutrition Diagnosis  Altered GI Function    Related to (etiology):   GI bleed    Signs and Symptoms (as evidenced by):   NPO     Interventions:  Collaboration with other providers    Nutrition Diagnosis Status:   Improving (on Cardiac diet)       Monitor and Evaluation  Food and Nutrient Intake: food and beverage intake  Food and Nutrient Adminstration: diet order  Physical Activity and Function: nutrition-related ADLs and IADLs  Anthropometric Measurements: weight  Biochemical Data, Medical Tests and Procedures: electrolyte and renal panel  Nutrition-Focused Physical Findings: overall appearance     Malnutrition Assessment  Subcutaneous Fat Loss (Final Summary): well nourished  Muscle Loss Evaluation (Final Summary): well nourished       Nutrition Follow-Up  RD Follow-up?: Yes

## 2020-08-10 NOTE — ASSESSMENT & PLAN NOTE
-he had a life threatening PE but also a life threatening GI bleed  -d/w   -d/w pt and wife  -we decided to make the difficult decision to d/c anticoagulation and consult IR for placement of IVC filter

## 2020-08-10 NOTE — PLAN OF CARE
Permission attained to enter room via virtual system.  Virtual rounds completed as documented.  Today's lab values, notes, and vital signs up to now have been reviewed.    Pt to be discharged today    eating supper   will review discharge instructions when he is allowed to leave

## 2020-08-10 NOTE — ASSESSMENT & PLAN NOTE
-Patient on Xarelto at home, will hold in setting of acute suspected GI bleed  -Monitor closely for SOB, DVT. SCDs placed.  See 'History of PE'

## 2020-08-10 NOTE — PLAN OF CARE
Recommendation:   1. Encourage intake at meals as tolerated.   2. RD to follow to monitor po intake    Goals:   Pt will consume at least 50-75% intake at meals by RD follow up  Nutrition Goal Status: new  Communication of RD Recs: reviewed with RN(Bernadette)

## 2020-08-10 NOTE — CONSULTS
[facility]  Hematology/Oncology  Consult Note    Patient Name: Miky Esqueda  MRN: [id number]  Admission Date: 8/6/2020  Hospital Length of Stay: 4 days  Code Status: Full Code   Attending Provider: Guillermina Loredo MD  Consulting Provider: Miguelito Gonzales MD  Primary Care Physician: Farhat Matson MD  Principal Problem:Symptomatic anemia    Consults  Subjective:     HPI:  He presented to the ER 8/7 with severe anemia Hb 4.6 - passed out in his primary care physician's office.    This is his second episode of severe symptomatic anemia after being started on Xarelto.    He has past h/o extensive DVT/PE in may 2020, CVA in June 2020 and h/o jejunal AVM.    No active source of bleeding could be found from extensive GI work-up during current hospital admission.     Severe anemia felt likely related to intermittent bleeding from angioectasia(s) vs a Dieulafoy lesion.      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    Bilateral pulmonary embolism 5/25/2020    History of DVT (deep vein thrombosis) 6/12/2020    Iron deficiency anemia     Stroke      Past Surgical History:   Procedure Laterality Date    COLONOSCOPY N/A 6/15/2020    Procedure: COLONOSCOPY;  Surgeon: Brown Go MD;  Location: [facility];  Service: Endoscopy;  Laterality: N/A;    COLONOSCOPY N/A 8/8/2020    Procedure: COLONOSCOPY;  Surgeon: Orlando Hannon MD;  Location: [facility];  Service: Endoscopy;  Laterality: N/A;    ESOPHAGOGASTRODUODENOSCOPY N/A 6/15/2020    Procedure: EGD (ESOPHAGOGASTRODUODENOSCOPY);  Surgeon: Brown Go MD;  Location: [facility];  Service:  Endoscopy;  Laterality: N/A;    FUSION, SPINE, MINIMALLY INVASIVE, USING COMPUTER-ASSISTED NAVIGATION  8/7/2020    Procedure: ENTEROSCOPY, DOUBLE BALLOON, ANTEGRADE;  Surgeon: Alexandru Espinal MD;  Location: [facility] ENDO;  Service: Endoscopy;;    INTRALUMINAL GASTROINTESTINAL TRACT IMAGING VIA CAPSULE N/A 7/13/2020    Procedure: IMAGING PROCEDURE, GI TRACT, INTRALUMINAL, VIA CAPSULE;  Surgeon: Brown Go MD;  Location: [facility] ENDO;  Service: Endoscopy;  Laterality: N/A;     Family History     Problem Relation (Age of Onset)    Heart disease Father        Tobacco Use    Smoking status: Former Smoker     Types: Cigarettes    Smokeless tobacco: Former User   Substance and Sexual Activity    Alcohol use: No    Drug use: No    Sexual activity: Not Currently     Partners: Female       Review of Systems   Constitutional: Positive for activity change and fatigue. Negative for fever and unexpected weight change.   HENT: Negative for mouth sores and nosebleeds.    Cardiovascular: Negative for chest pain and palpitations.   Gastrointestinal: Negative for abdominal pain and vomiting.   Neurological: Positive for weakness.   Hematological: Negative for adenopathy. Bruises/bleeds easily.   Psychiatric/Behavioral: The patient is nervous/anxious.      Objective:     Vital Signs (Most Recent):  Temp: 97.5 °F (36.4 °C) (08/10/20 1614)  Pulse: 82 (08/10/20 1614)  Resp: 20 (08/10/20 1614)  BP: 122/67 (08/10/20 1614)  SpO2: 96 % (08/10/20 1606) Vital Signs (24h Range):  Temp:  [97.3 °F (36.3 °C)-98.3 °F (36.8 °C)] 97.5 °F (36.4 °C)  Pulse:  [] 82  Resp:  [15-20] 20  SpO2:  [92 %-96 %] 96 %  BP: (102-140)/(58-73) 122/67     Weight: 97.5 kg (214 lb 15.2 oz)  Body mass index is 29.15 kg/m².  Body surface area is 2.23 meters squared.      Intake/Output Summary (Last 24 hours) at 8/10/2020 1637  Last data filed at 8/10/2020 0800  Gross per 24 hour   Intake 240 ml   Output 725 ml   Net -485 ml       Physical Exam  Vitals signs  reviewed.   Constitutional:       General: He is not in acute distress.     Appearance: He is not toxic-appearing or diaphoretic.   HENT:      Mouth/Throat:      Mouth: Mucous membranes are not pale, not dry and not cyanotic. No lacerations.      Pharynx: No oropharyngeal exudate.   Eyes:      General: No scleral icterus.     Conjunctiva/sclera: Conjunctivae normal.   Neck:      Musculoskeletal: Neck supple.      Thyroid: No thyroid mass or thyromegaly.   Cardiovascular:      Rate and Rhythm: Normal rate and regular rhythm.      Heart sounds: Normal heart sounds and S1 normal.   Pulmonary:      Effort: Pulmonary effort is normal. No accessory muscle usage or respiratory distress.      Breath sounds: Normal breath sounds. No stridor. No wheezing or rales.   Abdominal:      Palpations: Abdomen is soft. There is no mass.      Tenderness: There is no abdominal tenderness.   Lymphadenopathy:      Head:      Right side of head: No submental or submandibular adenopathy.      Left side of head: No submental or submandibular adenopathy.      Cervical: No cervical adenopathy.   Skin:     Findings: No bruising, petechiae or rash.   Neurological:      Mental Status: He is alert and oriented to person, place, and time.      Cranial Nerves: No cranial nerve deficit.      Sensory: No sensory deficit.      Gait: Gait normal.   Psychiatric:         Thought Content: Thought content normal.         Judgment: Judgment normal.         Significant Labs:   All pertinent labs from the last 24 hours have been reviewed.    Diagnostic Results:  I have reviewed all pertinent imaging results/findings within the past 24 hours.    Assessment/Plan:   1. H/o massive PE with lower extremity DVT  2. Severe blood loss anemia    -he had a life threatening PE but also a life threatening GI bleed  -d/w   -d/w pt and wife  -we decided to make the difficult decision to d/c anticoagulation and consult IR for placement of IVC filter  -discussed pros  and cons of this approach, pt and wife agreeable       Miguelito Gonzales MD  Hematology/Oncology  [facility]

## 2020-08-10 NOTE — NURSING
Call received from lab stating pt's H&H 7.6 and 24.7. Dr. Hilario made aware. Orders to recollect lab received.

## 2020-08-10 NOTE — NURSING
IR IVC filter placement completed, RIJ accessed, VS stable afebrile, denies pain, room air, neuro remains intact, gauze with tegaderm applied, dressing D/I, called report to FLOR Motley on 5th floor, pt transported back to floor via stretcher, IR care complete

## 2020-08-10 NOTE — HPI
He presented to the ER 8/7 with severe anemia Hb 4.6 - passed out in his primary care physician's office.    This is his second episode of severe symptomatic anemia after being started on Xarelto.    He has past h/o extensive DVT/PE in may 2020, CVA in June 2020 and h/o jejunal AVM.    No active source of bleeding could be found from extensive GI work-up during current hospital admission.     Severe anemia felt likely related to intermittent bleeding from angioectasia(s) vs a Dieulafoy lesion.

## 2020-08-10 NOTE — PLAN OF CARE
Discharge orders noted. Additional clinical references attached. Patient's discharge instructions given by bedside RN and reviewed via this VN.  Education provided on new and previous medications, diagnosis, and follow-up appointments. Patient verbalized understanding. Patient's ride/transportation home at bedside. All questions answered. Transport to [facility] requested. Floor nurse notified.

## 2020-08-10 NOTE — PLAN OF CARE
Permission attained to enter room via virtual system.  Virtual rounds completed as documented.  Today's lab values, notes, and vital signs up to now have been reviewed.       Pt currently being assisted with meal.  Answered no when asked : dolor?  Respirations even

## 2020-08-11 ENCOUNTER — PATIENT OUTREACH (OUTPATIENT)
Dept: ADMINISTRATIVE | Facility: CLINIC | Age: 65
End: 2020-08-11

## 2020-08-11 NOTE — PATIENT INSTRUCTIONS

## 2020-08-11 NOTE — TELEPHONE ENCOUNTER
C3 nurse attempted to contact patient. The following occurred:   C3 nurse attempted to contact Miky Esqueda  for a TCC post hospital discharge follow up call. The patient is unable to conduct the call @ this time. The patient requested a callback.    The patient has a scheduled [facility] appointment with Farhat Matson MD on 8/17/2020 at 10:00am

## 2020-08-13 NOTE — PHYSICIAN QUERY
"PT Name: Miky Esqueda  MR #: [id number]     ACUITY OF CONDITION CLARIFICATION    Mirela Matthew RN, CCDS; [email or web address]    This form is a permanent document in the medical record.     Query Date: August 13, 2020    By submitting this query, we are merely seeking further clarification of documentation to reflect the severity of illness of your patient. Please utilize your independent clinical judgment when addressing the question(s) below.    The Medical record reflects the following:     Indicators   Supporting Clinical Findings Location in Medical Record   x Documentation of condition still has thrombus in both legs at this time - has only been treated with rivaroxaban for 3 months.  Pulm PN 8/9    Lab Value(s)     x Radiology Findings US Lower Ext Veins Bilat    DVT in the right femoral and both popliteal veins, similar to the 05/25/2028 exam.  No new thrombus.    Electronically signed by: Paul Berumen MD  Date:  07/27/2020 [facility] Report 7/27/2020    Treatment/Medication     x Other Hx of PUlmonary embolus; date noted 5/26/2020    PMH of b/l DVT/PE (on Xarelto) Pulm PN 8/9    H&P      Provider, please further specify the acuity/chronicity of  "B/L DVT" .    [ x  ] Chronic   [   ] Acute on chronic   [   ] Past history only, not a current diagnosis   [   ] Other - specify: ______________   [   ]  Clinically Undetermined     Please document in your progress notes daily for the duration of treatment until resolved, and include in your discharge summary.  "

## 2020-08-17 ENCOUNTER — OFFICE VISIT (OUTPATIENT)
Dept: HEMATOLOGY/ONCOLOGY | Facility: CLINIC | Age: 65
End: 2020-08-17
Payer: COMMERCIAL

## 2020-08-17 VITALS
BODY MASS INDEX: 28.88 KG/M2 | DIASTOLIC BLOOD PRESSURE: 75 MMHG | OXYGEN SATURATION: 97 % | RESPIRATION RATE: 18 BRPM | TEMPERATURE: 98 F | WEIGHT: 212.94 LBS | HEART RATE: 86 BPM | SYSTOLIC BLOOD PRESSURE: 119 MMHG

## 2020-08-17 DIAGNOSIS — D50.0 IRON DEFICIENCY ANEMIA DUE TO CHRONIC BLOOD LOSS: Primary | ICD-10-CM

## 2020-08-17 DIAGNOSIS — E55.9 VITAMIN D DEFICIENCY: ICD-10-CM

## 2020-08-17 DIAGNOSIS — I26.99 BILATERAL PULMONARY EMBOLISM: ICD-10-CM

## 2020-08-17 DIAGNOSIS — K55.20 AV MALFORMATION OF GASTROINTESTINAL TRACT: ICD-10-CM

## 2020-08-17 PROCEDURE — 3008F PR BODY MASS INDEX (BMI) DOCUMENTED: ICD-10-PCS | Mod: CPTII,S$GLB,, | Performed by: INTERNAL MEDICINE

## 2020-08-17 PROCEDURE — 99999 PR PBB SHADOW E&M-EST. PATIENT-LVL III: CPT | Mod: PBBFAC,,, | Performed by: INTERNAL MEDICINE

## 2020-08-17 PROCEDURE — 99214 OFFICE O/P EST MOD 30 MIN: CPT | Mod: S$GLB,,, | Performed by: INTERNAL MEDICINE

## 2020-08-17 PROCEDURE — 3008F BODY MASS INDEX DOCD: CPT | Mod: CPTII,S$GLB,, | Performed by: INTERNAL MEDICINE

## 2020-08-17 PROCEDURE — 99214 PR OFFICE/OUTPT VISIT, EST, LEVL IV, 30-39 MIN: ICD-10-PCS | Mod: S$GLB,,, | Performed by: INTERNAL MEDICINE

## 2020-08-17 PROCEDURE — 99999 PR PBB SHADOW E&M-EST. PATIENT-LVL III: ICD-10-PCS | Mod: PBBFAC,,, | Performed by: INTERNAL MEDICINE

## 2020-08-17 NOTE — PROGRESS NOTES
PATIENT: Miky Esqueda  MRN: [id number]  DATE: 8/17/2020    Diagnosis:   1. Iron deficiency anemia due to chronic blood loss    2. Bilateral pulmonary embolism    3. AV malformation of gastrointestinal tract    4. Vitamin D deficiency      Chief Complaint: Anemia and Hospital Follow Up    Subjective:     History of Present Illness: He presented to the ER 05/25/2020 with acute onset of shortness of breath, diaphoresis and lightheadedness.  CT chest showed extensive bilateral pulmonary thromboemboli.  No evidence of central saddle embolus.  There was moderate pulmonary emphysema.  Lower extremity ultrasound also revealed nonocclusive thrombus in the right and left popliteal veins as well as the right femoral vein.    This is his 1st episode of pulmonary embolism/DVT.  He was started on Xarelto.    Then he had a syncopal episode in his PCP's office on 06/12/2020 which lasted about 30 sec followed by brief 15 sec seizure like episode.  He was taken to the ER where a CT head showed a hypodense area in the left parietal lobe concerning for infarct.  By this time he also developed significant anemia and was transfused 2 units packed red blood cells.    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    He got 1 dose of Injectafer 07/21/2020    INTERVAL HISTORY:   -he presented to the ER 8/7 with severe anemia Hb 4.6 - passed out in his primary care physician's office.  -this is his second episode of severe symptomatic anemia after being started on Xarelto.  -he has past h/o extensive DVT/PE in may 2020, CVA in June 2020 and h/o jejunal AVM.  -no active source of bleeding could be found from extensive GI work-up during current hospital admission.   -severe anemia felt likely related to intermittent bleeding from angioectasia(s) vs a Dieulafoy lesion.  -he underwent IVC filter placement 08/10/2020 and his Xarelto was discontinued  -he is here for follow-up today, accompanied by his wife  -he feels well  -the  only prescription medication he is taking is vitamin D3, once a week    Past Medical, Surgical, Family and Social History Reviewed.    Medications and Allergies reviewed      Review of Systems   Constitutional: Negative for fatigue, fever and unexpected weight change.   HENT: Negative for mouth sores and nosebleeds.    Eyes: Negative for photophobia and pain.   Respiratory: Negative for shortness of breath and wheezing.    Cardiovascular: Negative for chest pain and palpitations.   Gastrointestinal: Negative for abdominal pain and vomiting.   Genitourinary: Negative for flank pain and hematuria.   Musculoskeletal: Negative for back pain, neck pain and neck stiffness.   Skin: Negative for rash and wound.   Neurological: Negative for seizures, syncope and weakness.   Hematological: Negative for adenopathy. Does not bruise/bleed easily.   Psychiatric/Behavioral: Negative for behavioral problems and confusion.   All other systems reviewed and are negative.      Objective:     Vitals:    08/17/20 1459   BP: 119/75   Pulse: 86   Resp: 18   Temp: 98 °F (36.7 °C)   SpO2: 97%   Weight: 96.6 kg (212 lb 15.4 oz)     BMI: Body mass index is 28.88 kg/m².    Physical Exam  Vitals signs reviewed.   Constitutional:       General: He is not in acute distress.     Appearance: He is not toxic-appearing or diaphoretic.   HENT:      Mouth/Throat:      Mouth: Mucous membranes are not pale, not dry and not cyanotic. No lacerations.      Pharynx: No oropharyngeal exudate.   Eyes:      General: No scleral icterus.     Conjunctiva/sclera: Conjunctivae normal.   Neck:      Musculoskeletal: Neck supple.      Thyroid: No thyroid mass or thyromegaly.   Cardiovascular:      Rate and Rhythm: Normal rate and regular rhythm.      Heart sounds: Normal heart sounds and S1 normal.   Pulmonary:      Effort: Pulmonary effort is normal. No accessory muscle usage or respiratory distress.      Breath sounds: Normal breath sounds. No stridor. No wheezing or  rales.   Abdominal:      Palpations: Abdomen is soft. There is no mass.      Tenderness: There is no abdominal tenderness.   Lymphadenopathy:      Head:      Right side of head: No submental or submandibular adenopathy.      Left side of head: No submental or submandibular adenopathy.      Cervical: No cervical adenopathy.   Skin:     Findings: No bruising, petechiae or rash.   Neurological:      Mental Status: He is alert and oriented to person, place, and time.      Cranial Nerves: No cranial nerve deficit.      Sensory: No sensory deficit.      Gait: Gait normal.   Psychiatric:         Thought Content: Thought content normal.         Judgment: Judgment normal.         Assessment:       1. Iron deficiency anemia due to chronic blood loss    2. Bilateral pulmonary embolism    3. AV malformation of gastrointestinal tract    4. Vitamin D deficiency      Plan:   Acute bilateral PE with bilateral DVT  -unprovoked  -intolerant to anticoagulation secondary to recurrent GI bleeds and hence currently not on anticoagulation  -status post IVC filter placement 08/10/2020  -will continue to monitor    H/o CVA  -diagnosed June 2020  -currently no focal neurological deficits  -cannot tolerate antiplatelet therapy or anticoagulation therapy    GI bleeding from AV malformations  -he got 1 dose of Injectafer 07/21/2020  -cannot tolerate anticoagulation  -will continue to monitor    Vitamin-D deficiency  -continue vitamin D3, 43327 units weekly    Overall who is doing well since hospital discharge.  Will check CBC and iron studies in 1 week and follow closely

## 2020-08-24 ENCOUNTER — LAB VISIT (OUTPATIENT)
Dept: LAB | Facility: HOSPITAL | Age: 65
End: 2020-08-24
Attending: INTERNAL MEDICINE
Payer: COMMERCIAL

## 2020-08-24 DIAGNOSIS — O22.30 DVT (DEEP VEIN THROMBOSIS) IN PREGNANCY: ICD-10-CM

## 2020-08-24 DIAGNOSIS — E78.5 HYPERLIPIDEMIA, UNSPECIFIED HYPERLIPIDEMIA TYPE: ICD-10-CM

## 2020-08-24 DIAGNOSIS — D50.0 IRON DEFICIENCY ANEMIA DUE TO CHRONIC BLOOD LOSS: ICD-10-CM

## 2020-08-24 LAB
ALBUMIN SERPL BCP-MCNC: 3.5 G/DL (ref 3.5–5.2)
ALP SERPL-CCNC: 86 U/L (ref 38–126)
ALT SERPL W/O P-5'-P-CCNC: 14 U/L (ref 10–44)
ANION GAP SERPL CALC-SCNC: 4 MMOL/L (ref 8–16)
AST SERPL-CCNC: 23 U/L (ref 15–46)
BASOPHILS # BLD AUTO: 0.04 K/UL (ref 0–0.2)
BASOPHILS NFR BLD: 0.7 % (ref 0–1.9)
BILIRUB SERPL-MCNC: 0.3 MG/DL (ref 0.1–1)
BUN SERPL-MCNC: 14 MG/DL (ref 2–20)
CALCIUM SERPL-MCNC: 8.5 MG/DL (ref 8.7–10.5)
CHLORIDE SERPL-SCNC: 108 MMOL/L (ref 95–110)
CHOLEST SERPL-MCNC: 201 MG/DL (ref 120–199)
CHOLEST/HDLC SERPL: 4.7 {RATIO} (ref 2–5)
CO2 SERPL-SCNC: 28 MMOL/L (ref 23–29)
CREAT SERPL-MCNC: 1.21 MG/DL (ref 0.5–1.4)
DIFFERENTIAL METHOD: ABNORMAL
EOSINOPHIL # BLD AUTO: 0.1 K/UL (ref 0–0.5)
EOSINOPHIL NFR BLD: 2.3 % (ref 0–8)
ERYTHROCYTE [DISTWIDTH] IN BLOOD BY AUTOMATED COUNT: 17.4 % (ref 11.5–14.5)
EST. GFR  (AFRICAN AMERICAN): >60 ML/MIN/1.73 M^2
EST. GFR  (NON AFRICAN AMERICAN): >60 ML/MIN/1.73 M^2
FERRITIN SERPL-MCNC: 57 NG/ML (ref 20–300)
GLUCOSE SERPL-MCNC: 103 MG/DL (ref 70–110)
HCT VFR BLD AUTO: 32.4 % (ref 40–54)
HDLC SERPL-MCNC: 43 MG/DL (ref 40–75)
HDLC SERPL: 21.4 % (ref 20–50)
HGB BLD-MCNC: 9.6 G/DL (ref 14–18)
IMM GRANULOCYTES # BLD AUTO: 0.02 K/UL (ref 0–0.04)
IMM GRANULOCYTES NFR BLD AUTO: 0.4 % (ref 0–0.5)
IRON SERPL-MCNC: 31 UG/DL (ref 45–160)
LDLC SERPL CALC-MCNC: 137 MG/DL (ref 63–159)
LYMPHOCYTES # BLD AUTO: 2 K/UL (ref 1–4.8)
LYMPHOCYTES NFR BLD: 36.3 % (ref 18–48)
MCH RBC QN AUTO: 25.4 PG (ref 27–31)
MCHC RBC AUTO-ENTMCNC: 29.6 G/DL (ref 32–36)
MCV RBC AUTO: 86 FL (ref 82–98)
MONOCYTES # BLD AUTO: 0.5 K/UL (ref 0.3–1)
MONOCYTES NFR BLD: 8.8 % (ref 4–15)
NEUTROPHILS # BLD AUTO: 2.9 K/UL (ref 1.8–7.7)
NEUTROPHILS NFR BLD: 51.5 % (ref 38–73)
NONHDLC SERPL-MCNC: 158 MG/DL
NRBC BLD-RTO: 0 /100 WBC
PLATELET # BLD AUTO: 324 K/UL (ref 150–350)
PMV BLD AUTO: 10.9 FL (ref 9.2–12.9)
POTASSIUM SERPL-SCNC: 4.1 MMOL/L (ref 3.5–5.1)
PROT SERPL-MCNC: 6.7 G/DL (ref 6–8.4)
RBC # BLD AUTO: 3.78 M/UL (ref 4.6–6.2)
SATURATED IRON: 10 % (ref 20–50)
SODIUM SERPL-SCNC: 140 MMOL/L (ref 136–145)
TOTAL IRON BINDING CAPACITY: 321 UG/DL (ref 250–450)
TRANSFERRIN SERPL-MCNC: 217 MG/DL (ref 200–375)
TRIGL SERPL-MCNC: 105 MG/DL (ref 30–150)
WBC # BLD AUTO: 5.59 K/UL (ref 3.9–12.7)

## 2020-08-24 PROCEDURE — 80061 LIPID PANEL: CPT

## 2020-08-24 PROCEDURE — 82728 ASSAY OF FERRITIN: CPT

## 2020-08-24 PROCEDURE — 85025 COMPLETE CBC W/AUTO DIFF WBC: CPT | Mod: PO

## 2020-08-24 PROCEDURE — 83540 ASSAY OF IRON: CPT | Mod: PO

## 2020-08-24 PROCEDURE — 80053 COMPREHEN METABOLIC PANEL: CPT | Mod: PO

## 2020-08-24 PROCEDURE — 36415 COLL VENOUS BLD VENIPUNCTURE: CPT | Mod: PO

## 2020-08-25 ENCOUNTER — OFFICE VISIT (OUTPATIENT)
Dept: HEMATOLOGY/ONCOLOGY | Facility: CLINIC | Age: 65
End: 2020-08-25
Payer: COMMERCIAL

## 2020-08-25 DIAGNOSIS — I26.99 BILATERAL PULMONARY EMBOLISM: Primary | ICD-10-CM

## 2020-08-25 DIAGNOSIS — D50.0 IRON DEFICIENCY ANEMIA DUE TO CHRONIC BLOOD LOSS: ICD-10-CM

## 2020-08-25 DIAGNOSIS — E55.9 VITAMIN D DEFICIENCY: ICD-10-CM

## 2020-08-25 DIAGNOSIS — K55.20 AV MALFORMATION OF GASTROINTESTINAL TRACT: ICD-10-CM

## 2020-08-25 PROCEDURE — 99214 OFFICE O/P EST MOD 30 MIN: CPT | Mod: 95,,, | Performed by: INTERNAL MEDICINE

## 2020-08-25 PROCEDURE — 99214 PR OFFICE/OUTPT VISIT, EST, LEVL IV, 30-39 MIN: ICD-10-PCS | Mod: 95,,, | Performed by: INTERNAL MEDICINE

## 2020-08-25 RX ORDER — HEPARIN 100 UNIT/ML
500 SYRINGE INTRAVENOUS
Status: CANCELLED | OUTPATIENT
Start: 2020-08-25

## 2020-08-25 RX ORDER — SODIUM CHLORIDE 0.9 % (FLUSH) 0.9 %
10 SYRINGE (ML) INJECTION
Status: CANCELLED | OUTPATIENT
Start: 2020-08-25

## 2020-08-25 NOTE — PROGRESS NOTES
PATIENT: Miky Esqueda  MRN: [id number]  DATE: 8/25/2020    Diagnosis:   1. Bilateral pulmonary embolism    2. Iron deficiency anemia due to chronic blood loss    3. Vitamin D deficiency    4. AV malformation of gastrointestinal tract      Chief Complaint: PE, iron def anemia    The patient location is: home  The chief complaint leading to consultation is:  Iron deficiency anemia and history of pulmonary embolism    Visit type: audiovisual    Face to Face time with patient: 10 min  15 minutes of total time spent on the encounter, which includes face to face time and non-face to face time preparing to see the patient (eg, review of tests), Obtaining and/or reviewing separately obtained history, Documenting clinical information in the electronic or other health record, Independently interpreting results (not separately reported) and communicating results to the patient/family/caregiver, or Care coordination (not separately reported).         Each patient to whom he or she provides medical services by telemedicine is:  (1) informed of the relationship between the physician and patient and the respective role of any other health care provider with respect to management of the patient; and (2) notified that he or she may decline to receive medical services by telemedicine and may withdraw from such care at any time.    Notes:       Subjective:     History of Present Illness: He presented to the ER 05/25/2020 with acute onset of shortness of breath, diaphoresis and lightheadedness.  CT chest showed extensive bilateral pulmonary thromboemboli.  No evidence of central saddle embolus.  There was moderate pulmonary emphysema.  Lower extremity ultrasound also revealed nonocclusive thrombus in the right and left popliteal veins as well as the right femoral vein.    This is his 1st episode of pulmonary embolism/DVT.  He was started on Xarelto.    Then he had a syncopal episode in his PCP's office on 06/12/2020 which lasted about  30 sec followed by brief 15 sec seizure like episode.  He was taken to the ER where a CT head showed a hypodense area in the left parietal lobe concerning for infarct.  By this time he also developed significant anemia and was transfused 2 units packed red blood cells.    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    He got 1 dose of Injectafer 07/21/2020    INTERVAL HISTORY:   -he presented to the ER 8/7/2020 with severe anemia Hb 4.6 - passed out in his primary care physician's office.  -this is his second episode of severe symptomatic anemia after being started on Xarelto.  -he has past h/o extensive DVT/PE in may 2020, CVA in June 2020 and h/o jejunal AVM.  -no active source of bleeding could be found from extensive GI work-up during current hospital admission.   -severe anemia felt likely related to intermittent bleeding from angioectasia(s) vs a Dieulafoy lesion.  -he underwent IVC filter placement 08/10/2020 and his Xarelto was discontinued  -he feels well  -the only prescription medication he is taking is vitamin D3, once a week    Past Medical, Surgical, Family and Social History Reviewed.    Medications and Allergies reviewed      Review of Systems   Constitutional: Negative for fatigue, fever and unexpected weight change.   HENT: Negative for mouth sores and nosebleeds.    Eyes: Negative for photophobia and pain.   Respiratory: Negative for shortness of breath and wheezing.    Cardiovascular: Negative for chest pain and palpitations.   Gastrointestinal: Negative for abdominal pain and vomiting.   Genitourinary: Negative for flank pain and hematuria.   Musculoskeletal: Negative for back pain, neck pain and neck stiffness.   Skin: Negative for rash and wound.   Neurological: Negative for seizures, syncope and weakness.   Hematological: Negative for adenopathy. Does not bruise/bleed easily.   Psychiatric/Behavioral: Negative for behavioral problems and confusion.   All other systems  reviewed and are negative.      Objective:     No physical examination was done as this is a virtual visit      Assessment:       1. Bilateral pulmonary embolism    2. Iron deficiency anemia due to chronic blood loss    3. Vitamin D deficiency    4. AV malformation of gastrointestinal tract      Plan:   Acute bilateral PE with bilateral DVT  -unprovoked  -intolerant to anticoagulation secondary to recurrent GI bleeds and hence currently not on anticoagulation  -status post IVC filter placement 08/10/2020  -will continue to monitor    Iron deficiency anemia  -secondary to GI bleed  -got 1 dose of Injectafer in July 2020  -labs reviewed  -hemoglobin 9.6, iron saturation 10%, ferritin 57  -will administer 1 more dose of Injectafer to replenish his iron stores and prevent worsening of anemia and iron deficiency    H/o CVA  -diagnosed June 2020  -currently no focal neurological deficits  -cannot tolerate antiplatelet therapy or anticoagulation therapy    GI bleeding from AV malformations  -he got 1 dose of Injectafer 07/21/2020  -cannot tolerate anticoagulation  -will continue to monitor    Vitamin-D deficiency  -continue vitamin D3, 77458 units weekly    Check labs and follow-up in 1 month, sooner if needed

## 2020-09-01 ENCOUNTER — TELEPHONE (OUTPATIENT)
Dept: CARDIOLOGY | Facility: CLINIC | Age: 65
End: 2020-09-01

## 2020-09-01 NOTE — TELEPHONE ENCOUNTER
I called patient and left him V/Message on his cell#.     , you need to do an Estrellita- ultrasound prior to seeing .    Please call [phone] and scheduled you test .    I also called Mrs Esqueda at [phone] and spoke to     her letting her know that the patient ()     Need to do an ESTRELLITA-ultrasound prior.    She stated she would call [phone] and schedule     the test herself.        NW

## 2020-09-03 ENCOUNTER — HOSPITAL ENCOUNTER (OUTPATIENT)
Dept: CARDIOLOGY | Facility: HOSPITAL | Age: 65
Discharge: HOME | End: 2020-09-03
Attending: INTERNAL MEDICINE
Payer: COMMERCIAL

## 2020-09-03 DIAGNOSIS — O22.30 DVT (DEEP VEIN THROMBOSIS) IN PREGNANCY: ICD-10-CM

## 2020-09-04 ENCOUNTER — INFUSION (OUTPATIENT)
Dept: INFUSION THERAPY | Facility: HOSPITAL | Age: 65
End: 2020-09-04
Attending: INTERNAL MEDICINE
Payer: COMMERCIAL

## 2020-09-04 VITALS
HEART RATE: 77 BPM | SYSTOLIC BLOOD PRESSURE: 148 MMHG | DIASTOLIC BLOOD PRESSURE: 72 MMHG | TEMPERATURE: 98 F | BODY MASS INDEX: 28.84 KG/M2 | OXYGEN SATURATION: 95 % | HEIGHT: 72 IN | WEIGHT: 212.94 LBS | RESPIRATION RATE: 18 BRPM

## 2020-09-04 DIAGNOSIS — D50.0 IRON DEFICIENCY ANEMIA DUE TO CHRONIC BLOOD LOSS: Primary | ICD-10-CM

## 2020-09-04 PROCEDURE — A4216 STERILE WATER/SALINE, 10 ML: HCPCS | Performed by: INTERNAL MEDICINE

## 2020-09-04 PROCEDURE — 25000003 PHARM REV CODE 250: Performed by: INTERNAL MEDICINE

## 2020-09-04 PROCEDURE — 63600175 PHARM REV CODE 636 W HCPCS: Mod: JG | Performed by: INTERNAL MEDICINE

## 2020-09-04 PROCEDURE — 96365 THER/PROPH/DIAG IV INF INIT: CPT

## 2020-09-04 RX ORDER — HEPARIN 100 UNIT/ML
500 SYRINGE INTRAVENOUS
Status: DISCONTINUED | OUTPATIENT
Start: 2020-09-04 | End: 2020-09-04 | Stop reason: HOSPADM

## 2020-09-04 RX ORDER — SODIUM CHLORIDE 0.9 % (FLUSH) 0.9 %
10 SYRINGE (ML) INJECTION
Status: DISCONTINUED | OUTPATIENT
Start: 2020-09-04 | End: 2020-09-04 | Stop reason: HOSPADM

## 2020-09-04 RX ADMIN — Medication 10 ML: at 09:09

## 2020-09-04 RX ADMIN — SODIUM CHLORIDE: 0.9 INJECTION, SOLUTION INTRAVENOUS at 08:09

## 2020-09-04 RX ADMIN — FERRIC CARBOXYMALTOSE INJECTION 750 MG: 50 INJECTION, SOLUTION INTRAVENOUS at 08:09

## 2020-09-09 ENCOUNTER — OFFICE VISIT (OUTPATIENT)
Dept: CARDIOLOGY | Facility: CLINIC | Age: 65
End: 2020-09-09
Payer: COMMERCIAL

## 2020-09-09 VITALS
BODY MASS INDEX: 28.99 KG/M2 | SYSTOLIC BLOOD PRESSURE: 129 MMHG | HEIGHT: 72 IN | DIASTOLIC BLOOD PRESSURE: 73 MMHG | WEIGHT: 214 LBS | HEART RATE: 74 BPM

## 2020-09-09 DIAGNOSIS — Z86.711 HISTORY OF PULMONARY EMBOLUS (PE): ICD-10-CM

## 2020-09-09 DIAGNOSIS — I63.9 CEREBROVASCULAR ACCIDENT (CVA), UNSPECIFIED MECHANISM: ICD-10-CM

## 2020-09-09 DIAGNOSIS — I82.533 CHRONIC DEEP VEIN THROMBOSIS (DVT) OF POPLITEAL VEIN OF BOTH LOWER EXTREMITIES: Primary | ICD-10-CM

## 2020-09-09 DIAGNOSIS — F17.200 TOBACCO DEPENDENCE: ICD-10-CM

## 2020-09-09 DIAGNOSIS — D50.0 IRON DEFICIENCY ANEMIA DUE TO CHRONIC BLOOD LOSS: ICD-10-CM

## 2020-09-09 DIAGNOSIS — I65.23 BILATERAL STENOSIS OF CAROTID ARTERIES GREATER THAN 50%: ICD-10-CM

## 2020-09-09 DIAGNOSIS — K55.20 AV MALFORMATION OF GI TRACT: ICD-10-CM

## 2020-09-09 DIAGNOSIS — Z95.828 S/P INSERTION OF IVC (INFERIOR VENA CAVAL) FILTER: ICD-10-CM

## 2020-09-09 PROCEDURE — 99999 PR PBB SHADOW E&M-EST. PATIENT-LVL III: ICD-10-PCS | Mod: PBBFAC,,, | Performed by: INTERNAL MEDICINE

## 2020-09-09 PROCEDURE — 3008F BODY MASS INDEX DOCD: CPT | Mod: CPTII,S$GLB,, | Performed by: INTERNAL MEDICINE

## 2020-09-09 PROCEDURE — 3008F PR BODY MASS INDEX (BMI) DOCUMENTED: ICD-10-PCS | Mod: CPTII,S$GLB,, | Performed by: INTERNAL MEDICINE

## 2020-09-09 PROCEDURE — 99999 PR PBB SHADOW E&M-EST. PATIENT-LVL III: CPT | Mod: PBBFAC,,, | Performed by: INTERNAL MEDICINE

## 2020-09-09 PROCEDURE — 99205 OFFICE O/P NEW HI 60 MIN: CPT | Mod: S$GLB,,, | Performed by: INTERNAL MEDICINE

## 2020-09-09 PROCEDURE — 99205 PR OFFICE/OUTPT VISIT, NEW, LEVL V, 60-74 MIN: ICD-10-PCS | Mod: S$GLB,,, | Performed by: INTERNAL MEDICINE

## 2020-09-10 PROBLEM — Z95.828 S/P INSERTION OF IVC (INFERIOR VENA CAVAL) FILTER: Status: ACTIVE | Noted: 2020-09-10

## 2020-09-10 PROBLEM — I82.533 CHRONIC DEEP VEIN THROMBOSIS (DVT) OF POPLITEAL VEIN OF BOTH LOWER EXTREMITIES: Status: ACTIVE | Noted: 2020-09-10

## 2020-09-10 NOTE — PATIENT INSTRUCTIONS
Deep Vein Thrombosis (DVT)    Deep vein thrombosis (DVT) occurs when a blood clot (thrombus) forms in a deep vein. This happens most often in the leg. It can also happen in the arms or other parts of the body. A part of the clot called an embolus can break off and travel to the lungs. When this happens, its called a pulmonary embolism (PE). PE is a medical emergency. It can cut off blood flow and lead to death. Both DVT and PE are closely related. Together, they are often referred to by the term venous thromboembolism (VTE).  Risk factors for DVT  Anything that slows blood flow, injures the lining of a vein, or increases blood clotting can make you more prone to having DVT. This includes the following:  · Long periods without movement (such as when sitting for many hours at a time or when recovering from major surgery or illness)  · Estrogen (female hormone) therapy, such as hormone replacement therapy (HRT) or oral contraceptives  · Fractured hip or leg  · Major surgery or joint replacement  · Major trauma or spinal cord injury  · Cancer  · Family history  · Excess weight or obesity  · Smoking  · Older age  Symptoms  DVT does not always cause symptoms. When symptoms do occur, they may appear around the site of the DVT, such as in the leg. Possible symptoms include:  · Swelling  · Pain  · Warmth  · Redness  · Tenderness  Home care  · You were likely prescribed blood thinners (anticoagulants). They may be given as pills (oral) or shots (injections). Follow all instructions when using these medicines. Note: Do not take blood thinners with other medicines, herbal remedies, or supplements without talking to your provider first. Certain medicines or products can affect how blood thinners work.  · Follow your providers instructions about activity and rest.  · If support or compression stockings are prescribed, wear them as directed. These may help improve blood flow in the legs.  · When sitting or lying down, move  your ankles, toes and knees often. This may also help improve blood flow in the legs.  Follow-up care  Follow up with your healthcare provider, or as advised. If imaging tests were done, they may need further review by a doctor. You will be told of any new findings that may affect your care.  When to seek medical advice  Call your healthcare provider right away if any of these occur:  · New or increased swelling, pain, tenderness, warmth, or redness, in the leg, arm, or other area  · Blood in the urine  · Bleeding with bowel movements  Call 911  Call 911 right away if any of these occur:  · Bleeding from the nose, gums, a cut, or vagina  · Heavy or uncontrolled bleeding  · Trouble breathing  · Chest pain or discomfort that worsens with deep breathing or coughing  · Coughing (may cough up blood)  · Fast heartbeat  · Sweating  · Anxiety  · Lightheadedness, dizziness, or fainting  Date Last Reviewed: 9/21/2015  © 5229-4473 [employer]. [address] [place] [address]. All rights reserved. This information is not intended as a substitute for professional medical care. Always follow your healthcare professional's instructions.        Stroke (Completed)    You have had a mild stroke, or cerebrovascular accident (CVA). This is caused by a loss of blood flow to part of your brain. This can occur when a blood clot forms inside the carotid artery (main artery from the heart to the brain) or inside the heart. When the clot travels to the brain, it can lodge in a blood vessel and block blood flow. The other common cause of stroke is a gradual narrowing of the arteries in the brain due to buildup of fatty deposits (plaque).  Symptoms  Blocked blood flow in different areas of the brain can cause different symptoms. If you have had a stroke before, a new one may be different. A memory aid for the basic signs of a stroke is F.A.S.T.  F.A.S.T.  · F: Face drooping, or numbness on one side. This may be more  noticeable when you ask the affected person to smile.  · A: Arm weakness or numbness. The affected person may have trouble using or lifting one side.  · S: Speech difficulty. Speech may be slurred or hard to understand. The affected person may also use the wrong words.  · T: Time to call 911. Time is critical in treating a stroke. Call 911 as soon as you suspect a stroke has happened--even a small one. The sooner treatment is started the better, even if the symptoms go away.  Other common symptoms of a stroke include:  · Having difficulty getting the right words to come out  · Weakness in one leg  · Numbness on one side  · Difficulty walking  · Trouble with coordination  · Trouble with vision  · Headache  · Confusion  · Dizziness  Treatment  After you have had a stroke, you are at risk of having another. Be sure to follow up with your healthcare provider for further evaluation and treatment. If problems are found, your healthcare provider will recommend treatment with medicines and/or procedures.  To reduce your chance of having another stroke, you may be prescribed medicines. These include medicines to prevent blood clots, such as antiplatelet or anticoagulant medicines.  Home care  · Rest at home and avoid exertion for the next few days.  · If your healthcare provider has prescribed medicines, take them as directed.  Follow-up care  Follow up with your healthcare provider, or as advised. Additional tests may be needed. If you had an X-ray, CT scan, MRI, or ECG (electrocardiogram), it will be reviewed by a specialist. You will be notified of any new findings that will affect your care.  Call 911  Contact emergency services right away if any of these occur:  · Any of your stroke symptoms worsen  · New problems with speech, confusion, vision, walking, coordination, facial droop, or weakness or numbness on one side of your body  · Severe headache, fainting spell, dizziness, or seizure  · Chest pain or shortness of  breath  Remember F.ALIA.S.T. (described above). If you notice warning signs and symptoms of stroke, CALL 911 without delay.  Date Last Reviewed: 9/21/2015  © 2681-4828 [employer]. [address] [place] [address], [place] [address]. All rights reserved. This information is not intended as a substitute for professional medical care. Always follow your healthcare professional's instructions.

## 2020-09-10 NOTE — PROGRESS NOTES
Subjective:   Patient ID:  Miky Esqueda is a 65 y.o. male who presents for evaluation of DVT + PE, Anemia, Hypertension, Hyperlipidemia, PFO with R to L shunt, and previous CVA      HPI:       65-year-old male referred by Dr. Farhat Matson and Dr. Gonzales for management of hypertension, hyperlipidemia, venous thromboembolic event (PE and DVT 5/2020), obesity, previous left parietal CVA, and possible removal of IVC filter.  Patient was admitted May 2020 with bilateral femoral + popliteal DVT and bilateral pulmonary embolism.  At the time patient had been driving back and forth between [facility] and [place].  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  Patient was initially treated with Xarelto.  Patient return in June 2020 with severe anemia from acute blood loss.  H/H on admission was 6/22.  Patient was transfused.  Patient was evaluated by the GI team which diagnosed with AV malformation of his GI tract.  Anticoagulation was discontinued.  Repeat CT scan July 2020 revealed no residual pulmonary embolism.  Repeat ultrasound July 2020 revealed bilateral popliteal DVT and right femoral vein DVT.  Due to residual thrombus and inability to tolerate anticoagulation he had an Argon Option Elite IVC filter was placed August 2020.  Patient had an echocardiogram which revealed normal ejection fraction, grade 1 diastolic dysfunction, no valvular heart disease, and a PFO with positive right to left shunt.      EKG August 2020:  Normal sinus rhythm without acute ischemic changes.    H/H 9.6/32.4 (08/2020).        Patient Active Problem List    Diagnosis Date Noted    S/P insertion of IVC (inferior vena caval) filter 09/10/2020    Chronic deep vein thrombosis (DVT) of popliteal vein of both lower extremities 09/10/2020    LOC (loss of consciousness) 08/06/2020    Anemia 08/06/2020    Acute deep vein thrombosis (DVT) of popliteal vein of both lower  extremities 2020    AV malformation of GI tract 2020    H/O: CVA (cerebrovascular accident)     Right to left cardiac shunt 2020    CVA (cerebral vascular accident) 2020       Left parietal lobe       Symptomatic anemia 2020    Bilateral stenosis of carotid arteries greater than 50% 2020    History of pulmonary embolus (PE) 2020    Iron deficiency anemia 2020    Tobacco dependence 2020    Pulmonary embolism 2020           Right Arm BP - Sittin/75  Left Arm BP - Sittin/73        LABS      LAST HbA1c  Lab Results   Component Value Date    HGBA1C <4.0 (A) 2020       Lipid panel  Lab Results   Component Value Date    CHOL 201 (H) 2020    CHOL 177 2020     Lab Results   Component Value Date    HDL 43 2020    HDL 41 2020     Lab Results   Component Value Date    LDLCALC 137.0 2020    LDLCALC 118.2 2020     Lab Results   Component Value Date    TRIG 105 2020    TRIG 89 2020     Lab Results   Component Value Date    CHOLHDL 21.4 2020    CHOLHDL 23.2 2020            Review of Systems   Constitution: Negative for diaphoresis, night sweats, weight gain and weight loss.   HENT: Negative for congestion.    Eyes: Negative for blurred vision, discharge and double vision.   Cardiovascular: Positive for leg swelling. Negative for chest pain, claudication, cyanosis, dyspnea on exertion, irregular heartbeat, near-syncope, orthopnea, palpitations, paroxysmal nocturnal dyspnea and syncope.   Respiratory: Negative for cough, shortness of breath and wheezing.    Endocrine: Negative for cold intolerance, heat intolerance and polyphagia.   Hematologic/Lymphatic: Negative for adenopathy and bleeding problem. Does not bruise/bleed easily.   Skin: Negative for dry skin and nail changes.   Musculoskeletal: Negative for arthritis, back pain, falls, joint pain, myalgias and neck pain.    Gastrointestinal: Negative for bloating, abdominal pain, change in bowel habit and constipation.   Genitourinary: Negative for bladder incontinence, dysuria, flank pain, genital sores and missed menses.   Neurological: Negative for aphonia, brief paralysis, difficulty with concentration, dizziness and weakness.   Psychiatric/Behavioral: Negative for altered mental status and memory loss. The patient does not have insomnia.    Allergic/Immunologic: Negative for environmental allergies.       Objective:   Physical Exam   Constitutional: He is oriented to person, place, and time. He appears well-developed and well-nourished. He is not intubated.   HENT:   Head: Normocephalic and atraumatic.   Right Ear: External ear normal.   Left Ear: External ear normal.   Mouth/Throat: Oropharynx is clear and moist.   Eyes: Pupils are equal, round, and reactive to light. Conjunctivae and EOM are normal. Right eye exhibits no discharge. Left eye exhibits no discharge. No scleral icterus.   Neck: Normal range of motion. Neck supple. Normal carotid pulses, no hepatojugular reflux and no JVD present. Carotid bruit is not present. No tracheal deviation present. No thyromegaly present.   Cardiovascular: Normal rate, regular rhythm, S1 normal and S2 normal.  No extrasystoles are present. PMI is not displaced. Exam reveals no gallop, no S3, no distant heart sounds, no friction rub and no midsystolic click.   No murmur heard.  Pulses:       Carotid pulses are 2+ on the right side and 2+ on the left side.       Radial pulses are 2+ on the right side and 2+ on the left side.        Femoral pulses are 2+ on the right side and 2+ on the left side.       Popliteal pulses are 2+ on the right side and 2+ on the left side.        Dorsalis pedis pulses are 2+ on the right side and 2+ on the left side.        Posterior tibial pulses are 2+ on the right side and 2+ on the left side.   Pulmonary/Chest: Effort normal and breath sounds normal. No  accessory muscle usage or stridor. No apnea, no tachypnea and no bradypnea. He is not intubated. No respiratory distress. He has no decreased breath sounds. He has no wheezes. He has no rales. He exhibits no tenderness and no bony tenderness.   Abdominal: He exhibits no distension, no pulsatile liver, no abdominal bruit, no ascites, no pulsatile midline mass and no mass. There is no abdominal tenderness. There is no rebound and no guarding.   Musculoskeletal: Normal range of motion.         General: No tenderness or edema.   Lymphadenopathy:     He has no cervical adenopathy.   1+ bilateral edema   Neurological: He is alert and oriented to person, place, and time. He has normal reflexes. No cranial nerve deficit. Coordination normal.   Skin: Skin is warm. No rash noted. No erythema. No pallor.   Psychiatric: He has a normal mood and affect. His behavior is normal. Judgment and thought content normal.       Assessment:     1. Chronic deep vein thrombosis (DVT) of popliteal vein of both lower extremities    2. AV malformation of GI tract    3. Tobacco dependence    4. History of pulmonary embolus (PE)    5. Bilateral stenosis of carotid arteries greater than 50%    6. Right to left cardiac shunt    7. Cerebrovascular accident (CVA), unspecified mechanism    8. Iron deficiency anemia due to chronic blood loss    9. S/P insertion of IVC (inferior vena caval) filter        Plan:       Patient will have a venous ultrasound to assess the current thrombus burden.  If patient has residual deep vein thrombosis we will consider thrombectomy of the popliteal + femoral veins. His presentation is concerning for bilateral iliac vein thrombosis with underlying stenosis.  This anatomical problem can be treated and reconstructed with stenting.  We will also remove the IVC filter.  Although unprovoked there is a remote possibility that his venous  thrombotic event was due to immobility due to frequent driving and immobility while at  work.  Regarding his PFO and previous left parietal CVA he will be evaluated and if there are no other clinical reason for CVA will consider PFO closure if it is deemed to be a culprit for his CVA.    Statin therapy.  Weight loss.  Exercise.  Target blood pressure less than 130/80 mm Hg.  Compression stockings 20-30 mm of mercury.  Target LDL less than 70. Smoking cessation.     Continue with current medical plan and lifestyle changes.  Return sooner for concerns or questions. If symptoms persist go to the ED  I have reviewed all pertinent data on this patient       I have reviewed the patient's medical history in detail and updated the computerized patient record.    Orders Placed This Encounter   Procedures    US Lower Extremity Veins Bilateral     Standing Status:   Future     Standing Expiration Date:   9/9/2021       Follow up as scheduled. Return sooner for concerns or questions            He expressed verbal understanding and agreed with the plan        Patient's Medications   New Prescriptions    No medications on file   Previous Medications    CHOLECALCIFEROL, VITAMIN D3, 1,250 MCG (50,000 UNIT) TAB    Take 50,000 Units by mouth once a week.    MULTIVITAMIN CAPSULE    Take 1 capsule by mouth once daily.    ONDANSETRON (ZOFRAN-ODT) 4 MG TBDL    Take 1 tablet (4 mg total) by mouth every 6 (six) hours as needed (nausea).   Modified Medications    No medications on file   Discontinued Medications    No medications on file

## 2020-09-16 ENCOUNTER — HOSPITAL ENCOUNTER (OUTPATIENT)
Dept: RADIOLOGY | Facility: HOSPITAL | Age: 65
Discharge: HOME | End: 2020-09-16
Attending: INTERNAL MEDICINE
Payer: COMMERCIAL

## 2020-09-16 DIAGNOSIS — I82.533 CHRONIC DEEP VEIN THROMBOSIS (DVT) OF POPLITEAL VEIN OF BOTH LOWER EXTREMITIES: ICD-10-CM

## 2020-09-16 PROCEDURE — 93970 EXTREMITY STUDY: CPT | Mod: TC,PO

## 2020-09-29 ENCOUNTER — TELEPHONE (OUTPATIENT)
Dept: HEMATOLOGY/ONCOLOGY | Facility: CLINIC | Age: 65
End: 2020-09-29

## 2020-09-29 ENCOUNTER — LAB VISIT (OUTPATIENT)
Dept: LAB | Facility: HOSPITAL | Age: 65
End: 2020-09-29
Attending: INTERNAL MEDICINE
Payer: COMMERCIAL

## 2020-09-29 ENCOUNTER — OFFICE VISIT (OUTPATIENT)
Dept: HEMATOLOGY/ONCOLOGY | Facility: CLINIC | Age: 65
End: 2020-09-29
Payer: COMMERCIAL

## 2020-09-29 VITALS
RESPIRATION RATE: 18 BRPM | OXYGEN SATURATION: 97 % | TEMPERATURE: 98 F | DIASTOLIC BLOOD PRESSURE: 76 MMHG | SYSTOLIC BLOOD PRESSURE: 128 MMHG | BODY MASS INDEX: 28.97 KG/M2 | HEART RATE: 78 BPM | WEIGHT: 213.63 LBS

## 2020-09-29 DIAGNOSIS — D50.0 IRON DEFICIENCY ANEMIA DUE TO CHRONIC BLOOD LOSS: ICD-10-CM

## 2020-09-29 DIAGNOSIS — Z86.73 H/O: CVA (CEREBROVASCULAR ACCIDENT): ICD-10-CM

## 2020-09-29 DIAGNOSIS — I26.99 BILATERAL PULMONARY EMBOLISM: Primary | ICD-10-CM

## 2020-09-29 DIAGNOSIS — I26.99 BILATERAL PULMONARY EMBOLISM: ICD-10-CM

## 2020-09-29 DIAGNOSIS — E55.9 VITAMIN D DEFICIENCY: ICD-10-CM

## 2020-09-29 DIAGNOSIS — K55.20 AV MALFORMATION OF GASTROINTESTINAL TRACT: ICD-10-CM

## 2020-09-29 LAB
ALBUMIN SERPL BCP-MCNC: 3.9 G/DL (ref 3.5–5.2)
ALP SERPL-CCNC: 100 U/L (ref 55–135)
ALT SERPL W/O P-5'-P-CCNC: 8 U/L (ref 10–44)
ANION GAP SERPL CALC-SCNC: 10 MMOL/L (ref 8–16)
ANISOCYTOSIS BLD QL SMEAR: SLIGHT
AST SERPL-CCNC: 17 U/L (ref 10–40)
BASOPHILS # BLD AUTO: 0.04 K/UL (ref 0–0.2)
BASOPHILS NFR BLD: 0.6 % (ref 0–1.9)
BILIRUB SERPL-MCNC: 0.3 MG/DL (ref 0.1–1)
BUN SERPL-MCNC: 13 MG/DL (ref 8–23)
CALCIUM SERPL-MCNC: 8.9 MG/DL (ref 8.7–10.5)
CHLORIDE SERPL-SCNC: 108 MMOL/L (ref 95–110)
CO2 SERPL-SCNC: 23 MMOL/L (ref 23–29)
CREAT SERPL-MCNC: 1.1 MG/DL (ref 0.5–1.4)
DACRYOCYTES BLD QL SMEAR: ABNORMAL
DIFFERENTIAL METHOD: ABNORMAL
EOSINOPHIL # BLD AUTO: 0.2 K/UL (ref 0–0.5)
EOSINOPHIL NFR BLD: 2.4 % (ref 0–8)
ERYTHROCYTE [DISTWIDTH] IN BLOOD BY AUTOMATED COUNT: 17.3 % (ref 11.5–14.5)
EST. GFR  (AFRICAN AMERICAN): >60 ML/MIN/1.73 M^2
EST. GFR  (NON AFRICAN AMERICAN): >60 ML/MIN/1.73 M^2
FERRITIN SERPL-MCNC: 298 NG/ML (ref 20–300)
GLUCOSE SERPL-MCNC: 58 MG/DL (ref 70–110)
HCT VFR BLD AUTO: 40.9 % (ref 40–54)
HGB BLD-MCNC: 12.2 G/DL (ref 14–18)
HYPOCHROMIA BLD QL SMEAR: ABNORMAL
IMM GRANULOCYTES # BLD AUTO: 0.02 K/UL (ref 0–0.04)
IMM GRANULOCYTES NFR BLD AUTO: 0.3 % (ref 0–0.5)
IRON SERPL-MCNC: 56 UG/DL (ref 45–160)
LYMPHOCYTES # BLD AUTO: 2 K/UL (ref 1–4.8)
LYMPHOCYTES NFR BLD: 28.4 % (ref 18–48)
MCH RBC QN AUTO: 23.8 PG (ref 27–31)
MCHC RBC AUTO-ENTMCNC: 29.8 G/DL (ref 32–36)
MCV RBC AUTO: 80 FL (ref 82–98)
MONOCYTES # BLD AUTO: 0.8 K/UL (ref 0.3–1)
MONOCYTES NFR BLD: 11.4 % (ref 4–15)
NEUTROPHILS # BLD AUTO: 4.1 K/UL (ref 1.8–7.7)
NEUTROPHILS NFR BLD: 56.9 % (ref 38–73)
NRBC BLD-RTO: 0 /100 WBC
OVALOCYTES BLD QL SMEAR: ABNORMAL
PLATELET # BLD AUTO: 186 K/UL (ref 150–350)
PLATELET BLD QL SMEAR: ABNORMAL
PMV BLD AUTO: 11.3 FL (ref 9.2–12.9)
POIKILOCYTOSIS BLD QL SMEAR: ABNORMAL
POTASSIUM SERPL-SCNC: 3.9 MMOL/L (ref 3.5–5.1)
PROT SERPL-MCNC: 7.4 G/DL (ref 6–8.4)
RBC # BLD AUTO: 5.12 M/UL (ref 4.6–6.2)
SATURATED IRON: 19 % (ref 20–50)
SODIUM SERPL-SCNC: 141 MMOL/L (ref 136–145)
TARGETS BLD QL SMEAR: ABNORMAL
TOTAL IRON BINDING CAPACITY: 300 UG/DL (ref 250–450)
TRANSFERRIN SERPL-MCNC: 203 MG/DL (ref 200–375)
WBC # BLD AUTO: 7.11 K/UL (ref 3.9–12.7)

## 2020-09-29 PROCEDURE — 99214 PR OFFICE/OUTPT VISIT, EST, LEVL IV, 30-39 MIN: ICD-10-PCS | Mod: S$GLB,,, | Performed by: INTERNAL MEDICINE

## 2020-09-29 PROCEDURE — 3008F BODY MASS INDEX DOCD: CPT | Mod: CPTII,S$GLB,, | Performed by: INTERNAL MEDICINE

## 2020-09-29 PROCEDURE — 83540 ASSAY OF IRON: CPT

## 2020-09-29 PROCEDURE — 99999 PR PBB SHADOW E&M-EST. PATIENT-LVL III: ICD-10-PCS | Mod: PBBFAC,,, | Performed by: INTERNAL MEDICINE

## 2020-09-29 PROCEDURE — 80053 COMPREHEN METABOLIC PANEL: CPT

## 2020-09-29 PROCEDURE — 99999 PR PBB SHADOW E&M-EST. PATIENT-LVL III: CPT | Mod: PBBFAC,,, | Performed by: INTERNAL MEDICINE

## 2020-09-29 PROCEDURE — 85025 COMPLETE CBC W/AUTO DIFF WBC: CPT

## 2020-09-29 PROCEDURE — 3008F PR BODY MASS INDEX (BMI) DOCUMENTED: ICD-10-PCS | Mod: CPTII,S$GLB,, | Performed by: INTERNAL MEDICINE

## 2020-09-29 PROCEDURE — 82728 ASSAY OF FERRITIN: CPT

## 2020-09-29 PROCEDURE — 82306 VITAMIN D 25 HYDROXY: CPT

## 2020-09-29 PROCEDURE — 99214 OFFICE O/P EST MOD 30 MIN: CPT | Mod: S$GLB,,, | Performed by: INTERNAL MEDICINE

## 2020-09-29 PROCEDURE — 36415 COLL VENOUS BLD VENIPUNCTURE: CPT

## 2020-09-29 RX ORDER — CHOLECALCIFEROL (VITAMIN D3) 1250 MCG
50000 TABLET ORAL WEEKLY
Qty: 12 TABLET | Refills: 3 | Status: SHIPPED | OUTPATIENT
Start: 2020-09-29 | End: 2021-05-12 | Stop reason: SDUPTHER

## 2020-09-29 NOTE — PROGRESS NOTES
PATIENT: Miky Esqueda  MRN: [id number]  DATE: 9/29/2020    Diagnosis:   1. Bilateral pulmonary embolism    2. H/O: CVA (cerebrovascular accident)    3. AV malformation of gastrointestinal tract    4. Vitamin D deficiency      Chief Complaint: PE, iron def anemia    Subjective:     History of Present Illness: He presented to the ER 05/25/2020 with acute onset of shortness of breath, diaphoresis and lightheadedness.  CT chest showed extensive bilateral pulmonary thromboemboli.  No evidence of central saddle embolus.  There was moderate pulmonary emphysema.  Lower extremity ultrasound also revealed nonocclusive thrombus in the right and left popliteal veins as well as the right femoral vein.    This is his 1st episode of pulmonary embolism/DVT.  He was started on Xarelto.    Then he had a syncopal episode in his PCP's office on 06/12/2020 which lasted about 30 sec followed by brief 15 sec seizure like episode.  He was taken to the ER where a CT head showed a hypodense area in the left parietal lobe concerning for infarct.  By this time he also developed significant anemia and was transfused 2 units packed red blood cells.    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    He got 1 dose of Injectafer 07/21/2020 and a second dose 9/4/2020    INTERVAL HISTORY:   -he presented to the ER 8/7/2020 with severe anemia Hb 4.6 - passed out in his primary care physician's office.  -this is his second episode of severe symptomatic anemia after being started on Xarelto.  -he has past h/o extensive DVT/PE in may 2020, CVA in June 2020 and h/o jejunal AVM.  -no active source of bleeding could be found from extensive GI work-up during current hospital admission.   -severe anemia felt likely related to intermittent bleeding from angioectasia(s) vs a Dieulafoy lesion.  -he underwent IVC filter placement 08/10/2020 and his Xarelto was discontinued  -he feels well  -takes Vit D3 once a week    Past Medical, Surgical,  Family and Social History Reviewed.    Medications and Allergies reviewed      Review of Systems   Constitutional: Negative for fatigue, fever and unexpected weight change.   HENT: Negative for mouth sores and nosebleeds.    Eyes: Negative for photophobia and pain.   Respiratory: Negative for shortness of breath and wheezing.    Cardiovascular: Negative for chest pain and palpitations.   Gastrointestinal: Negative for abdominal pain and vomiting.   Genitourinary: Negative for flank pain and hematuria.   Musculoskeletal: Negative for back pain, neck pain and neck stiffness.   Skin: Negative for rash and wound.   Neurological: Negative for seizures, syncope and weakness.   Hematological: Negative for adenopathy. Does not bruise/bleed easily.   Psychiatric/Behavioral: Negative for behavioral problems and confusion.   All other systems reviewed and are negative.      Objective:     Vitals:    09/29/20 1352   BP: 128/76   Pulse: 78   Resp: 18   Temp: 98 °F (36.7 °C)   SpO2: 97%   Weight: 96.9 kg (213 lb 10 oz)     Body mass index is 28.97 kg/m².    General appearance: no acute distress. conversant  Ear, Nose, Mouth, Throat: oropharynx clear. mucous membranes moist. no oral ulcers. no gum bleeding.  Neck: no masses or enlarged lymph nodes  Lungs: clear to auscultation. no rales. breathing nonlabored  Heart: rhythm regular. no gallops. no edema.  Abdomen: soft and nontender. no ascites. no hepatosplenomegaly.  Neurologic/Psychiatric: alert. oriented to time, place and person. no anxiety or agitation.      Assessment:       1. Bilateral pulmonary embolism    2. H/O: CVA (cerebrovascular accident)    3. AV malformation of gastrointestinal tract    4. Vitamin D deficiency      Plan:   Acute bilateral PE with bilateral DVT  -unprovoked  -intolerant to anticoagulation secondary to recurrent GI bleeds and hence currently not on anticoagulation  -status post IVC filter placement 08/10/2020  -will continue to monitor    Iron  deficiency anemia  -secondary to GI bleed  -got 1 dose of Injectafer in July 2020 and a second dose in sept  -labs looking good    H/o CVA  -diagnosed June 2020  -currently no focal neurological deficits  -cannot tolerate antiplatelet therapy or anticoagulation therapy    GI bleeding from AV malformations  -he got 1 dose of Injectafer 07/21/2020 and a second dose in september  -cannot tolerate anticoagulation  -will continue to monitor    Vitamin-D deficiency  -continue vitamin D3, 03950 units weekly

## 2020-09-30 ENCOUNTER — TELEPHONE (OUTPATIENT)
Dept: HEMATOLOGY/ONCOLOGY | Facility: CLINIC | Age: 65
End: 2020-09-30

## 2020-09-30 LAB — 25(OH)D3+25(OH)D2 SERPL-MCNC: 71 NG/ML (ref 30–96)

## 2020-11-23 ENCOUNTER — TELEPHONE (OUTPATIENT)
Dept: HEMATOLOGY/ONCOLOGY | Facility: CLINIC | Age: 65
End: 2020-11-23

## 2020-11-23 NOTE — TELEPHONE ENCOUNTER
"Pt.'s wife confirmed 1/27/21 lab appt. And office visit with Dr. Gonzales and states she does not need sooner appt. At this time.    ----- Message from Kamille Gutierrez sent at 11/23/2020 10:34 AM CST -----  Patient Assist    Name of caller:  Miky   Contact Preference:  [phone]  Does Patient feel the need to see the MD today? Unclear   Physician: Janet NIETO MD   What is the nature of the call?    - called to schedule a ED discharge f/u appt. Please call pt directly    Additional Notes:   "Thank you for all that you do for our patients'"            "

## 2021-01-25 ENCOUNTER — LAB VISIT (OUTPATIENT)
Dept: LAB | Facility: HOSPITAL | Age: 66
End: 2021-01-25
Attending: INTERNAL MEDICINE
Payer: COMMERCIAL

## 2021-01-25 DIAGNOSIS — D50.0 IRON DEFICIENCY ANEMIA DUE TO CHRONIC BLOOD LOSS: ICD-10-CM

## 2021-01-25 DIAGNOSIS — K55.20 AV MALFORMATION OF GASTROINTESTINAL TRACT: ICD-10-CM

## 2021-01-25 DIAGNOSIS — E55.9 VITAMIN D DEFICIENCY: ICD-10-CM

## 2021-01-25 LAB
ALBUMIN SERPL BCP-MCNC: 4.3 G/DL (ref 3.5–5.2)
ALP SERPL-CCNC: 72 U/L (ref 38–126)
ALT SERPL W/O P-5'-P-CCNC: 15 U/L (ref 10–44)
ANION GAP SERPL CALC-SCNC: 10 MMOL/L (ref 8–16)
AST SERPL-CCNC: 28 U/L (ref 15–46)
BASOPHILS # BLD AUTO: 0.03 K/UL (ref 0–0.2)
BASOPHILS NFR BLD: 0.6 % (ref 0–1.9)
BILIRUB SERPL-MCNC: 0.5 MG/DL (ref 0.1–1)
CALCIUM SERPL-MCNC: 9.4 MG/DL (ref 8.7–10.5)
CHLORIDE SERPL-SCNC: 105 MMOL/L (ref 95–110)
CO2 SERPL-SCNC: 27 MMOL/L (ref 23–29)
CREAT SERPL-MCNC: 1.58 MG/DL (ref 0.5–1.4)
DIFFERENTIAL METHOD: ABNORMAL
EOSINOPHIL # BLD AUTO: 0.1 K/UL (ref 0–0.5)
EOSINOPHIL NFR BLD: 1.5 % (ref 0–8)
ERYTHROCYTE [DISTWIDTH] IN BLOOD BY AUTOMATED COUNT: 16.4 % (ref 11.5–14.5)
EST. GFR  (AFRICAN AMERICAN): 53 ML/MIN/1.73 M^2
EST. GFR  (NON AFRICAN AMERICAN): 45.9 ML/MIN/1.73 M^2
FERRITIN SERPL-MCNC: 190 NG/ML (ref 20–300)
GLUCOSE SERPL-MCNC: 87 MG/DL (ref 70–110)
HCT VFR BLD AUTO: 38.7 % (ref 40–54)
HGB BLD-MCNC: 11.7 G/DL (ref 14–18)
IMM GRANULOCYTES # BLD AUTO: 0.01 K/UL (ref 0–0.04)
IMM GRANULOCYTES NFR BLD AUTO: 0.2 % (ref 0–0.5)
IRON SERPL-MCNC: 75 UG/DL (ref 45–160)
LYMPHOCYTES # BLD AUTO: 2.1 K/UL (ref 1–4.8)
LYMPHOCYTES NFR BLD: 38.2 % (ref 18–48)
MCH RBC QN AUTO: 23.3 PG (ref 27–31)
MCHC RBC AUTO-ENTMCNC: 30.2 G/DL (ref 32–36)
MCV RBC AUTO: 77 FL (ref 82–98)
MONOCYTES # BLD AUTO: 0.6 K/UL (ref 0.3–1)
MONOCYTES NFR BLD: 11.6 % (ref 4–15)
NEUTROPHILS # BLD AUTO: 2.6 K/UL (ref 1.8–7.7)
NEUTROPHILS NFR BLD: 47.9 % (ref 38–73)
NRBC BLD-RTO: 0 /100 WBC
PLATELET # BLD AUTO: 186 K/UL (ref 150–350)
PMV BLD AUTO: 11.3 FL (ref 9.2–12.9)
POTASSIUM SERPL-SCNC: 4.4 MMOL/L (ref 3.5–5.1)
PROT SERPL-MCNC: 7.7 G/DL (ref 6–8.4)
RBC # BLD AUTO: 5.03 M/UL (ref 4.6–6.2)
SATURATED IRON: 24 % (ref 20–50)
SODIUM SERPL-SCNC: 142 MMOL/L (ref 136–145)
TOTAL IRON BINDING CAPACITY: 308 UG/DL (ref 250–450)
TRANSFERRIN SERPL-MCNC: 208 MG/DL (ref 200–375)
UUN UR-MCNC: 21 MG/DL (ref 2–20)
WBC # BLD AUTO: 5.45 K/UL (ref 3.9–12.7)

## 2021-01-25 PROCEDURE — 83540 ASSAY OF IRON: CPT | Mod: PO

## 2021-01-25 PROCEDURE — 80053 COMPREHEN METABOLIC PANEL: CPT | Mod: PO

## 2021-01-25 PROCEDURE — 85025 COMPLETE CBC W/AUTO DIFF WBC: CPT | Mod: PO

## 2021-01-25 PROCEDURE — 36415 COLL VENOUS BLD VENIPUNCTURE: CPT | Mod: PO

## 2021-01-25 PROCEDURE — 82306 VITAMIN D 25 HYDROXY: CPT | Mod: PO

## 2021-01-25 PROCEDURE — 82728 ASSAY OF FERRITIN: CPT

## 2021-01-26 ENCOUNTER — OFFICE VISIT (OUTPATIENT)
Dept: HEMATOLOGY/ONCOLOGY | Facility: CLINIC | Age: 66
End: 2021-01-26
Payer: COMMERCIAL

## 2021-01-26 VITALS
HEART RATE: 87 BPM | TEMPERATURE: 98 F | DIASTOLIC BLOOD PRESSURE: 90 MMHG | WEIGHT: 222.25 LBS | RESPIRATION RATE: 18 BRPM | OXYGEN SATURATION: 93 % | SYSTOLIC BLOOD PRESSURE: 161 MMHG | BODY MASS INDEX: 30.14 KG/M2

## 2021-01-26 DIAGNOSIS — E55.9 VITAMIN D DEFICIENCY: ICD-10-CM

## 2021-01-26 DIAGNOSIS — I26.99 BILATERAL PULMONARY EMBOLISM: Primary | ICD-10-CM

## 2021-01-26 DIAGNOSIS — R56.9 SEIZURE: ICD-10-CM

## 2021-01-26 DIAGNOSIS — D50.0 IRON DEFICIENCY ANEMIA DUE TO CHRONIC BLOOD LOSS: ICD-10-CM

## 2021-01-26 LAB — 25(OH)D3+25(OH)D2 SERPL-MCNC: 112 NG/ML (ref 30–96)

## 2021-01-26 PROCEDURE — 3008F PR BODY MASS INDEX (BMI) DOCUMENTED: ICD-10-PCS | Mod: CPTII,S$GLB,, | Performed by: INTERNAL MEDICINE

## 2021-01-26 PROCEDURE — 1126F PR PAIN SEVERITY QUANTIFIED, NO PAIN PRESENT: ICD-10-PCS | Mod: S$GLB,,, | Performed by: INTERNAL MEDICINE

## 2021-01-26 PROCEDURE — 99999 PR PBB SHADOW E&M-EST. PATIENT-LVL III: CPT | Mod: PBBFAC,,, | Performed by: INTERNAL MEDICINE

## 2021-01-26 PROCEDURE — 99214 PR OFFICE/OUTPT VISIT, EST, LEVL IV, 30-39 MIN: ICD-10-PCS | Mod: S$GLB,,, | Performed by: INTERNAL MEDICINE

## 2021-01-26 PROCEDURE — 99999 PR PBB SHADOW E&M-EST. PATIENT-LVL III: ICD-10-PCS | Mod: PBBFAC,,, | Performed by: INTERNAL MEDICINE

## 2021-01-26 PROCEDURE — 1126F AMNT PAIN NOTED NONE PRSNT: CPT | Mod: S$GLB,,, | Performed by: INTERNAL MEDICINE

## 2021-01-26 PROCEDURE — 3008F BODY MASS INDEX DOCD: CPT | Mod: CPTII,S$GLB,, | Performed by: INTERNAL MEDICINE

## 2021-01-26 PROCEDURE — 99214 OFFICE O/P EST MOD 30 MIN: CPT | Mod: S$GLB,,, | Performed by: INTERNAL MEDICINE

## 2021-01-26 RX ORDER — LEVETIRACETAM 500 MG/1
500 TABLET ORAL 2 TIMES DAILY
Qty: 60 TABLET | Refills: 11
Start: 2021-01-26 | End: 2021-03-17 | Stop reason: SDUPTHER

## 2021-03-17 ENCOUNTER — TELEPHONE (OUTPATIENT)
Dept: NEUROLOGY | Facility: CLINIC | Age: 66
End: 2021-03-17

## 2021-03-17 ENCOUNTER — OFFICE VISIT (OUTPATIENT)
Dept: NEUROLOGY | Facility: CLINIC | Age: 66
End: 2021-03-17
Payer: COMMERCIAL

## 2021-03-17 VITALS
WEIGHT: 225.06 LBS | SYSTOLIC BLOOD PRESSURE: 160 MMHG | HEIGHT: 72 IN | BODY MASS INDEX: 30.48 KG/M2 | HEART RATE: 86 BPM | DIASTOLIC BLOOD PRESSURE: 84 MMHG

## 2021-03-17 DIAGNOSIS — R56.9 SEIZURE: ICD-10-CM

## 2021-03-17 DIAGNOSIS — G40.101 PARTIAL SYMPTOMATIC EPILEPSY WITH SIMPLE PARTIAL SEIZURES, NOT INTRACTABLE, WITH STATUS EPILEPTICUS: ICD-10-CM

## 2021-03-17 DIAGNOSIS — Z86.73 H/O: CVA (CEREBROVASCULAR ACCIDENT): Primary | ICD-10-CM

## 2021-03-17 PROCEDURE — 99999 PR PBB SHADOW E&M-EST. PATIENT-LVL III: CPT | Mod: PBBFAC,,, | Performed by: PSYCHIATRY & NEUROLOGY

## 2021-03-17 PROCEDURE — 3008F BODY MASS INDEX DOCD: CPT | Mod: CPTII,S$GLB,, | Performed by: PSYCHIATRY & NEUROLOGY

## 2021-03-17 PROCEDURE — 1126F AMNT PAIN NOTED NONE PRSNT: CPT | Mod: S$GLB,,, | Performed by: PSYCHIATRY & NEUROLOGY

## 2021-03-17 PROCEDURE — 99205 OFFICE O/P NEW HI 60 MIN: CPT | Mod: S$GLB,,, | Performed by: PSYCHIATRY & NEUROLOGY

## 2021-03-17 PROCEDURE — 3008F PR BODY MASS INDEX (BMI) DOCUMENTED: ICD-10-PCS | Mod: CPTII,S$GLB,, | Performed by: PSYCHIATRY & NEUROLOGY

## 2021-03-17 PROCEDURE — 99205 PR OFFICE/OUTPT VISIT, NEW, LEVL V, 60-74 MIN: ICD-10-PCS | Mod: S$GLB,,, | Performed by: PSYCHIATRY & NEUROLOGY

## 2021-03-17 PROCEDURE — 99999 PR PBB SHADOW E&M-EST. PATIENT-LVL III: ICD-10-PCS | Mod: PBBFAC,,, | Performed by: PSYCHIATRY & NEUROLOGY

## 2021-03-17 PROCEDURE — 1126F PR PAIN SEVERITY QUANTIFIED, NO PAIN PRESENT: ICD-10-PCS | Mod: S$GLB,,, | Performed by: PSYCHIATRY & NEUROLOGY

## 2021-03-17 RX ORDER — LEVETIRACETAM 500 MG/1
500 TABLET ORAL 2 TIMES DAILY
Qty: 180 TABLET | Refills: 3
Start: 2021-03-17 | End: 2021-07-27

## 2021-03-27 ENCOUNTER — IMMUNIZATION (OUTPATIENT)
Dept: PRIMARY CARE CLINIC | Facility: CLINIC | Age: 66
End: 2021-03-27
Payer: COMMERCIAL

## 2021-03-27 DIAGNOSIS — Z23 NEED FOR VACCINATION: Primary | ICD-10-CM

## 2021-03-27 PROCEDURE — 91300 PR SARS-COV- 2 COVID-19 VACCINE, NO PRSV, 30MCG/0.3ML, IM: CPT | Mod: ,,, | Performed by: INTERNAL MEDICINE

## 2021-03-27 PROCEDURE — 0001A PR IMMUNIZ ADMIN, SARS-COV-2 COVID-19 VACC, 30MCG/0.3ML, 1ST DOSE: ICD-10-PCS | Mod: CV19,,, | Performed by: INTERNAL MEDICINE

## 2021-03-27 PROCEDURE — 91300 PR SARS-COV- 2 COVID-19 VACCINE, NO PRSV, 30MCG/0.3ML, IM: ICD-10-PCS | Mod: ,,, | Performed by: INTERNAL MEDICINE

## 2021-03-27 PROCEDURE — 0001A PR IMMUNIZ ADMIN, SARS-COV-2 COVID-19 VACC, 30MCG/0.3ML, 1ST DOSE: CPT | Mod: CV19,,, | Performed by: INTERNAL MEDICINE

## 2021-03-27 RX ADMIN — Medication 0.3 ML: at 09:03

## 2021-04-17 ENCOUNTER — IMMUNIZATION (OUTPATIENT)
Dept: PRIMARY CARE CLINIC | Facility: CLINIC | Age: 66
End: 2021-04-17

## 2021-04-17 DIAGNOSIS — Z23 NEED FOR VACCINATION: Primary | ICD-10-CM

## 2021-04-17 PROCEDURE — 0002A PR IMMUNIZ ADMIN, SARS-COV-2 COVID-19 VACC, 30MCG/0.3ML, 2ND DOSE: CPT | Mod: CV19,,, | Performed by: INTERNAL MEDICINE

## 2021-04-17 PROCEDURE — 0002A PR IMMUNIZ ADMIN, SARS-COV-2 COVID-19 VACC, 30MCG/0.3ML, 2ND DOSE: ICD-10-PCS | Mod: CV19,,, | Performed by: INTERNAL MEDICINE

## 2021-04-17 PROCEDURE — 91300 PR SARS-COV- 2 COVID-19 VACCINE, NO PRSV, 30MCG/0.3ML, IM: ICD-10-PCS | Mod: ,,, | Performed by: INTERNAL MEDICINE

## 2021-04-17 PROCEDURE — 91300 PR SARS-COV- 2 COVID-19 VACCINE, NO PRSV, 30MCG/0.3ML, IM: CPT | Mod: ,,, | Performed by: INTERNAL MEDICINE

## 2021-04-17 RX ADMIN — Medication 0.3 ML: at 10:04

## 2021-04-21 ENCOUNTER — OFFICE VISIT (OUTPATIENT)
Dept: NEUROLOGY | Facility: CLINIC | Age: 66
End: 2021-04-21
Payer: COMMERCIAL

## 2021-04-21 VITALS
HEIGHT: 72 IN | DIASTOLIC BLOOD PRESSURE: 93 MMHG | WEIGHT: 230.06 LBS | BODY MASS INDEX: 31.16 KG/M2 | SYSTOLIC BLOOD PRESSURE: 165 MMHG | HEART RATE: 91 BPM

## 2021-04-21 DIAGNOSIS — Q21.12 PFO (PATENT FORAMEN OVALE): ICD-10-CM

## 2021-04-21 DIAGNOSIS — Z86.73 H/O ISCHEMIC LEFT MCA STROKE: Primary | ICD-10-CM

## 2021-04-21 PROCEDURE — 99215 PR OFFICE/OUTPT VISIT, EST, LEVL V, 40-54 MIN: ICD-10-PCS | Mod: S$GLB,,, | Performed by: NURSE PRACTITIONER

## 2021-04-21 PROCEDURE — 1126F PR PAIN SEVERITY QUANTIFIED, NO PAIN PRESENT: ICD-10-PCS | Mod: S$GLB,,, | Performed by: NURSE PRACTITIONER

## 2021-04-21 PROCEDURE — 99999 PR PBB SHADOW E&M-EST. PATIENT-LVL III: CPT | Mod: PBBFAC,,, | Performed by: NURSE PRACTITIONER

## 2021-04-21 PROCEDURE — 1126F AMNT PAIN NOTED NONE PRSNT: CPT | Mod: S$GLB,,, | Performed by: NURSE PRACTITIONER

## 2021-04-21 PROCEDURE — 99215 OFFICE O/P EST HI 40 MIN: CPT | Mod: S$GLB,,, | Performed by: NURSE PRACTITIONER

## 2021-04-21 PROCEDURE — 99999 PR PBB SHADOW E&M-EST. PATIENT-LVL III: ICD-10-PCS | Mod: PBBFAC,,, | Performed by: NURSE PRACTITIONER

## 2021-04-21 PROCEDURE — 3008F BODY MASS INDEX DOCD: CPT | Mod: CPTII,S$GLB,, | Performed by: NURSE PRACTITIONER

## 2021-04-21 PROCEDURE — 3008F PR BODY MASS INDEX (BMI) DOCUMENTED: ICD-10-PCS | Mod: CPTII,S$GLB,, | Performed by: NURSE PRACTITIONER

## 2021-04-22 ENCOUNTER — LAB VISIT (OUTPATIENT)
Dept: LAB | Facility: HOSPITAL | Age: 66
End: 2021-04-22
Attending: NURSE PRACTITIONER
Payer: COMMERCIAL

## 2021-04-22 DIAGNOSIS — Z86.73 H/O ISCHEMIC LEFT MCA STROKE: ICD-10-CM

## 2021-04-22 LAB
CHOLEST SERPL-MCNC: 188 MG/DL (ref 120–199)
CHOLEST/HDLC SERPL: 4.3 {RATIO} (ref 2–5)
HDLC SERPL-MCNC: 44 MG/DL (ref 40–75)
HDLC SERPL: 23.4 % (ref 20–50)
LDLC SERPL CALC-MCNC: 125.8 MG/DL (ref 63–159)
NONHDLC SERPL-MCNC: 144 MG/DL
TRIGL SERPL-MCNC: 91 MG/DL (ref 30–150)

## 2021-04-22 PROCEDURE — 80061 LIPID PANEL: CPT | Performed by: NURSE PRACTITIONER

## 2021-04-22 PROCEDURE — 36415 COLL VENOUS BLD VENIPUNCTURE: CPT | Mod: PO | Performed by: NURSE PRACTITIONER

## 2021-04-23 ENCOUNTER — EDUCATION (OUTPATIENT)
Dept: CARDIOLOGY | Facility: CLINIC | Age: 66
End: 2021-04-23

## 2021-04-23 ENCOUNTER — OFFICE VISIT (OUTPATIENT)
Dept: CARDIOLOGY | Facility: CLINIC | Age: 66
End: 2021-04-23
Payer: COMMERCIAL

## 2021-04-23 VITALS
HEIGHT: 72 IN | WEIGHT: 229.06 LBS | OXYGEN SATURATION: 95 % | BODY MASS INDEX: 31.03 KG/M2 | DIASTOLIC BLOOD PRESSURE: 83 MMHG | HEART RATE: 78 BPM | SYSTOLIC BLOOD PRESSURE: 165 MMHG

## 2021-04-23 DIAGNOSIS — I63.9 CEREBROVASCULAR ACCIDENT (CVA), UNSPECIFIED MECHANISM: ICD-10-CM

## 2021-04-23 DIAGNOSIS — Z01.812 PRE-PROCEDURE LAB EXAM: Primary | ICD-10-CM

## 2021-04-23 DIAGNOSIS — Q21.12 PFO (PATENT FORAMEN OVALE): Primary | ICD-10-CM

## 2021-04-23 DIAGNOSIS — Z95.828 S/P INSERTION OF IVC (INFERIOR VENA CAVAL) FILTER: ICD-10-CM

## 2021-04-23 DIAGNOSIS — D50.0 IRON DEFICIENCY ANEMIA DUE TO CHRONIC BLOOD LOSS: ICD-10-CM

## 2021-04-23 PROCEDURE — 99999 PR PBB SHADOW E&M-EST. PATIENT-LVL III: CPT | Mod: PBBFAC,,, | Performed by: INTERNAL MEDICINE

## 2021-04-23 PROCEDURE — 1126F AMNT PAIN NOTED NONE PRSNT: CPT | Mod: S$GLB,,, | Performed by: INTERNAL MEDICINE

## 2021-04-23 PROCEDURE — 3008F BODY MASS INDEX DOCD: CPT | Mod: CPTII,S$GLB,, | Performed by: INTERNAL MEDICINE

## 2021-04-23 PROCEDURE — 99215 OFFICE O/P EST HI 40 MIN: CPT | Mod: S$GLB,,, | Performed by: INTERNAL MEDICINE

## 2021-04-23 PROCEDURE — 3008F PR BODY MASS INDEX (BMI) DOCUMENTED: ICD-10-PCS | Mod: CPTII,S$GLB,, | Performed by: INTERNAL MEDICINE

## 2021-04-23 PROCEDURE — 99999 PR PBB SHADOW E&M-EST. PATIENT-LVL III: ICD-10-PCS | Mod: PBBFAC,,, | Performed by: INTERNAL MEDICINE

## 2021-04-23 PROCEDURE — 99215 PR OFFICE/OUTPT VISIT, EST, LEVL V, 40-54 MIN: ICD-10-PCS | Mod: S$GLB,,, | Performed by: INTERNAL MEDICINE

## 2021-04-23 PROCEDURE — 1126F PR PAIN SEVERITY QUANTIFIED, NO PAIN PRESENT: ICD-10-PCS | Mod: S$GLB,,, | Performed by: INTERNAL MEDICINE

## 2021-04-23 RX ORDER — CLOPIDOGREL BISULFATE 75 MG/1
75 TABLET ORAL DAILY
Qty: 30 TABLET | Refills: 11 | Status: SHIPPED | OUTPATIENT
Start: 2021-04-23 | End: 2024-02-16

## 2021-04-23 RX ORDER — NAPROXEN SODIUM 220 MG/1
81 TABLET, FILM COATED ORAL DAILY
Qty: 90 TABLET | Refills: 3 | Status: SHIPPED | OUTPATIENT
Start: 2021-04-23 | End: 2024-02-16

## 2021-04-23 RX ORDER — DIPHENHYDRAMINE HCL 50 MG
50 CAPSULE ORAL ONCE
Status: CANCELLED | OUTPATIENT
Start: 2021-04-23 | End: 2021-04-23

## 2021-04-23 RX ORDER — SODIUM CHLORIDE 9 MG/ML
INJECTION, SOLUTION INTRAVENOUS CONTINUOUS
Status: CANCELLED | OUTPATIENT
Start: 2021-04-23 | End: 2021-04-23

## 2021-04-26 ENCOUNTER — LAB VISIT (OUTPATIENT)
Dept: FAMILY MEDICINE | Facility: CLINIC | Age: 66
End: 2021-04-26
Payer: COMMERCIAL

## 2021-04-26 ENCOUNTER — PATIENT MESSAGE (OUTPATIENT)
Dept: NEUROLOGY | Facility: CLINIC | Age: 66
End: 2021-04-26

## 2021-04-26 DIAGNOSIS — E78.5 HYPERLIPIDEMIA, UNSPECIFIED HYPERLIPIDEMIA TYPE: Primary | ICD-10-CM

## 2021-04-26 DIAGNOSIS — Z01.812 PRE-PROCEDURE LAB EXAM: ICD-10-CM

## 2021-04-26 PROCEDURE — U0003 INFECTIOUS AGENT DETECTION BY NUCLEIC ACID (DNA OR RNA); SEVERE ACUTE RESPIRATORY SYNDROME CORONAVIRUS 2 (SARS-COV-2) (CORONAVIRUS DISEASE [COVID-19]), AMPLIFIED PROBE TECHNIQUE, MAKING USE OF HIGH THROUGHPUT TECHNOLOGIES AS DESCRIBED BY CMS-2020-01-R: HCPCS | Performed by: INTERNAL MEDICINE

## 2021-04-26 PROCEDURE — U0005 INFEC AGEN DETEC AMPLI PROBE: HCPCS | Performed by: INTERNAL MEDICINE

## 2021-04-26 RX ORDER — ATORVASTATIN CALCIUM 40 MG/1
40 TABLET, FILM COATED ORAL DAILY
Qty: 90 TABLET | Refills: 3 | Status: SHIPPED | OUTPATIENT
Start: 2021-04-26 | End: 2024-02-16

## 2021-04-28 LAB — SARS-COV-2 RNA RESP QL NAA+PROBE: NOT DETECTED

## 2021-04-29 ENCOUNTER — HOSPITAL ENCOUNTER (OUTPATIENT)
Facility: HOSPITAL | Age: 66
Discharge: HOME | End: 2021-04-29
Attending: INTERNAL MEDICINE | Admitting: INTERNAL MEDICINE
Payer: COMMERCIAL

## 2021-04-29 ENCOUNTER — ANESTHESIA (OUTPATIENT)
Dept: MEDSURG UNIT | Facility: HOSPITAL | Age: 66
End: 2021-04-29
Payer: COMMERCIAL

## 2021-04-29 ENCOUNTER — ANESTHESIA EVENT (OUTPATIENT)
Dept: MEDSURG UNIT | Facility: HOSPITAL | Age: 66
End: 2021-04-29

## 2021-04-29 ENCOUNTER — HOSPITAL ENCOUNTER (OUTPATIENT)
Dept: CARDIOLOGY | Facility: HOSPITAL | Age: 66
Discharge: HOME | End: 2021-04-29
Attending: INTERNAL MEDICINE
Payer: COMMERCIAL

## 2021-04-29 VITALS
HEIGHT: 73 IN | HEART RATE: 65 BPM | RESPIRATION RATE: 17 BRPM | BODY MASS INDEX: 30.48 KG/M2 | DIASTOLIC BLOOD PRESSURE: 74 MMHG | TEMPERATURE: 98 F | OXYGEN SATURATION: 98 % | SYSTOLIC BLOOD PRESSURE: 133 MMHG | WEIGHT: 230 LBS

## 2021-04-29 VITALS
WEIGHT: 230 LBS | SYSTOLIC BLOOD PRESSURE: 183 MMHG | BODY MASS INDEX: 30.48 KG/M2 | HEIGHT: 73 IN | DIASTOLIC BLOOD PRESSURE: 84 MMHG

## 2021-04-29 DIAGNOSIS — Q21.12 PFO (PATENT FORAMEN OVALE): ICD-10-CM

## 2021-04-29 LAB
BASOPHILS # BLD AUTO: 0.03 K/UL (ref 0–0.2)
BASOPHILS NFR BLD: 0.6 % (ref 0–1.9)
BSA FOR ECHO PROCEDURE: 2.32 M2
DIFFERENTIAL METHOD: ABNORMAL
EJECTION FRACTION: 60 %
EOSINOPHIL # BLD AUTO: 0.2 K/UL (ref 0–0.5)
EOSINOPHIL NFR BLD: 3 % (ref 0–8)
ERYTHROCYTE [DISTWIDTH] IN BLOOD BY AUTOMATED COUNT: 15.5 % (ref 11.5–14.5)
HCT VFR BLD AUTO: 40 % (ref 40–54)
HGB BLD-MCNC: 12.4 G/DL (ref 14–18)
IMM GRANULOCYTES # BLD AUTO: 0.01 K/UL (ref 0–0.04)
IMM GRANULOCYTES NFR BLD AUTO: 0.2 % (ref 0–0.5)
LYMPHOCYTES # BLD AUTO: 2.3 K/UL (ref 1–4.8)
LYMPHOCYTES NFR BLD: 43.4 % (ref 18–48)
MCH RBC QN AUTO: 23.6 PG (ref 27–31)
MCHC RBC AUTO-ENTMCNC: 31 G/DL (ref 32–36)
MCV RBC AUTO: 76 FL (ref 82–98)
MONOCYTES # BLD AUTO: 0.7 K/UL (ref 0.3–1)
MONOCYTES NFR BLD: 12.1 % (ref 4–15)
NEUTROPHILS # BLD AUTO: 2.2 K/UL (ref 1.8–7.7)
NEUTROPHILS NFR BLD: 40.7 % (ref 38–73)
NRBC BLD-RTO: 0 /100 WBC
PLATELET # BLD AUTO: 179 K/UL (ref 150–450)
PMV BLD AUTO: 11.6 FL (ref 9.2–12.9)
RBC # BLD AUTO: 5.26 M/UL (ref 4.6–6.2)
SINUS: 3.5 CM
STJ: 3.2 CM
WBC # BLD AUTO: 5.35 K/UL (ref 3.9–12.7)

## 2021-04-29 PROCEDURE — 37000009 HC ANESTHESIA EA ADD 15 MINS

## 2021-04-29 PROCEDURE — 93312 ECHO TRANSESOPHAGEAL: CPT

## 2021-04-29 PROCEDURE — 25000003 PHARM REV CODE 250: Performed by: NURSE ANESTHETIST, CERTIFIED REGISTERED

## 2021-04-29 PROCEDURE — 85025 COMPLETE CBC W/AUTO DIFF WBC: CPT | Performed by: INTERNAL MEDICINE

## 2021-04-29 PROCEDURE — 93320 TRANSESOPHAGEAL ECHO (TEE) (CUPID ONLY): ICD-10-PCS | Mod: 26,,, | Performed by: INTERNAL MEDICINE

## 2021-04-29 PROCEDURE — 93312 ECHO TRANSESOPHAGEAL: CPT | Mod: 26,,, | Performed by: INTERNAL MEDICINE

## 2021-04-29 PROCEDURE — D9220A PRA ANESTHESIA: Mod: CRNA,,, | Performed by: NURSE ANESTHETIST, CERTIFIED REGISTERED

## 2021-04-29 PROCEDURE — 93320 DOPPLER ECHO COMPLETE: CPT | Mod: 26,,, | Performed by: INTERNAL MEDICINE

## 2021-04-29 PROCEDURE — D9220A PRA ANESTHESIA: Mod: ANES,,, | Performed by: ANESTHESIOLOGY

## 2021-04-29 PROCEDURE — 63600175 PHARM REV CODE 636 W HCPCS: Performed by: NURSE ANESTHETIST, CERTIFIED REGISTERED

## 2021-04-29 PROCEDURE — D9220A PRA ANESTHESIA: ICD-10-PCS | Mod: ANES,,, | Performed by: ANESTHESIOLOGY

## 2021-04-29 PROCEDURE — 36415 COLL VENOUS BLD VENIPUNCTURE: CPT | Performed by: INTERNAL MEDICINE

## 2021-04-29 PROCEDURE — 93312 TRANSESOPHAGEAL ECHO (TEE) (CUPID ONLY): ICD-10-PCS | Mod: 26,,, | Performed by: INTERNAL MEDICINE

## 2021-04-29 PROCEDURE — 93325 TRANSESOPHAGEAL ECHO (TEE) (CUPID ONLY): ICD-10-PCS | Mod: 26,,, | Performed by: INTERNAL MEDICINE

## 2021-04-29 PROCEDURE — 37000008 HC ANESTHESIA 1ST 15 MINUTES

## 2021-04-29 PROCEDURE — 93325 DOPPLER ECHO COLOR FLOW MAPG: CPT | Mod: 26,,, | Performed by: INTERNAL MEDICINE

## 2021-04-29 PROCEDURE — D9220A PRA ANESTHESIA: ICD-10-PCS | Mod: CRNA,,, | Performed by: NURSE ANESTHETIST, CERTIFIED REGISTERED

## 2021-04-29 RX ORDER — FENTANYL CITRATE 50 UG/ML
INJECTION, SOLUTION INTRAMUSCULAR; INTRAVENOUS
Status: DISCONTINUED | OUTPATIENT
Start: 2021-04-29 | End: 2021-04-29

## 2021-04-29 RX ORDER — MEPERIDINE HYDROCHLORIDE 50 MG/ML
12.5 INJECTION INTRAMUSCULAR; INTRAVENOUS; SUBCUTANEOUS ONCE AS NEEDED
Status: DISCONTINUED | OUTPATIENT
Start: 2021-04-29 | End: 2021-04-29 | Stop reason: HOSPADM

## 2021-04-29 RX ORDER — PROPOFOL 10 MG/ML
VIAL (ML) INTRAVENOUS CONTINUOUS PRN
Status: DISCONTINUED | OUTPATIENT
Start: 2021-04-29 | End: 2021-04-29

## 2021-04-29 RX ORDER — PROPOFOL 10 MG/ML
VIAL (ML) INTRAVENOUS
Status: DISCONTINUED | OUTPATIENT
Start: 2021-04-29 | End: 2021-04-29

## 2021-04-29 RX ORDER — HYDROMORPHONE HYDROCHLORIDE 1 MG/ML
0.2 INJECTION, SOLUTION INTRAMUSCULAR; INTRAVENOUS; SUBCUTANEOUS EVERY 5 MIN PRN
Status: DISCONTINUED | OUTPATIENT
Start: 2021-04-29 | End: 2021-04-29 | Stop reason: HOSPADM

## 2021-04-29 RX ORDER — LIDOCAINE HYDROCHLORIDE 20 MG/ML
SOLUTION OROPHARYNGEAL
Status: DISCONTINUED | OUTPATIENT
Start: 2021-04-29 | End: 2021-04-29

## 2021-04-29 RX ORDER — LIDOCAINE HYDROCHLORIDE 20 MG/ML
INJECTION, SOLUTION EPIDURAL; INFILTRATION; INTRACAUDAL; PERINEURAL
Status: DISCONTINUED | OUTPATIENT
Start: 2021-04-29 | End: 2021-04-29

## 2021-04-29 RX ORDER — ONDANSETRON 2 MG/ML
4 INJECTION INTRAMUSCULAR; INTRAVENOUS DAILY PRN
Status: DISCONTINUED | OUTPATIENT
Start: 2021-04-29 | End: 2021-04-29 | Stop reason: HOSPADM

## 2021-04-29 RX ORDER — SODIUM CHLORIDE 0.9 % (FLUSH) 0.9 %
3 SYRINGE (ML) INJECTION
Status: DISCONTINUED | OUTPATIENT
Start: 2021-04-29 | End: 2021-04-29 | Stop reason: HOSPADM

## 2021-04-29 RX ADMIN — SODIUM CHLORIDE: 9 INJECTION, SOLUTION INTRAVENOUS at 09:04

## 2021-04-29 RX ADMIN — FENTANYL CITRATE 25 MCG: 50 INJECTION INTRAMUSCULAR; INTRAVENOUS at 09:04

## 2021-04-29 RX ADMIN — LIDOCAINE HYDROCHLORIDE 25 MG: 20 INJECTION, SOLUTION EPIDURAL; INFILTRATION; INTRACAUDAL at 09:04

## 2021-04-29 RX ADMIN — GLYCOPYRROLATE 0.2 MG: 0.2 INJECTION, SOLUTION INTRAMUSCULAR; INTRAVITREAL at 09:04

## 2021-04-29 RX ADMIN — LIDOCAINE HYDROCHLORIDE 15 ML: 20 SOLUTION ORAL; TOPICAL at 09:04

## 2021-04-29 RX ADMIN — PROPOFOL 175 MCG/KG/MIN: 10 INJECTION, EMULSION INTRAVENOUS at 09:04

## 2021-04-29 RX ADMIN — PROPOFOL 40 MG: 10 INJECTION, EMULSION INTRAVENOUS at 09:04

## 2021-05-06 ENCOUNTER — TELEPHONE (OUTPATIENT)
Dept: CARDIOLOGY | Facility: CLINIC | Age: 66
End: 2021-05-06

## 2021-05-06 ENCOUNTER — DOCUMENTATION ONLY (OUTPATIENT)
Dept: CARDIOLOGY | Facility: CLINIC | Age: 66
End: 2021-05-06

## 2021-05-12 DIAGNOSIS — E55.9 VITAMIN D DEFICIENCY: ICD-10-CM

## 2021-05-12 RX ORDER — CHOLECALCIFEROL (VITAMIN D3) 1250 MCG
50000 TABLET ORAL WEEKLY
Qty: 12 TABLET | Refills: 3 | Status: SHIPPED | OUTPATIENT
Start: 2021-05-12

## 2021-07-26 ENCOUNTER — LAB VISIT (OUTPATIENT)
Dept: LAB | Facility: HOSPITAL | Age: 66
End: 2021-07-26
Attending: INTERNAL MEDICINE
Payer: COMMERCIAL

## 2021-07-26 DIAGNOSIS — I26.99 BILATERAL PULMONARY EMBOLISM: ICD-10-CM

## 2021-07-26 DIAGNOSIS — D50.0 IRON DEFICIENCY ANEMIA DUE TO CHRONIC BLOOD LOSS: ICD-10-CM

## 2021-07-26 DIAGNOSIS — E55.9 VITAMIN D DEFICIENCY: ICD-10-CM

## 2021-07-26 LAB
ALBUMIN SERPL BCP-MCNC: 4.4 G/DL (ref 3.5–5.2)
ALP SERPL-CCNC: 93 U/L (ref 38–126)
ALT SERPL W/O P-5'-P-CCNC: 26 U/L (ref 10–44)
ANION GAP SERPL CALC-SCNC: 7 MMOL/L (ref 8–16)
AST SERPL-CCNC: 32 U/L (ref 15–46)
BASOPHILS # BLD AUTO: 0.04 K/UL (ref 0–0.2)
BASOPHILS NFR BLD: 0.7 % (ref 0–1.9)
BILIRUB SERPL-MCNC: 0.6 MG/DL (ref 0.1–1)
CALCIUM SERPL-MCNC: 9.3 MG/DL (ref 8.7–10.5)
CHLORIDE SERPL-SCNC: 107 MMOL/L (ref 95–110)
CO2 SERPL-SCNC: 26 MMOL/L (ref 23–29)
CREAT SERPL-MCNC: 1.43 MG/DL (ref 0.5–1.4)
DIFFERENTIAL METHOD: ABNORMAL
EOSINOPHIL # BLD AUTO: 0.1 K/UL (ref 0–0.5)
EOSINOPHIL NFR BLD: 1.7 % (ref 0–8)
ERYTHROCYTE [DISTWIDTH] IN BLOOD BY AUTOMATED COUNT: 15.9 % (ref 11.5–14.5)
EST. GFR  (AFRICAN AMERICAN): 59.4 ML/MIN/1.73 M^2
EST. GFR  (NON AFRICAN AMERICAN): 51.4 ML/MIN/1.73 M^2
FERRITIN SERPL-MCNC: 84 NG/ML (ref 20–300)
GLUCOSE SERPL-MCNC: 90 MG/DL (ref 70–110)
HCT VFR BLD AUTO: 39.2 % (ref 40–54)
HGB BLD-MCNC: 12 G/DL (ref 14–18)
IMM GRANULOCYTES # BLD AUTO: 0.01 K/UL (ref 0–0.04)
IMM GRANULOCYTES NFR BLD AUTO: 0.2 % (ref 0–0.5)
LYMPHOCYTES # BLD AUTO: 2.2 K/UL (ref 1–4.8)
LYMPHOCYTES NFR BLD: 40.4 % (ref 18–48)
MCH RBC QN AUTO: 23.4 PG (ref 27–31)
MCHC RBC AUTO-ENTMCNC: 30.6 G/DL (ref 32–36)
MCV RBC AUTO: 77 FL (ref 82–98)
MONOCYTES # BLD AUTO: 0.6 K/UL (ref 0.3–1)
MONOCYTES NFR BLD: 10.4 % (ref 4–15)
NEUTROPHILS # BLD AUTO: 2.5 K/UL (ref 1.8–7.7)
NEUTROPHILS NFR BLD: 46.6 % (ref 38–73)
NRBC BLD-RTO: 0 /100 WBC
PLATELET # BLD AUTO: 211 K/UL (ref 150–450)
PMV BLD AUTO: 11.2 FL (ref 9.2–12.9)
POTASSIUM SERPL-SCNC: 5.2 MMOL/L (ref 3.5–5.1)
PROT SERPL-MCNC: 7.5 G/DL (ref 6–8.4)
RBC # BLD AUTO: 5.12 M/UL (ref 4.6–6.2)
SODIUM SERPL-SCNC: 140 MMOL/L (ref 136–145)
UUN UR-MCNC: 20 MG/DL (ref 2–20)
WBC # BLD AUTO: 5.37 K/UL (ref 3.9–12.7)

## 2021-07-26 PROCEDURE — 36415 COLL VENOUS BLD VENIPUNCTURE: CPT | Mod: PO | Performed by: INTERNAL MEDICINE

## 2021-07-26 PROCEDURE — 82306 VITAMIN D 25 HYDROXY: CPT | Mod: PO | Performed by: INTERNAL MEDICINE

## 2021-07-26 PROCEDURE — 85025 COMPLETE CBC W/AUTO DIFF WBC: CPT | Mod: PO | Performed by: INTERNAL MEDICINE

## 2021-07-26 PROCEDURE — 82728 ASSAY OF FERRITIN: CPT | Performed by: INTERNAL MEDICINE

## 2021-07-26 PROCEDURE — 83540 ASSAY OF IRON: CPT | Mod: PO | Performed by: INTERNAL MEDICINE

## 2021-07-26 PROCEDURE — 80053 COMPREHEN METABOLIC PANEL: CPT | Mod: PO | Performed by: INTERNAL MEDICINE

## 2021-07-27 ENCOUNTER — OFFICE VISIT (OUTPATIENT)
Dept: HEMATOLOGY/ONCOLOGY | Facility: CLINIC | Age: 66
End: 2021-07-27
Payer: COMMERCIAL

## 2021-07-27 VITALS — OXYGEN SATURATION: 97 %

## 2021-07-27 DIAGNOSIS — Z86.73 H/O: CVA (CEREBROVASCULAR ACCIDENT): ICD-10-CM

## 2021-07-27 DIAGNOSIS — D50.0 IRON DEFICIENCY ANEMIA DUE TO CHRONIC BLOOD LOSS: ICD-10-CM

## 2021-07-27 DIAGNOSIS — R56.9 SEIZURE: ICD-10-CM

## 2021-07-27 DIAGNOSIS — E55.9 VITAMIN D DEFICIENCY: ICD-10-CM

## 2021-07-27 DIAGNOSIS — I26.99 BILATERAL PULMONARY EMBOLISM: Primary | ICD-10-CM

## 2021-07-27 LAB
25(OH)D3+25(OH)D2 SERPL-MCNC: 77 NG/ML (ref 30–96)
IRON SERPL-MCNC: 71 UG/DL (ref 45–160)
SATURATED IRON: 20 % (ref 20–50)
TOTAL IRON BINDING CAPACITY: 348 UG/DL (ref 250–450)
TRANSFERRIN SERPL-MCNC: 235 MG/DL (ref 200–375)

## 2021-07-27 PROCEDURE — 1159F PR MEDICATION LIST DOCUMENTED IN MEDICAL RECORD: ICD-10-PCS | Mod: CPTII,S$GLB,, | Performed by: INTERNAL MEDICINE

## 2021-07-27 PROCEDURE — 1159F MED LIST DOCD IN RCRD: CPT | Mod: CPTII,S$GLB,, | Performed by: INTERNAL MEDICINE

## 2021-07-27 PROCEDURE — 1160F PR REVIEW ALL MEDS BY PRESCRIBER/CLIN PHARMACIST DOCUMENTED: ICD-10-PCS | Mod: CPTII,S$GLB,, | Performed by: INTERNAL MEDICINE

## 2021-07-27 PROCEDURE — 99214 PR OFFICE/OUTPT VISIT, EST, LEVL IV, 30-39 MIN: ICD-10-PCS | Mod: S$GLB,,, | Performed by: INTERNAL MEDICINE

## 2021-07-27 PROCEDURE — 1160F RVW MEDS BY RX/DR IN RCRD: CPT | Mod: CPTII,S$GLB,, | Performed by: INTERNAL MEDICINE

## 2021-07-27 PROCEDURE — 1126F PR PAIN SEVERITY QUANTIFIED, NO PAIN PRESENT: ICD-10-PCS | Mod: CPTII,S$GLB,, | Performed by: INTERNAL MEDICINE

## 2021-07-27 PROCEDURE — 1126F AMNT PAIN NOTED NONE PRSNT: CPT | Mod: CPTII,S$GLB,, | Performed by: INTERNAL MEDICINE

## 2021-07-27 PROCEDURE — 99214 OFFICE O/P EST MOD 30 MIN: CPT | Mod: S$GLB,,, | Performed by: INTERNAL MEDICINE

## 2021-07-27 PROCEDURE — 99999 PR PBB SHADOW E&M-EST. PATIENT-LVL III: CPT | Mod: PBBFAC,,, | Performed by: INTERNAL MEDICINE

## 2021-07-27 PROCEDURE — 99999 PR PBB SHADOW E&M-EST. PATIENT-LVL III: ICD-10-PCS | Mod: PBBFAC,,, | Performed by: INTERNAL MEDICINE

## 2021-07-27 RX ORDER — ALBUTEROL SULFATE 90 UG/1
2 AEROSOL, METERED RESPIRATORY (INHALATION) EVERY 6 HOURS PRN
COMMUNITY
Start: 2020-11-23 | End: 2024-02-16

## 2021-07-27 RX ORDER — LEVETIRACETAM 1000 MG/1
1000 TABLET ORAL 2 TIMES DAILY
COMMUNITY
Start: 2021-07-15

## 2021-07-28 ENCOUNTER — TELEPHONE (OUTPATIENT)
Dept: HEMATOLOGY/ONCOLOGY | Facility: CLINIC | Age: 66
End: 2021-07-28

## 2021-07-30 DIAGNOSIS — I63.9 CEREBROVASCULAR ACCIDENT (CVA), UNSPECIFIED MECHANISM: ICD-10-CM

## 2021-07-30 DIAGNOSIS — Q21.12 PFO (PATENT FORAMEN OVALE): Primary | ICD-10-CM

## 2021-07-30 RX ORDER — DIPHENHYDRAMINE HCL 50 MG
50 CAPSULE ORAL ONCE
Status: CANCELLED | OUTPATIENT
Start: 2021-07-30 | End: 2021-07-30

## 2021-07-30 RX ORDER — SODIUM CHLORIDE 9 MG/ML
INJECTION, SOLUTION INTRAVENOUS CONTINUOUS
Status: CANCELLED | OUTPATIENT
Start: 2021-07-30 | End: 2021-07-30

## 2021-08-11 ENCOUNTER — TELEPHONE (OUTPATIENT)
Dept: CARDIOLOGY | Facility: CLINIC | Age: 66
End: 2021-08-11

## 2021-08-11 ENCOUNTER — DOCUMENTATION ONLY (OUTPATIENT)
Dept: CARDIOLOGY | Facility: CLINIC | Age: 66
End: 2021-08-11

## 2021-09-02 ENCOUNTER — PATIENT MESSAGE (OUTPATIENT)
Dept: NEUROLOGY | Facility: CLINIC | Age: 66
End: 2021-09-02

## 2022-01-26 ENCOUNTER — LAB VISIT (OUTPATIENT)
Dept: LAB | Facility: HOSPITAL | Age: 67
End: 2022-01-26
Attending: INTERNAL MEDICINE
Payer: COMMERCIAL

## 2022-01-26 DIAGNOSIS — I26.99 BILATERAL PULMONARY EMBOLISM: ICD-10-CM

## 2022-01-26 DIAGNOSIS — E55.9 VITAMIN D DEFICIENCY: ICD-10-CM

## 2022-01-26 LAB
25(OH)D3+25(OH)D2 SERPL-MCNC: 76 NG/ML (ref 30–96)
ALBUMIN SERPL BCP-MCNC: 4.2 G/DL (ref 3.5–5.2)
ALP SERPL-CCNC: 110 U/L (ref 38–126)
ALT SERPL W/O P-5'-P-CCNC: 18 U/L (ref 10–44)
ANION GAP SERPL CALC-SCNC: 9 MMOL/L (ref 8–16)
AST SERPL-CCNC: 26 U/L (ref 15–46)
BASOPHILS # BLD AUTO: 0.03 K/UL (ref 0–0.2)
BASOPHILS NFR BLD: 0.5 % (ref 0–1.9)
BILIRUB SERPL-MCNC: 0.5 MG/DL (ref 0.1–1)
CALCIUM SERPL-MCNC: 8.9 MG/DL (ref 8.7–10.5)
CHLORIDE SERPL-SCNC: 103 MMOL/L (ref 95–110)
CO2 SERPL-SCNC: 27 MMOL/L (ref 23–29)
CREAT SERPL-MCNC: 1.42 MG/DL (ref 0.5–1.4)
DIFFERENTIAL METHOD: ABNORMAL
EOSINOPHIL # BLD AUTO: 0.1 K/UL (ref 0–0.5)
EOSINOPHIL NFR BLD: 2 % (ref 0–8)
ERYTHROCYTE [DISTWIDTH] IN BLOOD BY AUTOMATED COUNT: 14.9 % (ref 11.5–14.5)
EST. GFR  (AFRICAN AMERICAN): 59.9 ML/MIN/1.73 M^2
EST. GFR  (NON AFRICAN AMERICAN): 51.8 ML/MIN/1.73 M^2
FERRITIN SERPL-MCNC: 31 NG/ML (ref 20–300)
GLUCOSE SERPL-MCNC: 102 MG/DL (ref 70–110)
HCT VFR BLD AUTO: 37.5 % (ref 40–54)
HGB BLD-MCNC: 11.7 G/DL (ref 14–18)
IMM GRANULOCYTES # BLD AUTO: 0.02 K/UL (ref 0–0.04)
IMM GRANULOCYTES NFR BLD AUTO: 0.3 % (ref 0–0.5)
IRON SERPL-MCNC: 56 UG/DL (ref 45–160)
LYMPHOCYTES # BLD AUTO: 2.3 K/UL (ref 1–4.8)
LYMPHOCYTES NFR BLD: 39 % (ref 18–48)
MCH RBC QN AUTO: 23.8 PG (ref 27–31)
MCHC RBC AUTO-ENTMCNC: 31.2 G/DL (ref 32–36)
MCV RBC AUTO: 76 FL (ref 82–98)
MONOCYTES # BLD AUTO: 0.7 K/UL (ref 0.3–1)
MONOCYTES NFR BLD: 12.3 % (ref 4–15)
NEUTROPHILS # BLD AUTO: 2.8 K/UL (ref 1.8–7.7)
NEUTROPHILS NFR BLD: 45.9 % (ref 38–73)
NRBC BLD-RTO: 0 /100 WBC
PLATELET # BLD AUTO: 207 K/UL (ref 150–450)
PMV BLD AUTO: 10.4 FL (ref 9.2–12.9)
POTASSIUM SERPL-SCNC: 4.5 MMOL/L (ref 3.5–5.1)
PROT SERPL-MCNC: 7.5 G/DL (ref 6–8.4)
RBC # BLD AUTO: 4.91 M/UL (ref 4.6–6.2)
SATURATED IRON: 15 % (ref 20–50)
SODIUM SERPL-SCNC: 139 MMOL/L (ref 136–145)
TOTAL IRON BINDING CAPACITY: 370 UG/DL (ref 250–450)
TRANSFERRIN SERPL-MCNC: 250 MG/DL (ref 200–375)
UUN UR-MCNC: 15 MG/DL (ref 2–20)
WBC # BLD AUTO: 6 K/UL (ref 3.9–12.7)

## 2022-01-26 PROCEDURE — 36415 COLL VENOUS BLD VENIPUNCTURE: CPT | Mod: PO | Performed by: INTERNAL MEDICINE

## 2022-01-26 PROCEDURE — 82728 ASSAY OF FERRITIN: CPT | Performed by: INTERNAL MEDICINE

## 2022-01-26 PROCEDURE — 80053 COMPREHEN METABOLIC PANEL: CPT | Mod: PO | Performed by: INTERNAL MEDICINE

## 2022-01-26 PROCEDURE — 84466 ASSAY OF TRANSFERRIN: CPT | Mod: PO | Performed by: INTERNAL MEDICINE

## 2022-01-26 PROCEDURE — 82306 VITAMIN D 25 HYDROXY: CPT | Mod: PO | Performed by: INTERNAL MEDICINE

## 2022-01-26 PROCEDURE — 85025 COMPLETE CBC W/AUTO DIFF WBC: CPT | Mod: PO | Performed by: INTERNAL MEDICINE

## 2022-01-27 ENCOUNTER — OFFICE VISIT (OUTPATIENT)
Dept: HEMATOLOGY/ONCOLOGY | Facility: CLINIC | Age: 67
End: 2022-01-27
Payer: COMMERCIAL

## 2022-01-27 VITALS
SYSTOLIC BLOOD PRESSURE: 153 MMHG | TEMPERATURE: 99 F | WEIGHT: 226 LBS | HEIGHT: 72 IN | HEART RATE: 75 BPM | RESPIRATION RATE: 20 BRPM | OXYGEN SATURATION: 95 % | DIASTOLIC BLOOD PRESSURE: 80 MMHG | BODY MASS INDEX: 30.61 KG/M2

## 2022-01-27 DIAGNOSIS — D50.0 IRON DEFICIENCY ANEMIA DUE TO CHRONIC BLOOD LOSS: ICD-10-CM

## 2022-01-27 DIAGNOSIS — I26.99 BILATERAL PULMONARY EMBOLISM: Primary | ICD-10-CM

## 2022-01-27 DIAGNOSIS — R56.9 SEIZURE: ICD-10-CM

## 2022-01-27 PROCEDURE — 1160F PR REVIEW ALL MEDS BY PRESCRIBER/CLIN PHARMACIST DOCUMENTED: ICD-10-PCS | Mod: CPTII,S$GLB,, | Performed by: INTERNAL MEDICINE

## 2022-01-27 PROCEDURE — 3079F DIAST BP 80-89 MM HG: CPT | Mod: CPTII,S$GLB,, | Performed by: INTERNAL MEDICINE

## 2022-01-27 PROCEDURE — 3077F SYST BP >= 140 MM HG: CPT | Mod: CPTII,S$GLB,, | Performed by: INTERNAL MEDICINE

## 2022-01-27 PROCEDURE — 1159F MED LIST DOCD IN RCRD: CPT | Mod: CPTII,S$GLB,, | Performed by: INTERNAL MEDICINE

## 2022-01-27 PROCEDURE — 99999 PR PBB SHADOW E&M-EST. PATIENT-LVL III: CPT | Mod: PBBFAC,,, | Performed by: INTERNAL MEDICINE

## 2022-01-27 PROCEDURE — 1159F PR MEDICATION LIST DOCUMENTED IN MEDICAL RECORD: ICD-10-PCS | Mod: CPTII,S$GLB,, | Performed by: INTERNAL MEDICINE

## 2022-01-27 PROCEDURE — 99214 PR OFFICE/OUTPT VISIT, EST, LEVL IV, 30-39 MIN: ICD-10-PCS | Mod: S$GLB,,, | Performed by: INTERNAL MEDICINE

## 2022-01-27 PROCEDURE — 99999 PR PBB SHADOW E&M-EST. PATIENT-LVL III: ICD-10-PCS | Mod: PBBFAC,,, | Performed by: INTERNAL MEDICINE

## 2022-01-27 PROCEDURE — 3008F BODY MASS INDEX DOCD: CPT | Mod: CPTII,S$GLB,, | Performed by: INTERNAL MEDICINE

## 2022-01-27 PROCEDURE — 3077F PR MOST RECENT SYSTOLIC BLOOD PRESSURE >= 140 MM HG: ICD-10-PCS | Mod: CPTII,S$GLB,, | Performed by: INTERNAL MEDICINE

## 2022-01-27 PROCEDURE — 99214 OFFICE O/P EST MOD 30 MIN: CPT | Mod: S$GLB,,, | Performed by: INTERNAL MEDICINE

## 2022-01-27 PROCEDURE — 1160F RVW MEDS BY RX/DR IN RCRD: CPT | Mod: CPTII,S$GLB,, | Performed by: INTERNAL MEDICINE

## 2022-01-27 PROCEDURE — 3079F PR MOST RECENT DIASTOLIC BLOOD PRESSURE 80-89 MM HG: ICD-10-PCS | Mod: CPTII,S$GLB,, | Performed by: INTERNAL MEDICINE

## 2022-01-27 PROCEDURE — 3008F PR BODY MASS INDEX (BMI) DOCUMENTED: ICD-10-PCS | Mod: CPTII,S$GLB,, | Performed by: INTERNAL MEDICINE

## 2022-01-27 RX ORDER — SODIUM CHLORIDE 0.9 % (FLUSH) 0.9 %
10 SYRINGE (ML) INJECTION
Status: CANCELLED | OUTPATIENT
Start: 2022-01-27

## 2022-01-27 RX ORDER — SODIUM CHLORIDE 0.9 % (FLUSH) 0.9 %
10 SYRINGE (ML) INJECTION
Status: CANCELLED | OUTPATIENT
Start: 2022-02-04

## 2022-01-27 RX ORDER — HEPARIN 100 UNIT/ML
500 SYRINGE INTRAVENOUS
Status: CANCELLED | OUTPATIENT
Start: 2022-02-04

## 2022-01-27 RX ORDER — HEPARIN 100 UNIT/ML
500 SYRINGE INTRAVENOUS
Status: CANCELLED | OUTPATIENT
Start: 2022-01-27

## 2022-01-27 NOTE — PROGRESS NOTES
PATIENT: Miky Esqueda  MRN: [id number]  DATE: 1/27/2022    Diagnosis:   1. Bilateral pulmonary embolism    2. Iron deficiency anemia due to chronic blood loss    3. Seizure      Chief Complaint: PE, iron def anemia    Subjective:     History of Present Illness: He presented to the ER 05/25/2020 with acute onset of shortness of breath, diaphoresis and lightheadedness.  CT chest showed extensive bilateral pulmonary thromboemboli.  No evidence of central saddle embolus.  There was moderate pulmonary emphysema.  Lower extremity ultrasound also revealed nonocclusive thrombus in the right and left popliteal veins as well as the right femoral vein.    This is his 1st episode of pulmonary embolism/DVT.  He was started on Xarelto.    Then he had a syncopal episode in his PCP's office on 06/12/2020 which lasted about 30 sec followed by brief 15 sec seizure like episode.  He was taken to the ER where a CT head showed a hypodense area in the left parietal lobe concerning for infarct.  By this time he also developed significant anemia and was transfused 2 units packed red blood cells.    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    He got 1 dose of Injectafer 07/21/2020 and a second dose 9/4/2020    INTERVAL HISTORY:   -he presented to the ER 8/7/2020 with severe anemia Hb 4.6 - passed out in his primary care physician's office.  -this was his second episode of severe symptomatic anemia after being started on Xarelto.  -he has past h/o extensive DVT/PE in may 2020, CVA in June 2020 and h/o jejunal AVM.  -no active source of bleeding could be found from extensive GI work-up   -severe anemia felt likely related to intermittent bleeding from angioectasia(s) vs a Dieulafoy lesion.  -he underwent IVC filter placement 08/10/2020 and his Xarelto was discontinued  -takes Vit D3 every other week  -needs PFO closure    Past Medical, Surgical, Family and Social History Reviewed.    Medications and Allergies  reviewed      Review of Systems   Constitutional: Positive for fatigue. Negative for fever and unexpected weight change.     Objective:     Vitals:    01/27/22 1355   BP: (!) 153/80   BP Location: Left arm   Patient Position: Sitting   BP Method: Large (Automatic)   Pulse: 75   Resp: 20   Temp: 98.7 °F (37.1 °C)   TempSrc: Oral   SpO2: 95%   Weight: 102.5 kg (225 lb 15.5 oz)   Height: 6' (1.829 m)     Body mass index is 30.65 kg/m².    General appearance: no acute distress. conversant  Lungs: clear to auscultation. no rales. breathing nonlabored  Heart: rhythm regular. no gallops. no edema.  Neurologic/Psychiatric: alert. oriented to time, place and person. no anxiety or agitation.      Assessment:       1. Bilateral pulmonary embolism    2. Iron deficiency anemia due to chronic blood loss    3. Seizure      Plan:   H/o Acute bilateral PE with bilateral DVT  -unprovoked  -intolerant to anticoagulation secondary to recurrent GI bleeds and hence currently not on anticoagulation  -status post IVC filter placement 08/10/2020  -off all anticoagulation  -reviewed CBC, chemistries, ferritin, iron saturation  -will continue to monitor off anticoagulation    Iron deficiency anemia  -secondary to h/o GI bleed and use of blood thinners  -got 1 dose of Injectafer in July 2020 and a second dose in sept 2020  -labs cbc, cmp, ferritin, iron saturation reviewed  -he is developing iron deficiency again  -will restart IV iron Injectafer weekly x2 doses  -will check CBC, CMP, ferritin, iron saturation in 4 months    H/o CVA with seizure disorder  -diagnosed June 2020  -doing well, no focal neurological deficits  -cannot tolerate antiplatelet therapy or anticoagulation therapy  -on keppra 500 mg bid since Nov 2020    GI bleeding from AV malformations  -he got 1 dose of Injectafer 07/21/2020 and a second dose in september  -cannot tolerate anticoagulation  -will continue to monitor    Vitamin-D deficiency  -continue vitamin D3, 41369  units, once every 2 weeks    PFO  -needs PFO closure, but denied by insurance  -f/u with cardiology

## 2022-02-03 ENCOUNTER — INFUSION (OUTPATIENT)
Dept: INFUSION THERAPY | Facility: HOSPITAL | Age: 67
End: 2022-02-03
Attending: INTERNAL MEDICINE
Payer: COMMERCIAL

## 2022-02-03 VITALS
HEART RATE: 78 BPM | SYSTOLIC BLOOD PRESSURE: 151 MMHG | BODY MASS INDEX: 30.61 KG/M2 | DIASTOLIC BLOOD PRESSURE: 81 MMHG | OXYGEN SATURATION: 95 % | WEIGHT: 226 LBS | TEMPERATURE: 98 F | HEIGHT: 72 IN | RESPIRATION RATE: 18 BRPM

## 2022-02-03 DIAGNOSIS — D50.0 IRON DEFICIENCY ANEMIA DUE TO CHRONIC BLOOD LOSS: Primary | ICD-10-CM

## 2022-02-03 PROCEDURE — 25000003 PHARM REV CODE 250: Performed by: INTERNAL MEDICINE

## 2022-02-03 PROCEDURE — A4216 STERILE WATER/SALINE, 10 ML: HCPCS | Performed by: INTERNAL MEDICINE

## 2022-02-03 PROCEDURE — 63600175 PHARM REV CODE 636 W HCPCS: Mod: JG | Performed by: INTERNAL MEDICINE

## 2022-02-03 PROCEDURE — 96365 THER/PROPH/DIAG IV INF INIT: CPT

## 2022-02-03 RX ORDER — SODIUM CHLORIDE 0.9 % (FLUSH) 0.9 %
10 SYRINGE (ML) INJECTION
Status: DISCONTINUED | OUTPATIENT
Start: 2022-02-03 | End: 2022-02-03 | Stop reason: HOSPADM

## 2022-02-03 RX ADMIN — SODIUM CHLORIDE: 0.9 INJECTION, SOLUTION INTRAVENOUS at 02:02

## 2022-02-03 RX ADMIN — SODIUM CHLORIDE, PRESERVATIVE FREE 10 ML: 5 INJECTION INTRAVENOUS at 02:02

## 2022-02-03 RX ADMIN — FERRIC CARBOXYMALTOSE INJECTION 750 MG: 50 INJECTION, SOLUTION INTRAVENOUS at 02:02

## 2022-02-10 ENCOUNTER — INFUSION (OUTPATIENT)
Dept: INFUSION THERAPY | Facility: HOSPITAL | Age: 67
End: 2022-02-10
Attending: INTERNAL MEDICINE
Payer: COMMERCIAL

## 2022-02-10 VITALS
RESPIRATION RATE: 18 BRPM | OXYGEN SATURATION: 96 % | WEIGHT: 226 LBS | HEIGHT: 72 IN | HEART RATE: 76 BPM | DIASTOLIC BLOOD PRESSURE: 76 MMHG | SYSTOLIC BLOOD PRESSURE: 163 MMHG | BODY MASS INDEX: 30.61 KG/M2 | TEMPERATURE: 98 F

## 2022-02-10 DIAGNOSIS — D50.0 IRON DEFICIENCY ANEMIA DUE TO CHRONIC BLOOD LOSS: Primary | ICD-10-CM

## 2022-02-10 PROCEDURE — 63600175 PHARM REV CODE 636 W HCPCS: Mod: JG | Performed by: INTERNAL MEDICINE

## 2022-02-10 PROCEDURE — 25000003 PHARM REV CODE 250: Performed by: INTERNAL MEDICINE

## 2022-02-10 PROCEDURE — 96365 THER/PROPH/DIAG IV INF INIT: CPT

## 2022-02-10 PROCEDURE — A4216 STERILE WATER/SALINE, 10 ML: HCPCS | Performed by: INTERNAL MEDICINE

## 2022-02-10 RX ORDER — SODIUM CHLORIDE 0.9 % (FLUSH) 0.9 %
10 SYRINGE (ML) INJECTION
Status: DISCONTINUED | OUTPATIENT
Start: 2022-02-10 | End: 2022-02-10 | Stop reason: HOSPADM

## 2022-02-10 RX ADMIN — SODIUM CHLORIDE: 0.9 INJECTION, SOLUTION INTRAVENOUS at 01:02

## 2022-02-10 RX ADMIN — Medication 10 ML: at 02:02

## 2022-02-10 RX ADMIN — FERRIC CARBOXYMALTOSE INJECTION 750 MG: 50 INJECTION, SOLUTION INTRAVENOUS at 01:02

## 2022-05-24 ENCOUNTER — LAB VISIT (OUTPATIENT)
Dept: LAB | Facility: HOSPITAL | Age: 67
End: 2022-05-24
Attending: INTERNAL MEDICINE
Payer: COMMERCIAL

## 2022-05-24 DIAGNOSIS — I26.99 BILATERAL PULMONARY EMBOLISM: ICD-10-CM

## 2022-05-24 DIAGNOSIS — R56.9 SEIZURE: ICD-10-CM

## 2022-05-24 DIAGNOSIS — D50.0 IRON DEFICIENCY ANEMIA DUE TO CHRONIC BLOOD LOSS: ICD-10-CM

## 2022-05-24 LAB
ALBUMIN SERPL BCP-MCNC: 3.8 G/DL (ref 3.5–5.2)
ALP SERPL-CCNC: 82 U/L (ref 38–126)
ALT SERPL W/O P-5'-P-CCNC: 14 U/L (ref 10–44)
ANION GAP SERPL CALC-SCNC: 11 MMOL/L (ref 8–16)
AST SERPL-CCNC: 18 U/L (ref 15–46)
BASOPHILS # BLD AUTO: 0.01 K/UL (ref 0–0.2)
BASOPHILS NFR BLD: 0.2 % (ref 0–1.9)
BILIRUB SERPL-MCNC: 0.6 MG/DL (ref 0.1–1)
CALCIUM SERPL-MCNC: 8.6 MG/DL (ref 8.7–10.5)
CHLORIDE SERPL-SCNC: 106 MMOL/L (ref 95–110)
CO2 SERPL-SCNC: 23 MMOL/L (ref 23–29)
CREAT SERPL-MCNC: 1.24 MG/DL (ref 0.5–1.4)
DIFFERENTIAL METHOD: ABNORMAL
EOSINOPHIL # BLD AUTO: 0.1 K/UL (ref 0–0.5)
EOSINOPHIL NFR BLD: 2 % (ref 0–8)
ERYTHROCYTE [DISTWIDTH] IN BLOOD BY AUTOMATED COUNT: 16.6 % (ref 11.5–14.5)
EST. GFR  (AFRICAN AMERICAN): >60 ML/MIN/1.73 M^2
EST. GFR  (NON AFRICAN AMERICAN): >60 ML/MIN/1.73 M^2
FERRITIN SERPL-MCNC: 108 NG/ML (ref 20–300)
GLUCOSE SERPL-MCNC: 96 MG/DL (ref 70–110)
HCT VFR BLD AUTO: 27.4 % (ref 40–54)
HGB BLD-MCNC: 8.3 G/DL (ref 14–18)
IMM GRANULOCYTES # BLD AUTO: 0.01 K/UL (ref 0–0.04)
IMM GRANULOCYTES NFR BLD AUTO: 0.2 % (ref 0–0.5)
IRON SERPL-MCNC: 33 UG/DL (ref 45–160)
LYMPHOCYTES # BLD AUTO: 1.4 K/UL (ref 1–4.8)
LYMPHOCYTES NFR BLD: 34.3 % (ref 18–48)
MCH RBC QN AUTO: 24.3 PG (ref 27–31)
MCHC RBC AUTO-ENTMCNC: 30.3 G/DL (ref 32–36)
MCV RBC AUTO: 80 FL (ref 82–98)
MONOCYTES # BLD AUTO: 0.4 K/UL (ref 0.3–1)
MONOCYTES NFR BLD: 10 % (ref 4–15)
NEUTROPHILS # BLD AUTO: 2.2 K/UL (ref 1.8–7.7)
NEUTROPHILS NFR BLD: 53.3 % (ref 38–73)
NRBC BLD-RTO: 0 /100 WBC
PLATELET # BLD AUTO: 198 K/UL (ref 150–450)
PMV BLD AUTO: 10.8 FL (ref 9.2–12.9)
POTASSIUM SERPL-SCNC: 5.4 MMOL/L (ref 3.5–5.1)
PROT SERPL-MCNC: 6.2 G/DL (ref 6–8.4)
RBC # BLD AUTO: 3.42 M/UL (ref 4.6–6.2)
SATURATED IRON: 12 % (ref 20–50)
SODIUM SERPL-SCNC: 140 MMOL/L (ref 136–145)
TOTAL IRON BINDING CAPACITY: 284 UG/DL (ref 250–450)
TRANSFERRIN SERPL-MCNC: 192 MG/DL (ref 200–375)
UUN UR-MCNC: 11 MG/DL (ref 2–20)
WBC # BLD AUTO: 4.08 K/UL (ref 3.9–12.7)

## 2022-05-24 PROCEDURE — 85025 COMPLETE CBC W/AUTO DIFF WBC: CPT | Mod: PO | Performed by: INTERNAL MEDICINE

## 2022-05-24 PROCEDURE — 84466 ASSAY OF TRANSFERRIN: CPT | Mod: PO | Performed by: INTERNAL MEDICINE

## 2022-05-24 PROCEDURE — 82728 ASSAY OF FERRITIN: CPT | Performed by: INTERNAL MEDICINE

## 2022-05-24 PROCEDURE — 36415 COLL VENOUS BLD VENIPUNCTURE: CPT | Mod: PO | Performed by: INTERNAL MEDICINE

## 2022-05-24 PROCEDURE — 80053 COMPREHEN METABOLIC PANEL: CPT | Mod: PO | Performed by: INTERNAL MEDICINE

## 2022-05-26 ENCOUNTER — OFFICE VISIT (OUTPATIENT)
Dept: HEMATOLOGY/ONCOLOGY | Facility: CLINIC | Age: 67
End: 2022-05-26
Payer: COMMERCIAL

## 2022-05-26 VITALS
BODY MASS INDEX: 28.64 KG/M2 | DIASTOLIC BLOOD PRESSURE: 69 MMHG | WEIGHT: 211.44 LBS | OXYGEN SATURATION: 97 % | HEIGHT: 72 IN | SYSTOLIC BLOOD PRESSURE: 114 MMHG | HEART RATE: 88 BPM | TEMPERATURE: 97 F | RESPIRATION RATE: 20 BRPM

## 2022-05-26 DIAGNOSIS — R53.83 FATIGUE, UNSPECIFIED TYPE: ICD-10-CM

## 2022-05-26 DIAGNOSIS — K55.20 AV MALFORMATION OF GASTROINTESTINAL TRACT: ICD-10-CM

## 2022-05-26 DIAGNOSIS — D50.0 IRON DEFICIENCY ANEMIA DUE TO CHRONIC BLOOD LOSS: Primary | ICD-10-CM

## 2022-05-26 DIAGNOSIS — E55.9 VITAMIN D DEFICIENCY: ICD-10-CM

## 2022-05-26 DIAGNOSIS — Z86.711 HISTORY OF PULMONARY EMBOLUS (PE): ICD-10-CM

## 2022-05-26 DIAGNOSIS — Z86.73 H/O: CVA (CEREBROVASCULAR ACCIDENT): ICD-10-CM

## 2022-05-26 DIAGNOSIS — R06.09 DYSPNEA ON EXERTION: ICD-10-CM

## 2022-05-26 PROCEDURE — 99215 OFFICE O/P EST HI 40 MIN: CPT | Mod: S$GLB,,, | Performed by: NURSE PRACTITIONER

## 2022-05-26 PROCEDURE — 3078F DIAST BP <80 MM HG: CPT | Mod: CPTII,S$GLB,, | Performed by: NURSE PRACTITIONER

## 2022-05-26 PROCEDURE — 1101F PT FALLS ASSESS-DOCD LE1/YR: CPT | Mod: CPTII,S$GLB,, | Performed by: NURSE PRACTITIONER

## 2022-05-26 PROCEDURE — 3288F FALL RISK ASSESSMENT DOCD: CPT | Mod: CPTII,S$GLB,, | Performed by: NURSE PRACTITIONER

## 2022-05-26 PROCEDURE — 3074F SYST BP LT 130 MM HG: CPT | Mod: CPTII,S$GLB,, | Performed by: NURSE PRACTITIONER

## 2022-05-26 PROCEDURE — 3288F PR FALLS RISK ASSESSMENT DOCUMENTED: ICD-10-PCS | Mod: CPTII,S$GLB,, | Performed by: NURSE PRACTITIONER

## 2022-05-26 PROCEDURE — 1159F MED LIST DOCD IN RCRD: CPT | Mod: CPTII,S$GLB,, | Performed by: NURSE PRACTITIONER

## 2022-05-26 PROCEDURE — 99215 PR OFFICE/OUTPT VISIT, EST, LEVL V, 40-54 MIN: ICD-10-PCS | Mod: S$GLB,,, | Performed by: NURSE PRACTITIONER

## 2022-05-26 PROCEDURE — 3008F PR BODY MASS INDEX (BMI) DOCUMENTED: ICD-10-PCS | Mod: CPTII,S$GLB,, | Performed by: NURSE PRACTITIONER

## 2022-05-26 PROCEDURE — 1159F PR MEDICATION LIST DOCUMENTED IN MEDICAL RECORD: ICD-10-PCS | Mod: CPTII,S$GLB,, | Performed by: NURSE PRACTITIONER

## 2022-05-26 PROCEDURE — 1160F RVW MEDS BY RX/DR IN RCRD: CPT | Mod: CPTII,S$GLB,, | Performed by: NURSE PRACTITIONER

## 2022-05-26 PROCEDURE — 1101F PR PT FALLS ASSESS DOC 0-1 FALLS W/OUT INJ PAST YR: ICD-10-PCS | Mod: CPTII,S$GLB,, | Performed by: NURSE PRACTITIONER

## 2022-05-26 PROCEDURE — 99999 PR PBB SHADOW E&M-EST. PATIENT-LVL V: ICD-10-PCS | Mod: PBBFAC,,, | Performed by: NURSE PRACTITIONER

## 2022-05-26 PROCEDURE — 3008F BODY MASS INDEX DOCD: CPT | Mod: CPTII,S$GLB,, | Performed by: NURSE PRACTITIONER

## 2022-05-26 PROCEDURE — 3078F PR MOST RECENT DIASTOLIC BLOOD PRESSURE < 80 MM HG: ICD-10-PCS | Mod: CPTII,S$GLB,, | Performed by: NURSE PRACTITIONER

## 2022-05-26 PROCEDURE — 3074F PR MOST RECENT SYSTOLIC BLOOD PRESSURE < 130 MM HG: ICD-10-PCS | Mod: CPTII,S$GLB,, | Performed by: NURSE PRACTITIONER

## 2022-05-26 PROCEDURE — 1160F PR REVIEW ALL MEDS BY PRESCRIBER/CLIN PHARMACIST DOCUMENTED: ICD-10-PCS | Mod: CPTII,S$GLB,, | Performed by: NURSE PRACTITIONER

## 2022-05-26 PROCEDURE — 99999 PR PBB SHADOW E&M-EST. PATIENT-LVL V: CPT | Mod: PBBFAC,,, | Performed by: NURSE PRACTITIONER

## 2022-05-26 RX ORDER — SODIUM CHLORIDE 0.9 % (FLUSH) 0.9 %
10 SYRINGE (ML) INJECTION
Status: CANCELLED | OUTPATIENT
Start: 2022-05-31

## 2022-05-26 RX ORDER — HEPARIN 100 UNIT/ML
5 SYRINGE INTRAVENOUS
Status: CANCELLED | OUTPATIENT
Start: 2022-05-31

## 2022-05-26 RX ORDER — SODIUM CHLORIDE 9 MG/ML
INJECTION, SOLUTION INTRAVENOUS CONTINUOUS
Status: CANCELLED | OUTPATIENT
Start: 2022-05-31

## 2022-05-26 NOTE — PROGRESS NOTES
PATIENT: Miky Esqueda  MRN: [id number]  DATE: 5/26/2022    Diagnosis:   1. Iron deficiency anemia due to chronic blood loss    2. History of pulmonary embolus (PE)    3. Fatigue, unspecified type    4. Dyspnea on exertion    5. AV malformation of gastrointestinal tract    6. H/O: CVA (cerebrovascular accident)    7. Vitamin D deficiency      Chief Complaint: PE, iron def anemia    Subjective:     History of Present Illness:    As per Dr Gonzales's previous note:   He presented to the ER 05/25/2020 with acute onset of shortness of breath, diaphoresis and lightheadedness.  CT chest showed extensive bilateral pulmonary thromboemboli.  No evidence of central saddle embolus.  There was moderate pulmonary emphysema.  Lower extremity ultrasound also revealed nonocclusive thrombus in the right and left popliteal veins as well as the right femoral vein.    This is his 1st episode of pulmonary embolism/DVT.  He was started on Xarelto.    Then he had a syncopal episode in his PCP's office on 06/12/2020 which lasted about 30 sec followed by brief 15 sec seizure like episode.  He was taken to the ER where a CT head showed a hypodense area in the left parietal lobe concerning for infarct.  By this time he also developed significant anemia and was transfused 2 units packed red blood cells.    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    He got 1 dose of Injectafer 07/21/2020 and a second dose 9/4/2020    He presented to the ER 8/7/2020 with severe anemia Hb 4.6 - passed out in his primary care physician's office. This was the second episode of severe symptomatic anemia after being started on Xarelto.    He has past h/o extensive DVT/PE in may 2020, CVA in June 2020 and h/o jejunal AVM. No active source of bleeding could be found from extensive GI work-up  2020.  -severe anemia felt likely related to intermittent bleeding from angioectasia(s) vs a Dieulafoy lesion.  -he underwent IVC filter placement 08/10/2020  and his Xarelto was discontinued    INTERVAL HISTORY:   -Pt endorses sob when walking around and active, stops as soon as rest last few weeks, states it is mild but does occur nearly everyday.   -He denies dizziness, lightheadedness, near syncope or syncope, pounding in ears, chest pain, abdominal pain, n/v/d, black or bloody stools, hematuria, extremity swelling, cold intolerance or ice cravings, pallor, fever, chills. No gum or oral bleeding, no nosebleeds.   -Had Injectafer 2 doses 2/2022  -He is on baby asa and plavix now, states he has been on these for awhile again and has not noted any bleeding issues with this regimen at this time.  -Has not seen GI since 2020  -takes Vit D3 every other week  -needs PFO closure  -    Past Medical, Surgical, Family and Social History Reviewed.    Medications and Allergies reviewed      Review of Systems   Constitutional: Positive for fatigue (intermittent). Negative for fever and unexpected weight change.   HENT: Negative for mouth sores and nosebleeds.    Eyes: Negative for visual disturbance.   Respiratory: Positive for shortness of breath. Negative for chest tightness.    Cardiovascular: Negative for chest pain, palpitations and leg swelling.   Gastrointestinal: Negative for abdominal pain, anal bleeding, blood in stool, diarrhea, nausea and vomiting.   Endocrine: Negative for cold intolerance.   Genitourinary: Negative for hematuria.   Musculoskeletal: Negative for back pain.   Neurological: Negative for dizziness, seizures, syncope, weakness, light-headedness and headaches.   Hematological: Negative for adenopathy. Does not bruise/bleed easily.   Psychiatric/Behavioral: Negative for confusion and decreased concentration.     Objective:     Vitals:    05/26/22 1438   BP: 114/69   BP Location: Left arm   Patient Position: Sitting   BP Method: Large (Automatic)   Pulse: 88   Resp: 20   Temp: 97.4 °F (36.3 °C)   TempSrc: Oral   SpO2: 97%   Weight: 95.9 kg (211 lb 6.7 oz)    Height: 6' (1.829 m)     Body mass index is 28.67 kg/m².    General appearance: no acute distress. Conversant  HEENT: Normocephalic. Moist mucus membranes. No pallor.  Eyes: + bilat conjunctival pallor. No scleral icterus.  Lungs: Easy unlabored respirations. Clear to auscultation. no rales.  Heart: rhythm regular. no gallops. no edema.  Abdomen: Soft, NT, nondistended.  Extremities: No swelling, nontender.  Neurologic/Psychiatric: alert. oriented to time, place and person. Steady gait. no anxiety or agitation.       Assessment:       1. Iron deficiency anemia due to chronic blood loss    2. History of pulmonary embolus (PE)    3. Fatigue, unspecified type    4. Dyspnea on exertion    5. AV malformation of gastrointestinal tract    6. H/O: CVA (cerebrovascular accident)    7. Vitamin D deficiency      Plan:       Iron deficiency anemia  -secondary to h/o GI bleed and use of blood thinners  -got 1 dose of Injectafer in July 2020 and a second dose in sept 2020, 2 doses Feb 2022  -labs cbc, cmp, ferritin, iron saturation reviewed  -he is developing iron deficiency again, symptomatic with sob and intermittent fatigue  -will restart IV iron Injectafer weekly x2 doses  -will check CBC, CMP, ferritin, iron saturation in 2 months    H/o Acute bilateral PE with bilateral DVT  -unprovoked  -intolerant to anticoagulation secondary to recurrent GI bleeds in past, now on daily baby ASA and plavix  -status post IVC filter placement 08/10/2020  -reviewed CBC, chemistries, ferritin, iron saturation    H/o CVA with seizure disorder  -diagnosed June 2020  -doing well, no focal neurological deficits  -cannot tolerate antiplatelet therapy or anticoagulation therapy  -on keppra 500 mg bid since Nov 2020    GI bleeding from AV malformations History  -last seen by GI in 2020, will refer for follow up   -will continue to monitor    Vitamin-D deficiency  -continue vitamin D3, 80787 units, once every 2 weeks    PFO  -needs PFO closure,  but denied by insurance  -f/u with cardiology        Reviewed case with Dr Gonzales who agrees pt developing SUBHASH again, concern for recurrent gi blood loss given history, and feels pt would benefit from Injectafer and needs referral back to GI. Pt additionally in agreement.           Anastacia Davenport, NP-C  [facility]  Hematology/Oncology  [address]  [place]  [phone]

## 2022-05-27 ENCOUNTER — TELEPHONE (OUTPATIENT)
Dept: ADMINISTRATIVE | Facility: OTHER | Age: 67
End: 2022-05-27

## 2022-05-27 ENCOUNTER — TELEPHONE (OUTPATIENT)
Dept: HEMATOLOGY/ONCOLOGY | Facility: CLINIC | Age: 67
End: 2022-05-27

## 2022-05-27 PROBLEM — R06.09 DYSPNEA ON EXERTION: Status: ACTIVE | Noted: 2022-05-27

## 2022-05-27 PROBLEM — R53.83 FATIGUE: Status: ACTIVE | Noted: 2022-05-27

## 2022-05-27 RX ORDER — HEPARIN 100 UNIT/ML
500 SYRINGE INTRAVENOUS
Status: CANCELLED | OUTPATIENT
Start: 2022-06-25

## 2022-05-27 RX ORDER — SODIUM CHLORIDE 0.9 % (FLUSH) 0.9 %
10 SYRINGE (ML) INJECTION
Status: CANCELLED | OUTPATIENT
Start: 2022-06-25

## 2022-05-27 RX ORDER — HEPARIN 100 UNIT/ML
500 SYRINGE INTRAVENOUS
Status: CANCELLED | OUTPATIENT
Start: 2022-06-18

## 2022-05-27 RX ORDER — SODIUM CHLORIDE 0.9 % (FLUSH) 0.9 %
10 SYRINGE (ML) INJECTION
Status: CANCELLED | OUTPATIENT
Start: 2022-06-01

## 2022-05-27 RX ORDER — SODIUM CHLORIDE 0.9 % (FLUSH) 0.9 %
10 SYRINGE (ML) INJECTION
Status: CANCELLED | OUTPATIENT
Start: 2022-06-18

## 2022-05-27 RX ORDER — HEPARIN 100 UNIT/ML
500 SYRINGE INTRAVENOUS
Status: CANCELLED | OUTPATIENT
Start: 2022-06-11

## 2022-05-27 RX ORDER — HEPARIN 100 UNIT/ML
500 SYRINGE INTRAVENOUS
Status: CANCELLED | OUTPATIENT
Start: 2022-06-01

## 2022-05-27 RX ORDER — SODIUM CHLORIDE 0.9 % (FLUSH) 0.9 %
10 SYRINGE (ML) INJECTION
Status: CANCELLED | OUTPATIENT
Start: 2022-06-11

## 2022-05-27 NOTE — TELEPHONE ENCOUNTER
Spoke with wife, Priscilla, explained reason for GI referral and verbalizes understanding and will move forward with GI appt scheduling.

## 2022-06-10 ENCOUNTER — INFUSION (OUTPATIENT)
Dept: INFUSION THERAPY | Facility: HOSPITAL | Age: 67
End: 2022-06-10
Attending: INTERNAL MEDICINE
Payer: COMMERCIAL

## 2022-06-10 VITALS
TEMPERATURE: 97 F | HEIGHT: 72 IN | OXYGEN SATURATION: 99 % | WEIGHT: 211.44 LBS | BODY MASS INDEX: 28.64 KG/M2 | RESPIRATION RATE: 20 BRPM | HEART RATE: 73 BPM | SYSTOLIC BLOOD PRESSURE: 119 MMHG | DIASTOLIC BLOOD PRESSURE: 62 MMHG

## 2022-06-10 DIAGNOSIS — R53.83 FATIGUE, UNSPECIFIED TYPE: ICD-10-CM

## 2022-06-10 DIAGNOSIS — D50.0 IRON DEFICIENCY ANEMIA DUE TO CHRONIC BLOOD LOSS: ICD-10-CM

## 2022-06-10 DIAGNOSIS — R06.09 DYSPNEA ON EXERTION: Primary | ICD-10-CM

## 2022-06-10 DIAGNOSIS — D64.9 ANEMIA, UNSPECIFIED TYPE: ICD-10-CM

## 2022-06-10 DIAGNOSIS — D64.9 SYMPTOMATIC ANEMIA: ICD-10-CM

## 2022-06-10 PROCEDURE — 25000003 PHARM REV CODE 250: Performed by: NURSE PRACTITIONER

## 2022-06-10 PROCEDURE — A4216 STERILE WATER/SALINE, 10 ML: HCPCS | Performed by: NURSE PRACTITIONER

## 2022-06-10 PROCEDURE — 63600175 PHARM REV CODE 636 W HCPCS: Performed by: NURSE PRACTITIONER

## 2022-06-10 PROCEDURE — 96365 THER/PROPH/DIAG IV INF INIT: CPT

## 2022-06-10 RX ORDER — SODIUM CHLORIDE 0.9 % (FLUSH) 0.9 %
10 SYRINGE (ML) INJECTION
Status: DISCONTINUED | OUTPATIENT
Start: 2022-06-10 | End: 2022-06-10 | Stop reason: HOSPADM

## 2022-06-10 RX ORDER — HEPARIN 100 UNIT/ML
500 SYRINGE INTRAVENOUS
Status: DISCONTINUED | OUTPATIENT
Start: 2022-06-10 | End: 2022-06-10 | Stop reason: HOSPADM

## 2022-06-10 RX ADMIN — SODIUM CHLORIDE: 0.9 INJECTION, SOLUTION INTRAVENOUS at 02:06

## 2022-06-10 RX ADMIN — IRON SUCROSE 200 MG: 20 INJECTION, SOLUTION INTRAVENOUS at 02:06

## 2022-06-10 RX ADMIN — Medication 10 ML: at 03:06

## 2022-06-10 NOTE — NURSING
Pt tolerated infusion well.  No signs of extravasation noted.  Site remains free of discoloration, pt denies any pain.  Pt updated on plan of care.

## 2022-06-17 ENCOUNTER — INFUSION (OUTPATIENT)
Dept: INFUSION THERAPY | Facility: HOSPITAL | Age: 67
End: 2022-06-17
Attending: INTERNAL MEDICINE
Payer: COMMERCIAL

## 2022-06-17 VITALS
OXYGEN SATURATION: 96 % | SYSTOLIC BLOOD PRESSURE: 117 MMHG | TEMPERATURE: 98 F | HEART RATE: 78 BPM | BODY MASS INDEX: 28.64 KG/M2 | RESPIRATION RATE: 18 BRPM | HEIGHT: 72 IN | DIASTOLIC BLOOD PRESSURE: 58 MMHG | WEIGHT: 211.44 LBS

## 2022-06-17 DIAGNOSIS — D64.9 ANEMIA, UNSPECIFIED TYPE: ICD-10-CM

## 2022-06-17 DIAGNOSIS — D50.0 IRON DEFICIENCY ANEMIA DUE TO CHRONIC BLOOD LOSS: ICD-10-CM

## 2022-06-17 DIAGNOSIS — D64.9 SYMPTOMATIC ANEMIA: ICD-10-CM

## 2022-06-17 DIAGNOSIS — R53.83 FATIGUE, UNSPECIFIED TYPE: ICD-10-CM

## 2022-06-17 DIAGNOSIS — R06.09 DYSPNEA ON EXERTION: Primary | ICD-10-CM

## 2022-06-17 PROCEDURE — 25000003 PHARM REV CODE 250: Performed by: NURSE PRACTITIONER

## 2022-06-17 PROCEDURE — 96365 THER/PROPH/DIAG IV INF INIT: CPT

## 2022-06-17 PROCEDURE — 63600175 PHARM REV CODE 636 W HCPCS: Performed by: NURSE PRACTITIONER

## 2022-06-17 PROCEDURE — A4216 STERILE WATER/SALINE, 10 ML: HCPCS | Performed by: NURSE PRACTITIONER

## 2022-06-17 RX ORDER — SODIUM CHLORIDE 0.9 % (FLUSH) 0.9 %
10 SYRINGE (ML) INJECTION
Status: DISCONTINUED | OUTPATIENT
Start: 2022-06-17 | End: 2022-06-17 | Stop reason: HOSPADM

## 2022-06-17 RX ADMIN — Medication 10 ML: at 03:06

## 2022-06-17 RX ADMIN — SODIUM CHLORIDE: 9 INJECTION, SOLUTION INTRAVENOUS at 01:06

## 2022-06-17 RX ADMIN — IRON SUCROSE 200 MG: 20 INJECTION, SOLUTION INTRAVENOUS at 01:06

## 2022-06-17 NOTE — NURSING
Pt tolerated venofer infusion 2 of 4  well.  No adverse reaction noted.   No complaints at this time.IV flushed with NS and D/C per protocol.  Patient left clinic in no acute distress.

## 2022-06-24 ENCOUNTER — INFUSION (OUTPATIENT)
Dept: INFUSION THERAPY | Facility: HOSPITAL | Age: 67
End: 2022-06-24
Attending: INTERNAL MEDICINE
Payer: COMMERCIAL

## 2022-06-24 VITALS
HEIGHT: 72 IN | OXYGEN SATURATION: 95 % | WEIGHT: 211.44 LBS | SYSTOLIC BLOOD PRESSURE: 116 MMHG | RESPIRATION RATE: 18 BRPM | DIASTOLIC BLOOD PRESSURE: 56 MMHG | TEMPERATURE: 99 F | BODY MASS INDEX: 28.64 KG/M2 | HEART RATE: 80 BPM

## 2022-06-24 DIAGNOSIS — D50.0 IRON DEFICIENCY ANEMIA DUE TO CHRONIC BLOOD LOSS: ICD-10-CM

## 2022-06-24 DIAGNOSIS — D64.9 ANEMIA, UNSPECIFIED TYPE: ICD-10-CM

## 2022-06-24 DIAGNOSIS — D64.9 SYMPTOMATIC ANEMIA: ICD-10-CM

## 2022-06-24 DIAGNOSIS — R53.83 FATIGUE, UNSPECIFIED TYPE: ICD-10-CM

## 2022-06-24 DIAGNOSIS — R06.09 DYSPNEA ON EXERTION: Primary | ICD-10-CM

## 2022-06-24 PROCEDURE — 96365 THER/PROPH/DIAG IV INF INIT: CPT

## 2022-06-24 PROCEDURE — A4216 STERILE WATER/SALINE, 10 ML: HCPCS | Performed by: NURSE PRACTITIONER

## 2022-06-24 PROCEDURE — 63600175 PHARM REV CODE 636 W HCPCS: Performed by: NURSE PRACTITIONER

## 2022-06-24 PROCEDURE — 25000003 PHARM REV CODE 250: Performed by: NURSE PRACTITIONER

## 2022-06-24 RX ORDER — SODIUM CHLORIDE 0.9 % (FLUSH) 0.9 %
10 SYRINGE (ML) INJECTION
Status: DISCONTINUED | OUTPATIENT
Start: 2022-06-24 | End: 2022-06-24 | Stop reason: HOSPADM

## 2022-06-24 RX ADMIN — Medication 10 ML: at 02:06

## 2022-06-24 RX ADMIN — IRON SUCROSE 200 MG: 20 INJECTION, SOLUTION INTRAVENOUS at 01:06

## 2022-06-24 RX ADMIN — SODIUM CHLORIDE: 0.9 INJECTION, SOLUTION INTRAVENOUS at 01:06

## 2022-06-24 NOTE — NURSING
Pt received IV Venofer infusion dose 3/4. Pt tolerated it well. AVS given. Next appointment scheduled. Discharged with no acute distress.

## 2022-07-01 ENCOUNTER — INFUSION (OUTPATIENT)
Dept: INFUSION THERAPY | Facility: HOSPITAL | Age: 67
End: 2022-07-01
Attending: INTERNAL MEDICINE
Payer: COMMERCIAL

## 2022-07-01 VITALS
HEART RATE: 75 BPM | TEMPERATURE: 99 F | SYSTOLIC BLOOD PRESSURE: 116 MMHG | DIASTOLIC BLOOD PRESSURE: 65 MMHG | HEIGHT: 72 IN | OXYGEN SATURATION: 97 % | BODY MASS INDEX: 28.58 KG/M2 | RESPIRATION RATE: 18 BRPM | WEIGHT: 211 LBS

## 2022-07-01 DIAGNOSIS — R06.09 DYSPNEA ON EXERTION: Primary | ICD-10-CM

## 2022-07-01 DIAGNOSIS — D50.0 IRON DEFICIENCY ANEMIA DUE TO CHRONIC BLOOD LOSS: ICD-10-CM

## 2022-07-01 DIAGNOSIS — R53.83 FATIGUE, UNSPECIFIED TYPE: ICD-10-CM

## 2022-07-01 DIAGNOSIS — D64.9 SYMPTOMATIC ANEMIA: ICD-10-CM

## 2022-07-01 DIAGNOSIS — D64.9 ANEMIA, UNSPECIFIED TYPE: ICD-10-CM

## 2022-07-01 PROCEDURE — 63600175 PHARM REV CODE 636 W HCPCS: Performed by: NURSE PRACTITIONER

## 2022-07-01 PROCEDURE — 25000003 PHARM REV CODE 250: Performed by: NURSE PRACTITIONER

## 2022-07-01 PROCEDURE — 96365 THER/PROPH/DIAG IV INF INIT: CPT

## 2022-07-01 RX ADMIN — IRON SUCROSE 200 MG: 20 INJECTION, SOLUTION INTRAVENOUS at 01:07

## 2022-07-01 RX ADMIN — SODIUM CHLORIDE: 9 INJECTION, SOLUTION INTRAVENOUS at 01:07

## 2022-07-01 NOTE — NURSING
Pt tolerated Venofer infusion 4 of 4 well.  No adverse reaction noted. IV flushed with NS and D/C per protocol. Patient left clinic in no acute distress.

## 2022-07-20 NOTE — PROGRESS NOTES
PATIENT: Miky Esqueda  MRN: [id number]  DATE: 7/21/2022    Diagnosis:   1. Iron deficiency anemia due to chronic blood loss    2. AV malformation of gastrointestinal tract    3. History of pulmonary embolus (PE)    4. History of DVT in adulthood    5. H/O: CVA (cerebrovascular accident)    6. Seizure    7. Vitamin D deficiency    8. PFO (patent foramen ovale)      Chief Complaint: PE, iron def anemia    Subjective:     History of Present Illness:    As per Dr Gonzales's previous note:   He presented to the ER 05/25/2020 with acute onset of shortness of breath, diaphoresis and lightheadedness.  CT chest showed extensive bilateral pulmonary thromboemboli.  No evidence of central saddle embolus.  There was moderate pulmonary emphysema.  Lower extremity ultrasound also revealed nonocclusive thrombus in the right and left popliteal veins as well as the right femoral vein.    This is his 1st episode of pulmonary embolism/DVT.  He was started on Xarelto.    Then he had a syncopal episode in his PCP's office on 06/12/2020 which lasted about 30 sec followed by brief 15 sec seizure like episode.  He was taken to the ER where a CT head showed a hypodense area in the left parietal lobe concerning for infarct.  By this time he also developed significant anemia and was transfused 2 units packed red blood cells.    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    He got 1 dose of Injectafer 07/21/2020 and a second dose 9/4/2020    He presented to the ER 8/7/2020 with severe anemia Hb 4.6 - passed out in his primary care physician's office. This was the second episode of severe symptomatic anemia after being started on Xarelto.    He has past h/o extensive DVT/PE in may 2020, CVA in June 2020 and h/o jejunal AVM. No active source of bleeding could be found from extensive GI work-up  2020.  -severe anemia felt likely related to intermittent bleeding from angioectasia(s) vs a Dieulafoy lesion.  -he underwent IVC filter  placement 08/10/2020 and his Xarelto was discontinued    INTERVAL HISTORY:   -Pt endorses sob when walking around and active, stops as soon as rest last few weeks, states it is mild but does occur nearly everyday.   -He denies dizziness, lightheadedness, near syncope or syncope, pounding in ears, chest pain, abdominal pain, n/v/d, black or bloody stools, hematuria, extremity swelling, cold intolerance or ice cravings, pallor, fever, chills. No gum or oral bleeding, no nosebleeds.   -Had Injectafer 2 doses 2/2022  -He is on baby asa and plavix now, states he has been on these for awhile again and has not noted any bleeding issues with this regimen at this time.  -Has not seen GI since 2020  -takes Vit D3 every other week  -needs PFO closure  -received venofer 6/10/22, 6/17/22, 6/24/22, 7/1/22  - Pt endorses occasional shortness of breath lasting a few seconds if he is lifting on furniture or other heavy things otherwise he states he feels fine. Denies chest pain, palpitations, headaches, dizziness or lightheadedness, hemoptysis, hematemesis, melena, hematuria, painful or swollen joints, weight loss, night sweats.   - Seen by GI as well today. Dr Go spoke with pt about Sandastin    Past Medical, Surgical, Family and Social History Reviewed.    Medications and Allergies reviewed      Review of Systems   Constitutional: Negative for fatigue, fever and unexpected weight change.   HENT: Negative for mouth sores and nosebleeds.    Eyes: Negative for visual disturbance.   Respiratory: Positive for shortness of breath (mild and ocassional with heavy lifting). Negative for chest tightness.    Cardiovascular: Negative for chest pain, palpitations and leg swelling.   Gastrointestinal: Negative for abdominal pain, anal bleeding, blood in stool, diarrhea, nausea and vomiting.   Endocrine: Negative for cold intolerance.   Genitourinary: Negative for hematuria.   Musculoskeletal: Negative for back pain.   Neurological: Negative  for dizziness, seizures, syncope, weakness, light-headedness and headaches.   Hematological: Negative for adenopathy. Does not bruise/bleed easily.   Psychiatric/Behavioral: Negative for confusion and decreased concentration.     Objective:     Lab Results   Component Value Date    WBC 3.94 07/21/2022    HGB 10.8 (L) 07/21/2022    HCT 34.2 (L) 07/21/2022    MCV 77 (L) 07/21/2022     07/21/2022       Lab Results   Component Value Date    IRON 33 (L) 05/24/2022    TIBC 284 05/24/2022    FERRITIN 115 07/21/2022                 Vitals:    07/21/22 1347   BP: 115/68   Pulse: 79   Temp: 98.6 °F (37 °C)   SpO2: (!) 94%   Weight: 95.6 kg (210 lb 12.2 oz)     Body mass index is 28.58 kg/m².    General appearance: no acute distress. Conversant  HEENT: Normocephalic. Moist mucus membranes. No pallor.  Eyes:  Negative bilat conjunctival pallor. No scleral icterus.  Lungs: Easy unlabored respirations. Clear to auscultation bilat. no rales.  Heart: rhythm regular. no gallops. no edema.  Abdomen: Soft, NT, nondistended.  Extremities: No swelling, nontender.  Neurologic/Psychiatric: alert. oriented to time, place and person. Steady gait. no anxiety or agitation.       Assessment:       1. Iron deficiency anemia due to chronic blood loss    2. AV malformation of gastrointestinal tract    3. History of pulmonary embolus (PE)    4. History of DVT in adulthood    5. H/O: CVA (cerebrovascular accident)    6. Seizure    7. Vitamin D deficiency    8. PFO (patent foramen ovale)      Plan:       Iron deficiency anemia  -secondary to h/o GI bleed and use of blood thinners  -received 1 dose of Injectafer in July 2020 and a second dose in sept 2020, 2 doses Feb 2022  -received venofer 6/10/22, 6/17/22, 6/24/22, 7/1/22  - Pt states he is feeling well except mild sob with heavy lifting and this is occasional. No evidence active bleeds at home he reports.    -labs (7/21/22) cbc, ferritin reviewed, iron saturation and tibc pending at visit  time H/H 10.8/34.2 and ferritin is normal at 115. Will call with iron/tibc results.  - Saw GI today who recommended Sandastin which pt is unsure if he wants to take and will discuss he states with Dr Espinal.  - RTC 4 months with repeat cbc, ferritin, iron/tibc    H/o Acute bilateral PE with bilateral DVT  -unprovoked  -intolerant to anticoagulation secondary to recurrent GI bleeds in past, now on daily baby ASA and plavix  -status post IVC filter placement 08/10/2020  -reviewed CBC, ferritin, pending iron saturation    H/o CVA with seizure disorder  -diagnosed June 2020  -doing well, no focal neurological deficits, no seizures, stable  -cannot tolerate antiplatelet therapy or anticoagulation therapy  -on keppra 500 mg bid since Nov 2020, tolerating well    GI bleeding from AV malformations History  -last seen by GI today, 7/21/22  -will continue to monitor     Vitamin-D deficiency  - Vit D level normal 1/26/22, will repeat with next labs, stable  -continue vitamin D3, 72933 units, once every 2 weeks    PFO  -needs PFO closure, but denied by insurance  -management deferred to cardiology          Anastacia Davenport, ERON-C  [facility]  Hematology/Oncology  [address]  [place]  [phone]

## 2022-07-21 ENCOUNTER — LAB VISIT (OUTPATIENT)
Dept: LAB | Facility: HOSPITAL | Age: 67
End: 2022-07-21
Attending: NURSE PRACTITIONER
Payer: COMMERCIAL

## 2022-07-21 ENCOUNTER — OFFICE VISIT (OUTPATIENT)
Dept: GASTROENTEROLOGY | Facility: CLINIC | Age: 67
End: 2022-07-21
Payer: COMMERCIAL

## 2022-07-21 ENCOUNTER — TELEPHONE (OUTPATIENT)
Dept: HEMATOLOGY/ONCOLOGY | Facility: CLINIC | Age: 67
End: 2022-07-21

## 2022-07-21 ENCOUNTER — OFFICE VISIT (OUTPATIENT)
Dept: HEMATOLOGY/ONCOLOGY | Facility: CLINIC | Age: 67
End: 2022-07-21
Payer: COMMERCIAL

## 2022-07-21 VITALS
BODY MASS INDEX: 28.58 KG/M2 | OXYGEN SATURATION: 94 % | WEIGHT: 210.75 LBS | HEART RATE: 79 BPM | SYSTOLIC BLOOD PRESSURE: 115 MMHG | DIASTOLIC BLOOD PRESSURE: 68 MMHG | TEMPERATURE: 99 F

## 2022-07-21 VITALS — WEIGHT: 209.19 LBS | BODY MASS INDEX: 28.33 KG/M2 | HEIGHT: 72 IN

## 2022-07-21 DIAGNOSIS — D50.0 IRON DEFICIENCY ANEMIA DUE TO CHRONIC BLOOD LOSS: Primary | ICD-10-CM

## 2022-07-21 DIAGNOSIS — R53.83 FATIGUE, UNSPECIFIED TYPE: ICD-10-CM

## 2022-07-21 DIAGNOSIS — D50.0 IRON DEFICIENCY ANEMIA DUE TO CHRONIC BLOOD LOSS: ICD-10-CM

## 2022-07-21 DIAGNOSIS — E55.9 VITAMIN D DEFICIENCY: ICD-10-CM

## 2022-07-21 DIAGNOSIS — K55.20 AV MALFORMATION OF GASTROINTESTINAL TRACT: ICD-10-CM

## 2022-07-21 DIAGNOSIS — Z86.73 H/O: CVA (CEREBROVASCULAR ACCIDENT): ICD-10-CM

## 2022-07-21 DIAGNOSIS — R56.9 SEIZURE: ICD-10-CM

## 2022-07-21 DIAGNOSIS — Z86.711 HISTORY OF PULMONARY EMBOLUS (PE): ICD-10-CM

## 2022-07-21 DIAGNOSIS — Q21.12 PFO (PATENT FORAMEN OVALE): ICD-10-CM

## 2022-07-21 DIAGNOSIS — R06.09 DYSPNEA ON EXERTION: ICD-10-CM

## 2022-07-21 DIAGNOSIS — Z86.718 HISTORY OF DVT IN ADULTHOOD: ICD-10-CM

## 2022-07-21 LAB
BASOPHILS # BLD AUTO: 0.02 K/UL (ref 0–0.2)
BASOPHILS NFR BLD: 0.5 % (ref 0–1.9)
DIFFERENTIAL METHOD: ABNORMAL
EOSINOPHIL # BLD AUTO: 0.1 K/UL (ref 0–0.5)
EOSINOPHIL NFR BLD: 1.5 % (ref 0–8)
ERYTHROCYTE [DISTWIDTH] IN BLOOD BY AUTOMATED COUNT: 15.7 % (ref 11.5–14.5)
FERRITIN SERPL-MCNC: 115 NG/ML (ref 20–300)
HCT VFR BLD AUTO: 34.2 % (ref 40–54)
HGB BLD-MCNC: 10.8 G/DL (ref 14–18)
IMM GRANULOCYTES # BLD AUTO: 0 K/UL (ref 0–0.04)
IMM GRANULOCYTES NFR BLD AUTO: 0 % (ref 0–0.5)
IRON SERPL-MCNC: 35 UG/DL (ref 45–160)
LYMPHOCYTES # BLD AUTO: 1.5 K/UL (ref 1–4.8)
LYMPHOCYTES NFR BLD: 39.1 % (ref 18–48)
MCH RBC QN AUTO: 24.3 PG (ref 27–31)
MCHC RBC AUTO-ENTMCNC: 31.6 G/DL (ref 32–36)
MCV RBC AUTO: 77 FL (ref 82–98)
MONOCYTES # BLD AUTO: 0.4 K/UL (ref 0.3–1)
MONOCYTES NFR BLD: 10.9 % (ref 4–15)
NEUTROPHILS # BLD AUTO: 1.9 K/UL (ref 1.8–7.7)
NEUTROPHILS NFR BLD: 48 % (ref 38–73)
NRBC BLD-RTO: 0 /100 WBC
PLATELET # BLD AUTO: 182 K/UL (ref 150–450)
PMV BLD AUTO: 10.4 FL (ref 9.2–12.9)
RBC # BLD AUTO: 4.44 M/UL (ref 4.6–6.2)
SATURATED IRON: 12 % (ref 20–50)
TOTAL IRON BINDING CAPACITY: 295 UG/DL (ref 250–450)
TRANSFERRIN SERPL-MCNC: 199 MG/DL (ref 200–375)
WBC # BLD AUTO: 3.94 K/UL (ref 3.9–12.7)

## 2022-07-21 PROCEDURE — 99999 PR PBB SHADOW E&M-EST. PATIENT-LVL IV: ICD-10-PCS | Mod: PBBFAC,,, | Performed by: NURSE PRACTITIONER

## 2022-07-21 PROCEDURE — 1101F PT FALLS ASSESS-DOCD LE1/YR: CPT | Mod: CPTII,S$GLB,, | Performed by: INTERNAL MEDICINE

## 2022-07-21 PROCEDURE — 99215 OFFICE O/P EST HI 40 MIN: CPT | Mod: S$GLB,,, | Performed by: NURSE PRACTITIONER

## 2022-07-21 PROCEDURE — 3008F PR BODY MASS INDEX (BMI) DOCUMENTED: ICD-10-PCS | Mod: CPTII,S$GLB,, | Performed by: INTERNAL MEDICINE

## 2022-07-21 PROCEDURE — 82728 ASSAY OF FERRITIN: CPT | Performed by: NURSE PRACTITIONER

## 2022-07-21 PROCEDURE — 3074F PR MOST RECENT SYSTOLIC BLOOD PRESSURE < 130 MM HG: ICD-10-PCS | Mod: CPTII,S$GLB,, | Performed by: NURSE PRACTITIONER

## 2022-07-21 PROCEDURE — 3008F BODY MASS INDEX DOCD: CPT | Mod: CPTII,S$GLB,, | Performed by: NURSE PRACTITIONER

## 2022-07-21 PROCEDURE — 99999 PR PBB SHADOW E&M-EST. PATIENT-LVL IV: CPT | Mod: PBBFAC,,, | Performed by: NURSE PRACTITIONER

## 2022-07-21 PROCEDURE — 85025 COMPLETE CBC W/AUTO DIFF WBC: CPT | Performed by: NURSE PRACTITIONER

## 2022-07-21 PROCEDURE — 99214 OFFICE O/P EST MOD 30 MIN: CPT | Mod: S$GLB,,, | Performed by: INTERNAL MEDICINE

## 2022-07-21 PROCEDURE — 36415 COLL VENOUS BLD VENIPUNCTURE: CPT | Performed by: NURSE PRACTITIONER

## 2022-07-21 PROCEDURE — 1101F PR PT FALLS ASSESS DOC 0-1 FALLS W/OUT INJ PAST YR: ICD-10-PCS | Mod: CPTII,S$GLB,, | Performed by: INTERNAL MEDICINE

## 2022-07-21 PROCEDURE — 3008F PR BODY MASS INDEX (BMI) DOCUMENTED: ICD-10-PCS | Mod: CPTII,S$GLB,, | Performed by: NURSE PRACTITIONER

## 2022-07-21 PROCEDURE — 3288F FALL RISK ASSESSMENT DOCD: CPT | Mod: CPTII,S$GLB,, | Performed by: NURSE PRACTITIONER

## 2022-07-21 PROCEDURE — 99215 PR OFFICE/OUTPT VISIT, EST, LEVL V, 40-54 MIN: ICD-10-PCS | Mod: S$GLB,,, | Performed by: NURSE PRACTITIONER

## 2022-07-21 PROCEDURE — 3078F PR MOST RECENT DIASTOLIC BLOOD PRESSURE < 80 MM HG: ICD-10-PCS | Mod: CPTII,S$GLB,, | Performed by: NURSE PRACTITIONER

## 2022-07-21 PROCEDURE — 99214 PR OFFICE/OUTPT VISIT, EST, LEVL IV, 30-39 MIN: ICD-10-PCS | Mod: S$GLB,,, | Performed by: INTERNAL MEDICINE

## 2022-07-21 PROCEDURE — 3288F FALL RISK ASSESSMENT DOCD: CPT | Mod: CPTII,S$GLB,, | Performed by: INTERNAL MEDICINE

## 2022-07-21 PROCEDURE — 1101F PT FALLS ASSESS-DOCD LE1/YR: CPT | Mod: CPTII,S$GLB,, | Performed by: NURSE PRACTITIONER

## 2022-07-21 PROCEDURE — 1159F MED LIST DOCD IN RCRD: CPT | Mod: CPTII,S$GLB,, | Performed by: NURSE PRACTITIONER

## 2022-07-21 PROCEDURE — 3008F BODY MASS INDEX DOCD: CPT | Mod: CPTII,S$GLB,, | Performed by: INTERNAL MEDICINE

## 2022-07-21 PROCEDURE — 3078F DIAST BP <80 MM HG: CPT | Mod: CPTII,S$GLB,, | Performed by: NURSE PRACTITIONER

## 2022-07-21 PROCEDURE — 3074F SYST BP LT 130 MM HG: CPT | Mod: CPTII,S$GLB,, | Performed by: NURSE PRACTITIONER

## 2022-07-21 PROCEDURE — 84466 ASSAY OF TRANSFERRIN: CPT | Performed by: NURSE PRACTITIONER

## 2022-07-21 PROCEDURE — 99999 PR PBB SHADOW E&M-EST. PATIENT-LVL III: CPT | Mod: PBBFAC,,, | Performed by: INTERNAL MEDICINE

## 2022-07-21 PROCEDURE — 1159F PR MEDICATION LIST DOCUMENTED IN MEDICAL RECORD: ICD-10-PCS | Mod: CPTII,S$GLB,, | Performed by: NURSE PRACTITIONER

## 2022-07-21 PROCEDURE — 1159F MED LIST DOCD IN RCRD: CPT | Mod: CPTII,S$GLB,, | Performed by: INTERNAL MEDICINE

## 2022-07-21 PROCEDURE — 99999 PR PBB SHADOW E&M-EST. PATIENT-LVL III: ICD-10-PCS | Mod: PBBFAC,,, | Performed by: INTERNAL MEDICINE

## 2022-07-21 PROCEDURE — 1159F PR MEDICATION LIST DOCUMENTED IN MEDICAL RECORD: ICD-10-PCS | Mod: CPTII,S$GLB,, | Performed by: INTERNAL MEDICINE

## 2022-07-21 PROCEDURE — 3288F PR FALLS RISK ASSESSMENT DOCUMENTED: ICD-10-PCS | Mod: CPTII,S$GLB,, | Performed by: INTERNAL MEDICINE

## 2022-07-21 PROCEDURE — 3288F PR FALLS RISK ASSESSMENT DOCUMENTED: ICD-10-PCS | Mod: CPTII,S$GLB,, | Performed by: NURSE PRACTITIONER

## 2022-07-21 PROCEDURE — 1101F PR PT FALLS ASSESS DOC 0-1 FALLS W/OUT INJ PAST YR: ICD-10-PCS | Mod: CPTII,S$GLB,, | Performed by: NURSE PRACTITIONER

## 2022-07-21 NOTE — TELEPHONE ENCOUNTER
Called to schedule sandostatin injection. Pt wants to wait to schedule until he speaks with his doctor. Will call the infusion center once he is ready to schedule. Notified that he is authorized.

## 2022-07-22 ENCOUNTER — TELEPHONE (OUTPATIENT)
Dept: HEMATOLOGY/ONCOLOGY | Facility: CLINIC | Age: 67
End: 2022-07-22

## 2022-07-22 NOTE — TELEPHONE ENCOUNTER
Lab Results   Component Value Date    IRON 35 (L) 07/21/2022    TIBC 295 07/21/2022    FERRITIN 115 07/21/2022     Lab Results   Component Value Date    WBC 3.94 07/21/2022    HGB 10.8 (L) 07/21/2022    HCT 34.2 (L) 07/21/2022    MCV 77 (L) 07/21/2022     07/21/2022       Reviewed iron labs with pt. He would prefer to do otc iron first before adding more infusions. Last infusion 7/1/22 venofer. Will recheck labs in 2 months.

## 2022-08-04 ENCOUNTER — TELEPHONE (OUTPATIENT)
Dept: NEUROLOGY | Facility: HOSPITAL | Age: 67
End: 2022-08-04

## 2022-08-04 NOTE — TELEPHONE ENCOUNTER
Attempt to schedule gi clinic appt from referral by Dr. Abad.  Message placed on [facility].

## 2022-08-29 ENCOUNTER — TELEPHONE (OUTPATIENT)
Dept: NEUROLOGY | Facility: HOSPITAL | Age: 67
End: 2022-08-29

## 2022-08-29 NOTE — TELEPHONE ENCOUNTER
Message given to Priscilla wife to contact this clinic to schedule appt with Dr. Espinal.  Per wife, pt would like to be called at [phone].

## 2022-08-29 NOTE — TELEPHONE ENCOUNTER
----- Message from Brown Go MD sent at 7/21/2022  1:00 PM CDT -----  Nadia and Emmett    Pt with recurrent small bowel bleeding  I ordered shivam based on prior clinic notes but I think he is reluctanct     Can yall get him in to discuss with dr reed please  thanks

## 2022-09-08 NOTE — PROGRESS NOTES
GASTROENTEROLOGY CLINIC NOTE    Reason for visit: Diagnoses of Iron deficiency anemia due to chronic blood loss, Fatigue, unspecified type, and AV malformation of gastrointestinal tract were pertinent to this visit.  Referring provider/PCP: Farhat Matson MD    HPI:  Miky Esqueda is a 67 y.o. male here today for recurrent obscure occult GI bleeding. Follow up    inteval 7/2022  Has undergone further workup since our last visit 2 years ago.  He seemingly did well in between.  He is now on aspirin and Plavix.  He was noted to have a worsening of hemoglobin without any overt GI bleeding.  Denies melena.  Denies hematochezia.  Is accompanied by his wife.  He has had extensive workup as detailed below.  There has been some seemingly proximal small bowel lesions that have been difficult to identify on upper balloon enteroscopy.  Repeat video capsule did not seem to show any of these lesions.  It was recommended by the small-bowel expert that he have a trial of octreotide Depot if this were to continue.  He is here did today to follow-up.  He is reluctant to try any new medicine.  He is somewhat frustrated by what has been going on and I can understand that.  We have not been able to identify endoscopically the lesions that are the culprits.    =============================  Last visit:n 7/2020  He is here for follow-up of obscure occult bleeding.  He had a recent hospital stay at [facility] as well as [facility] where he was transferred to [facility].  He underwent a EGD and colonoscopy with Dr. Bolaños which were fairly unremarkable he did have a very small tubular adenoma that was removed from the sigmoid colon.  There was no source of bleeding found.  He then was transferred to [facility] had a push enteroscopy done with details as below.  There was a angio ectasia that was seen but this was too distal to the extent of enteroscopy reached and thus was not able to be treated.  He is  "currently asymptomatic besides some mild fatigue on exertion.  He has a decreased exercise tolerance.  He has over although feeling well without any abdominal pain.  There is no melena there is no hematochezia there is no hematemesis.  There is no abdominal pain. He had CBC today and this is stable.    I have reviewed those records.  He also had CT c/a/p which showed mildly enlarge mesenteric nodes throughout the abdomen.  CTAngio was negative for bleeding.    Most recent push enteroscopy done at [facility] 7/7/20  Impression:          - Normal esophagus.                       - Nodular mucosa in the gastric antrum. Biopsied. Jar 1.                       - Normal examined duodenum.                       - Outside the reach of the pediatric colonoscope, we                        could see a single, small, non-bleeding angiodysplastic                        lesion found in the jejunum. I attempted scope removal                        back into the stomach three times, loop reduction, and                        manual pressure, yet we were not able to get the                        pediatric colonoscope to a position that would make                        coagulation possible.                       - He has chronic occult, obscure GI bleeding and anemia                        from small bowel (and history of gastric)                        angiodysplasias.                       - Bleeding will likely continue in the presence of                        anticoagulation which he requires for bilateral                        pulmonary emboli and DVT. This is considered to be the                        "new normal" until he is safely able to stop                        anticoagulation.  Recommendation:      - Advance diet as tolerated                       - Restart Xarelto at full strength. As discussed with                        patient's wife, this is likely to cause recurrent                        bleeding. However, " the benefit at this time outweighs                        the risks (better to receive intermittent pRBC                        transfusion and/or iron supplementation than to be                        completely off anticoagulation with his degree of                        thrombus burden).                       - Give IV iron sucrose 200mg now.                       - I would recommend IV iron as an outpatient instead of                        oral iron therapy. Once daily oral iron (his current                        dose) will not keep up with the ongoing occult, obscure                        losses.                       - I would recommend at least weekly or every-other-week                        assessment of H&H with attention towards outpatient pRBC                        transfusions.                       - He needs a video capsule endoscopy (PillCam) that is                        already in the works in [place], [place] wt Dr. Bolaños. This                        needs to be done in next 1-2 weeks.                       - Very likely, he will require double balloon                        enteroscopy, since the jejunal angiodysplasia was out of                        reach of our standard pediatric colonoscopy (push                        enteroscopy). This would have to be performed at [facility]                        with [facility].                       - Discharge home when H/H stable and no overt bleeding.                        I suspect this will be tomorrow morning.                       - All of the above will have to be coordinated by his                        existing physicians in [place], [place] and [facility]. He                        was transferred to our facility since there was                        apparently no GI coverage in [place], [place] ([facility]) yesterday.  PATH:  Negative for HP ; chronic intestinal metaplasia without  dysplasia.      EGD and colon with Dr. Bolaños at [facility] 7/2020  No source of bleeding  Small 6mm colon polyp removed, TA on path      Prior Endoscopy:  EGD:  6/15/20 with me  Biopsied.                         - Nodular mucosa in the greater curvature of the                         stomach. Biopsied.                         - Non-bleeding erosive gastropathy. Biopsied.                         - A single small non-bleeding angiodysplastic                         lesion in the stomach. Treated with argon plasma                         coagulation (APC). Ablated.                         - Normal duodenal bulb, first portion of the                         duodenum, second portion of the duodenum, third                         portion of the duodenum and fourth portion of the                         duodenum.   PATH positive for HP, sent treatment    Colon:  6/15/20 with me  Impression:           - Hemorrhoids found on perianal exam.                         - One 6 mm polyp in the sigmoid colon, removed with                         a cold snare. Resected and retrieved.                         - One 2 mm polyp in the rectum, removed with a cold                         snare. Resected and retrieved.                         - The examined portion of the ileum was normal. At                         least 15 cm examined.                         - Non-bleeding internal hemorrhoids.                         - The examination was otherwise normal on direct                         and retroflexion views.   Recommendation:       - Return patient to hospital hall for ongoing care.                         - Resume previous diet.                         - Continue present medications.                         - Await pathology results.                         - Repeat colonoscopy in 5 years for surveillance.                         - Monitor CBC while on iron.                         - No obvious source on todays EGD / Colon.  Await                         pathology, if iron def anemia persists in the near                         future, would consider VCE for further evaluation                         of possible small bowel AVMs.                         - Resume Xarelto (rivaroxaban) at prior dose                         tomorrow.  PATH TA    Upper DBE [facility]  8/2020  Impression:           65yrMN with a reported chronic history of iron                         deficiency anemia over 5 years in duration now has                         recurrent, severe anemia with occult blood loss                         which is exacerbated over the last 3 months on                         xarelto therapy for DVT/PTE. There is a report of                         previous angioectasia in the jejunum on push                         enteroscopy (but not able to clearly see the                         lesion) and report of a small angioectasia in the                         proximal jejunum by recent VCE. This patient's                         anemia is likely related to chronic GI blood loss                         from angioectasias in the small bowel or right                         colon vs a Dieulafoy lesion. This lesion may be                         difficult or impossible to treat unless actively                         bleeding at the time of endoscopy.   Recommendation:       Colonoscopy tomorrow to re-evaluated for                         angioectasias in the right colon and any other                         bleeding sources. If negative plan to restart                         xarelto on Sunday AM then VCE Sunday night to                         determine if there is evidence of bleeding on                         xarelto reinstitution. If VCE on Sunday evening is                         negative, patient may be discharged on Monday.     Colon [facility] 8/2020  Impression:           65 year old man with a reported chronic history of                          iron deficiency anemia over 5 years in duration now                         has recurrent, severe anemia with occult blood loss                         which is exacerbated over the last 3 months on                         xarelto therapy for DVT/PTE. There is a report of                         previous angioectasia in the jejunum on push                         enteroscopy (but not able to clearly see the                         lesion) and report of a small angioectasia in the                         proximal jejunum by recent VCE. This patient's                         anemia is likely related to chronic GI blood loss                         from angioectasias in the small bowel or right                         colon vs a Dieulafoy lesion. This lesion may be                         difficult or impossible to treat unless actively                         bleeding at the time of endoscopy. Colonoscopy                         showed:                         - Preparation of the colon was fair.                         - The entire examined colon is normal.                         - The examined portion of the ileum was normal,                         briefly intubated terminal ileum without evidence                         of bleeding seen.                         - Mild non-bleeding hemorrhoids seen.                         - Colonoscopy without evidence of bleeding in right                         colon or terminal ileum to explain iron deficiency                         anemia and melena. Proceed with VCE today for                         further evaluation of bleeding source.    VCE 2020 raines  Impression:           - Unrevealing video capsule study                         - 65yrM with recurrent severe SUBHASH attributed to                         obscure GI bleeding with previous findings of one                         classic angioectasia in the proximal jejunum by                         video capsule  but upper DBE was unrevealing. This                         patient's anemia is likely related to intermittent                         bleeding from angioectasia(s) vs a Dieulafoy                         lesion. Bleeding has been exacerbated by xarelto                         therapy for DVT/PTE   Recommendation:       Potential for withdrawal of xarelto with IVC filter                         placement                         Future management should include a 6 month trial of                         sandostatin monthly injections if his anemia fails                         to improve off xarelto                         Follow up iron stores and plan parenteral iron if                         indicated                         Future management may include CTA vs emergent                         repeat video capsule study if bleeding recurs                         followed by repeat DBE based upon CTA/VCE findings     (Portions of this note were dictated using voice recognition software and may contain dictation related errors in spelling/grammar/syntax not found on text review)    Review of Systems   Constitutional: Positive for malaise/fatigue. Negative for fever.   Respiratory: Negative for cough and shortness of breath.    Cardiovascular: Negative for chest pain and palpitations.   Gastrointestinal: Negative for abdominal pain, blood in stool, nausea and vomiting.   Neurological: Negative for dizziness and headaches.       Past Medical History: has a past medical history of AV malformation of gastrointestinal tract, Bilateral pulmonary embolism, History of DVT (deep vein thrombosis), Iron deficiency anemia, and Stroke.    Past Surgical History: has a past surgical history that includes Esophagogastroduodenoscopy (N/A, 6/15/2020); Colonoscopy (N/A, 6/15/2020); Intraluminal gastrointestinal tract imaging via capsule (N/A, 7/13/2020); FUSION, SPINE, MINIMALLY INVASIVE, USING COMPUTER ASSISTED NAVIGATION (8/7/2020);  Colonoscopy (N/A, 8/8/2020); Intraluminal gastrointestinal tract imaging via capsule (N/A, 8/10/2020); and Transesophageal echocardiography (N/A, 4/29/2021).    Family History:family history includes Heart disease in his father.    Allergies: Review of patient's allergies indicates:  No Known Allergies    Social History: reports that he has quit smoking. His smoking use included cigarettes. He has never used smokeless tobacco. He reports that he does not drink alcohol and does not use drugs.    Home medications:   Current Outpatient Medications on File Prior to Visit   Medication Sig Dispense Refill    cholecalciferol, vitamin D3, 1,250 mcg (50,000 unit) Tab Take 50,000 Units by mouth once a week. 12 tablet 3    levETIRAcetam (KEPPRA) 1000 MG tablet Take 1,000 mg by mouth 2 (two) times daily.      multivitamin capsule Take 1 capsule by mouth once daily.      ondansetron (ZOFRAN-ODT) 4 MG TbDL Take 1 tablet (4 mg total) by mouth every 6 (six) hours as needed (nausea). 60 tablet 1    albuterol (PROVENTIL/VENTOLIN HFA) 90 mcg/actuation inhaler Inhale 2 puffs into the lungs every 6 (six) hours as needed.      aspirin 81 MG Chew Take 1 tablet (81 mg total) by mouth once daily. 90 tablet 3    atorvastatin (LIPITOR) 40 MG tablet Take 1 tablet (40 mg total) by mouth once daily. 90 tablet 3    clopidogreL (PLAVIX) 75 mg tablet Take 1 tablet (75 mg total) by mouth once daily. 30 tablet 11     No current facility-administered medications on file prior to visit.       Vital signs:  Ht 6' (1.829 m)   Wt 94.9 kg (209 lb 3.5 oz)   BMI 28.37 kg/m²     Physical Exam  Vitals reviewed.   Constitutional:       Appearance: He is not toxic-appearing.   HENT:      Head: Normocephalic and atraumatic.   Eyes:      General: No scleral icterus.     Conjunctiva/sclera: Conjunctivae normal.   Neurological:      Mental Status: He is alert and oriented to person, place, and time.      Gait: Gait normal.   Psychiatric:         Mood and  Affect: Mood normal.         Behavior: Behavior normal.         Routine labs:  Lab Results   Component Value Date    WBC 3.94 07/21/2022    HGB 10.8 (L) 07/21/2022    HCT 34.2 (L) 07/21/2022    MCV 77 (L) 07/21/2022     07/21/2022     Lab Results   Component Value Date    INR 1.1 08/06/2020     Lab Results   Component Value Date    IRON 33 (L) 05/24/2022    FERRITIN 115 07/21/2022    TIBC 284 05/24/2022    FESATURATED 12 (L) 05/24/2022     Lab Results   Component Value Date     05/24/2022    K 5.4 (H) 05/24/2022     05/24/2022    CO2 23 05/24/2022    BUN 11 05/24/2022    CREATININE 1.24 05/24/2022     Lab Results   Component Value Date    ALBUMIN 3.8 05/24/2022    ALT 14 05/24/2022    AST 18 05/24/2022    ALKPHOS 82 05/24/2022    BILITOT 0.6 05/24/2022     No results found for: GLUCOSE    I have reviewed prior labs, imaging, notes from last month      Assessment:  1. Iron deficiency anemia due to chronic blood loss    2. Fatigue, unspecified type    3. AV malformation of gastrointestinal tract      Chronic obscure small bowel bleeding.  Unable to localize on upper balloon enteroscopy.    Per dr espinal, next step was octreotide depot and I offered him that.  He is reluctact to start but will consider , he desires to discuss further with Dr. Espinal      Plan:       Refer to Dr Espinal  I ordered sandostatin but he wants to wait for now.    Agree wit IV iron and hematology follow up    RTC prn    Brown Go MD  [facility] [place]             Yes

## 2022-11-21 ENCOUNTER — LAB VISIT (OUTPATIENT)
Dept: LAB | Facility: HOSPITAL | Age: 67
End: 2022-11-21
Attending: INTERNAL MEDICINE
Payer: MEDICARE

## 2022-11-21 DIAGNOSIS — R53.83 FATIGUE, UNSPECIFIED TYPE: ICD-10-CM

## 2022-11-21 DIAGNOSIS — D50.0 IRON DEFICIENCY ANEMIA DUE TO CHRONIC BLOOD LOSS: ICD-10-CM

## 2022-11-21 DIAGNOSIS — R06.09 DYSPNEA ON EXERTION: ICD-10-CM

## 2022-11-21 LAB
BASOPHILS # BLD AUTO: 0.03 K/UL (ref 0–0.2)
BASOPHILS NFR BLD: 0.5 % (ref 0–1.9)
DIFFERENTIAL METHOD: ABNORMAL
EOSINOPHIL # BLD AUTO: 0.1 K/UL (ref 0–0.5)
EOSINOPHIL NFR BLD: 1.4 % (ref 0–8)
ERYTHROCYTE [DISTWIDTH] IN BLOOD BY AUTOMATED COUNT: 15.5 % (ref 11.5–14.5)
FERRITIN SERPL-MCNC: 71 NG/ML (ref 20–300)
HCT VFR BLD AUTO: 43.3 % (ref 40–54)
HGB BLD-MCNC: 13.3 G/DL (ref 14–18)
IMM GRANULOCYTES # BLD AUTO: 0.01 K/UL (ref 0–0.04)
IMM GRANULOCYTES NFR BLD AUTO: 0.2 % (ref 0–0.5)
IRON SERPL-MCNC: 61 UG/DL (ref 45–160)
LYMPHOCYTES # BLD AUTO: 2 K/UL (ref 1–4.8)
LYMPHOCYTES NFR BLD: 34.3 % (ref 18–48)
MCH RBC QN AUTO: 23.4 PG (ref 27–31)
MCHC RBC AUTO-ENTMCNC: 30.7 G/DL (ref 32–36)
MCV RBC AUTO: 76 FL (ref 82–98)
MONOCYTES # BLD AUTO: 0.6 K/UL (ref 0.3–1)
MONOCYTES NFR BLD: 10.3 % (ref 4–15)
NEUTROPHILS # BLD AUTO: 3.1 K/UL (ref 1.8–7.7)
NEUTROPHILS NFR BLD: 53.3 % (ref 38–73)
NRBC BLD-RTO: 0 /100 WBC
PLATELET # BLD AUTO: 218 K/UL (ref 150–450)
PMV BLD AUTO: 10.9 FL (ref 9.2–12.9)
RBC # BLD AUTO: 5.68 M/UL (ref 4.6–6.2)
SATURATED IRON: 18 % (ref 20–50)
TOTAL IRON BINDING CAPACITY: 334 UG/DL (ref 250–450)
TRANSFERRIN SERPL-MCNC: 226 MG/DL (ref 200–375)
WBC # BLD AUTO: 5.83 K/UL (ref 3.9–12.7)

## 2022-11-21 PROCEDURE — 82728 ASSAY OF FERRITIN: CPT | Performed by: NURSE PRACTITIONER

## 2022-11-21 PROCEDURE — 85025 COMPLETE CBC W/AUTO DIFF WBC: CPT | Mod: PO | Performed by: NURSE PRACTITIONER

## 2022-11-21 PROCEDURE — 84466 ASSAY OF TRANSFERRIN: CPT | Mod: PO | Performed by: NURSE PRACTITIONER

## 2022-11-21 PROCEDURE — 36415 COLL VENOUS BLD VENIPUNCTURE: CPT | Mod: PO | Performed by: NURSE PRACTITIONER

## 2022-11-22 ENCOUNTER — OFFICE VISIT (OUTPATIENT)
Dept: HEMATOLOGY/ONCOLOGY | Facility: CLINIC | Age: 67
End: 2022-11-22
Payer: MEDICARE

## 2022-11-22 VITALS
DIASTOLIC BLOOD PRESSURE: 80 MMHG | SYSTOLIC BLOOD PRESSURE: 150 MMHG | OXYGEN SATURATION: 95 % | BODY MASS INDEX: 28.02 KG/M2 | HEART RATE: 79 BPM | WEIGHT: 206.56 LBS

## 2022-11-22 DIAGNOSIS — R56.9 SEIZURE: ICD-10-CM

## 2022-11-22 DIAGNOSIS — Q21.12 PFO (PATENT FORAMEN OVALE): ICD-10-CM

## 2022-11-22 DIAGNOSIS — K55.20 AV MALFORMATION OF GASTROINTESTINAL TRACT: ICD-10-CM

## 2022-11-22 DIAGNOSIS — Z86.718 HISTORY OF DVT IN ADULTHOOD: ICD-10-CM

## 2022-11-22 DIAGNOSIS — Z86.711 HISTORY OF PULMONARY EMBOLUS (PE): ICD-10-CM

## 2022-11-22 DIAGNOSIS — Z86.73 H/O: CVA (CEREBROVASCULAR ACCIDENT): ICD-10-CM

## 2022-11-22 DIAGNOSIS — D50.0 IRON DEFICIENCY ANEMIA DUE TO CHRONIC BLOOD LOSS: Primary | ICD-10-CM

## 2022-11-22 DIAGNOSIS — E55.9 VITAMIN D DEFICIENCY: ICD-10-CM

## 2022-11-22 PROCEDURE — 3077F PR MOST RECENT SYSTOLIC BLOOD PRESSURE >= 140 MM HG: ICD-10-PCS | Mod: CPTII,S$GLB,, | Performed by: NURSE PRACTITIONER

## 2022-11-22 PROCEDURE — 3288F PR FALLS RISK ASSESSMENT DOCUMENTED: ICD-10-PCS | Mod: CPTII,S$GLB,, | Performed by: NURSE PRACTITIONER

## 2022-11-22 PROCEDURE — 1159F PR MEDICATION LIST DOCUMENTED IN MEDICAL RECORD: ICD-10-PCS | Mod: CPTII,S$GLB,, | Performed by: NURSE PRACTITIONER

## 2022-11-22 PROCEDURE — 99999 PR PBB SHADOW E&M-EST. PATIENT-LVL IV: CPT | Mod: PBBFAC,,, | Performed by: NURSE PRACTITIONER

## 2022-11-22 PROCEDURE — 99215 OFFICE O/P EST HI 40 MIN: CPT | Mod: S$GLB,,, | Performed by: NURSE PRACTITIONER

## 2022-11-22 PROCEDURE — 1159F MED LIST DOCD IN RCRD: CPT | Mod: CPTII,S$GLB,, | Performed by: NURSE PRACTITIONER

## 2022-11-22 PROCEDURE — 99215 PR OFFICE/OUTPT VISIT, EST, LEVL V, 40-54 MIN: ICD-10-PCS | Mod: S$GLB,,, | Performed by: NURSE PRACTITIONER

## 2022-11-22 PROCEDURE — 3288F FALL RISK ASSESSMENT DOCD: CPT | Mod: CPTII,S$GLB,, | Performed by: NURSE PRACTITIONER

## 2022-11-22 PROCEDURE — 3008F PR BODY MASS INDEX (BMI) DOCUMENTED: ICD-10-PCS | Mod: CPTII,S$GLB,, | Performed by: NURSE PRACTITIONER

## 2022-11-22 PROCEDURE — 3077F SYST BP >= 140 MM HG: CPT | Mod: CPTII,S$GLB,, | Performed by: NURSE PRACTITIONER

## 2022-11-22 PROCEDURE — 1101F PR PT FALLS ASSESS DOC 0-1 FALLS W/OUT INJ PAST YR: ICD-10-PCS | Mod: CPTII,S$GLB,, | Performed by: NURSE PRACTITIONER

## 2022-11-22 PROCEDURE — 99999 PR PBB SHADOW E&M-EST. PATIENT-LVL IV: ICD-10-PCS | Mod: PBBFAC,,, | Performed by: NURSE PRACTITIONER

## 2022-11-22 PROCEDURE — 3079F DIAST BP 80-89 MM HG: CPT | Mod: CPTII,S$GLB,, | Performed by: NURSE PRACTITIONER

## 2022-11-22 PROCEDURE — 3008F BODY MASS INDEX DOCD: CPT | Mod: CPTII,S$GLB,, | Performed by: NURSE PRACTITIONER

## 2022-11-22 PROCEDURE — 1101F PT FALLS ASSESS-DOCD LE1/YR: CPT | Mod: CPTII,S$GLB,, | Performed by: NURSE PRACTITIONER

## 2022-11-22 PROCEDURE — 3079F PR MOST RECENT DIASTOLIC BLOOD PRESSURE 80-89 MM HG: ICD-10-PCS | Mod: CPTII,S$GLB,, | Performed by: NURSE PRACTITIONER

## 2022-11-22 NOTE — PROGRESS NOTES
PATIENT: Miky Esqueda  MRN: [id number]  DATE: 11/22/2022    Diagnosis:   1. Iron deficiency anemia due to chronic blood loss    2. History of pulmonary embolus (PE)    3. History of DVT in adulthood    4. H/O: CVA (cerebrovascular accident)    5. Seizure    6. AV malformation of gastrointestinal tract    7. Vitamin D deficiency    8. PFO (patent foramen ovale)        Chief Complaint: PE, iron def anemia    Subjective:     History of Present Illness:    HPI As per Dr Gonzales's previous note 1/27/2022:     He presented to the ER 05/25/2020 with acute onset of shortness of breath, diaphoresis and lightheadedness.  CT chest showed extensive bilateral pulmonary thromboemboli.  No evidence of central saddle embolus.  There was moderate pulmonary emphysema.  Lower extremity ultrasound also revealed nonocclusive thrombus in the right and left popliteal veins as well as the right femoral vein.    This is his 1st episode of pulmonary embolism/DVT.  He was started on Xarelto.    Then he had a syncopal episode in his PCP's office on 06/12/2020 which lasted about 30 sec followed by brief 15 sec seizure like episode.  He was taken to the ER where a CT head showed a hypodense area in the left parietal lobe concerning for infarct.  By this time he also developed significant anemia and was transfused 2 units packed red blood cells.    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    He got 1 dose of Injectafer 07/21/2020 and a second dose 9/4/2020    He presented to the ER 8/7/2020 with severe anemia Hb 4.6 - passed out in his primary care physician's office. This was the second episode of severe symptomatic anemia after being started on Xarelto.    He has past h/o extensive DVT/PE in may 2020, CVA in June 2020 and h/o jejunal AVM. No active source of bleeding could be found from extensive GI work-up  2020.  -severe anemia felt likely related to intermittent bleeding from angioectasia(s) vs a Dieulafoy lesion.  -he  underwent IVC filter placement 08/10/2020 and his Xarelto was discontinued    INTERVAL HISTORY:   -Pt here for anemia follow up, additionally with hx PE   -Had Injectafer 2 doses 2/2022  -received venofer 6/10/22, 6/17/22, 6/24/22, 7/1/22  -He is on baby asa and plavix now, states he has been on these for awhile again and has not noted any bleeding issues with this regimen at this time.  -takes Vit D3 every other week  -needs PFO closure  - Saw Dr Go/GI 7/21/22, discussed sandostatin with pt, pt originally wanted to discuss with Dr Espinal, but today states he spoke with his pcp Dr Matson and he does not want to take sandostatin  - He has currently moved back to [place] and is driving to/from [facility] as his mother passed a couple months ago and he has been busy dealing with this  -He denies dizziness, lightheadedness, near syncope or syncope, pounding in ears, chest pain, abdominal pain, n/v/d, black or bloody stools, hematuria, extremity swelling, cold intolerance or ice cravings, pallor, fever, chills. No gum or oral bleeding, no nosebleeds. States he has really been feeling well physically. He does not feel like he has been sob lately.     Past Medical, Surgical, Family and Social History Reviewed.    Medications and Allergies reviewed      Review of Systems   Constitutional:  Negative for fatigue, fever and unexpected weight change.   HENT:  Negative for mouth sores and nosebleeds.    Eyes:  Negative for visual disturbance.   Respiratory:  Negative for chest tightness and shortness of breath.    Cardiovascular:  Negative for chest pain, palpitations and leg swelling.   Gastrointestinal:  Negative for abdominal pain, anal bleeding, blood in stool, diarrhea, nausea and vomiting.   Endocrine: Negative for cold intolerance.   Genitourinary:  Negative for hematuria.   Musculoskeletal:  Negative for back pain.   Neurological:  Negative for dizziness, seizures, syncope, weakness, light-headedness and headaches.    Hematological:  Negative for adenopathy. Does not bruise/bleed easily.   Psychiatric/Behavioral:  Negative for confusion and decreased concentration.    Objective:     Lab Results   Component Value Date    WBC 5.83 11/21/2022    HGB 13.3 (L) 11/21/2022    HCT 43.3 11/21/2022    MCV 76 (L) 11/21/2022     11/21/2022       Lab Results   Component Value Date    IRON 61 11/21/2022    TIBC 334 11/21/2022    FERRITIN 71 11/21/2022                 Vitals:    11/22/22 1041   BP: (!) 150/80   Pulse: 79   SpO2: 95%   Weight: 93.7 kg (206 lb 9.1 oz)     Body mass index is 28.02 kg/m².    General appearance: no acute distress. Conversant  HEENT: Normocephalic. Moist mucus membranes. No pallor.  Eyes:  Negative bilat conjunctival pallor. No scleral icterus.  Lungs: Easy unlabored respirations. Clear to auscultation bilat. no rales.  Heart: rhythm regular. no gallops. no edema.  Abdomen: Soft, NT, nondistended.  Extremities: No swelling, nontender.  Neurologic/Psychiatric: alert. oriented to time, place and person. Steady gait. no anxiety or agitation.     ECOG SCORE    1 - Restricted in strenuous activity-ambulatory and able to carry out work of a light nature           Assessment:       1. Iron deficiency anemia due to chronic blood loss    2. History of pulmonary embolus (PE)    3. History of DVT in adulthood    4. H/O: CVA (cerebrovascular accident)    5. Seizure    6. AV malformation of gastrointestinal tract    7. Vitamin D deficiency    8. PFO (patent foramen ovale)        Plan:       Iron deficiency anemia  -secondary to h/o GI bleed and use of blood thinners  -received 1 dose of Injectafer in July 2020 and a second dose in sept 2020, 2 doses Feb 2022  -received venofer 6/10/22, 6/17/22, 6/24/22, 7/1/22  -labs (7/21/22) cbc, ferritin reviewed, iron saturation and tibc pending at visit time H/H 10.8/34.2 and ferritin is normal at 115. Will call with iron/tibc results.  -Saw GI 7/21/22 who recommended Sandostin  which pt now states he is not interested in taking   -Updated labs 11/21/22 reviewed with improved H/H 13.3/43.3, microcytic with normal serum iron, tibc and ferritin.   -He has been taking po iron without side effects and has had no recent evidence of GI bleed symptoms  -rtc 3 months with labs, call in interim any changes or concerns    H/o Acute bilateral PE with bilateral DVT  -unprovoked  -intolerant to anticoagulation secondary to recurrent GI bleeds in past, now on daily baby ASA and plavix  -status post IVC filter placement 08/10/2020  -reviewed CBC, ferritin, iron saturation    H/o CVA with seizure disorder  -diagnosed June 2020  -doing well, no focal neurological deficits, no seizures, stable  -cannot tolerate antiplatelet therapy or anticoagulation therapy  -on keppra 500 mg bid since Nov 2020, tolerating well    GI bleeding from AV malformations History  -contributory to SUBHASH  -last seen by GI  7/21/22  -will continue to monitor     Vitamin-D deficiency  -continue vitamin D3, 18412 units, once every 2 weeks  -continue to monitor    PFO  -needs PFO closure, but denied by insurance  -management deferred to cardiology          Anastacia Davenport, NP-C  [facility]  Hematology/Oncology  [address]  [place]  [id number] [phone]

## 2023-02-17 NOTE — PROGRESS NOTES
PATIENT: Miky Esqueda  MRN: [id number]  DATE: 2/20/2023    Diagnosis:   1. Iron deficiency anemia due to chronic blood loss    2. History of pulmonary embolus (PE)    3. Bilateral pulmonary embolism    4. History of DVT in adulthood    5. H/O: CVA (cerebrovascular accident)    6. Seizure    7. Vitamin D deficiency    8. AV malformation of gastrointestinal tract    9. PFO (patent foramen ovale)    10. Constipation, unspecified constipation type          Chief Complaint: PE, iron def anemia    Subjective:     History of Present Illness:    HPI As per Dr Gonzales's previous note 1/27/2022:     He presented to the ER 05/25/2020 with acute onset of shortness of breath, diaphoresis and lightheadedness.  CT chest showed extensive bilateral pulmonary thromboemboli.  No evidence of central saddle embolus.  There was moderate pulmonary emphysema.  Lower extremity ultrasound also revealed nonocclusive thrombus in the right and left popliteal veins as well as the right femoral vein.    This is his 1st episode of pulmonary embolism/DVT.  He was started on Xarelto.    Then he had a syncopal episode in his PCP's office on 06/12/2020 which lasted about 30 sec followed by brief 15 sec seizure like episode.  He was taken to the ER where a CT head showed a hypodense area in the left parietal lobe concerning for infarct.  By this time he also developed significant anemia and was transfused 2 units packed red blood cells.    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    He got 1 dose of Injectafer 07/21/2020 and a second dose 9/4/2020    He presented to the ER 8/7/2020 with severe anemia Hb 4.6 - passed out in his primary care physician's office. This was the second episode of severe symptomatic anemia after being started on Xarelto.    He has past h/o extensive DVT/PE in may 2020, CVA in June 2020 and h/o jejunal AVM. No active source of bleeding could be found from extensive GI work-up  2020.  -severe anemia felt  likely related to intermittent bleeding from angioectasia(s) vs a Dieulafoy lesion.  -he underwent IVC filter placement 08/10/2020 and his Xarelto was discontinued    INTERVAL HISTORY:   -Pt here for anemia follow up, additionally with hx PE   -Had Injectafer 2 doses 2/2022  -received venofer 6/10/22, 6/17/22, 6/24/22, 7/1/22  -He is on baby asa and plavix now, has not noted any bleeding issues at this time.  -takes Vit D3 every other week  -needs PFO closure  - Saw Dr Go/GI 7/21/22, discussed sandostatin with pt, pt originally wanted to discuss with Dr Espinal, spoke with his pcp and he does not want sandostatin  - He has currently moved back to [place] and is driving to/from [facility] as his mother passed 2022, states he is doing well in [place] closer to family. Plans to continue returning here at this time for yoana. Active at [employer].  -He denies dizziness, lightheadedness, near syncope or syncope, pounding in ears, chest pain, abdominal pain, n/v/d, black or bloody stools, hematuria, extremity swelling, cold intolerance or ice cravings, pallor, fever, chills. No gum or oral bleeding, no nosebleeds. States he has really been feeling well physically. He does not feel like he has been sob lately.   -He has some constipation a couple times a week with po iron, mild. Asking today what he should take for this.     Past Medical, Surgical, Family and Social History Reviewed.    Medications and Allergies reviewed      Review of Systems   Constitutional:  Negative for fatigue, fever and unexpected weight change.   HENT:  Negative for mouth sores and nosebleeds.    Eyes:  Negative for visual disturbance.   Respiratory:  Negative for chest tightness and shortness of breath.    Cardiovascular:  Negative for chest pain, palpitations and leg swelling.   Gastrointestinal:  Positive for constipation. Negative for abdominal pain, anal bleeding, blood in stool, diarrhea, nausea and vomiting.   Endocrine: Negative for cold  intolerance.   Genitourinary:  Negative for hematuria.   Musculoskeletal:  Negative for back pain.   Neurological:  Negative for dizziness, seizures, syncope, weakness, light-headedness and headaches.   Hematological:  Negative for adenopathy. Does not bruise/bleed easily.   Psychiatric/Behavioral:  Negative for confusion and decreased concentration.    Objective:     Lab Results   Component Value Date    WBC 5.15 02/20/2023    HGB 9.6 (L) 02/20/2023    HCT 31.2 (L) 02/20/2023    MCV 80 (L) 02/20/2023     02/20/2023       Lab Results   Component Value Date    IRON 61 11/21/2022    TIBC 334 11/21/2022    FERRITIN 71 11/21/2022                 Vitals:    02/20/23 0806   BP: (!) 157/71   Pulse: 89   SpO2: 96%   Weight: 96 kg (211 lb 10.3 oz)       Body mass index is 28.7 kg/m².    Physical Exam   Constitutional: He is oriented to person, place, and time. normal appearance.  Non-toxic appearance. No distress. He does not appear ill.   HENT:   Head: Normocephalic and atraumatic.   Right Ear: External ear normal.   Left Ear: External ear normal.   Nose: Nose normal.   Mouth/Throat: Mucous membranes are moist. Oropharynx is clear.   Eyes: Conjunctivae are normal. No scleral icterus.   Cardiovascular: Normal rate, regular rhythm and normal heart sounds. Pulmonary:      Effort: Pulmonary effort is normal. No respiratory distress.      Breath sounds: Normal breath sounds.     Abdominal: Soft. Normal appearance. He exhibits no distension. There is no abdominal tenderness. There is no guarding.   Musculoskeletal:         General: Normal range of motion.      Cervical back: Normal range of motion and neck supple.      Right lower leg: No edema.      Left lower leg: No edema.   Neurological: He is alert and oriented to person, place, and time. Gait normal.   Skin: Skin is warm and dry. No bruising noted. He is not diaphoretic. No erythema. No jaundice or pallor.   Psychiatric: His behavior is normal. Mood and thought  content normal.   Nursing note and vitals reviewed.      ECOG SCORE    0 - Fully active-able to carry on all pre-disease performance without restriction           Assessment:       1. Iron deficiency anemia due to chronic blood loss    2. History of pulmonary embolus (PE)    3. Bilateral pulmonary embolism    4. History of DVT in adulthood    5. H/O: CVA (cerebrovascular accident)    6. Seizure    7. Vitamin D deficiency    8. AV malformation of gastrointestinal tract    9. PFO (patent foramen ovale)    10. Constipation, unspecified constipation type          Plan:       Iron deficiency anemia  -secondary to h/o GI bleed and use of blood thinners  -received 1 dose of Injectafer in July 2020 and a second dose in sept 2020, 2 doses Feb 2022  -received venofer 6/10/22, 6/17/22, 6/24/22, 7/1/22  -labs (7/21/22) cbc, ferritin reviewed, iron saturation and tibc pending at visit time H/H 10.8/34.2 and ferritin is normal at 115. Will call with iron/tibc results.  -Saw GI 7/21/22 who recommended Sandostin which pt now states he is not interested in taking   -Updated labs 11/21/22 reviewed with improved H/H 13.3/43.3, microcytic with normal serum iron, tibc and ferritin.   -Updated labs 2/20/23 prior this appt are not ready at visit time, will call pt with results  -He has been taking po iron with mild intermittent constipation and has had no recent evidence of GI bleed symptoms  -rtc 3 months with labs, call in interim any changes or concerns    H/o Acute bilateral PE with bilateral DVT  -unprovoked  -intolerant to anticoagulation secondary to recurrent GI bleeds in past, now on daily baby ASA and plavix  -status post IVC filter placement 08/10/2020  -reviewed CBC, ferritin, iron saturation    H/o CVA with seizure disorder  -diagnosed June 2020  -doing well, no focal neurological deficits, no seizures, stable  -cannot tolerate antiplatelet therapy or anticoagulation therapy  -on keppra 500 mg bid since Nov 2020, tolerating  well    GI bleeding from AV malformations History  -contributory to SUBHASH, denies current symptoms  -last seen by GI  7/21/22  -will continue to monitor     Vitamin-D deficiency  -continue vitamin D3, 36733 units, once every 2 weeks  -continue to monitor    PFO  -needs PFO closure, but denied by insurance  -management deferred to cardiology    Constipation  -mild, intermittent, harder stools twice weekly  -likely related to iron use  -increase po fluids, fresh fruits and vegetables  -script colace sent, will add prn miralax  -continue to monitor      Addendum 2/22/23  Labs reviewed  Lab Results   Component Value Date    WBC 5.15 02/20/2023    HGB 9.6 (L) 02/20/2023    HCT 31.2 (L) 02/20/2023    MCV 80 (L) 02/20/2023     02/20/2023       Lab Results   Component Value Date    IRON 26 (L) 02/20/2023    TRANSFERRIN 229 02/20/2023    TIBC 339 02/20/2023    FESATURATED 8 (L) 02/20/2023      Lab Results   Component Value Date    FERRITIN 34 02/20/2023     IV iron orders placed.       Anastacia Davenport, NP-C  [facility]  Hematology/Oncology  [address]  [place]  [phone]

## 2023-02-20 ENCOUNTER — OFFICE VISIT (OUTPATIENT)
Dept: HEMATOLOGY/ONCOLOGY | Facility: CLINIC | Age: 68
End: 2023-02-20
Payer: MEDICARE

## 2023-02-20 ENCOUNTER — LAB VISIT (OUTPATIENT)
Dept: LAB | Facility: HOSPITAL | Age: 68
End: 2023-02-20
Attending: FAMILY MEDICINE
Payer: MEDICARE

## 2023-02-20 VITALS
OXYGEN SATURATION: 96 % | BODY MASS INDEX: 28.7 KG/M2 | DIASTOLIC BLOOD PRESSURE: 71 MMHG | HEART RATE: 89 BPM | SYSTOLIC BLOOD PRESSURE: 157 MMHG | WEIGHT: 211.63 LBS

## 2023-02-20 DIAGNOSIS — Z86.711 HISTORY OF PULMONARY EMBOLUS (PE): ICD-10-CM

## 2023-02-20 DIAGNOSIS — K55.20 AV MALFORMATION OF GASTROINTESTINAL TRACT: ICD-10-CM

## 2023-02-20 DIAGNOSIS — R56.9 SEIZURE: ICD-10-CM

## 2023-02-20 DIAGNOSIS — Z86.718 HISTORY OF DVT IN ADULTHOOD: ICD-10-CM

## 2023-02-20 DIAGNOSIS — E55.9 VITAMIN D DEFICIENCY: ICD-10-CM

## 2023-02-20 DIAGNOSIS — D50.0 IRON DEFICIENCY ANEMIA DUE TO CHRONIC BLOOD LOSS: ICD-10-CM

## 2023-02-20 DIAGNOSIS — I26.99 BILATERAL PULMONARY EMBOLISM: ICD-10-CM

## 2023-02-20 DIAGNOSIS — D50.0 IRON DEFICIENCY ANEMIA DUE TO CHRONIC BLOOD LOSS: Primary | ICD-10-CM

## 2023-02-20 DIAGNOSIS — Q21.12 PFO (PATENT FORAMEN OVALE): ICD-10-CM

## 2023-02-20 DIAGNOSIS — R06.09 DYSPNEA ON EXERTION: ICD-10-CM

## 2023-02-20 DIAGNOSIS — R53.83 FATIGUE, UNSPECIFIED TYPE: ICD-10-CM

## 2023-02-20 DIAGNOSIS — K59.00 CONSTIPATION, UNSPECIFIED CONSTIPATION TYPE: ICD-10-CM

## 2023-02-20 DIAGNOSIS — Z86.73 H/O: CVA (CEREBROVASCULAR ACCIDENT): ICD-10-CM

## 2023-02-20 LAB
BASOPHILS # BLD AUTO: 0.04 K/UL (ref 0–0.2)
BASOPHILS NFR BLD: 0.8 % (ref 0–1.9)
DIFFERENTIAL METHOD: ABNORMAL
EOSINOPHIL # BLD AUTO: 0.1 K/UL (ref 0–0.5)
EOSINOPHIL NFR BLD: 1.4 % (ref 0–8)
ERYTHROCYTE [DISTWIDTH] IN BLOOD BY AUTOMATED COUNT: 16.4 % (ref 11.5–14.5)
FERRITIN SERPL-MCNC: 34 NG/ML (ref 20–300)
HCT VFR BLD AUTO: 31.2 % (ref 40–54)
HGB BLD-MCNC: 9.6 G/DL (ref 14–18)
IMM GRANULOCYTES # BLD AUTO: 0 K/UL (ref 0–0.04)
IMM GRANULOCYTES NFR BLD AUTO: 0 % (ref 0–0.5)
IRON SERPL-MCNC: 26 UG/DL (ref 45–160)
LYMPHOCYTES # BLD AUTO: 1.6 K/UL (ref 1–4.8)
LYMPHOCYTES NFR BLD: 31.7 % (ref 18–48)
MCH RBC QN AUTO: 24.7 PG (ref 27–31)
MCHC RBC AUTO-ENTMCNC: 30.8 G/DL (ref 32–36)
MCV RBC AUTO: 80 FL (ref 82–98)
MONOCYTES # BLD AUTO: 0.6 K/UL (ref 0.3–1)
MONOCYTES NFR BLD: 11.1 % (ref 4–15)
NEUTROPHILS # BLD AUTO: 2.8 K/UL (ref 1.8–7.7)
NEUTROPHILS NFR BLD: 55 % (ref 38–73)
NRBC BLD-RTO: 0 /100 WBC
PLATELET # BLD AUTO: 219 K/UL (ref 150–450)
PMV BLD AUTO: 11 FL (ref 9.2–12.9)
RBC # BLD AUTO: 3.88 M/UL (ref 4.6–6.2)
SATURATED IRON: 8 % (ref 20–50)
TOTAL IRON BINDING CAPACITY: 339 UG/DL (ref 250–450)
TRANSFERRIN SERPL-MCNC: 229 MG/DL (ref 200–375)
WBC # BLD AUTO: 5.15 K/UL (ref 3.9–12.7)

## 2023-02-20 PROCEDURE — 84466 ASSAY OF TRANSFERRIN: CPT | Performed by: NURSE PRACTITIONER

## 2023-02-20 PROCEDURE — 3288F PR FALLS RISK ASSESSMENT DOCUMENTED: ICD-10-PCS | Mod: CPTII,S$GLB,, | Performed by: NURSE PRACTITIONER

## 2023-02-20 PROCEDURE — 85025 COMPLETE CBC W/AUTO DIFF WBC: CPT | Performed by: NURSE PRACTITIONER

## 2023-02-20 PROCEDURE — 99215 OFFICE O/P EST HI 40 MIN: CPT | Mod: S$GLB,,, | Performed by: NURSE PRACTITIONER

## 2023-02-20 PROCEDURE — 99999 PR PBB SHADOW E&M-EST. PATIENT-LVL III: CPT | Mod: PBBFAC,,, | Performed by: NURSE PRACTITIONER

## 2023-02-20 PROCEDURE — 3008F BODY MASS INDEX DOCD: CPT | Mod: CPTII,S$GLB,, | Performed by: NURSE PRACTITIONER

## 2023-02-20 PROCEDURE — 3078F PR MOST RECENT DIASTOLIC BLOOD PRESSURE < 80 MM HG: ICD-10-PCS | Mod: CPTII,S$GLB,, | Performed by: NURSE PRACTITIONER

## 2023-02-20 PROCEDURE — 1126F PR PAIN SEVERITY QUANTIFIED, NO PAIN PRESENT: ICD-10-PCS | Mod: CPTII,S$GLB,, | Performed by: NURSE PRACTITIONER

## 2023-02-20 PROCEDURE — 99215 PR OFFICE/OUTPT VISIT, EST, LEVL V, 40-54 MIN: ICD-10-PCS | Mod: S$GLB,,, | Performed by: NURSE PRACTITIONER

## 2023-02-20 PROCEDURE — 82728 ASSAY OF FERRITIN: CPT | Performed by: NURSE PRACTITIONER

## 2023-02-20 PROCEDURE — 36415 COLL VENOUS BLD VENIPUNCTURE: CPT | Performed by: NURSE PRACTITIONER

## 2023-02-20 PROCEDURE — 3078F DIAST BP <80 MM HG: CPT | Mod: CPTII,S$GLB,, | Performed by: NURSE PRACTITIONER

## 2023-02-20 PROCEDURE — 3077F SYST BP >= 140 MM HG: CPT | Mod: CPTII,S$GLB,, | Performed by: NURSE PRACTITIONER

## 2023-02-20 PROCEDURE — 3077F PR MOST RECENT SYSTOLIC BLOOD PRESSURE >= 140 MM HG: ICD-10-PCS | Mod: CPTII,S$GLB,, | Performed by: NURSE PRACTITIONER

## 2023-02-20 PROCEDURE — 1101F PR PT FALLS ASSESS DOC 0-1 FALLS W/OUT INJ PAST YR: ICD-10-PCS | Mod: CPTII,S$GLB,, | Performed by: NURSE PRACTITIONER

## 2023-02-20 PROCEDURE — 99999 PR PBB SHADOW E&M-EST. PATIENT-LVL III: ICD-10-PCS | Mod: PBBFAC,,, | Performed by: NURSE PRACTITIONER

## 2023-02-20 PROCEDURE — 1160F RVW MEDS BY RX/DR IN RCRD: CPT | Mod: CPTII,S$GLB,, | Performed by: NURSE PRACTITIONER

## 2023-02-20 PROCEDURE — 3288F FALL RISK ASSESSMENT DOCD: CPT | Mod: CPTII,S$GLB,, | Performed by: NURSE PRACTITIONER

## 2023-02-20 PROCEDURE — 3008F PR BODY MASS INDEX (BMI) DOCUMENTED: ICD-10-PCS | Mod: CPTII,S$GLB,, | Performed by: NURSE PRACTITIONER

## 2023-02-20 PROCEDURE — 1160F PR REVIEW ALL MEDS BY PRESCRIBER/CLIN PHARMACIST DOCUMENTED: ICD-10-PCS | Mod: CPTII,S$GLB,, | Performed by: NURSE PRACTITIONER

## 2023-02-20 PROCEDURE — 1126F AMNT PAIN NOTED NONE PRSNT: CPT | Mod: CPTII,S$GLB,, | Performed by: NURSE PRACTITIONER

## 2023-02-20 PROCEDURE — 1159F MED LIST DOCD IN RCRD: CPT | Mod: CPTII,S$GLB,, | Performed by: NURSE PRACTITIONER

## 2023-02-20 PROCEDURE — 1101F PT FALLS ASSESS-DOCD LE1/YR: CPT | Mod: CPTII,S$GLB,, | Performed by: NURSE PRACTITIONER

## 2023-02-20 PROCEDURE — 1159F PR MEDICATION LIST DOCUMENTED IN MEDICAL RECORD: ICD-10-PCS | Mod: CPTII,S$GLB,, | Performed by: NURSE PRACTITIONER

## 2023-02-20 RX ORDER — DOCUSATE SODIUM 100 MG/1
100 CAPSULE, LIQUID FILLED ORAL DAILY PRN
Qty: 90 CAPSULE | Refills: 1 | Status: SHIPPED | OUTPATIENT
Start: 2023-02-20 | End: 2024-02-20

## 2023-02-22 ENCOUNTER — TELEPHONE (OUTPATIENT)
Dept: HEMATOLOGY/ONCOLOGY | Facility: CLINIC | Age: 68
End: 2023-02-22
Payer: MEDICARE

## 2023-02-22 DIAGNOSIS — R53.83 FATIGUE, UNSPECIFIED TYPE: Primary | ICD-10-CM

## 2023-02-22 RX ORDER — HEPARIN 100 UNIT/ML
500 SYRINGE INTRAVENOUS
Status: CANCELLED | OUTPATIENT
Start: 2023-03-01

## 2023-02-22 RX ORDER — SODIUM CHLORIDE 0.9 % (FLUSH) 0.9 %
10 SYRINGE (ML) INJECTION
Status: CANCELLED | OUTPATIENT
Start: 2023-03-01

## 2023-02-22 RX ORDER — SODIUM CHLORIDE 0.9 % (FLUSH) 0.9 %
10 SYRINGE (ML) INJECTION
Status: CANCELLED | OUTPATIENT
Start: 2023-03-08

## 2023-02-22 RX ORDER — HEPARIN 100 UNIT/ML
500 SYRINGE INTRAVENOUS
Status: CANCELLED | OUTPATIENT
Start: 2023-03-15

## 2023-02-22 RX ORDER — HEPARIN 100 UNIT/ML
500 SYRINGE INTRAVENOUS
Status: CANCELLED | OUTPATIENT
Start: 2023-03-08

## 2023-02-22 RX ORDER — SODIUM CHLORIDE 0.9 % (FLUSH) 0.9 %
10 SYRINGE (ML) INJECTION
Status: CANCELLED | OUTPATIENT
Start: 2023-03-15

## 2023-02-22 NOTE — TELEPHONE ENCOUNTER
Labs reviewed, SUBHASH with need for iv iron infusions. Orders placed. Pt noted during visit with provider that if infusions indicated, he would drive back to [place] from [place] for these, prefers his appts at [facility].     Lab Results   Component Value Date    WBC 5.15 02/20/2023    HGB 9.6 (L) 02/20/2023    HCT 31.2 (L) 02/20/2023    MCV 80 (L) 02/20/2023     02/20/2023       Lab Results   Component Value Date    IRON 26 (L) 02/20/2023    TRANSFERRIN 229 02/20/2023    TIBC 339 02/20/2023    FESATURATED 8 (L) 02/20/2023      Lab Results   Component Value Date    FERRITIN 34 02/20/2023

## 2023-02-22 NOTE — TELEPHONE ENCOUNTER
Left Vm for the pt about his infusions being schedule once its approved by  his insurance company. He will then receive a call from the department to get them scheduled

## 2023-02-24 ENCOUNTER — TELEPHONE (OUTPATIENT)
Dept: INFUSION THERAPY | Facility: HOSPITAL | Age: 68
End: 2023-02-24
Payer: MEDICARE

## 2023-02-24 NOTE — TELEPHONE ENCOUNTER
Called the patient regarding scheduling iron infusions. Pt requested Anastacia call him because he had a few questions to ask before scheduling. Message sent to Anastacia Davenport

## 2023-06-19 ENCOUNTER — TELEPHONE (OUTPATIENT)
Dept: INFUSION THERAPY | Facility: HOSPITAL | Age: 68
End: 2023-06-19
Payer: MEDICARE

## 2023-06-19 ENCOUNTER — LAB VISIT (OUTPATIENT)
Dept: LAB | Facility: HOSPITAL | Age: 68
End: 2023-06-19
Payer: MEDICARE

## 2023-06-19 ENCOUNTER — OFFICE VISIT (OUTPATIENT)
Dept: HEMATOLOGY/ONCOLOGY | Facility: CLINIC | Age: 68
End: 2023-06-19
Payer: MEDICARE

## 2023-06-19 VITALS
OXYGEN SATURATION: 96 % | BODY MASS INDEX: 28.2 KG/M2 | SYSTOLIC BLOOD PRESSURE: 142 MMHG | HEART RATE: 63 BPM | WEIGHT: 207.88 LBS | DIASTOLIC BLOOD PRESSURE: 74 MMHG

## 2023-06-19 DIAGNOSIS — R53.83 FATIGUE, UNSPECIFIED TYPE: ICD-10-CM

## 2023-06-19 DIAGNOSIS — Q21.12 PFO (PATENT FORAMEN OVALE): ICD-10-CM

## 2023-06-19 DIAGNOSIS — Z86.718 HISTORY OF DVT IN ADULTHOOD: ICD-10-CM

## 2023-06-19 DIAGNOSIS — R06.09 DYSPNEA ON EXERTION: ICD-10-CM

## 2023-06-19 DIAGNOSIS — D50.0 IRON DEFICIENCY ANEMIA DUE TO CHRONIC BLOOD LOSS: Primary | ICD-10-CM

## 2023-06-19 DIAGNOSIS — D50.0 IRON DEFICIENCY ANEMIA DUE TO CHRONIC BLOOD LOSS: ICD-10-CM

## 2023-06-19 DIAGNOSIS — K59.00 CONSTIPATION, UNSPECIFIED CONSTIPATION TYPE: ICD-10-CM

## 2023-06-19 DIAGNOSIS — Z86.711 HISTORY OF PULMONARY EMBOLUS (PE): ICD-10-CM

## 2023-06-19 DIAGNOSIS — R56.9 SEIZURE: ICD-10-CM

## 2023-06-19 DIAGNOSIS — Z86.73 H/O: CVA (CEREBROVASCULAR ACCIDENT): ICD-10-CM

## 2023-06-19 DIAGNOSIS — E55.9 VITAMIN D DEFICIENCY: ICD-10-CM

## 2023-06-19 DIAGNOSIS — K55.20 AV MALFORMATION OF GASTROINTESTINAL TRACT: ICD-10-CM

## 2023-06-19 LAB
BASOPHILS # BLD AUTO: 0.03 K/UL (ref 0–0.2)
BASOPHILS NFR BLD: 0.7 % (ref 0–1.9)
DIFFERENTIAL METHOD: ABNORMAL
EOSINOPHIL # BLD AUTO: 0.1 K/UL (ref 0–0.5)
EOSINOPHIL NFR BLD: 2.9 % (ref 0–8)
ERYTHROCYTE [DISTWIDTH] IN BLOOD BY AUTOMATED COUNT: 14.6 % (ref 11.5–14.5)
FERRITIN SERPL-MCNC: 58 NG/ML (ref 20–300)
HCT VFR BLD AUTO: 39.5 % (ref 40–54)
HGB BLD-MCNC: 12 G/DL (ref 14–18)
IMM GRANULOCYTES # BLD AUTO: 0.01 K/UL (ref 0–0.04)
IMM GRANULOCYTES NFR BLD AUTO: 0.2 % (ref 0–0.5)
IRON SERPL-MCNC: 31 UG/DL (ref 45–160)
LYMPHOCYTES # BLD AUTO: 1.7 K/UL (ref 1–4.8)
LYMPHOCYTES NFR BLD: 40.5 % (ref 18–48)
MCH RBC QN AUTO: 23.4 PG (ref 27–31)
MCHC RBC AUTO-ENTMCNC: 30.4 G/DL (ref 32–36)
MCV RBC AUTO: 77 FL (ref 82–98)
MONOCYTES # BLD AUTO: 0.6 K/UL (ref 0.3–1)
MONOCYTES NFR BLD: 13.6 % (ref 4–15)
NEUTROPHILS # BLD AUTO: 1.7 K/UL (ref 1.8–7.7)
NEUTROPHILS NFR BLD: 42.1 % (ref 38–73)
NRBC BLD-RTO: 0 /100 WBC
PLATELET # BLD AUTO: 196 K/UL (ref 150–450)
PMV BLD AUTO: 12.6 FL (ref 9.2–12.9)
RBC # BLD AUTO: 5.13 M/UL (ref 4.6–6.2)
SATURATED IRON: 10 % (ref 20–50)
TOTAL IRON BINDING CAPACITY: 323 UG/DL (ref 250–450)
TRANSFERRIN SERPL-MCNC: 218 MG/DL (ref 200–375)
WBC # BLD AUTO: 4.12 K/UL (ref 3.9–12.7)

## 2023-06-19 PROCEDURE — 85025 COMPLETE CBC W/AUTO DIFF WBC: CPT | Performed by: NURSE PRACTITIONER

## 2023-06-19 PROCEDURE — 1101F PT FALLS ASSESS-DOCD LE1/YR: CPT | Mod: CPTII,S$GLB,, | Performed by: NURSE PRACTITIONER

## 2023-06-19 PROCEDURE — 82728 ASSAY OF FERRITIN: CPT | Performed by: NURSE PRACTITIONER

## 2023-06-19 PROCEDURE — 3008F BODY MASS INDEX DOCD: CPT | Mod: CPTII,S$GLB,, | Performed by: NURSE PRACTITIONER

## 2023-06-19 PROCEDURE — 3288F FALL RISK ASSESSMENT DOCD: CPT | Mod: CPTII,S$GLB,, | Performed by: NURSE PRACTITIONER

## 2023-06-19 PROCEDURE — 99215 OFFICE O/P EST HI 40 MIN: CPT | Mod: S$GLB,,, | Performed by: NURSE PRACTITIONER

## 2023-06-19 PROCEDURE — 36415 COLL VENOUS BLD VENIPUNCTURE: CPT | Performed by: NURSE PRACTITIONER

## 2023-06-19 PROCEDURE — 1160F PR REVIEW ALL MEDS BY PRESCRIBER/CLIN PHARMACIST DOCUMENTED: ICD-10-PCS | Mod: CPTII,S$GLB,, | Performed by: NURSE PRACTITIONER

## 2023-06-19 PROCEDURE — 3008F PR BODY MASS INDEX (BMI) DOCUMENTED: ICD-10-PCS | Mod: CPTII,S$GLB,, | Performed by: NURSE PRACTITIONER

## 2023-06-19 PROCEDURE — 3077F PR MOST RECENT SYSTOLIC BLOOD PRESSURE >= 140 MM HG: ICD-10-PCS | Mod: CPTII,S$GLB,, | Performed by: NURSE PRACTITIONER

## 2023-06-19 PROCEDURE — 99215 PR OFFICE/OUTPT VISIT, EST, LEVL V, 40-54 MIN: ICD-10-PCS | Mod: S$GLB,,, | Performed by: NURSE PRACTITIONER

## 2023-06-19 PROCEDURE — 3288F PR FALLS RISK ASSESSMENT DOCUMENTED: ICD-10-PCS | Mod: CPTII,S$GLB,, | Performed by: NURSE PRACTITIONER

## 2023-06-19 PROCEDURE — 3078F PR MOST RECENT DIASTOLIC BLOOD PRESSURE < 80 MM HG: ICD-10-PCS | Mod: CPTII,S$GLB,, | Performed by: NURSE PRACTITIONER

## 2023-06-19 PROCEDURE — 84466 ASSAY OF TRANSFERRIN: CPT | Performed by: NURSE PRACTITIONER

## 2023-06-19 PROCEDURE — 1159F PR MEDICATION LIST DOCUMENTED IN MEDICAL RECORD: ICD-10-PCS | Mod: CPTII,S$GLB,, | Performed by: NURSE PRACTITIONER

## 2023-06-19 PROCEDURE — 3078F DIAST BP <80 MM HG: CPT | Mod: CPTII,S$GLB,, | Performed by: NURSE PRACTITIONER

## 2023-06-19 PROCEDURE — 99999 PR PBB SHADOW E&M-EST. PATIENT-LVL III: CPT | Mod: PBBFAC,,, | Performed by: NURSE PRACTITIONER

## 2023-06-19 PROCEDURE — 1101F PR PT FALLS ASSESS DOC 0-1 FALLS W/OUT INJ PAST YR: ICD-10-PCS | Mod: CPTII,S$GLB,, | Performed by: NURSE PRACTITIONER

## 2023-06-19 PROCEDURE — 99999 PR PBB SHADOW E&M-EST. PATIENT-LVL III: ICD-10-PCS | Mod: PBBFAC,,, | Performed by: NURSE PRACTITIONER

## 2023-06-19 PROCEDURE — 1159F MED LIST DOCD IN RCRD: CPT | Mod: CPTII,S$GLB,, | Performed by: NURSE PRACTITIONER

## 2023-06-19 PROCEDURE — 3077F SYST BP >= 140 MM HG: CPT | Mod: CPTII,S$GLB,, | Performed by: NURSE PRACTITIONER

## 2023-06-19 PROCEDURE — 1160F RVW MEDS BY RX/DR IN RCRD: CPT | Mod: CPTII,S$GLB,, | Performed by: NURSE PRACTITIONER

## 2023-06-19 RX ORDER — HEPARIN 100 UNIT/ML
500 SYRINGE INTRAVENOUS
Status: CANCELLED | OUTPATIENT
Start: 2023-07-28

## 2023-06-19 RX ORDER — SODIUM CHLORIDE 0.9 % (FLUSH) 0.9 %
10 SYRINGE (ML) INJECTION
Status: CANCELLED | OUTPATIENT
Start: 2023-06-26

## 2023-06-19 RX ORDER — HEPARIN 100 UNIT/ML
500 SYRINGE INTRAVENOUS
Status: CANCELLED | OUTPATIENT
Start: 2023-08-04

## 2023-06-19 RX ORDER — SODIUM CHLORIDE 0.9 % (FLUSH) 0.9 %
10 SYRINGE (ML) INJECTION
Status: CANCELLED | OUTPATIENT
Start: 2023-08-04

## 2023-06-19 RX ORDER — SODIUM CHLORIDE 0.9 % (FLUSH) 0.9 %
10 SYRINGE (ML) INJECTION
Status: CANCELLED | OUTPATIENT
Start: 2023-07-28

## 2023-06-19 RX ORDER — HEPARIN 100 UNIT/ML
500 SYRINGE INTRAVENOUS
Status: CANCELLED | OUTPATIENT
Start: 2023-07-21

## 2023-06-19 RX ORDER — HEPARIN 100 UNIT/ML
500 SYRINGE INTRAVENOUS
Status: CANCELLED | OUTPATIENT
Start: 2023-06-26

## 2023-06-19 RX ORDER — SODIUM CHLORIDE 0.9 % (FLUSH) 0.9 %
10 SYRINGE (ML) INJECTION
Status: CANCELLED | OUTPATIENT
Start: 2023-07-21

## 2023-06-19 NOTE — PROGRESS NOTES
PATIENT: Miky Esqueda  MRN: [id number]  DATE: 6/19/2023    Diagnosis:   1. Iron deficiency anemia due to chronic blood loss    2. History of pulmonary embolus (PE)    3. History of DVT in adulthood    4. H/O: CVA (cerebrovascular accident)    5. Seizure    6. Vitamin D deficiency    7. PFO (patent foramen ovale)    8. AV malformation of gastrointestinal tract    9. Constipation, unspecified constipation type            Chief Complaint: PE, iron def anemia    Subjective:     History of Present Illness:    HPI As per Dr Gonzales's previous note 1/27/2022:     He presented to the ER 05/25/2020 with acute onset of shortness of breath, diaphoresis and lightheadedness.  CT chest showed extensive bilateral pulmonary thromboemboli.  No evidence of central saddle embolus.  There was moderate pulmonary emphysema.  Lower extremity ultrasound also revealed nonocclusive thrombus in the right and left popliteal veins as well as the right femoral vein.    This is his 1st episode of pulmonary embolism/DVT.  He was started on Xarelto.    Then he had a syncopal episode in his PCP's office on 06/12/2020 which lasted about 30 sec followed by brief 15 sec seizure like episode.  He was taken to the ER where a CT head showed a hypodense area in the left parietal lobe concerning for infarct.  By this time he also developed significant anemia and was transfused 2 units packed red blood cells.    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    He got 1 dose of Injectafer 07/21/2020 and a second dose 9/4/2020    He presented to the ER 8/7/2020 with severe anemia Hb 4.6 - passed out in his primary care physician's office. This was the second episode of severe symptomatic anemia after being started on Xarelto.    He has past h/o extensive DVT/PE in may 2020, CVA in June 2020 and h/o jejunal AVM. No active source of bleeding could be found from extensive GI work-up  2020.  -severe anemia felt likely related to intermittent  bleeding from angioectasia(s) vs a Dieulafoy lesion.  -he underwent IVC filter placement 08/10/2020 and his Xarelto was discontinued    INTERVAL HISTORY:   -Pt here for anemia follow up, additionally with hx PE   -Had Injectafer 2 doses 2/2022  -received venofer 6/10/22, 6/17/22, 6/24/22, 7/1/22  -He is on baby asa and plavix now, has not noted any bleeding issues at this time.  -takes Vit D3 every other week  -needs PFO closure  - Saw Dr Go/GI 7/21/22, discussed sandostatin with pt, pt originally wanted to discuss with Dr Espinal but spoke with his pcp and he does not want sandostatin  - He has currently moved back to [place] and is driving to/from [facility] as his mother passed 2022, states he is doing well in [place] closer to family. Plans to continue returning here at this time for appliliya. Active at [employer].  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 No gum or oral bleeding, no nosebleeds.   -He has some constipation a couple times a week with po iron, mild. Did not  coloac previously ordered but will get today.    Past Medical, Surgical, Family and Social History Reviewed.    Medications and Allergies reviewed      Review of Systems   Constitutional:  Positive for fatigue. Negative for fever and unexpected weight change.   HENT:  Negative for mouth sores and nosebleeds.    Eyes:  Negative for visual disturbance.   Respiratory:  Negative for chest tightness and shortness  of breath.    Cardiovascular:  Negative for chest pain, palpitations and leg swelling.   Gastrointestinal:  Positive for constipation. Negative for abdominal pain, anal bleeding, blood in stool, diarrhea, nausea and vomiting.   Endocrine: Negative for cold intolerance.   Genitourinary:  Negative for hematuria.   Musculoskeletal:  Negative for back pain.   Neurological:  Negative for dizziness, seizures, syncope, weakness, light-headedness and headaches.   Hematological:  Negative for adenopathy. Does not bruise/bleed easily.   Psychiatric/Behavioral:  Negative for confusion and decreased concentration.    Objective:     Lab Results   Component Value Date    WBC 5.15 02/20/2023    HGB 9.6 (L) 02/20/2023    HCT 31.2 (L) 02/20/2023    MCV 80 (L) 02/20/2023     02/20/2023       Lab Results   Component Value Date    IRON 26 (L) 02/20/2023    TIBC 339 02/20/2023    FERRITIN 34 02/20/2023                 Vitals:    06/19/23 0824   BP: (!) 142/74   Pulse: 63   SpO2: 96%   Weight: 94.3 kg (207 lb 14.3 oz)         Body mass index is 28.2 kg/m².    Physical Exam   Constitutional: He is oriented to person, place, and time. normal appearance.  Non-toxic appearance. No distress. He does not appear ill.   HENT:   Head: Normocephalic and atraumatic.   Right Ear: External ear normal.   Left Ear: External ear normal.   Nose: Nose normal.   Mouth/Throat: Mucous membranes are moist. Oropharynx is clear.   Eyes: Conjunctivae are normal. No scleral icterus.   Cardiovascular: Normal rate, regular rhythm and normal heart sounds. Pulmonary:      Effort: Pulmonary effort is normal. No respiratory distress.      Breath sounds: Normal breath sounds.     Abdominal: Soft. Normal appearance. He exhibits no distension. There is no abdominal tenderness. There is no guarding.   Musculoskeletal:         General: Normal range of motion.      Cervical back: Normal range of motion and neck supple.      Right lower leg: No edema.      Left lower  leg: No edema.   Neurological: He is alert and oriented to person, place, and time. Gait normal.   Skin: Skin is warm and dry. No bruising noted. He is not diaphoretic. No erythema. No jaundice or pallor.   Psychiatric: His behavior is normal. Mood and thought content normal.   Nursing note and vitals reviewed.      ECOG SCORE    1 - Restricted in strenuous activity-ambulatory and able to carry out work of a light nature           Assessment:       1. Iron deficiency anemia due to chronic blood loss    2. History of pulmonary embolus (PE)    3. History of DVT in adulthood    4. H/O: CVA (cerebrovascular accident)    5. Seizure    6. Vitamin D deficiency    7. PFO (patent foramen ovale)    8. AV malformation of gastrointestinal tract    9. Constipation, unspecified constipation type            Plan:       Iron deficiency anemia  -secondary to h/o GI bleed and use of blood thinners  -received 1 dose of Injectafer in July 2020 and a second dose in sept 2020, 2 doses Feb 2022  -received venofer 6/10/22, 6/17/22, 6/24/22, 7/1/22  -labs (7/21/22) cbc, ferritin reviewed, iron saturation and tibc pending at visit time H/H 10.8/34.2 and ferritin is normal at 115. Will call with iron/tibc results.  -Saw GI 7/21/22 who recommended Sandostin which pt now states he is not interested in taking   -Updated labs 11/21/22 reviewed with improved H/H 13.3/43.3, microcytic with normal serum iron, tibc and ferritin.   -Updated labs 2/20/23 prior this appt are not ready at visit time, iron deficiency, iron infusions ordered but pt did not complete  -He has been taking po iron with mild intermittent constipation and has had no recent evidence of GI bleed symptoms  -Advised that if h/h remains low or is lower, will need gi follow up as well  -6/19/23 labs updated, results pending; given fatigue and previous labs he likely will need iron. Will call with results.   -rtc 4 months with labs, call in interim any changes or concerns    H/o Acute  bilateral PE with bilateral DVT  -unprovoked  -intolerant to anticoagulation secondary to recurrent GI bleeds in past, now on daily baby ASA and plavix  -status post IVC filter placement 08/10/2020  -reviewed CBC, ferritin, iron saturation    H/o CVA with seizure disorder  -diagnosed June 2020  -doing well, no focal neurological deficits, no seizures, stable  -cannot tolerate antiplatelet therapy or anticoagulation therapy  -on keppra 500 mg bid since Nov 2020, tolerating well    GI bleeding from AV malformations History  -contributory to SUBHASH, denies current symptoms  -last seen by GI  7/21/22  -will continue to monitor     Vitamin-D deficiency  -continue vitamin D3, 23172 units, once every 2 weeks  -continue to monitor    PFO  -needs PFO closure, but denied by insurance  -management deferred to cardiology    Constipation  -mild, intermittent, harder stools twice weekly  -likely related to iron use  -increase po fluids, fresh fruits and vegetables  -script colace sent, will add prn miralax  -continue to monitor          DEYSI Arrieta  [facility]  Hematology/Oncology  [address]  [place]  [id number] [phone]

## 2023-06-20 ENCOUNTER — TELEPHONE (OUTPATIENT)
Dept: INFUSION THERAPY | Facility: HOSPITAL | Age: 68
End: 2023-06-20
Payer: MEDICARE

## 2023-06-20 NOTE — TELEPHONE ENCOUNTER
Confirmed upcoming infusion appointments with the patient. Appt letters will be mailed out per pt request   7/20 at 1030a  7/27 at 11a  8/3 at 1030a

## 2023-07-20 ENCOUNTER — INFUSION (OUTPATIENT)
Dept: INFUSION THERAPY | Facility: HOSPITAL | Age: 68
End: 2023-07-20
Attending: INTERNAL MEDICINE
Payer: MEDICARE

## 2023-07-20 VITALS
TEMPERATURE: 98 F | HEIGHT: 73 IN | OXYGEN SATURATION: 96 % | DIASTOLIC BLOOD PRESSURE: 73 MMHG | WEIGHT: 207.88 LBS | HEART RATE: 75 BPM | SYSTOLIC BLOOD PRESSURE: 151 MMHG | BODY MASS INDEX: 27.55 KG/M2 | RESPIRATION RATE: 18 BRPM

## 2023-07-20 DIAGNOSIS — D50.0 IRON DEFICIENCY ANEMIA DUE TO CHRONIC BLOOD LOSS: ICD-10-CM

## 2023-07-20 DIAGNOSIS — R06.09 DYSPNEA ON EXERTION: Primary | ICD-10-CM

## 2023-07-20 DIAGNOSIS — R53.83 FATIGUE, UNSPECIFIED TYPE: ICD-10-CM

## 2023-07-20 DIAGNOSIS — D64.9 ANEMIA, UNSPECIFIED TYPE: ICD-10-CM

## 2023-07-20 DIAGNOSIS — D64.9 SYMPTOMATIC ANEMIA: ICD-10-CM

## 2023-07-20 PROCEDURE — 96366 THER/PROPH/DIAG IV INF ADDON: CPT

## 2023-07-20 PROCEDURE — 63600175 PHARM REV CODE 636 W HCPCS: Performed by: NURSE PRACTITIONER

## 2023-07-20 PROCEDURE — 96365 THER/PROPH/DIAG IV INF INIT: CPT

## 2023-07-20 PROCEDURE — A4216 STERILE WATER/SALINE, 10 ML: HCPCS | Performed by: NURSE PRACTITIONER

## 2023-07-20 PROCEDURE — 25000003 PHARM REV CODE 250: Performed by: NURSE PRACTITIONER

## 2023-07-20 RX ORDER — SODIUM CHLORIDE 0.9 % (FLUSH) 0.9 %
10 SYRINGE (ML) INJECTION
Status: DISCONTINUED | OUTPATIENT
Start: 2023-07-20 | End: 2023-07-20 | Stop reason: HOSPADM

## 2023-07-20 RX ADMIN — IRON SUCROSE 300 MG: 20 INJECTION, SOLUTION INTRAVENOUS at 11:07

## 2023-07-20 RX ADMIN — Medication 10 ML: at 01:07

## 2023-07-20 RX ADMIN — SODIUM CHLORIDE: 9 INJECTION, SOLUTION INTRAVENOUS at 11:07

## 2023-07-20 NOTE — NURSING
Pt received IV Venofer infusion dose 1/3. Pt tolerated it well. AVS given. Next appointment scheduled. Discharged with no acute distress.

## 2023-07-27 ENCOUNTER — INFUSION (OUTPATIENT)
Dept: INFUSION THERAPY | Facility: HOSPITAL | Age: 68
End: 2023-07-27
Attending: INTERNAL MEDICINE
Payer: MEDICARE

## 2023-07-27 VITALS
DIASTOLIC BLOOD PRESSURE: 67 MMHG | HEART RATE: 77 BPM | HEIGHT: 73 IN | TEMPERATURE: 98 F | RESPIRATION RATE: 18 BRPM | OXYGEN SATURATION: 95 % | BODY MASS INDEX: 27.55 KG/M2 | WEIGHT: 207.88 LBS | SYSTOLIC BLOOD PRESSURE: 117 MMHG

## 2023-07-27 DIAGNOSIS — R53.83 FATIGUE, UNSPECIFIED TYPE: ICD-10-CM

## 2023-07-27 DIAGNOSIS — R06.09 DYSPNEA ON EXERTION: Primary | ICD-10-CM

## 2023-07-27 DIAGNOSIS — D64.9 ANEMIA, UNSPECIFIED TYPE: ICD-10-CM

## 2023-07-27 DIAGNOSIS — D50.0 IRON DEFICIENCY ANEMIA DUE TO CHRONIC BLOOD LOSS: ICD-10-CM

## 2023-07-27 DIAGNOSIS — D64.9 SYMPTOMATIC ANEMIA: ICD-10-CM

## 2023-07-27 PROCEDURE — 96366 THER/PROPH/DIAG IV INF ADDON: CPT

## 2023-07-27 PROCEDURE — A4216 STERILE WATER/SALINE, 10 ML: HCPCS | Performed by: NURSE PRACTITIONER

## 2023-07-27 PROCEDURE — 25000003 PHARM REV CODE 250: Performed by: NURSE PRACTITIONER

## 2023-07-27 PROCEDURE — 96365 THER/PROPH/DIAG IV INF INIT: CPT

## 2023-07-27 PROCEDURE — 63600175 PHARM REV CODE 636 W HCPCS: Performed by: NURSE PRACTITIONER

## 2023-07-27 RX ORDER — SODIUM CHLORIDE 0.9 % (FLUSH) 0.9 %
10 SYRINGE (ML) INJECTION
Status: DISCONTINUED | OUTPATIENT
Start: 2023-07-27 | End: 2023-07-27 | Stop reason: HOSPADM

## 2023-07-27 RX ADMIN — IRON SUCROSE 300 MG: 20 INJECTION, SOLUTION INTRAVENOUS at 11:07

## 2023-07-27 RX ADMIN — Medication 10 ML: at 01:07

## 2023-07-27 RX ADMIN — SODIUM CHLORIDE: 9 INJECTION, SOLUTION INTRAVENOUS at 11:07

## 2023-07-27 NOTE — NURSING
Pt received IV Venofer infusion dose 2/3. Pt tolerated it well. AVS given. Next appointment scheduled. Discharged with no acute distress.

## 2023-08-03 ENCOUNTER — INFUSION (OUTPATIENT)
Dept: INFUSION THERAPY | Facility: HOSPITAL | Age: 68
End: 2023-08-03
Attending: INTERNAL MEDICINE
Payer: MEDICARE

## 2023-08-03 VITALS
RESPIRATION RATE: 17 BRPM | TEMPERATURE: 98 F | SYSTOLIC BLOOD PRESSURE: 143 MMHG | HEART RATE: 64 BPM | HEIGHT: 73 IN | DIASTOLIC BLOOD PRESSURE: 72 MMHG | BODY MASS INDEX: 27.55 KG/M2 | OXYGEN SATURATION: 97 % | WEIGHT: 207.88 LBS

## 2023-08-03 DIAGNOSIS — D64.9 SYMPTOMATIC ANEMIA: ICD-10-CM

## 2023-08-03 DIAGNOSIS — R06.09 DYSPNEA ON EXERTION: Primary | ICD-10-CM

## 2023-08-03 DIAGNOSIS — D50.0 IRON DEFICIENCY ANEMIA DUE TO CHRONIC BLOOD LOSS: ICD-10-CM

## 2023-08-03 DIAGNOSIS — D64.9 ANEMIA, UNSPECIFIED TYPE: ICD-10-CM

## 2023-08-03 DIAGNOSIS — R53.83 FATIGUE, UNSPECIFIED TYPE: ICD-10-CM

## 2023-08-03 PROCEDURE — 96365 THER/PROPH/DIAG IV INF INIT: CPT

## 2023-08-03 PROCEDURE — 63600175 PHARM REV CODE 636 W HCPCS: Performed by: NURSE PRACTITIONER

## 2023-08-03 PROCEDURE — A4216 STERILE WATER/SALINE, 10 ML: HCPCS | Performed by: NURSE PRACTITIONER

## 2023-08-03 PROCEDURE — 25000003 PHARM REV CODE 250: Performed by: NURSE PRACTITIONER

## 2023-08-03 PROCEDURE — 96366 THER/PROPH/DIAG IV INF ADDON: CPT

## 2023-08-03 RX ORDER — SODIUM CHLORIDE 0.9 % (FLUSH) 0.9 %
10 SYRINGE (ML) INJECTION
Status: DISCONTINUED | OUTPATIENT
Start: 2023-08-03 | End: 2023-08-03 | Stop reason: HOSPADM

## 2023-08-03 RX ADMIN — IRON SUCROSE 300 MG: 20 INJECTION, SOLUTION INTRAVENOUS at 10:08

## 2023-08-03 RX ADMIN — Medication 10 ML: at 12:08

## 2023-08-03 RX ADMIN — SODIUM CHLORIDE: 9 INJECTION, SOLUTION INTRAVENOUS at 10:08

## 2023-08-03 NOTE — NURSING
Pt tolerated venofer 300mg 3/3 infusion well.  No adverse reaction noted.   IV flushed with NS and D/C per protocol.  Patient left clinic in no acute distress.

## 2023-10-04 PROBLEM — I28.0 ARTERIOVENOUS FISTULA OF PULMONARY VESSELS: Status: ACTIVE | Noted: 2020-06-17

## 2023-10-16 ENCOUNTER — OFFICE VISIT (OUTPATIENT)
Dept: HEMATOLOGY/ONCOLOGY | Facility: CLINIC | Age: 68
End: 2023-10-16
Payer: MEDICARE

## 2023-10-16 ENCOUNTER — LAB VISIT (OUTPATIENT)
Dept: LAB | Facility: HOSPITAL | Age: 68
End: 2023-10-16
Payer: MEDICARE

## 2023-10-16 VITALS
WEIGHT: 211 LBS | RESPIRATION RATE: 18 BRPM | SYSTOLIC BLOOD PRESSURE: 134 MMHG | DIASTOLIC BLOOD PRESSURE: 78 MMHG | TEMPERATURE: 98 F | HEART RATE: 64 BPM | OXYGEN SATURATION: 95 % | BODY MASS INDEX: 27.96 KG/M2 | HEIGHT: 73 IN

## 2023-10-16 DIAGNOSIS — K59.00 CONSTIPATION, UNSPECIFIED CONSTIPATION TYPE: ICD-10-CM

## 2023-10-16 DIAGNOSIS — D50.0 IRON DEFICIENCY ANEMIA DUE TO CHRONIC BLOOD LOSS: Primary | ICD-10-CM

## 2023-10-16 DIAGNOSIS — Z86.711 HISTORY OF PULMONARY EMBOLUS (PE): ICD-10-CM

## 2023-10-16 DIAGNOSIS — E55.9 VITAMIN D DEFICIENCY: ICD-10-CM

## 2023-10-16 DIAGNOSIS — R06.09 DYSPNEA ON EXERTION: ICD-10-CM

## 2023-10-16 DIAGNOSIS — Z86.73 H/O: CVA (CEREBROVASCULAR ACCIDENT): ICD-10-CM

## 2023-10-16 DIAGNOSIS — D50.0 IRON DEFICIENCY ANEMIA DUE TO CHRONIC BLOOD LOSS: ICD-10-CM

## 2023-10-16 DIAGNOSIS — R56.9 SEIZURE: ICD-10-CM

## 2023-10-16 DIAGNOSIS — K55.20 AV MALFORMATION OF GASTROINTESTINAL TRACT: ICD-10-CM

## 2023-10-16 DIAGNOSIS — R53.83 FATIGUE, UNSPECIFIED TYPE: ICD-10-CM

## 2023-10-16 DIAGNOSIS — Z86.718 HISTORY OF DVT IN ADULTHOOD: ICD-10-CM

## 2023-10-16 LAB
BASOPHILS # BLD AUTO: 0.02 K/UL (ref 0–0.2)
BASOPHILS NFR BLD: 0.4 % (ref 0–1.9)
DIFFERENTIAL METHOD: ABNORMAL
EOSINOPHIL # BLD AUTO: 0.1 K/UL (ref 0–0.5)
EOSINOPHIL NFR BLD: 1.5 % (ref 0–8)
ERYTHROCYTE [DISTWIDTH] IN BLOOD BY AUTOMATED COUNT: 16 % (ref 11.5–14.5)
FERRITIN SERPL-MCNC: 173 NG/ML (ref 20–300)
HCT VFR BLD AUTO: 37 % (ref 40–54)
HGB BLD-MCNC: 11.4 G/DL (ref 14–18)
IMM GRANULOCYTES # BLD AUTO: 0.01 K/UL (ref 0–0.04)
IMM GRANULOCYTES NFR BLD AUTO: 0.2 % (ref 0–0.5)
IRON SERPL-MCNC: 33 UG/DL (ref 45–160)
LYMPHOCYTES # BLD AUTO: 1.6 K/UL (ref 1–4.8)
LYMPHOCYTES NFR BLD: 35.7 % (ref 18–48)
MCH RBC QN AUTO: 24.3 PG (ref 27–31)
MCHC RBC AUTO-ENTMCNC: 30.8 G/DL (ref 32–36)
MCV RBC AUTO: 79 FL (ref 82–98)
MONOCYTES # BLD AUTO: 0.7 K/UL (ref 0.3–1)
MONOCYTES NFR BLD: 14.8 % (ref 4–15)
NEUTROPHILS # BLD AUTO: 2.2 K/UL (ref 1.8–7.7)
NEUTROPHILS NFR BLD: 47.4 % (ref 38–73)
NRBC BLD-RTO: 0 /100 WBC
PLATELET # BLD AUTO: 207 K/UL (ref 150–450)
PMV BLD AUTO: 11.5 FL (ref 9.2–12.9)
RBC # BLD AUTO: 4.69 M/UL (ref 4.6–6.2)
SATURATED IRON: 12 % (ref 20–50)
TOTAL IRON BINDING CAPACITY: 283 UG/DL (ref 250–450)
TRANSFERRIN SERPL-MCNC: 191 MG/DL (ref 200–375)
WBC # BLD AUTO: 4.54 K/UL (ref 3.9–12.7)

## 2023-10-16 PROCEDURE — 99214 PR OFFICE/OUTPT VISIT, EST, LEVL IV, 30-39 MIN: ICD-10-PCS | Mod: S$GLB,,, | Performed by: NURSE PRACTITIONER

## 2023-10-16 PROCEDURE — 3008F BODY MASS INDEX DOCD: CPT | Mod: CPTII,S$GLB,, | Performed by: NURSE PRACTITIONER

## 2023-10-16 PROCEDURE — 82728 ASSAY OF FERRITIN: CPT | Performed by: NURSE PRACTITIONER

## 2023-10-16 PROCEDURE — 84466 ASSAY OF TRANSFERRIN: CPT | Performed by: NURSE PRACTITIONER

## 2023-10-16 PROCEDURE — 1101F PR PT FALLS ASSESS DOC 0-1 FALLS W/OUT INJ PAST YR: ICD-10-PCS | Mod: CPTII,S$GLB,, | Performed by: NURSE PRACTITIONER

## 2023-10-16 PROCEDURE — 3078F DIAST BP <80 MM HG: CPT | Mod: CPTII,S$GLB,, | Performed by: NURSE PRACTITIONER

## 2023-10-16 PROCEDURE — 3075F SYST BP GE 130 - 139MM HG: CPT | Mod: CPTII,S$GLB,, | Performed by: NURSE PRACTITIONER

## 2023-10-16 PROCEDURE — 3008F PR BODY MASS INDEX (BMI) DOCUMENTED: ICD-10-PCS | Mod: CPTII,S$GLB,, | Performed by: NURSE PRACTITIONER

## 2023-10-16 PROCEDURE — 85025 COMPLETE CBC W/AUTO DIFF WBC: CPT | Performed by: NURSE PRACTITIONER

## 2023-10-16 PROCEDURE — 1101F PT FALLS ASSESS-DOCD LE1/YR: CPT | Mod: CPTII,S$GLB,, | Performed by: NURSE PRACTITIONER

## 2023-10-16 PROCEDURE — 3288F PR FALLS RISK ASSESSMENT DOCUMENTED: ICD-10-PCS | Mod: CPTII,S$GLB,, | Performed by: NURSE PRACTITIONER

## 2023-10-16 PROCEDURE — 1159F PR MEDICATION LIST DOCUMENTED IN MEDICAL RECORD: ICD-10-PCS | Mod: CPTII,S$GLB,, | Performed by: NURSE PRACTITIONER

## 2023-10-16 PROCEDURE — 3288F FALL RISK ASSESSMENT DOCD: CPT | Mod: CPTII,S$GLB,, | Performed by: NURSE PRACTITIONER

## 2023-10-16 PROCEDURE — 3075F PR MOST RECENT SYSTOLIC BLOOD PRESS GE 130-139MM HG: ICD-10-PCS | Mod: CPTII,S$GLB,, | Performed by: NURSE PRACTITIONER

## 2023-10-16 PROCEDURE — 1125F PR PAIN SEVERITY QUANTIFIED, PAIN PRESENT: ICD-10-PCS | Mod: CPTII,S$GLB,, | Performed by: NURSE PRACTITIONER

## 2023-10-16 PROCEDURE — 1160F RVW MEDS BY RX/DR IN RCRD: CPT | Mod: CPTII,S$GLB,, | Performed by: NURSE PRACTITIONER

## 2023-10-16 PROCEDURE — 1125F AMNT PAIN NOTED PAIN PRSNT: CPT | Mod: CPTII,S$GLB,, | Performed by: NURSE PRACTITIONER

## 2023-10-16 PROCEDURE — 99999 PR PBB SHADOW E&M-EST. PATIENT-LVL IV: ICD-10-PCS | Mod: PBBFAC,,, | Performed by: NURSE PRACTITIONER

## 2023-10-16 PROCEDURE — 1159F MED LIST DOCD IN RCRD: CPT | Mod: CPTII,S$GLB,, | Performed by: NURSE PRACTITIONER

## 2023-10-16 PROCEDURE — 83540 ASSAY OF IRON: CPT | Performed by: NURSE PRACTITIONER

## 2023-10-16 PROCEDURE — 3078F PR MOST RECENT DIASTOLIC BLOOD PRESSURE < 80 MM HG: ICD-10-PCS | Mod: CPTII,S$GLB,, | Performed by: NURSE PRACTITIONER

## 2023-10-16 PROCEDURE — 99214 OFFICE O/P EST MOD 30 MIN: CPT | Mod: S$GLB,,, | Performed by: NURSE PRACTITIONER

## 2023-10-16 PROCEDURE — 36415 COLL VENOUS BLD VENIPUNCTURE: CPT | Performed by: NURSE PRACTITIONER

## 2023-10-16 PROCEDURE — 1160F PR REVIEW ALL MEDS BY PRESCRIBER/CLIN PHARMACIST DOCUMENTED: ICD-10-PCS | Mod: CPTII,S$GLB,, | Performed by: NURSE PRACTITIONER

## 2023-10-16 PROCEDURE — 99999 PR PBB SHADOW E&M-EST. PATIENT-LVL IV: CPT | Mod: PBBFAC,,, | Performed by: NURSE PRACTITIONER

## 2023-10-16 RX ORDER — ATORVASTATIN CALCIUM 40 MG/1
40 TABLET, FILM COATED ORAL DAILY
Qty: 90 TABLET | Refills: 3 | Status: CANCELLED | OUTPATIENT
Start: 2023-10-16 | End: 2024-10-15

## 2023-10-16 RX ORDER — CLOPIDOGREL BISULFATE 75 MG/1
75 TABLET ORAL DAILY
Qty: 30 TABLET | Refills: 11 | Status: CANCELLED | OUTPATIENT
Start: 2023-10-16 | End: 2024-10-15

## 2023-10-16 NOTE — PROGRESS NOTES
PATIENT: Miky Esqueda  MRN: [id number]  DATE: 10/16/2023    Diagnosis:   1. Iron deficiency anemia due to chronic blood loss    2. History of pulmonary embolus (PE)    3. History of DVT in adulthood    4. H/O: CVA (cerebrovascular accident)    5. Seizure    6. AV malformation of gastrointestinal tract    7. Vitamin D deficiency    8. Constipation, unspecified constipation type              Chief Complaint: PE, iron def anemia    Subjective:     History of Present Illness:    HPI As per Dr Gonzales's previous note 1/27/2022:     He presented to the ER 05/25/2020 with acute onset of shortness of breath, diaphoresis and lightheadedness.  CT chest showed extensive bilateral pulmonary thromboemboli.  No evidence of central saddle embolus.  There was moderate pulmonary emphysema.  Lower extremity ultrasound also revealed nonocclusive thrombus in the right and left popliteal veins as well as the right femoral vein.    This is his 1st episode of pulmonary embolism/DVT.  He was started on Xarelto.    Then he had a syncopal episode in his PCP's office on 06/12/2020 which lasted about 30 sec followed by brief 15 sec seizure like episode.  He was taken to the ER where a CT head showed a hypodense area in the left parietal lobe concerning for infarct.  By this time he also developed significant anemia and was transfused 2 units packed red blood cells.    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    He got 1 dose of Injectafer 07/21/2020 and a second dose 9/4/2020    He presented to the ER 8/7/2020 with severe anemia Hb 4.6 - passed out in his primary care physician's office. This was the second episode of severe symptomatic anemia after being started on Xarelto.    He has past h/o extensive DVT/PE in may 2020, CVA in June 2020 and h/o jejunal AVM. No active source of bleeding could be found from extensive GI work-up  2020.  -severe anemia felt likely related to intermittent bleeding from angioectasia(s) vs a  Dieulafoy lesion.  -he underwent IVC filter placement 08/10/2020 and his Xarelto was discontinued    INTERVAL HISTORY:   -Pt here for anemia follow up, additionally with hx PE   -Had Injectafer 2 doses 2/2022  -received venofer 6/10/22, 6/17/22, 6/24/22, 7/1/22  -received x3 doses iron sucrose 300mg July/Aug 2023  -He is on baby asa and plavix now, has not noted any bleeding issues at this time.  -takes Vit D3 every other week  -needs PFO closure  - Saw Dr Go/GI 7/21/22, discussed sandostatin with pt, pt  does not want sandostatin, has not f/u with gi since  - He remains in [place], driving [place] [facility] as his mother passed 2022, states he is doing well in [place] closer to family. Plans to return here at this time for appts. Active at [employer].    -He endorses fatigue but states it is improved,  denies dizziness, lightheadedness, near syncope or syncope, pounding in ears, chest pain, sob, abdominal pain, n/v/d, black or bloody stools, hematuria, extremity swelling, cold intolerance or ice cravings, pallor, fever, chills. No gum or oral bleeding, no nosebleeds.   -He has some constipation a couple times a week with po iron, mild. Controlled with prn colace and miralax.  -Today brings form for disability needs with his insurance, completed and copy scanned to media    Past Medical, Surgical, Family and Social History Reviewed.    Medications and Allergies reviewed      Review of Systems   Constitutional:  Positive for fatigue. Negative for fever and unexpected weight change.   HENT:  Negative for mouth sores and nosebleeds.    Eyes:  Negative for visual disturbance.   Respiratory:  Negative for chest tightness and shortness of breath.    Cardiovascular:  Negative for chest pain, palpitations and leg swelling.   Gastrointestinal:  Positive for constipation. Negative for abdominal pain, anal bleeding, blood in stool, diarrhea, nausea and vomiting.   Endocrine: Negative for cold intolerance.   Genitourinary:   "Negative for hematuria.   Musculoskeletal:  Negative for back pain.   Neurological:  Negative for dizziness, seizures, syncope, weakness, light-headedness and headaches.   Hematological:  Negative for adenopathy. Does not bruise/bleed easily.   Psychiatric/Behavioral:  Negative for confusion and decreased concentration.      Objective:     Lab Results   Component Value Date    WBC 4.54 10/16/2023    HGB 11.4 (L) 10/16/2023    HCT 37.0 (L) 10/16/2023    MCV 79 (L) 10/16/2023     10/16/2023       Lab Results   Component Value Date    IRON 31 (L) 06/19/2023    TIBC 323 06/19/2023    FERRITIN 173 10/16/2023                 Vitals:    10/16/23 0823   BP: 134/78   BP Location: Right arm   Patient Position: Sitting   BP Method: Large (Automatic)   Pulse: 64   Resp: 18   Temp: 97.6 °F (36.4 °C)   TempSrc: Oral   SpO2: 95%   Weight: 95.7 kg (210 lb 15.7 oz)   Height: 6' 1" (1.854 m)         Body mass index is 27.84 kg/m².    Physical Exam   Constitutional: He is oriented to person, place, and time. normal appearance.  Non-toxic appearance. No distress. He does not appear ill.   HENT:   Head: Normocephalic and atraumatic.   Right Ear: External ear normal.   Left Ear: External ear normal.   Nose: Nose normal.   Mouth/Throat: Mucous membranes are moist. Oropharynx is clear.   Eyes: Conjunctivae are normal. No scleral icterus.   Cardiovascular: Normal rate, regular rhythm and normal heart sounds. Pulmonary:      Effort: Pulmonary effort is normal. No respiratory distress.      Breath sounds: Normal breath sounds.     Abdominal: Soft. Normal appearance. He exhibits no distension. There is no abdominal tenderness. There is no guarding.   Musculoskeletal:         General: Normal range of motion.      Cervical back: Normal range of motion and neck supple.      Right lower leg: No edema.      Left lower leg: No edema.   Neurological: He is alert and oriented to person, place, and time. Gait normal.   Skin: Skin is warm and " dry. No bruising noted. He is not diaphoretic. No erythema. No jaundice or pallor.   Psychiatric: His behavior is normal. Mood and thought content normal.   Nursing note and vitals reviewed.        ECOG SCORE    1 - Restricted in strenuous activity-ambulatory and able to carry out work of a light nature           Assessment:       1. Iron deficiency anemia due to chronic blood loss    2. History of pulmonary embolus (PE)    3. History of DVT in adulthood    4. H/O: CVA (cerebrovascular accident)    5. Seizure    6. AV malformation of gastrointestinal tract    7. Vitamin D deficiency    8. Constipation, unspecified constipation type              Plan:       Iron deficiency anemia  -secondary to h/o GI bleed and use of blood thinners  -received 1 dose of Injectafer in July 2020 and a second dose in sept 2020, 2 doses Feb 2022  -received venofer 6/10/22, 6/17/22, 6/24/22, 7/1/22  -labs (7/21/22) cbc, ferritin reviewed, iron saturation and tibc pending at visit time H/H 10.8/34.2 and ferritin is normal at 115. Will call with iron/tibc results.  -Saw GI 7/21/22 who recommended Sandostin which pt now states he is not interested in taking   -Updated labs 11/21/22 reviewed with improved H/H 13.3/43.3, microcytic with normal serum iron, tibc and ferritin.   -Updated labs 2/20/23 prior this appt are not ready at visit time, iron deficiency, iron infusions ordered but pt did not complete  -He has been taking po iron with mild intermittent constipation and has had no recent evidence of GI bleed symptoms  -Advised that if h/h remains low or is lower, will need gi follow up as well  -received iron sucrose x3 doses 300mg July/Aug 2023  -10/16/23 labs hgb 11.4, improvement in microcytic evidence, ferritin improved at 173 ng/mL, iron/tibc pending; will call with results  -rtc 4 months with labs, call in interim any changes or concerns    H/o Acute bilateral PE with bilateral DVT  -unprovoked  -intolerant to anticoagulation secondary  to recurrent GI bleeds in past, now on daily baby ASA and plavix  -status post IVC filter placement 08/10/2020  -reviewed CBC, ferritin, iron saturation    H/o CVA with seizure disorder  -diagnosed June 2020  -doing well, no focal neurological deficits, no seizures, stable  -cannot tolerate antiplatelet therapy or anticoagulation therapy  -on keppra 500 mg bid since Nov 2020, tolerating well    GI bleeding from AV malformations History  -contributory to SUBHASH, denies current symptoms  -last seen by GI  7/21/22  -will continue to monitor     Vitamin-D deficiency  -continue vitamin D3, 18862 units, once every 2 weeks  -continue to monitor    PFO  -needs PFO closure, but denied by insurance  -management deferred to cardiology    Constipation  -no current complaints  -likely related to iron use  -increase po fluids, fresh fruits and vegetables  -prn colace and miralax  -continue to monitor      Addendum:    Lab Results   Component Value Date    WBC 4.54 10/16/2023    HGB 11.4 (L) 10/16/2023    HCT 37.0 (L) 10/16/2023    MCV 79 (L) 10/16/2023     10/16/2023       Lab Results   Component Value Date    IRON 33 (L) 10/16/2023    TRANSFERRIN 191 (L) 10/16/2023    TIBC 283 10/16/2023    FESATURATED 12 (L) 10/16/2023      Lab Results   Component Value Date    FERRITIN 173 10/16/2023     Some improvement in microcytic evidence, tibc iron has minimally improved with iron sat 12% and  ferritin has tripled to now 173 ng/mL. Pt will still benefit from iv iron, orders placed for venofer 300mg x 4 doses. Would like him to see GI again to discuss continued anemia and concern for occult losses.       Anastacia Davenport, NP-C  [facility]  Hematology/Oncology  [address]  [place]  [phone]

## 2023-10-16 NOTE — Clinical Note
Call him and please let him know his iron numbers have had minimal improvement, still with anemia and needs more iv iron.  I placed orders for infusions, he will hear from [facility]/infusion center to schedule once insurance approves. I also placed order/referral for him to follow up again with GI, has seen Dr Go in past and it has been awhile since last follow up. Keep follow up as we already scheduled after his visit yest. Thanks

## 2023-10-17 ENCOUNTER — TELEPHONE (OUTPATIENT)
Dept: ADMINISTRATIVE | Facility: OTHER | Age: 68
End: 2023-10-17
Payer: MEDICARE

## 2023-10-17 RX ORDER — SODIUM CHLORIDE 0.9 % (FLUSH) 0.9 %
10 SYRINGE (ML) INJECTION
Status: CANCELLED | OUTPATIENT
Start: 2023-11-14

## 2023-10-17 RX ORDER — SODIUM CHLORIDE 0.9 % (FLUSH) 0.9 %
10 SYRINGE (ML) INJECTION
Status: CANCELLED | OUTPATIENT
Start: 2023-11-07

## 2023-10-17 RX ORDER — EPINEPHRINE 0.3 MG/.3ML
0.3 INJECTION SUBCUTANEOUS ONCE AS NEEDED
Status: CANCELLED | OUTPATIENT
Start: 2023-11-07

## 2023-10-17 RX ORDER — SODIUM CHLORIDE 0.9 % (FLUSH) 0.9 %
10 SYRINGE (ML) INJECTION
Status: CANCELLED | OUTPATIENT
Start: 2023-10-24

## 2023-10-17 RX ORDER — EPINEPHRINE 0.3 MG/.3ML
0.3 INJECTION SUBCUTANEOUS ONCE AS NEEDED
Status: CANCELLED | OUTPATIENT
Start: 2023-11-14

## 2023-10-17 RX ORDER — DIPHENHYDRAMINE HYDROCHLORIDE 50 MG/ML
50 INJECTION INTRAMUSCULAR; INTRAVENOUS ONCE AS NEEDED
Status: CANCELLED | OUTPATIENT
Start: 2023-11-14

## 2023-10-17 RX ORDER — HEPARIN 100 UNIT/ML
500 SYRINGE INTRAVENOUS
Status: CANCELLED | OUTPATIENT
Start: 2023-10-24

## 2023-10-17 RX ORDER — HEPARIN 100 UNIT/ML
500 SYRINGE INTRAVENOUS
Status: CANCELLED | OUTPATIENT
Start: 2023-11-14

## 2023-10-17 RX ORDER — SODIUM CHLORIDE 0.9 % (FLUSH) 0.9 %
10 SYRINGE (ML) INJECTION
Status: CANCELLED | OUTPATIENT
Start: 2023-10-31

## 2023-10-17 RX ORDER — HEPARIN 100 UNIT/ML
500 SYRINGE INTRAVENOUS
Status: CANCELLED | OUTPATIENT
Start: 2023-11-07

## 2023-10-17 RX ORDER — DIPHENHYDRAMINE HYDROCHLORIDE 50 MG/ML
50 INJECTION INTRAMUSCULAR; INTRAVENOUS ONCE AS NEEDED
Status: CANCELLED | OUTPATIENT
Start: 2023-11-07

## 2023-10-17 RX ORDER — HEPARIN 100 UNIT/ML
500 SYRINGE INTRAVENOUS
Status: CANCELLED | OUTPATIENT
Start: 2023-10-31

## 2023-10-17 RX ORDER — DIPHENHYDRAMINE HYDROCHLORIDE 50 MG/ML
50 INJECTION INTRAMUSCULAR; INTRAVENOUS ONCE AS NEEDED
Status: CANCELLED | OUTPATIENT
Start: 2023-10-24

## 2023-10-17 RX ORDER — EPINEPHRINE 0.3 MG/.3ML
0.3 INJECTION SUBCUTANEOUS ONCE AS NEEDED
Status: CANCELLED | OUTPATIENT
Start: 2023-10-24

## 2023-10-17 RX ORDER — DIPHENHYDRAMINE HYDROCHLORIDE 50 MG/ML
50 INJECTION INTRAMUSCULAR; INTRAVENOUS ONCE AS NEEDED
Status: CANCELLED | OUTPATIENT
Start: 2023-10-31

## 2023-10-17 RX ORDER — EPINEPHRINE 0.3 MG/.3ML
0.3 INJECTION SUBCUTANEOUS ONCE AS NEEDED
Status: CANCELLED | OUTPATIENT
Start: 2023-10-31

## 2023-10-17 NOTE — TELEPHONE ENCOUNTER
----- Message from Anastacia Davenport NP sent at 10/17/2023  2:48 PM CDT -----  Call him and please let him know his iron numbers have had minimal improvement, still with anemia and needs more iv iron.  I placed orders for infusions, he will hear from [facility]/infusion center to schedule once insurance approves. I also placed order/referral for him to follow up again with GI, has seen Dr Go in past and it has been awhile since last follow up. Keep follow up as we already scheduled after his visit yest. Thanks

## 2023-10-24 ENCOUNTER — TELEPHONE (OUTPATIENT)
Dept: INFUSION THERAPY | Facility: HOSPITAL | Age: 68
End: 2023-10-24
Payer: MEDICARE

## 2023-10-27 ENCOUNTER — TELEPHONE (OUTPATIENT)
Dept: INFUSION THERAPY | Facility: HOSPITAL | Age: 68
End: 2023-10-27
Payer: MEDICARE

## 2023-10-30 ENCOUNTER — TELEPHONE (OUTPATIENT)
Dept: INFUSION THERAPY | Facility: HOSPITAL | Age: 68
End: 2023-10-30
Payer: MEDICARE

## 2023-10-30 NOTE — TELEPHONE ENCOUNTER
(3rd attempt) left message on voicemail advising the patient to call infusion center back to schedule appt

## 2023-11-21 NOTE — HPI
Mr. 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 Patient was at PCP's office when he experienced an episode of syncope for a sick visit. Patient had been feeling fatigued and lightheaded for the past few days. Per ED note, he had two episodes of LOC at the clinic in which the first event lasted approximately 25 seconds and the second lasting 15 seconds. In between episodes he had an occurrence of non-bloody, non-bilious emesis, in which the second LOC subsequently followed the event. During the second episode, his eyes rolled back and exhibited seizure-like activity with generalized shaking. He was hypotensive, diaphoretic, and pale. On arrival he was alert and oriented and reported weakness and headache with blurry vision. Denied focal weakness or numbness.    Previous workup include an endoscopic workup including upper and lower endoscopy 6/2020 showing one gastric AVM and H.pylori on biopsy, as well as two colon polyps. He again underwent EGD at [facility] which showed a jejunal AVM which was unable to be reached for treatment and one colon polyp. A VCE was then done at [facility] 7/16/2020 showing 2 proximal AVMs that may have been the source of bleeding. The patient was then referred to Dr. Espinal with [facility] for further evaluation with an upper single balloon enteroscopy at the time.    Per interview with the patient, he confirms that the reported series of events have to his admission today. He reports no trauma to the head. Last bowel movement was a day or two ago and reports it was soft and brown. Denies any trials of new foods or sick contacts. Denies chest pain, abdominal pain, hematuria, hematochezia. Denies weakness or numbness.    In the ED, BP 90/50, H/H 4.6/17.1, Bun/Cr 34/1.35, positive for occult blood in stool, Covid negative, normal cxr. CT head shows no evidence of an  acute ischemic event or intracranial hemorrhage. Xarelto was held. Two large bore IVs started. Patient to be admitted to the ICU for close monitoring. One unit of unmatched blood was transfused and a type and screen was done. GI has been consulted and patient is scheduled for a push enteroscopy vs. upper single balloon tomorrow. Patient NPO at midnight. Family medicine consulted for admission and medical management.   The patient has been re-examined and I agree with the above assessment or I updated with my findings.

## 2024-02-16 ENCOUNTER — LAB VISIT (OUTPATIENT)
Dept: LAB | Facility: HOSPITAL | Age: 69
End: 2024-02-16
Payer: MEDICARE

## 2024-02-16 ENCOUNTER — OFFICE VISIT (OUTPATIENT)
Dept: HEMATOLOGY/ONCOLOGY | Facility: CLINIC | Age: 69
End: 2024-02-16
Payer: MEDICARE

## 2024-02-16 VITALS
DIASTOLIC BLOOD PRESSURE: 76 MMHG | WEIGHT: 212.75 LBS | BODY MASS INDEX: 28.07 KG/M2 | HEART RATE: 82 BPM | SYSTOLIC BLOOD PRESSURE: 143 MMHG | OXYGEN SATURATION: 97 % | TEMPERATURE: 98 F | RESPIRATION RATE: 20 BRPM

## 2024-02-16 DIAGNOSIS — R53.83 FATIGUE, UNSPECIFIED TYPE: ICD-10-CM

## 2024-02-16 DIAGNOSIS — I82.533 CHRONIC DEEP VEIN THROMBOSIS (DVT) OF POPLITEAL VEIN OF BOTH LOWER EXTREMITIES: ICD-10-CM

## 2024-02-16 DIAGNOSIS — Z86.711 HISTORY OF PULMONARY EMBOLUS (PE): ICD-10-CM

## 2024-02-16 DIAGNOSIS — R06.09 DYSPNEA ON EXERTION: ICD-10-CM

## 2024-02-16 DIAGNOSIS — K59.00 CONSTIPATION, UNSPECIFIED CONSTIPATION TYPE: ICD-10-CM

## 2024-02-16 DIAGNOSIS — D50.0 IRON DEFICIENCY ANEMIA DUE TO CHRONIC BLOOD LOSS: ICD-10-CM

## 2024-02-16 DIAGNOSIS — R56.9 SEIZURE: ICD-10-CM

## 2024-02-16 DIAGNOSIS — K55.20 AV MALFORMATION OF GASTROINTESTINAL TRACT: ICD-10-CM

## 2024-02-16 DIAGNOSIS — D50.0 IRON DEFICIENCY ANEMIA DUE TO CHRONIC BLOOD LOSS: Primary | ICD-10-CM

## 2024-02-16 DIAGNOSIS — Q21.12 PFO (PATENT FORAMEN OVALE): ICD-10-CM

## 2024-02-16 DIAGNOSIS — Z86.73 H/O: CVA (CEREBROVASCULAR ACCIDENT): ICD-10-CM

## 2024-02-16 LAB
BASOPHILS # BLD AUTO: 0.03 K/UL (ref 0–0.2)
BASOPHILS NFR BLD: 0.6 % (ref 0–1.9)
DIFFERENTIAL METHOD BLD: ABNORMAL
EOSINOPHIL # BLD AUTO: 0.1 K/UL (ref 0–0.5)
EOSINOPHIL NFR BLD: 1.2 % (ref 0–8)
ERYTHROCYTE [DISTWIDTH] IN BLOOD BY AUTOMATED COUNT: 15.9 % (ref 11.5–14.5)
FERRITIN SERPL-MCNC: 16 NG/ML (ref 20–300)
HCT VFR BLD AUTO: 32.6 % (ref 40–54)
HGB BLD-MCNC: 10.2 G/DL (ref 14–18)
IMM GRANULOCYTES # BLD AUTO: 0.01 K/UL (ref 0–0.04)
IMM GRANULOCYTES NFR BLD AUTO: 0.2 % (ref 0–0.5)
IRON SERPL-MCNC: 24 UG/DL (ref 45–160)
LYMPHOCYTES # BLD AUTO: 1.8 K/UL (ref 1–4.8)
LYMPHOCYTES NFR BLD: 36.6 % (ref 18–48)
MCH RBC QN AUTO: 22.6 PG (ref 27–31)
MCHC RBC AUTO-ENTMCNC: 31.3 G/DL (ref 32–36)
MCV RBC AUTO: 72 FL (ref 82–98)
MONOCYTES # BLD AUTO: 0.7 K/UL (ref 0.3–1)
MONOCYTES NFR BLD: 13 % (ref 4–15)
NEUTROPHILS # BLD AUTO: 2.4 K/UL (ref 1.8–7.7)
NEUTROPHILS NFR BLD: 48.4 % (ref 38–73)
NRBC BLD-RTO: 0 /100 WBC
PLATELET # BLD AUTO: 231 K/UL (ref 150–450)
PMV BLD AUTO: 10.9 FL (ref 9.2–12.9)
RBC # BLD AUTO: 4.51 M/UL (ref 4.6–6.2)
SATURATED IRON: 6 % (ref 20–50)
TOTAL IRON BINDING CAPACITY: 400 UG/DL (ref 250–450)
TRANSFERRIN SERPL-MCNC: 270 MG/DL (ref 200–375)
WBC # BLD AUTO: 5 K/UL (ref 3.9–12.7)

## 2024-02-16 PROCEDURE — 3288F FALL RISK ASSESSMENT DOCD: CPT | Mod: CPTII,S$GLB,, | Performed by: NURSE PRACTITIONER

## 2024-02-16 PROCEDURE — 99214 OFFICE O/P EST MOD 30 MIN: CPT | Mod: S$GLB,,, | Performed by: NURSE PRACTITIONER

## 2024-02-16 PROCEDURE — 1101F PT FALLS ASSESS-DOCD LE1/YR: CPT | Mod: CPTII,S$GLB,, | Performed by: NURSE PRACTITIONER

## 2024-02-16 PROCEDURE — 83540 ASSAY OF IRON: CPT | Performed by: NURSE PRACTITIONER

## 2024-02-16 PROCEDURE — 1160F RVW MEDS BY RX/DR IN RCRD: CPT | Mod: CPTII,S$GLB,, | Performed by: NURSE PRACTITIONER

## 2024-02-16 PROCEDURE — 82728 ASSAY OF FERRITIN: CPT | Performed by: NURSE PRACTITIONER

## 2024-02-16 PROCEDURE — 1126F AMNT PAIN NOTED NONE PRSNT: CPT | Mod: CPTII,S$GLB,, | Performed by: NURSE PRACTITIONER

## 2024-02-16 PROCEDURE — 36415 COLL VENOUS BLD VENIPUNCTURE: CPT | Performed by: NURSE PRACTITIONER

## 2024-02-16 PROCEDURE — 1159F MED LIST DOCD IN RCRD: CPT | Mod: CPTII,S$GLB,, | Performed by: NURSE PRACTITIONER

## 2024-02-16 PROCEDURE — 99999 PR PBB SHADOW E&M-EST. PATIENT-LVL V: CPT | Mod: PBBFAC,,, | Performed by: NURSE PRACTITIONER

## 2024-02-16 PROCEDURE — 3077F SYST BP >= 140 MM HG: CPT | Mod: CPTII,S$GLB,, | Performed by: NURSE PRACTITIONER

## 2024-02-16 PROCEDURE — 85025 COMPLETE CBC W/AUTO DIFF WBC: CPT | Performed by: NURSE PRACTITIONER

## 2024-02-16 PROCEDURE — 3008F BODY MASS INDEX DOCD: CPT | Mod: CPTII,S$GLB,, | Performed by: NURSE PRACTITIONER

## 2024-02-16 PROCEDURE — 3078F DIAST BP <80 MM HG: CPT | Mod: CPTII,S$GLB,, | Performed by: NURSE PRACTITIONER

## 2024-02-16 RX ORDER — DIPHENHYDRAMINE HYDROCHLORIDE 50 MG/ML
50 INJECTION INTRAMUSCULAR; INTRAVENOUS ONCE AS NEEDED
OUTPATIENT
Start: 2024-03-15

## 2024-02-16 RX ORDER — SODIUM CHLORIDE 0.9 % (FLUSH) 0.9 %
10 SYRINGE (ML) INJECTION
OUTPATIENT
Start: 2024-03-15

## 2024-02-16 RX ORDER — EPINEPHRINE 0.3 MG/.3ML
0.3 INJECTION SUBCUTANEOUS ONCE AS NEEDED
OUTPATIENT
Start: 2024-02-23

## 2024-02-16 RX ORDER — HEPARIN 100 UNIT/ML
500 SYRINGE INTRAVENOUS
OUTPATIENT
Start: 2024-03-15

## 2024-02-16 RX ORDER — SODIUM CHLORIDE 0.9 % (FLUSH) 0.9 %
10 SYRINGE (ML) INJECTION
OUTPATIENT
Start: 2024-02-23

## 2024-02-16 RX ORDER — HEPARIN 100 UNIT/ML
500 SYRINGE INTRAVENOUS
OUTPATIENT
Start: 2024-02-23

## 2024-02-16 RX ORDER — DIPHENHYDRAMINE HYDROCHLORIDE 50 MG/ML
50 INJECTION INTRAMUSCULAR; INTRAVENOUS ONCE AS NEEDED
OUTPATIENT
Start: 2024-02-23

## 2024-02-16 RX ORDER — DIPHENHYDRAMINE HYDROCHLORIDE 50 MG/ML
50 INJECTION INTRAMUSCULAR; INTRAVENOUS ONCE AS NEEDED
OUTPATIENT
Start: 2024-03-08

## 2024-02-16 RX ORDER — SODIUM CHLORIDE 0.9 % (FLUSH) 0.9 %
10 SYRINGE (ML) INJECTION
OUTPATIENT
Start: 2024-03-01

## 2024-02-16 RX ORDER — HEPARIN 100 UNIT/ML
500 SYRINGE INTRAVENOUS
OUTPATIENT
Start: 2024-03-01

## 2024-02-16 RX ORDER — EPINEPHRINE 0.3 MG/.3ML
0.3 INJECTION SUBCUTANEOUS ONCE AS NEEDED
OUTPATIENT
Start: 2024-03-15

## 2024-02-16 RX ORDER — SODIUM CHLORIDE 0.9 % (FLUSH) 0.9 %
10 SYRINGE (ML) INJECTION
OUTPATIENT
Start: 2024-03-08

## 2024-02-16 RX ORDER — HEPARIN 100 UNIT/ML
500 SYRINGE INTRAVENOUS
OUTPATIENT
Start: 2024-03-08

## 2024-02-16 RX ORDER — EPINEPHRINE 0.3 MG/.3ML
0.3 INJECTION SUBCUTANEOUS ONCE AS NEEDED
OUTPATIENT
Start: 2024-03-08

## 2024-02-16 RX ORDER — DIPHENHYDRAMINE HYDROCHLORIDE 50 MG/ML
50 INJECTION INTRAMUSCULAR; INTRAVENOUS ONCE AS NEEDED
OUTPATIENT
Start: 2024-03-01

## 2024-02-16 RX ORDER — EPINEPHRINE 0.3 MG/.3ML
0.3 INJECTION SUBCUTANEOUS ONCE AS NEEDED
OUTPATIENT
Start: 2024-03-01

## 2024-02-16 NOTE — PROGRESS NOTES
PATIENT: Miky Esqueda  MRN: [id number]  DATE: 2/16/2024    Diagnosis:   No diagnosis found.            Chief Complaint: PE, iron def anemia    Subjective:     History of Present Illness:    HPI As per Dr Gonzales's previous note 1/27/2022:     He presented to the ER 05/25/2020 with acute onset of shortness of breath, diaphoresis and lightheadedness.  CT chest showed extensive bilateral pulmonary thromboemboli.  No evidence of central saddle embolus.  There was moderate pulmonary emphysema.  Lower extremity ultrasound also revealed nonocclusive thrombus in the right and left popliteal veins as well as the right femoral vein.    This is his 1st episode of pulmonary embolism/DVT.  He was started on Xarelto.    Then he had a syncopal episode in his PCP's office on 06/12/2020 which lasted about 30 sec followed by brief 15 sec seizure like episode.  He was taken to the ER where a CT head showed a hypodense area in the left parietal lobe concerning for infarct.  By this time he also developed significant anemia and was transfused 2 units packed red blood cells.    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    He got 1 dose of Injectafer 07/21/2020 and a second dose 9/4/2020    He presented to the ER 8/7/2020 with severe anemia Hb 4.6 - passed out in his primary care physician's office. This was the second episode of severe symptomatic anemia after being started on Xarelto.    He has past h/o extensive DVT/PE in may 2020, CVA in June 2020 and h/o jejunal AVM. No active source of bleeding could be found from extensive GI work-up  2020.  -severe anemia felt likely related to intermittent bleeding from angioectasia(s) vs a Dieulafoy lesion.  -he underwent IVC filter placement 08/10/2020 and his Xarelto was discontinued    INTERVAL HISTORY:   -Pt here for anemia follow up, additionally with hx PE   -Had Injectafer 2 doses 2/2022  -received venofer June/July2022  -received x3 doses iron sucrose 300mg July/Aug  2023  -received x3 doses iron sucrose Oct 2023  -He is on baby asa and plavix now, has not noted any bleeding issues at this time.  -takes Vit D3 every other week  -needs PFO closure  - Saw Dr Go/GI 7/21/22, discussed sandostatin with pt, pt  does not want sandostatin, has not f/u with gi since  - [place] in [place] [facility] as his mother passed 2022, states he is doing well in [place] closer to family. Plans to return here at this time for [employer]. Active at [employer].    -He endorses occasional sob with exertion but resolves easily after few mins rest,  denies dizziness, lightheadedness, near syncope or syncope, pounding in ears, chest pain, abdominal pain, n/v/d, black or bloody stools, hematuria, extremity swelling, cold intolerance or ice cravings, pallor, fever, chills. No gum or oral bleeding, no nosebleeds.   -He has some constipation is past with po iron, mild. Controlled with prn colace and miralax but states no issues recently.      Past Medical, Surgical, Family and Social History Reviewed.    Medications and Allergies reviewed      Review of Systems   Constitutional:  Negative for fatigue, fever and unexpected weight change.   HENT:  Negative for mouth sores and nosebleeds.    Eyes:  Negative for visual disturbance.   Respiratory:  Positive for shortness of breath (HA, intermittent, none at present). Negative for chest tightness.    Cardiovascular:  Negative for chest pain, palpitations and leg swelling.   Gastrointestinal:  Negative for abdominal pain, anal bleeding, blood in stool, constipation, diarrhea, nausea and vomiting.   Endocrine: Negative for cold intolerance.   Genitourinary:  Negative for hematuria.   Musculoskeletal:  Negative for back pain.   Neurological:  Negative for dizziness, seizures, syncope, weakness, light-headedness and headaches.   Hematological:  Negative for adenopathy. Does not bruise/bleed easily.   Psychiatric/Behavioral:  Negative for confusion and  decreased concentration.      Objective:     Lab Results   Component Value Date    WBC 5.00 02/16/2024    HGB 10.2 (L) 02/16/2024    HCT 32.6 (L) 02/16/2024    MCV 72 (L) 02/16/2024     02/16/2024       Lab Results   Component Value Date    IRON 33 (L) 10/16/2023    TIBC 283 10/16/2023    FERRITIN 173 10/16/2023                 Vitals:    02/16/24 0847   BP: (!) 143/76   BP Location: Left arm   Patient Position: Sitting   BP Method: Medium (Automatic)   Pulse: 82   Resp: 20   Temp: 97.8 °F (36.6 °C)   TempSrc: Oral   SpO2: 97%   Weight: 96.5 kg (212 lb 11.9 oz)         Body mass index is 28.07 kg/m².    Physical Exam   Constitutional: He is oriented to person, place, and time. normal appearance.  Non-toxic appearance. No distress. He does not appear ill.   HENT:   Head: Normocephalic and atraumatic.   Right Ear: External ear normal.   Left Ear: External ear normal.   Nose: Nose normal.   Mouth/Throat: Mucous membranes are moist. Oropharynx is clear.   Eyes: Conjunctivae are normal. No scleral icterus.   Cardiovascular: Normal rate, regular rhythm and normal heart sounds. Pulmonary:      Effort: Pulmonary effort is normal. No respiratory distress.      Breath sounds: Normal breath sounds.     Abdominal: Soft. Normal appearance. He exhibits no distension. There is no abdominal tenderness. There is no guarding.   Musculoskeletal:         General: Normal range of motion.      Cervical back: Normal range of motion and neck supple.      Right lower leg: No edema.      Left lower leg: No edema.   Neurological: He is alert and oriented to person, place, and time. Gait normal.   Skin: Skin is warm and dry. No bruising noted. He is not diaphoretic. No erythema. No jaundice or pallor.   Psychiatric: His behavior is normal. Mood and thought content normal.   Nursing note and vitals reviewed.        ECOG SCORE               Assessment:       No diagnosis found.            Plan:       Iron deficiency anemia  -secondary to  h/o GI bleed and use of blood thinners  -received 1 dose of Injectafer in July 2020 and a second dose in sept 2020, 2 doses Feb 2022  -received venofer 6/10/22, 6/17/22, 6/24/22, 7/1/22  -labs (7/21/22) cbc, ferritin reviewed, iron saturation and tibc pending at visit time H/H 10.8/34.2 and ferritin is normal at 115. Will call with iron/tibc results.  -Saw GI 7/21/22 who recommended Sandostin which pt now states he is not interested in taking   -Updated labs 11/21/22 reviewed with improved H/H 13.3/43.3, microcytic with normal serum iron, tibc and ferritin.   -Updated labs 2/20/23 prior this appt are not ready at visit time, iron deficiency, iron infusions ordered but pt did not complete  -He has been taking po iron with mild intermittent constipation and has had no recent evidence of GI bleed symptoms  - Labs today, 2/16/24, downward trend of hgb to   -Advised that if h/h remains low or is lower, will need gi follow up as well  -received iron sucrose x3 doses 300mg July/Aug 2023  -10/16/23 labs hgb 11.4, improvement in microcytic evidence, ferritin improved at 173 ng/mL,  - iron sucrose x3 Oct 2023  - labs today 2/16/24 hgb downward trend to 10.2 g/dL, ferritin low at 16 ng/mL, pending iron studies; given the ferritin pt will benefit from iv iron  -rtc 4 months with labs, call in interim any changes or concerns    H/o Acute bilateral PE with bilateral DVT  -unprovoked  -intolerant to anticoagulation secondary to recurrent GI bleeds in past, now on daily baby ASA and plavix  -status post IVC filter placement 08/10/2020  -reviewed CBC, ferritin, iron saturation    H/o CVA with seizure disorder  -diagnosed June 2020  -doing well, no focal neurological deficits, no seizures, stable  -cannot tolerate antiplatelet therapy or anticoagulation therapy  -on keppra 500 mg bid since Nov 2020, tolerating well    GI bleeding from AV malformations History  -contributory to SUBHASH, denies current symptoms  -last seen by GI   7/21/22  -given h/h downward trend, would like pt to follow up with GI, he is agreeable at this time, referral placed    Vitamin-D deficiency  -continue vitamin D3, 22360 units, once every 2 weeks  -continue to monitor    PFO  -needs PFO closure, but denied by insurance  -management deferred to cardiology    Constipation  -no current complaints  -likely related to iron use  -increase po fluids, fresh fruits and vegetables  -prn colace and miralax  -continue to monitor      Addendum:    Lab Results   Component Value Date    WBC 5.00 02/16/2024    HGB 10.2 (L) 02/16/2024    HCT 32.6 (L) 02/16/2024    MCV 72 (L) 02/16/2024     02/16/2024       Lab Results   Component Value Date    IRON 33 (L) 10/16/2023    TRANSFERRIN 191 (L) 10/16/2023    TIBC 283 10/16/2023    FESATURATED 12 (L) 10/16/2023      Lab Results   Component Value Date    FERRITIN 173 10/16/2023     Some improvement in microcytic evidence, tibc iron has minimally improved with iron sat 12% and  ferritin has tripled to now 173 ng/mL. Pt will still benefit from iv iron, orders placed for venofer 300mg x 4 doses. Would like him to see GI again to discuss continued anemia and concern for occult losses.       Anastacia Davenport, NP-C  [facility]  Hematology/Oncology  [address]  [place]  [id number]  [phone]

## 2024-02-16 NOTE — PATIENT INSTRUCTIONS
HIGH IRON FOODS    Meat and Eggs    Beef  Lamb  Ham  Turkey  Chicken  Veal  Pork  Dried beef  Liver  Liverwurst  Eggs (any style)  Seafood    Shrimp  Clams  Scallops  Oysters  Tuna  Sardines  Barbara  Mackerel  Vegetables    Spinach  Sweet potatoes  Peas  Broccoli  String beans  Beet greens  Dandelion greens  Collards  Kale  Chard  Bread and Cereals    White bread (enriched)  Whole wheat bread  Enriched pasta  Wheat products  Bran cereals  Corn meal  Oat cereal  Cream of Wheat  Rye bread  Enriched rice  Fruit    Strawberries  Watermelon  Raisins  Dates  Figs  Prunes  Prune juice  Dried apricots  Dried peaches  Beans and Other Foods    Tofu  Beans (kidney, garbanzo, or white, canned)  Tomato products (e.g., paste)  Dried peas  Dried beans  Lentils  Instant breakfast  Corn syrup  Maple syrup  Molasses    HIGH VIT C  Food Sources  Fruits and vegetables are the best sources of this vitamin.  Citrus (oranges, kiwi, lemon, grapefruit)  Guevara peppers  Strawberries  Tomatoes  Cruciferous vegetables (broccoli, McIntosh sprouts, cabbage, cauliflower)  White potatoes

## 2024-02-19 ENCOUNTER — TELEPHONE (OUTPATIENT)
Dept: INFUSION THERAPY | Facility: HOSPITAL | Age: 69
End: 2024-02-19
Payer: MEDICARE

## 2024-02-20 ENCOUNTER — TELEPHONE (OUTPATIENT)
Dept: INFUSION THERAPY | Facility: HOSPITAL | Age: 69
End: 2024-02-20
Payer: MEDICARE

## 2024-02-21 ENCOUNTER — TELEPHONE (OUTPATIENT)
Dept: INFUSION THERAPY | Facility: HOSPITAL | Age: 69
End: 2024-02-21
Payer: MEDICARE

## 2024-06-14 ENCOUNTER — TELEPHONE (OUTPATIENT)
Dept: HEMATOLOGY/ONCOLOGY | Facility: CLINIC | Age: 69
End: 2024-06-14
Payer: MEDICARE

## 2024-06-14 DIAGNOSIS — D50.0 IRON DEFICIENCY ANEMIA DUE TO CHRONIC BLOOD LOSS: Primary | ICD-10-CM

## 2024-06-17 ENCOUNTER — HOSPITAL ENCOUNTER (INPATIENT)
Facility: HOSPITAL | Age: 69
LOS: 2 days | Discharge: HOME | DRG: 378 | End: 2024-06-19
Attending: EMERGENCY MEDICINE | Admitting: STUDENT IN AN ORGANIZED HEALTH CARE EDUCATION/TRAINING PROGRAM
Payer: MEDICARE

## 2024-06-17 ENCOUNTER — LAB VISIT (OUTPATIENT)
Dept: LAB | Facility: HOSPITAL | Age: 69
End: 2024-06-17
Attending: NURSE PRACTITIONER
Payer: MEDICARE

## 2024-06-17 ENCOUNTER — DOCUMENTATION ONLY (OUTPATIENT)
Dept: HEMATOLOGY/ONCOLOGY | Facility: CLINIC | Age: 69
End: 2024-06-17

## 2024-06-17 ENCOUNTER — OFFICE VISIT (OUTPATIENT)
Dept: HEMATOLOGY/ONCOLOGY | Facility: CLINIC | Age: 69
End: 2024-06-17
Payer: MEDICARE

## 2024-06-17 VITALS
DIASTOLIC BLOOD PRESSURE: 77 MMHG | RESPIRATION RATE: 22 BRPM | TEMPERATURE: 98 F | WEIGHT: 191.81 LBS | HEART RATE: 97 BPM | HEIGHT: 73 IN | OXYGEN SATURATION: 87 % | BODY MASS INDEX: 25.42 KG/M2 | SYSTOLIC BLOOD PRESSURE: 146 MMHG

## 2024-06-17 DIAGNOSIS — Z86.711 HISTORY OF PULMONARY EMBOLUS (PE): ICD-10-CM

## 2024-06-17 DIAGNOSIS — D50.0 ANEMIA DUE TO GI BLOOD LOSS: Primary | ICD-10-CM

## 2024-06-17 DIAGNOSIS — G40.909 SEIZURE DISORDER: ICD-10-CM

## 2024-06-17 DIAGNOSIS — K59.00 CONSTIPATION, UNSPECIFIED CONSTIPATION TYPE: ICD-10-CM

## 2024-06-17 DIAGNOSIS — D64.9 ANEMIA REQUIRING TRANSFUSIONS: ICD-10-CM

## 2024-06-17 DIAGNOSIS — Z86.73 H/O: CVA (CEREBROVASCULAR ACCIDENT): ICD-10-CM

## 2024-06-17 DIAGNOSIS — E55.9 VITAMIN D DEFICIENCY: ICD-10-CM

## 2024-06-17 DIAGNOSIS — D62 ACUTE BLOOD LOSS ANEMIA: ICD-10-CM

## 2024-06-17 DIAGNOSIS — I82.533 CHRONIC DEEP VEIN THROMBOSIS (DVT) OF POPLITEAL VEIN OF BOTH LOWER EXTREMITIES: ICD-10-CM

## 2024-06-17 DIAGNOSIS — I63.9 CEREBROVASCULAR ACCIDENT (CVA), UNSPECIFIED MECHANISM: ICD-10-CM

## 2024-06-17 DIAGNOSIS — K55.20 AV MALFORMATION OF GASTROINTESTINAL TRACT: ICD-10-CM

## 2024-06-17 DIAGNOSIS — D50.0 IRON DEFICIENCY ANEMIA DUE TO CHRONIC BLOOD LOSS: ICD-10-CM

## 2024-06-17 DIAGNOSIS — R06.02 SHORTNESS OF BREATH: ICD-10-CM

## 2024-06-17 DIAGNOSIS — R56.9 SEIZURE: ICD-10-CM

## 2024-06-17 DIAGNOSIS — D64.9 SYMPTOMATIC ANEMIA: Primary | ICD-10-CM

## 2024-06-17 PROBLEM — I82.433 ACUTE DEEP VEIN THROMBOSIS (DVT) OF POPLITEAL VEIN OF BOTH LOWER EXTREMITIES: Status: RESOLVED | Noted: 2020-07-16 | Resolved: 2024-06-17

## 2024-06-17 LAB
ABO + RH BLD: ABNORMAL
ALBUMIN SERPL BCP-MCNC: 3.6 G/DL (ref 3.5–5.2)
ALP SERPL-CCNC: 61 U/L (ref 55–135)
ALT SERPL W/O P-5'-P-CCNC: 14 U/L (ref 10–44)
ANION GAP SERPL CALC-SCNC: 11 MMOL/L (ref 8–16)
ANISOCYTOSIS BLD QL SMEAR: SLIGHT
AST SERPL-CCNC: 25 U/L (ref 10–40)
BASOPHILS # BLD AUTO: 0.02 K/UL (ref 0–0.2)
BASOPHILS # BLD AUTO: 0.04 K/UL (ref 0–0.2)
BASOPHILS NFR BLD: 0.2 % (ref 0–1.9)
BASOPHILS NFR BLD: 0.8 % (ref 0–1.9)
BILIRUB SERPL-MCNC: 1.1 MG/DL (ref 0.1–1)
BLD GP AB SCN CELLS X3 SERPL QL: ABNORMAL
BLD PROD TYP BPU: NORMAL
BLD PROD TYP BPU: NORMAL
BLOOD GROUP ANTIBODIES SERPL: NORMAL
BLOOD UNIT EXPIRATION DATE: NORMAL
BLOOD UNIT EXPIRATION DATE: NORMAL
BLOOD UNIT TYPE CODE: 9500
BLOOD UNIT TYPE CODE: 9500
BLOOD UNIT TYPE: NORMAL
BLOOD UNIT TYPE: NORMAL
BUN SERPL-MCNC: 25 MG/DL (ref 8–23)
CALCIUM SERPL-MCNC: 9 MG/DL (ref 8.7–10.5)
CHLORIDE SERPL-SCNC: 108 MMOL/L (ref 95–110)
CO2 SERPL-SCNC: 18 MMOL/L (ref 23–29)
CODING SYSTEM: NORMAL
CODING SYSTEM: NORMAL
CREAT SERPL-MCNC: 1.3 MG/DL (ref 0.5–1.4)
CROSSMATCH INTERPRETATION: NORMAL
CROSSMATCH INTERPRETATION: NORMAL
DACRYOCYTES BLD QL SMEAR: ABNORMAL
DAT IGG-SP REAG RBC-IMP: NORMAL
DIFFERENTIAL METHOD BLD: ABNORMAL
DIFFERENTIAL METHOD BLD: ABNORMAL
DISPENSE STATUS: NORMAL
DISPENSE STATUS: NORMAL
EOSINOPHIL # BLD AUTO: 0 K/UL (ref 0–0.5)
EOSINOPHIL # BLD AUTO: 0 K/UL (ref 0–0.5)
EOSINOPHIL NFR BLD: 0.4 % (ref 0–8)
EOSINOPHIL NFR BLD: 0.6 % (ref 0–8)
ERYTHROCYTE [DISTWIDTH] IN BLOOD BY AUTOMATED COUNT: 19.9 % (ref 11.5–14.5)
ERYTHROCYTE [DISTWIDTH] IN BLOOD BY AUTOMATED COUNT: 24.8 % (ref 11.5–14.5)
EST. GFR  (NO RACE VARIABLE): >60 ML/MIN/1.73 M^2
ESTIMATED AVG GLUCOSE: 111 MG/DL (ref 68–131)
FERRITIN SERPL-MCNC: 14 NG/ML (ref 20–300)
GLUCOSE SERPL-MCNC: 96 MG/DL (ref 70–110)
HBA1C MFR BLD: 5.5 % (ref 4–5.6)
HCT VFR BLD AUTO: 17 % (ref 40–54)
HCT VFR BLD AUTO: 19.7 % (ref 40–54)
HGB BLD-MCNC: 4.6 G/DL (ref 14–18)
HGB BLD-MCNC: 5.8 G/DL (ref 14–18)
HYPOCHROMIA BLD QL SMEAR: ABNORMAL
IMM GRANULOCYTES # BLD AUTO: 0.01 K/UL (ref 0–0.04)
IMM GRANULOCYTES # BLD AUTO: 0.05 K/UL (ref 0–0.04)
IMM GRANULOCYTES NFR BLD AUTO: 0.2 % (ref 0–0.5)
IMM GRANULOCYTES NFR BLD AUTO: 0.5 % (ref 0–0.5)
IRON SERPL-MCNC: <10 UG/DL (ref 45–160)
LYMPHOCYTES # BLD AUTO: 1.3 K/UL (ref 1–4.8)
LYMPHOCYTES # BLD AUTO: 2 K/UL (ref 1–4.8)
LYMPHOCYTES NFR BLD: 13.6 % (ref 18–48)
LYMPHOCYTES NFR BLD: 39.9 % (ref 18–48)
MAGNESIUM SERPL-MCNC: 2 MG/DL (ref 1.6–2.6)
MCH RBC QN AUTO: 16.4 PG (ref 27–31)
MCH RBC QN AUTO: 19.1 PG (ref 27–31)
MCHC RBC AUTO-ENTMCNC: 27.1 G/DL (ref 32–36)
MCHC RBC AUTO-ENTMCNC: 29.4 G/DL (ref 32–36)
MCV RBC AUTO: 61 FL (ref 82–98)
MCV RBC AUTO: 65 FL (ref 82–98)
MONOCYTES # BLD AUTO: 0.6 K/UL (ref 0.3–1)
MONOCYTES # BLD AUTO: 0.8 K/UL (ref 0.3–1)
MONOCYTES NFR BLD: 11.9 % (ref 4–15)
MONOCYTES NFR BLD: 7.9 % (ref 4–15)
NEUTROPHILS # BLD AUTO: 2.3 K/UL (ref 1.8–7.7)
NEUTROPHILS # BLD AUTO: 7.5 K/UL (ref 1.8–7.7)
NEUTROPHILS NFR BLD: 46.6 % (ref 38–73)
NEUTROPHILS NFR BLD: 77.4 % (ref 38–73)
NRBC BLD-RTO: 0 /100 WBC
NRBC BLD-RTO: 1 /100 WBC
OB PNL STL: POSITIVE
OVALOCYTES BLD QL SMEAR: ABNORMAL
PHOSPHATE SERPL-MCNC: 2.9 MG/DL (ref 2.7–4.5)
PLATELET # BLD AUTO: 194 K/UL (ref 150–450)
PLATELET # BLD AUTO: 234 K/UL (ref 150–450)
PLATELET BLD QL SMEAR: ABNORMAL
PMV BLD AUTO: 9.6 FL (ref 9.2–12.9)
PMV BLD AUTO: 9.8 FL (ref 9.2–12.9)
POCT GLUCOSE: 114 MG/DL (ref 70–110)
POCT GLUCOSE: 99 MG/DL (ref 70–110)
POIKILOCYTOSIS BLD QL SMEAR: SLIGHT
POLYCHROMASIA BLD QL SMEAR: ABNORMAL
POTASSIUM SERPL-SCNC: 4.1 MMOL/L (ref 3.5–5.1)
PROT SERPL-MCNC: 6.3 G/DL (ref 6–8.4)
RBC # BLD AUTO: 2.8 M/UL (ref 4.6–6.2)
RBC # BLD AUTO: 3.04 M/UL (ref 4.6–6.2)
SATURATED IRON: ABNORMAL % (ref 20–50)
SODIUM SERPL-SCNC: 137 MMOL/L (ref 136–145)
SPECIMEN OUTDATE: ABNORMAL
TOTAL IRON BINDING CAPACITY: 428 UG/DL (ref 250–450)
TRANS ERYTHROCYTES VOL PATIENT: NORMAL ML
TRANS ERYTHROCYTES VOL PATIENT: NORMAL ML
TRANSFERRIN SERPL-MCNC: 289 MG/DL (ref 200–375)
TROPONIN I SERPL DL<=0.01 NG/ML-MCNC: <0.006 NG/ML (ref 0–0.03)
WBC # BLD AUTO: 4.96 K/UL (ref 3.9–12.7)
WBC # BLD AUTO: 9.66 K/UL (ref 3.9–12.7)

## 2024-06-17 PROCEDURE — 82728 ASSAY OF FERRITIN: CPT | Performed by: NURSE PRACTITIONER

## 2024-06-17 PROCEDURE — 83036 HEMOGLOBIN GLYCOSYLATED A1C: CPT | Performed by: STUDENT IN AN ORGANIZED HEALTH CARE EDUCATION/TRAINING PROGRAM

## 2024-06-17 PROCEDURE — 93010 ELECTROCARDIOGRAM REPORT: CPT | Mod: ,,, | Performed by: INTERNAL MEDICINE

## 2024-06-17 PROCEDURE — 83735 ASSAY OF MAGNESIUM: CPT | Performed by: STUDENT IN AN ORGANIZED HEALTH CARE EDUCATION/TRAINING PROGRAM

## 2024-06-17 PROCEDURE — 99285 EMERGENCY DEPT VISIT HI MDM: CPT | Mod: 25

## 2024-06-17 PROCEDURE — 1101F PT FALLS ASSESS-DOCD LE1/YR: CPT | Mod: CPTII,S$GLB,, | Performed by: NURSE PRACTITIONER

## 2024-06-17 PROCEDURE — 30233N1 TRANSFUSION OF NONAUTOLOGOUS RED BLOOD CELLS INTO PERIPHERAL VEIN, PERCUTANEOUS APPROACH: ICD-10-PCS | Performed by: HOSPITALIST

## 2024-06-17 PROCEDURE — 27201040 HC RC 50 FILTER: Performed by: EMERGENCY MEDICINE

## 2024-06-17 PROCEDURE — P9021 RED BLOOD CELLS UNIT: HCPCS

## 2024-06-17 PROCEDURE — 82272 OCCULT BLD FECES 1-3 TESTS: CPT | Performed by: EMERGENCY MEDICINE

## 2024-06-17 PROCEDURE — 86850 RBC ANTIBODY SCREEN: CPT | Performed by: EMERGENCY MEDICINE

## 2024-06-17 PROCEDURE — 1126F AMNT PAIN NOTED NONE PRSNT: CPT | Mod: CPTII,S$GLB,, | Performed by: NURSE PRACTITIONER

## 2024-06-17 PROCEDURE — 86922 COMPATIBILITY TEST ANTIGLOB: CPT

## 2024-06-17 PROCEDURE — 1159F MED LIST DOCD IN RCRD: CPT | Mod: CPTII,S$GLB,, | Performed by: NURSE PRACTITIONER

## 2024-06-17 PROCEDURE — 36415 COLL VENOUS BLD VENIPUNCTURE: CPT

## 2024-06-17 PROCEDURE — 80053 COMPREHEN METABOLIC PANEL: CPT | Performed by: STUDENT IN AN ORGANIZED HEALTH CARE EDUCATION/TRAINING PROGRAM

## 2024-06-17 PROCEDURE — 3078F DIAST BP <80 MM HG: CPT | Mod: CPTII,S$GLB,, | Performed by: NURSE PRACTITIONER

## 2024-06-17 PROCEDURE — 93005 ELECTROCARDIOGRAM TRACING: CPT

## 2024-06-17 PROCEDURE — C9113 INJ PANTOPRAZOLE SODIUM, VIA: HCPCS | Performed by: STUDENT IN AN ORGANIZED HEALTH CARE EDUCATION/TRAINING PROGRAM

## 2024-06-17 PROCEDURE — 36430 TRANSFUSION BLD/BLD COMPNT: CPT

## 2024-06-17 PROCEDURE — 63600175 PHARM REV CODE 636 W HCPCS: Performed by: STUDENT IN AN ORGANIZED HEALTH CARE EDUCATION/TRAINING PROGRAM

## 2024-06-17 PROCEDURE — 99999 PR PBB SHADOW E&M-EST. PATIENT-LVL III: CPT | Mod: PBBFAC,,, | Performed by: NURSE PRACTITIONER

## 2024-06-17 PROCEDURE — 83540 ASSAY OF IRON: CPT | Performed by: NURSE PRACTITIONER

## 2024-06-17 PROCEDURE — 86880 COOMBS TEST DIRECT: CPT | Performed by: EMERGENCY MEDICINE

## 2024-06-17 PROCEDURE — 36415 COLL VENOUS BLD VENIPUNCTURE: CPT | Performed by: NURSE PRACTITIONER

## 2024-06-17 PROCEDURE — 1160F RVW MEDS BY RX/DR IN RCRD: CPT | Mod: CPTII,S$GLB,, | Performed by: NURSE PRACTITIONER

## 2024-06-17 PROCEDURE — 3288F FALL RISK ASSESSMENT DOCD: CPT | Mod: CPTII,S$GLB,, | Performed by: NURSE PRACTITIONER

## 2024-06-17 PROCEDURE — 86870 RBC ANTIBODY IDENTIFICATION: CPT | Performed by: EMERGENCY MEDICINE

## 2024-06-17 PROCEDURE — 85025 COMPLETE CBC W/AUTO DIFF WBC: CPT | Performed by: NURSE PRACTITIONER

## 2024-06-17 PROCEDURE — G2211 COMPLEX E/M VISIT ADD ON: HCPCS | Mod: S$GLB,,, | Performed by: NURSE PRACTITIONER

## 2024-06-17 PROCEDURE — 3008F BODY MASS INDEX DOCD: CPT | Mod: CPTII,S$GLB,, | Performed by: NURSE PRACTITIONER

## 2024-06-17 PROCEDURE — P9021 RED BLOOD CELLS UNIT: HCPCS | Performed by: EMERGENCY MEDICINE

## 2024-06-17 PROCEDURE — 86922 COMPATIBILITY TEST ANTIGLOB: CPT | Performed by: EMERGENCY MEDICINE

## 2024-06-17 PROCEDURE — 99223 1ST HOSP IP/OBS HIGH 75: CPT | Mod: ,,, | Performed by: INTERNAL MEDICINE

## 2024-06-17 PROCEDURE — 84100 ASSAY OF PHOSPHORUS: CPT | Performed by: STUDENT IN AN ORGANIZED HEALTH CARE EDUCATION/TRAINING PROGRAM

## 2024-06-17 PROCEDURE — 11000001 HC ACUTE MED/SURG PRIVATE ROOM

## 2024-06-17 PROCEDURE — 85025 COMPLETE CBC W/AUTO DIFF WBC: CPT | Mod: 91

## 2024-06-17 PROCEDURE — 3077F SYST BP >= 140 MM HG: CPT | Mod: CPTII,S$GLB,, | Performed by: NURSE PRACTITIONER

## 2024-06-17 PROCEDURE — 86900 BLOOD TYPING SEROLOGIC ABO: CPT | Performed by: EMERGENCY MEDICINE

## 2024-06-17 PROCEDURE — 94760 N-INVAS EAR/PLS OXIMETRY 1: CPT

## 2024-06-17 PROCEDURE — 25000003 PHARM REV CODE 250: Performed by: STUDENT IN AN ORGANIZED HEALTH CARE EDUCATION/TRAINING PROGRAM

## 2024-06-17 PROCEDURE — 84484 ASSAY OF TROPONIN QUANT: CPT | Performed by: EMERGENCY MEDICINE

## 2024-06-17 PROCEDURE — 99213 OFFICE O/P EST LOW 20 MIN: CPT | Mod: S$GLB,,, | Performed by: NURSE PRACTITIONER

## 2024-06-17 RX ORDER — SODIUM CHLORIDE 0.9 % (FLUSH) 0.9 %
5 SYRINGE (ML) INJECTION
Status: DISCONTINUED | OUTPATIENT
Start: 2024-06-17 | End: 2024-06-19 | Stop reason: HOSPADM

## 2024-06-17 RX ORDER — PANTOPRAZOLE SODIUM 40 MG/10ML
40 INJECTION, POWDER, LYOPHILIZED, FOR SOLUTION INTRAVENOUS 2 TIMES DAILY
Status: DISCONTINUED | OUTPATIENT
Start: 2024-06-17 | End: 2024-06-19 | Stop reason: HOSPADM

## 2024-06-17 RX ORDER — HYDROCODONE BITARTRATE AND ACETAMINOPHEN 500; 5 MG/1; MG/1
TABLET ORAL
Status: DISCONTINUED | OUTPATIENT
Start: 2024-06-17 | End: 2024-06-19 | Stop reason: HOSPADM

## 2024-06-17 RX ORDER — ALBUTEROL SULFATE 90 UG/1
2 AEROSOL, METERED RESPIRATORY (INHALATION) EVERY 6 HOURS PRN
Status: DISCONTINUED | OUTPATIENT
Start: 2024-06-17 | End: 2024-06-19 | Stop reason: HOSPADM

## 2024-06-17 RX ORDER — LEVETIRACETAM 500 MG/1
500 TABLET ORAL 2 TIMES DAILY
Status: DISCONTINUED | OUTPATIENT
Start: 2024-06-17 | End: 2024-06-17

## 2024-06-17 RX ORDER — TALC
9 POWDER (GRAM) TOPICAL NIGHTLY PRN
Status: DISCONTINUED | OUTPATIENT
Start: 2024-06-17 | End: 2024-06-19 | Stop reason: HOSPADM

## 2024-06-17 RX ORDER — IBUPROFEN 200 MG
16 TABLET ORAL
Status: DISCONTINUED | OUTPATIENT
Start: 2024-06-17 | End: 2024-06-19 | Stop reason: HOSPADM

## 2024-06-17 RX ORDER — LEVETIRACETAM 500 MG/1
1000 TABLET ORAL 2 TIMES DAILY
Status: DISCONTINUED | OUTPATIENT
Start: 2024-06-17 | End: 2024-06-17

## 2024-06-17 RX ORDER — INSULIN ASPART 100 [IU]/ML
1-10 INJECTION, SOLUTION INTRAVENOUS; SUBCUTANEOUS
Status: DISCONTINUED | OUTPATIENT
Start: 2024-06-17 | End: 2024-06-18

## 2024-06-17 RX ORDER — ATORVASTATIN CALCIUM 40 MG/1
40 TABLET, FILM COATED ORAL DAILY
Status: DISCONTINUED | OUTPATIENT
Start: 2024-06-17 | End: 2024-06-19 | Stop reason: HOSPADM

## 2024-06-17 RX ORDER — HYDROCODONE BITARTRATE AND ACETAMINOPHEN 500; 5 MG/1; MG/1
TABLET ORAL
Status: DISCONTINUED | OUTPATIENT
Start: 2024-06-18 | End: 2024-06-19 | Stop reason: HOSPADM

## 2024-06-17 RX ORDER — GLUCAGON 1 MG
1 KIT INJECTION
Status: DISCONTINUED | OUTPATIENT
Start: 2024-06-17 | End: 2024-06-19 | Stop reason: HOSPADM

## 2024-06-17 RX ORDER — LIDOCAINE 50 MG/G
1 PATCH TOPICAL DAILY PRN
Status: DISCONTINUED | OUTPATIENT
Start: 2024-06-17 | End: 2024-06-19 | Stop reason: HOSPADM

## 2024-06-17 RX ORDER — LEVETIRACETAM 500 MG/1
1000 TABLET ORAL 2 TIMES DAILY
Status: DISCONTINUED | OUTPATIENT
Start: 2024-06-17 | End: 2024-06-18

## 2024-06-17 RX ORDER — IBUPROFEN 200 MG
24 TABLET ORAL
Status: DISCONTINUED | OUTPATIENT
Start: 2024-06-17 | End: 2024-06-19 | Stop reason: HOSPADM

## 2024-06-17 RX ORDER — ACETAMINOPHEN 325 MG/1
650 TABLET ORAL EVERY 8 HOURS PRN
Status: DISCONTINUED | OUTPATIENT
Start: 2024-06-17 | End: 2024-06-19 | Stop reason: HOSPADM

## 2024-06-17 RX ADMIN — ATORVASTATIN CALCIUM 40 MG: 40 TABLET, FILM COATED ORAL at 11:06

## 2024-06-17 RX ADMIN — PANTOPRAZOLE SODIUM 40 MG: 40 INJECTION, POWDER, FOR SOLUTION INTRAVENOUS at 01:06

## 2024-06-17 RX ADMIN — LEVETIRACETAM 1000 MG: 500 TABLET, FILM COATED ORAL at 09:06

## 2024-06-17 RX ADMIN — PANTOPRAZOLE SODIUM 40 MG: 40 INJECTION, POWDER, FOR SOLUTION INTRAVENOUS at 09:06

## 2024-06-17 RX ADMIN — LEVETIRACETAM 500 MG: 500 TABLET, FILM COATED ORAL at 11:06

## 2024-06-17 NOTE — SUBJECTIVE & OBJECTIVE
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    Bilateral pulmonary embolism 5/25/2020    History of DVT (deep vein thrombosis) 6/12/2020    Iron deficiency anemia     Stroke        Past Surgical History:   Procedure Laterality Date    COLONOSCOPY N/A 6/15/2020    Procedure: COLONOSCOPY;  Surgeon: Brown Go MD;  Location: [facility] ENDO;  Service: Endoscopy;  Laterality: N/A;    COLONOSCOPY N/A 8/8/2020    Procedure: COLONOSCOPY;  Surgeon: Orlando Hannon MD;  Location: [facility] ENDO;  Service: Endoscopy;  Laterality: N/A;    ESOPHAGOGASTRODUODENOSCOPY N/A 6/15/2020    Procedure: EGD (ESOPHAGOGASTRODUODENOSCOPY);  Surgeon: Brown Go MD;  Location: [facility] ENDO;  Service: Endoscopy;  Laterality: N/A;    FUSION, SPINE, MINIMALLY INVASIVE, USING COMPUTER-ASSISTED NAVIGATION  8/7/2020    Procedure: ENTEROSCOPY, DOUBLE BALLOON, ANTEGRADE;  Surgeon: Alexandru Espinal MD;  Location: [facility];  Service: Endoscopy;;    INTRALUMINAL GASTROINTESTINAL TRACT IMAGING VIA CAPSULE N/A 7/13/2020    Procedure: IMAGING PROCEDURE, GI TRACT, INTRALUMINAL, VIA CAPSULE;  Surgeon: Brown Go MD;  Location: [facility] ENDO;  Service: Endoscopy;  Laterality: N/A;    INTRALUMINAL GASTROINTESTINAL TRACT IMAGING VIA CAPSULE N/A 8/10/2020    Procedure: IMAGING PROCEDURE, GI TRACT, INTRALUMINAL, VIA CAPSULE;  Surgeon: Orlando Hannon MD;  Location: [facility] ENDO;  Service: Endoscopy;  Laterality: N/A;    TRANSESOPHAGEAL ECHOCARDIOGRAPHY N/A 4/29/2021    Procedure: ECHOCARDIOGRAM, TRANSESOPHAGEAL;  Surgeon: Titus Diagnostic Provider;  Location: [facility] EP LAB;  Service: Anesthesiology;  Laterality: N/A;       Review of patient's allergies indicates:  No Known Allergies    No current facility-administered medications on file prior to encounter.     Current Outpatient Medications on File Prior to Encounter   Medication Sig    aspirin 81 MG Chew Take 1 tablet (81 mg total) by mouth once daily.     atorvastatin (LIPITOR) 40 MG tablet Take 1 tablet (40 mg total) by mouth once daily.    clopidogreL (PLAVIX) 75 mg tablet Take 1 tablet (75 mg total) by mouth once daily.    levETIRAcetam (KEPPRA) 1000 MG tablet Take 1,000 mg by mouth 2 (two) times daily.    multivitamin capsule Take 1 capsule by mouth once daily.    [DISCONTINUED] albuterol (PROVENTIL/VENTOLIN HFA) 90 mcg/actuation inhaler Inhale 2 puffs into the lungs every 6 (six) hours as needed.    [DISCONTINUED] cholecalciferol, vitamin D3, 1,250 mcg (50,000 unit) Tab Take 50,000 Units by mouth once a week.    [DISCONTINUED] ondansetron (ZOFRAN-ODT) 4 MG TbDL Take 1 tablet (4 mg total) by mouth every 6 (six) hours as needed (nausea).     Family History       Problem Relation (Age of Onset)    Heart disease Father          Tobacco Use    Smoking status: Former     Types: Cigarettes    Smokeless tobacco: Never   Substance and Sexual Activity    Alcohol use: No    Drug use: No    Sexual activity: Not Currently     Partners: Female     Review of Systems   Constitutional:  Negative for chills, fatigue and fever.   HENT:  Negative for congestion, hearing loss and sore throat.    Respiratory:  Positive for shortness of breath.    Cardiovascular:  Negative for chest pain.   Gastrointestinal:  Positive for constipation. Negative for abdominal pain, blood in stool, diarrhea, nausea and vomiting.   Genitourinary:  Negative for dysuria and frequency.   Musculoskeletal:  Negative for back pain.   Skin:  Negative for rash.   Neurological:  Negative for weakness and headaches.   Hematological:  Does not bruise/bleed easily.   Psychiatric/Behavioral:  Negative for dysphoric mood and sleep disturbance. The patient is not nervous/anxious.      Objective:     Vital Signs (Most Recent):  Temp: 97.8 °F (36.6 °C) (06/17/24 1603)  Pulse: 70 (06/17/24 1603)  Resp: 17 (06/17/24 1603)  BP: 116/75 (06/17/24 1603)  SpO2: 100 % (06/17/24 1603) Vital Signs (24h Range):  Temp:  [97.5 °F  (36.4 °C)-98.1 °F (36.7 °C)] 97.8 °F (36.6 °C)  Pulse:  [65-97] 70  Resp:  [14-22] 17  SpO2:  [87 %-100 %] 100 %  BP: (116-159)/(55-77) 116/75     Weight: 89.8 kg (198 lb)  Body mass index is 26.12 kg/m².     Physical Exam  HENT:      Head: Normocephalic and atraumatic.   Eyes:      Pupils: Pupils are equal, round, and reactive to light.      Comments: Pale conjunctiva   Cardiovascular:      Rate and Rhythm: Normal rate and regular rhythm.   Pulmonary:      Effort: Pulmonary effort is normal.      Breath sounds: Normal breath sounds.      Comments: CTAB  Abdominal:      Palpations: Abdomen is soft.      Tenderness: There is no abdominal tenderness.   Musculoskeletal:      Right lower leg: No edema.      Left lower leg: No edema.   Skin:     General: Skin is warm.      Capillary Refill: Capillary refill takes 2 to 3 seconds.      Findings: No rash.   Neurological:      Mental Status: He is alert and oriented to person, place, and time.   Psychiatric:         Mood and Affect: Mood normal.         Behavior: Behavior normal.              CRANIAL NERVES     CN III, IV, VI   Pupils are equal, round, and reactive to light.       Significant Labs: All pertinent labs within the past 24 hours have been reviewed.  CBC:   Recent Labs   Lab 06/17/24  0749   WBC 4.96   HGB 4.6*   HCT 17.0*        Significant Imaging: I have reviewed all pertinent imaging results/findings within the past 24 hours.

## 2024-06-17 NOTE — PHARMACY MED REC
"  [facility]           Pharmacy  Admission Medication History     The home medication history was taken by Alecia Loving.      Medication history obtained from Medications listed below were obtained from: Patient/family    Based on information gathered for medication list, you may go to "Admission" then "Reconcile Home Medications" tabs to review and/or act upon those items.     The home medication list has been updated by the Pharmacy department.   Please read ALL comments highlighted in yellow.   Please address this information as you see fit.    Feel free to contact us if you have any questions or require assistance.    The medications listed below were removed from the home medication list.  Please reorder if appropriate:    Patient reports NOT TAKING the following medication(s):  Zofran odt 4mg  D3 59953  Ventolin hfa      No current facility-administered medications on file prior to encounter.     Current Outpatient Medications on File Prior to Encounter   Medication Sig Dispense Refill    aspirin 81 MG Chew Take 1 tablet (81 mg total) by mouth once daily. 90 tablet 3    atorvastatin (LIPITOR) 40 MG tablet Take 1 tablet (40 mg total) by mouth once daily. 90 tablet 3    clopidogreL (PLAVIX) 75 mg tablet Take 1 tablet (75 mg total) by mouth once daily. 30 tablet 11    levETIRAcetam (KEPPRA) 1000 MG tablet Take 1,000 mg by mouth 2 (two) times daily.      multivitamin capsule Take 1 capsule by mouth once daily.         Please address this information as you see fit.  Feel free to contact us if you have any questions or require assistance.    Alecia Loving  [phone]                .          "

## 2024-06-17 NOTE — HPI
Miky Esqueda is a 69-year-old male with a PMHx SUBHASH, CVA, h/o jejunal AVM, history of b/l PE s/p IVC filter who presented to [facility] ED for anemia.  Patient was recently seen by Hematology today and was advised to report to the emergency room for hemoglobin level of 4.6. Patient also reports a 2-3 week history of shortness of breath and chest tightness. He says the shortness of breath and chest tightness only occurs when exerting himself such as walking up stairs or walking for prolonged periods of time. Denies any melanotic stools, bright bloody stools, abdominal pain, nausea vomiting, wheezing, orthopnea, or lower extremity edema. He does report chronic constipation and says he has a bowel movement every 3 days. Regarding his history of iron-deficiency anemia previous causes included AVM and patient has received IV iron infusions and has been taking PO iron supplements.     In the ED, /68, HR 90, RR 18, with O2 saturation of 100% on RA.  CBC with H/H 4.6/17, MCV 61, RDW 19.9.  Ferritin 14. Troponin negative. EKG w/ NSR w/o ST segment changes. Occult blood positive. Patient was started with PRBC transfusion. Patient was admitted to [facility] for anemia likely secondary to GI bleed.

## 2024-06-17 NOTE — SUBJECTIVE & OBJECTIVE
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    Bilateral pulmonary embolism 5/25/2020    History of DVT (deep vein thrombosis) 6/12/2020    Iron deficiency anemia     Stroke        Past Surgical History:   Procedure Laterality Date    COLONOSCOPY N/A 6/15/2020    Procedure: COLONOSCOPY;  Surgeon: Brown Go MD;  Location: [facility] ENDO;  Service: Endoscopy;  Laterality: N/A;    COLONOSCOPY N/A 8/8/2020    Procedure: COLONOSCOPY;  Surgeon: Orlando Hannon MD;  Location: [facility] ENDO;  Service: Endoscopy;  Laterality: N/A;    ESOPHAGOGASTRODUODENOSCOPY N/A 6/15/2020    Procedure: EGD (ESOPHAGOGASTRODUODENOSCOPY);  Surgeon: Brown Go MD;  Location: [facility] ENDO;  Service: Endoscopy;  Laterality: N/A;    FUSION, SPINE, MINIMALLY INVASIVE, USING COMPUTER-ASSISTED NAVIGATION  8/7/2020    Procedure: ENTEROSCOPY, DOUBLE BALLOON, ANTEGRADE;  Surgeon: Alexandru Espinal MD;  Location: [facility];  Service: Endoscopy;;    INTRALUMINAL GASTROINTESTINAL TRACT IMAGING VIA CAPSULE N/A 7/13/2020    Procedure: IMAGING PROCEDURE, GI TRACT, INTRALUMINAL, VIA CAPSULE;  Surgeon: Brown Go MD;  Location: [facility] ENDO;  Service: Endoscopy;  Laterality: N/A;    INTRALUMINAL GASTROINTESTINAL TRACT IMAGING VIA CAPSULE N/A 8/10/2020    Procedure: IMAGING PROCEDURE, GI TRACT, INTRALUMINAL, VIA CAPSULE;  Surgeon: Orlando Hannon MD;  Location: [facility] ENDO;  Service: Endoscopy;  Laterality: N/A;    TRANSESOPHAGEAL ECHOCARDIOGRAPHY N/A 4/29/2021    Procedure: ECHOCARDIOGRAM, TRANSESOPHAGEAL;  Surgeon: Titus Diagnostic Provider;  Location: [facility] EP LAB;  Service: Anesthesiology;  Laterality: N/A;       Review of patient's allergies indicates:  No Known Allergies  Family History       Problem Relation (Age of Onset)    Heart disease Father          Tobacco Use    Smoking status: Former     Types: Cigarettes    Smokeless tobacco: Never   Substance and Sexual Activity    Alcohol use: No     Drug use: No    Sexual activity: Not Currently     Partners: Female     Review of Systems   Constitutional:  Positive for fatigue.   Respiratory:  Positive for shortness of breath.    Gastrointestinal:  Negative for abdominal pain and blood in stool.     Objective:     Vital Signs (Most Recent):  Temp: 98.1 °F (36.7 °C) (06/17/24 1417)  Pulse: 88 (06/17/24 1447)  Resp: 20 (06/17/24 1447)  BP: 129/62 (06/17/24 1447)  SpO2: 100 % (06/17/24 1447) Vital Signs (24h Range):  Temp:  [97.5 °F (36.4 °C)-98.1 °F (36.7 °C)] 98.1 °F (36.7 °C)  Pulse:  [76-97] 88  Resp:  [14-22] 20  SpO2:  [87 %-100 %] 100 %  BP: (117-159)/(55-77) 129/62     Weight: 89.8 kg (198 lb) (06/17/24 0903)  Body mass index is 26.12 kg/m².    No intake or output data in the 24 hours ending 06/17/24 1459    Lines/Drains/Airways       Peripheral Intravenous Line  Duration                  Peripheral IV - Single Lumen 06/17/24 0950 20 G;1 in Left Antecubital <1 day         Peripheral IV - Single Lumen 06/17/24 1130 22 G Posterior;Right Hand <1 day                     Physical Exam  Vitals and nursing note reviewed.   Constitutional:       Appearance: He is well-developed.   HENT:      Head: Normocephalic and atraumatic.   Eyes:      General: No scleral icterus.     Pupils: Pupils are equal, round, and reactive to light.   Cardiovascular:      Rate and Rhythm: Normal rate and regular rhythm.      Heart sounds: Normal heart sounds.   Pulmonary:      Effort: Pulmonary effort is normal. No respiratory distress.      Breath sounds: Normal breath sounds.   Abdominal:      General: Bowel sounds are normal. There is no distension.      Palpations: Abdomen is soft.      Tenderness: There is no abdominal tenderness.   Musculoskeletal:         General: Normal range of motion.      Cervical back: Normal range of motion and neck supple.   Skin:     General: Skin is warm and dry.      Findings: No erythema or rash.   Neurological:      Mental Status: He is alert and  oriented to person, place, and time.      Comments: No asterixis   Psychiatric:         Behavior: Behavior normal.          Significant Labs:  Recent Lab Results         06/17/24  1110   06/17/24  1054   06/17/24  0749   06/17/24  0741        Unit Blood Type Code   9500  [P]              9500  [P]           Unit Expiration   [id number]  [P]              [id number]  [P]           Unit Blood Type   O NEG  [P]              O NEG  [P]           Aniso     Slight         Antibody ID   POS  Comment: Anti-D           Baso #     0.04         Basophil %     0.8         CODING SYSTEM   [id number]  [P]              [id number]  [P]           Crossmatch Interpretation   Compatible  [P]              Compatible  [P]           Differential Method     Automated         Direct Jacinda (ABBEY)   NEG           DISPENSE STATUS   ISSUED  [P]              CROSSMATCHED  [P]           Eos #     0.0         Eos %     0.6         Ferritin     14         Gran # (ANC)     2.3         Gran %     46.6         Group & Rh   B NEG           Hematocrit     17.0  Comment: HCT/HGB critical result(s) called and verbal readback obtained from   Yuni Vicente MA.  by [facility] 06/17/2024 08:38           Hemoglobin     4.6  Comment: HCT/HGB critical result(s) called and verbal readback obtained from   Yuni Vicente MA.  by [facility] 06/17/2024 08:38           Hypo     Marked         Immature Grans (Abs)     0.01  Comment: Mild elevation in immature granulocytes is non specific and   can be seen in a variety of conditions including stress response,   acute inflammation, trauma and pregnancy. Correlation with other   laboratory and clinical findings is essential.           Immature Granulocytes     0.2         INDIRECT JACINDA   POS  Comment: @06/17/24 12:08 by VD1:  Result called to Rhonda Cooksey, RN, on 06/17/24, 12:09 by Buck Samano III.  Verbal readback obtained.             Lymph #     2.0         Lymph %     39.9         MCH     16.4         MCHC      27.1         MCV     61         Mono #     0.6         Mono %     11.9         MPV     9.6         nRBC     1         Occult Blood Positive             Ovalocytes     Occasional         Platelet Estimate     Appears normal         Platelet Count     234         Poikilocytosis     Slight         Poly     Occasional         Product Code   C1189J13  [P]              [id number]  [P]           RBC     2.80         RDW     19.9         Specimen Outdate   06/20/2024 23:59           Teardrop Cells     Occasional         Troponin I       <0.006  Comment: The reference interval for Troponin I represents the 99th percentile   cutoff   for our facility and is consistent with 3rd generation assay   performance.         UNIT NUMBER   [id number]  [P]              [id number]  [P]           WBC     4.96                  [P] - Preliminary Result               Significant Imaging:  Imaging results within the past 24 hours have been reviewed.

## 2024-06-17 NOTE — PROGRESS NOTES
PATIENT: Miky Esqueda  MRN: [id number]  DATE: 6/17/2024    Diagnosis:   1. Symptomatic anemia    2. Iron deficiency anemia due to chronic blood loss    3. History of pulmonary embolus (PE)    4. Chronic deep vein thrombosis (DVT) of popliteal vein of both lower extremities    5. H/O: CVA (cerebrovascular accident)    6. Seizure    7. AV malformation of gastrointestinal tract    8. Vitamin D deficiency    9. Constipation, unspecified constipation type                Chief Complaint: PE, iron def anemia    Subjective:     History of Present Illness:    HPI As per Dr Gonzales's previous note 1/27/2022:     He presented to the ER 05/25/2020 with acute onset of shortness of breath, diaphoresis and lightheadedness.  CT chest showed extensive bilateral pulmonary thromboemboli.  No evidence of central saddle embolus.  There was moderate pulmonary emphysema.  Lower extremity ultrasound also revealed nonocclusive thrombus in the right and left popliteal veins as well as the right femoral vein.    This is his 1st episode of pulmonary embolism/DVT.  He was started on Xarelto.    Then he had a syncopal episode in his PCP's office on 06/12/2020 which lasted about 30 sec followed by brief 15 sec seizure like episode.  He was taken to the ER where a CT head showed a hypodense area in the left parietal lobe concerning for infarct.  By this time he also developed significant anemia and was transfused 2 units packed red blood cells.    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    He got 1 dose of Injectafer 07/21/2020 and a second dose 9/4/2020    He presented to the ER 8/7/2020 with severe anemia Hb 4.6 - passed out in his primary care physician's office. This was the second episode of severe symptomatic anemia after being started on Xarelto.    He has past h/o extensive DVT/PE in may 2020, CVA in June 2020 and h/o jejunal AVM. No active source of bleeding could be found from extensive GI work-up  2020.  -severe  anemia felt likely related to intermittent bleeding from angioectasia(s) vs a Dieulafoy lesion.  -he underwent IVC filter placement 08/10/2020 and his Xarelto was discontinued    INTERVAL HISTORY:   -Pt here 6/17/24 for anemia follow up, additionally with hx PE   -Had Injectafer 2 doses 2/2022  -received venofer June/July2022  -received x3 doses iron sucrose 300mg July/Aug 2023  -received x3 doses iron sucrose Oct 2023  -He is on baby asa and plavix now, has not noted any bleeding issues at this time.  -takes Vit D3 every other week  -needs PFO closure  - Saw Dr Go/GI 7/21/22, discussed sandostatin with pt, pt  does not want sandostatin, has not f/u with gi since  - He remains in [place], driving to[place]from [facility] as his mother passed 2022, states he is doing well in [place] closer to family. Plans to return here at this time for [employer]. Active at [employer].    -Today pt endorses fatigue, HA, chest pressure increasing in frequency last couple weeks and with lesser amounts of activity as well as bending; sister is on phone with pt stating pt is very tired, has noticed difference in him particularly sob  - pt denies gi blood loss, he does report some constipation and small amounts of hemorrhoidal blood with wiping  - he did not make it to last IV iron infusions, states it was hard this past time to travel to [place] and in future he and sister state [place] infusion would be easier for infusions        Past Medical, Surgical, Family and Social History Reviewed.    Medications and Allergies reviewed      Review of Systems   Constitutional:  Positive for fatigue. Negative for fever and unexpected weight change.   HENT:  Negative for mouth sores and nosebleeds.    Eyes:  Negative for visual disturbance.   Respiratory:  Positive for shortness of breath (HA). Negative for chest tightness.    Cardiovascular:  Positive for chest pain (pressure intermittently, increasing frequency with activity). Negative for  "palpitations and leg swelling.   Gastrointestinal:  Negative for abdominal pain, anal bleeding, blood in stool, constipation, diarrhea, nausea and vomiting.        + hemorrhoids with intermittent blood when wiping   Endocrine: Negative for cold intolerance.   Genitourinary:  Negative for hematuria.   Musculoskeletal:  Negative for back pain.   Neurological:  Positive for weakness and light-headedness. Negative for dizziness, seizures, syncope and headaches.   Hematological:  Negative for adenopathy. Does not bruise/bleed easily.   Psychiatric/Behavioral:  Positive for decreased concentration. Negative for confusion.      Objective:     Lab Results   Component Value Date    WBC 4.96 06/17/2024    HGB 4.6 (LL) 06/17/2024    HCT 17.0 (LL) 06/17/2024    MCV 61 (L) 06/17/2024     06/17/2024       Lab Results   Component Value Date    IRON 24 (L) 02/16/2024    TIBC 400 02/16/2024    FERRITIN 14 (L) 06/17/2024                 Vitals:    06/17/24 0813   BP: (!) 146/77   BP Location: Left arm   Patient Position: Sitting   BP Method: Medium (Automatic)   Pulse: 97   Resp: (!) 22   Temp: 97.5 °F (36.4 °C)   TempSrc: Oral   SpO2: (!) 87%   Weight: 87 kg (191 lb 12.8 oz)   Height: 6' 1" (1.854 m)         Body mass index is 25.3 kg/m².    Physical Exam   Constitutional: He is oriented to person, place, and time. normal appearance. No distress.   HENT:   Head: Normocephalic and atraumatic.   Mouth/Throat: Mucous membranes are moist. Oropharynx is clear.   Eyes: No scleral icterus.   Pale conjunctiva   Cardiovascular: Normal rate and regular rhythm.   Murmur heard.Pulmonary:      Effort: Pulmonary effort is normal.      Breath sounds: Normal breath sounds.     Abdominal: Soft. Normal appearance and bowel sounds are normal. He exhibits no distension. There is no abdominal tenderness. There is no guarding.   Musculoskeletal:         General: Normal range of motion.      Cervical back: Normal range of motion and neck supple.    "   Right lower leg: No edema.      Left lower leg: No edema.   Neurological: He is alert and oriented to person, place, and time. Gait normal.   Skin: Skin is warm and dry. No bruising noted. No erythema. There is pallor. No jaundice.   Psychiatric: His behavior is normal. Mood and thought content normal.   Nursing note and vitals reviewed.        ECOG SCORE               Assessment:       1. Symptomatic anemia    2. Iron deficiency anemia due to chronic blood loss    3. History of pulmonary embolus (PE)    4. Chronic deep vein thrombosis (DVT) of popliteal vein of both lower extremities    5. H/O: CVA (cerebrovascular accident)    6. Seizure    7. AV malformation of gastrointestinal tract    8. Vitamin D deficiency    9. Constipation, unspecified constipation type                Plan:       Iron deficiency anemia, symptomatic anemia  -secondary to h/o GI bleed and use of blood thinners  -received 1 dose of Injectafer in July 2020 and a second dose in sept 2020, 2 doses Feb 2022  -received venofer 6/10/22, 6/17/22, 6/24/22, 7/1/22  -labs (7/21/22) cbc, ferritin reviewed, iron saturation and tibc pending at visit time H/H 10.8/34.2 and ferritin is normal at 115. Will call with iron/tibc results.  -Saw GI 7/21/22 who recommended Sandostin which pt now states he is not interested in taking   -Updated labs 11/21/22 reviewed with improved H/H 13.3/43.3, microcytic with normal serum iron, tibc and ferritin.   -Updated labs 2/20/23 prior this appt are not ready at visit time, iron deficiency, iron infusions ordered but pt did not complete  -He has been taking po iron with mild intermittent constipation and has had no recent evidence of GI bleed symptoms  - Labs today, 2/16/24, downward trend of hgb to   -Advised that if h/h remains low or is lower, will need gi follow up as well  -received iron sucrose x3 doses 300mg July/Aug 2023  -10/16/23 labs hgb 11.4, improvement in microcytic evidence, ferritin improved at 173  ng/mL,  - iron sucrose x3 Oct 2023  - labs2/16/24 hgb downward trend to 10.2 g/dL, ferritin low at 16 ng/mL, pending iron studies; given the ferritin pt will benefit from iv iron  - did not get recommended iv iron  - labs today 6/17/24 critical h/h 4.6/17 called in during visit  -rtc 4 months with labs, call in interim any changes or concerns    H/o Acute bilateral PE with bilateral DVT  -unprovoked  -intolerant to anticoagulation secondary to recurrent GI bleeds in past, now on daily baby ASA and plavix  -status post IVC filter placement 08/10/2020  -reviewed CBC, ferritin, iron saturation    H/o CVA with seizure disorder  -diagnosed June 2020  -doing well, no focal neurological deficits, no seizures, stable  -cannot tolerate antiplatelet therapy or anticoagulation therapy  -on keppra 500 mg bid since Nov 2020, tolerating well    GI bleeding from AV malformations History  -contributory to SUBHASH, denies current symptoms  -last seen by GI  7/21/22  -given h/h downward trend, would like pt to follow up with GI, he is agreeable at this time, referral placed    Vitamin-D deficiency  -continue vitamin D3, 15746 units, once every 2 weeks  -continue to monitor    PFO  -needs PFO closure, but denied by insurance  -management deferred to cardiology    Constipation  -intermittent   -likely related to iron use  -increase po fluids, fresh fruits and vegetables  -prn colace and miralax  -continue to monitor      - labs today 6/17/24 critical h/h 4.6/17 called in during visit, called report to Charlotte RAY in [place] ED; pt's sister Anastacia on phone with pt and this provider at time, aware pt will be sent to ED as I do not feel it is safe for pt to drive alone back to [place] given symptomatic anemia and critical labs. Yuni Vicente MA escorted pt per wheelchair to ED, pt in agreement with treatment.    Total time of this visit, including time spent face to face with patient and/or via video/audio, and also in preparing for today's visit  for MDM and documentation. (Medical Decision Making, including consideration of possible diagnoses, management options, complex medical record review, review of diagnostic tests and information, consideration and discussion of significant complications based on comorbidities, and discussion with providers involved with the care of the patient) 25 minutes. Greater than 50% was spent face to face with the patient counseling and coordinating care.     Visit today included increased complexity associated with the care of the episodic problem symptomatic anemia, SUBHASH, history GI bleed addressed and managing the longitudinal care of the patient due to the serious and/or complex managed problem(s) symptomatic anemia, transfer to ED for critical h/h, close follow up indicated.        Anastacia Davenport, ERON-C  [facility]  Hematology/Oncology  [address] [place] [id number]  TOBY Castillo  [phone]

## 2024-06-17 NOTE — HPI
69 y.o. male who presents to the Emergency Department with severe anemia, sent in by Hematology due to abnormal labs this a.m..  Patient reports shortness of breath and fatigue x1 month, worsened with exertion.  Patient denies any headache or dizziness, chest pain, abdominal pain, nausea, vomiting.  He does endorse intermittent constipation, however none currently.  Denies any black or tarry stools, no bright red blood per rectum.  Has had prior history of similar in the past but not for a few years.  He is on aspirin and Plavix.     Underwent VCE in 2020, suspicious for small bowel AVM. Subsequent DBE negative.

## 2024-06-17 NOTE — Clinical Note
Diagnosis: Anemia requiring transfusions [[id number]]   Future Attending Provider: LINNEA FRAZIER [[id number]]   Reason for IP Medical Treatment  (Clinical interventions that can only be accomplished in the IP setting? ) :: serial CBCs, emergent blood transfusion, GI consultation   I certify that Inpatient services for greater than or equal to 2 midnights are medically necessary:: Yes   Plans for Post-Acute care--if anticipated (pick the single best option):: A. No post acute care anticipated at this time   Special Needs:: No Special Needs [1]

## 2024-06-17 NOTE — ASSESSMENT & PLAN NOTE
Patient's anemia is currently uncontrolled. Etiology likely d/t chronic blood loss secondary to a AVM.  Current CBC reviewed-   Lab Results   Component Value Date    HGB 4.6 (LL) 06/17/2024    HCT 17.0 (LL) 06/17/2024     Monitor serial CBC and transfuse if patient becomes hemodynamically unstable, symptomatic or H/H drops below 7/21.  Type + screen and consent obtained, will plan for 2 unit PRBC transfusion   Follow-up post transfusion H&H  Protonix 40mg IV BID  Discontinue all ASA, NSAIDs and Heparin products. Currently on aspirin and Plavix  Maintain IV access with 2 large bore IVs  Keep NPO   [facility] consulted appreciate recommendations  Type and screen, PT, and PTT. Check Iron studies, ferritin, folate, Vit B12, retic count, LDH, haptoglobin, FOBT, TSH

## 2024-06-17 NOTE — CONSULTS
[facility] - Emergency Dept  Gastroenterology  Consult Note    Patient Name: Miky Esqueda  MRN: [id number]  Admission Date: 6/17/2024  Hospital Length of Stay: 0 days  Code Status: Full Code   Attending Provider: Sonia Jacinto MD   Consulting Provider: Jorge Alberto Bhatia MD  Primary Care Physician: Farhat Matson MD  Principal Problem:Acute blood loss anemia    Inpatient consult to Gastroenterology  Consult performed by: Jorge Alberto Bhatia MD  Consult ordered by: Jf Paris DO        Subjective:     HPI:  69 y.o. male who presents to the Emergency Department with severe anemia, sent in by Hematology due to abnormal labs this a.m..  Patient reports shortness of breath and fatigue x1 month, worsened with exertion.  Patient denies any headache or dizziness, chest pain, abdominal pain, nausea, vomiting.  He does endorse intermittent constipation, however none currently.  Denies any black or tarry stools, no bright red blood per rectum.  Has had prior history of similar in the past but not for a few years.  He is on aspirin and Plavix.     Underwent VCE in 2020, suspicious for small bowel AVM. Subsequent DBE negative.     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    Bilateral pulmonary embolism 5/25/2020    History of DVT (deep vein thrombosis) 6/12/2020    Iron deficiency anemia     Stroke        Past Surgical History:   Procedure Laterality Date    COLONOSCOPY N/A 6/15/2020    Procedure: COLONOSCOPY;  Surgeon: Brown Go MD;  Location: [facility];  Service: Endoscopy;  Laterality: N/A;    COLONOSCOPY N/A 8/8/2020    Procedure: COLONOSCOPY;  Surgeon: Orlando Hannon MD;  Location: [facility];  Service: Endoscopy;  Laterality: N/A;    ESOPHAGOGASTRODUODENOSCOPY N/A 6/15/2020    Procedure: EGD (ESOPHAGOGASTRODUODENOSCOPY);  Surgeon: Brown Go MD;  Location: [facility];  Service: Endoscopy;  Laterality: N/A;    FUSION, SPINE,  MINIMALLY INVASIVE, USING COMPUTER-ASSISTED NAVIGATION  8/7/2020    Procedure: ENTEROSCOPY, DOUBLE BALLOON, ANTEGRADE;  Surgeon: Alexandru Espinal MD;  Location: [facility] ENDO;  Service: Endoscopy;;    INTRALUMINAL GASTROINTESTINAL TRACT IMAGING VIA CAPSULE N/A 7/13/2020    Procedure: IMAGING PROCEDURE, GI TRACT, INTRALUMINAL, VIA CAPSULE;  Surgeon: Brown Go MD;  Location: [facility] ENDO;  Service: Endoscopy;  Laterality: N/A;    INTRALUMINAL GASTROINTESTINAL TRACT IMAGING VIA CAPSULE N/A 8/10/2020    Procedure: IMAGING PROCEDURE, GI TRACT, INTRALUMINAL, VIA CAPSULE;  Surgeon: Orlando Hannon MD;  Location: [facility] ENDO;  Service: Endoscopy;  Laterality: N/A;    TRANSESOPHAGEAL ECHOCARDIOGRAPHY N/A 4/29/2021    Procedure: ECHOCARDIOGRAM, TRANSESOPHAGEAL;  Surgeon: Titus Diagnostic Provider;  Location: [facility] EP LAB;  Service: Anesthesiology;  Laterality: N/A;       Review of patient's allergies indicates:  No Known Allergies  Family History       Problem Relation (Age of Onset)    Heart disease Father          Tobacco Use    Smoking status: Former     Types: Cigarettes    Smokeless tobacco: Never   Substance and Sexual Activity    Alcohol use: No    Drug use: No    Sexual activity: Not Currently     Partners: Female     Review of Systems   Constitutional:  Positive for fatigue.   Respiratory:  Positive for shortness of breath.    Gastrointestinal:  Negative for abdominal pain and blood in stool.     Objective:     Vital Signs (Most Recent):  Temp: 98.1 °F (36.7 °C) (06/17/24 1417)  Pulse: 88 (06/17/24 1447)  Resp: 20 (06/17/24 1447)  BP: 129/62 (06/17/24 1447)  SpO2: 100 % (06/17/24 1447) Vital Signs (24h Range):  Temp:  [97.5 °F (36.4 °C)-98.1 °F (36.7 °C)] 98.1 °F (36.7 °C)  Pulse:  [76-97] 88  Resp:  [14-22] 20  SpO2:  [87 %-100 %] 100 %  BP: (117-159)/(55-77) 129/62     Weight: 89.8 kg (198 lb) (06/17/24 0903)  Body mass index is 26.12 kg/m².    No intake or output data in the 24 hours ending 06/17/24  1459    Lines/Drains/Airways       Peripheral Intravenous Line  Duration                  Peripheral IV - Single Lumen 06/17/24 0950 20 G;1 in Left Antecubital <1 day         Peripheral IV - Single Lumen 06/17/24 1130 22 G Posterior;Right Hand <1 day                     Physical Exam  Vitals and nursing note reviewed.   Constitutional:       Appearance: He is well-developed.   HENT:      Head: Normocephalic and atraumatic.   Eyes:      General: No scleral icterus.     Pupils: Pupils are equal, round, and reactive to light.   Cardiovascular:      Rate and Rhythm: Normal rate and regular rhythm.      Heart sounds: Normal heart sounds.   Pulmonary:      Effort: Pulmonary effort is normal. No respiratory distress.      Breath sounds: Normal breath sounds.   Abdominal:      General: Bowel sounds are normal. There is no distension.      Palpations: Abdomen is soft.      Tenderness: There is no abdominal tenderness.   Musculoskeletal:         General: Normal range of motion.      Cervical back: Normal range of motion and neck supple.   Skin:     General: Skin is warm and dry.      Findings: No erythema or rash.   Neurological:      Mental Status: He is alert and oriented to person, place, and time.      Comments: No asterixis   Psychiatric:         Behavior: Behavior normal.          Significant Labs:  Recent Lab Results         06/17/24  1110   06/17/24  1054   06/17/24  0749   06/17/24  0741        Unit Blood Type Code   9500  [P]              9500  [P]           Unit Expiration   [id number]  [P]              [id number]  [P]           Unit Blood Type   O NEG  [P]              O NEG  [P]           Aniso     Slight         Antibody ID   POS  Comment: Anti-D           Baso #     0.04         Basophil %     0.8         CODING SYSTEM   [id number]  [P]              [id number]  [P]           Crossmatch Interpretation   Compatible  [P]              Compatible  [P]           Differential Method     Automated         Direct Romel  (ABBEY)   NEG           DISPENSE STATUS   ISSUED  [P]              CROSSMATCHED  [P]           Eos #     0.0         Eos %     0.6         Ferritin     14         Gran # (ANC)     2.3         Gran %     46.6         Group & Rh   B NEG           Hematocrit     17.0  Comment: HCT/HGB critical result(s) called and verbal readback obtained from   Yuni Vicente MA.  by [facility] 06/17/2024 08:38           Hemoglobin     4.6  Comment: HCT/HGB critical result(s) called and verbal readback obtained from   Yuni Vicente MA.  by [facility] 06/17/2024 08:38           Hypo     Marked         Immature Grans (Abs)     0.01  Comment: Mild elevation in immature granulocytes is non specific and   can be seen in a variety of conditions including stress response,   acute inflammation, trauma and pregnancy. Correlation with other   laboratory and clinical findings is essential.           Immature Granulocytes     0.2         INDIRECT JACINDA   POS  Comment: @06/17/24 12:08 by VD1:  Result called to Rhonda Cooksey, RN, on 06/17/24, 12:09 by Buck Samano III.  Verbal readback obtained.             Lymph #     2.0         Lymph %     39.9         MCH     16.4         MCHC     27.1         MCV     61         Mono #     0.6         Mono %     11.9         MPV     9.6         nRBC     1         Occult Blood Positive             Ovalocytes     Occasional         Platelet Estimate     Appears normal         Platelet Count     234         Poikilocytosis     Slight         Poly     Occasional         Product Code   C0804G64  [P]              [id number]  [P]           RBC     2.80         RDW     19.9         Specimen Outdate   06/20/2024 23:59           Teardrop Cells     Occasional         Troponin I       <0.006  Comment: The reference interval for Troponin I represents the 99th percentile   cutoff   for our facility and is consistent with 3rd generation assay   performance.         UNIT NUMBER   [id number]  [P]               [id number]  [P]           WBC     4.96                  [P] - Preliminary Result               Significant Imaging:  Imaging results within the past 24 hours have been reviewed.  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 Trend Hgb  Plan for push enteroscopy tomorrow  NPO after midnight         Thank you for your consult. I will follow-up with patient. Please contact us if you have any additional questions.    Jorge Alberto Bhatia MD  Gastroenterology  [facility] - Emergency Dept

## 2024-06-17 NOTE — ASSESSMENT & PLAN NOTE
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 Trend Hgb  Plan for push enteroscopy tomorrow  NPO after midnight

## 2024-06-17 NOTE — H&P
[place] [facility]  [facility] Physical    Patient Name: Miky Esqueda  MRN: [id number]  Patient Class: IP- Inpatient  Admission Date: 6/17/2024  Attending Physician: Sonia Jacinto MD   Primary Care Provider: Farhat Matson MD    Patient information was obtained from patient, past medical records, and ER records.     Subjective:     Principal Problem:Acute blood loss anemia    Chief Complaint:   Chief Complaint   Patient presents with    Referral     Pt had appointment with PCP this AM. Pt states abnormal labs and referred to ED. Also complaining of SOB x 1 weeks on exertion and when bending over.        HPI: Miky Esqueda is a 69-year-old male with a PMHx SUBHASH, CVA, h/o jejunal AVM, history of b/l PE s/p IVC filter who presented to [facility] ED for anemia.  Patient was recently seen by Hematology today and was advised to report to the emergency room for hemoglobin level of 4.6. Patient also reports a 2-3 week history of shortness of breath and chest tightness. He says the shortness of breath and chest tightness only occurs when exerting himself such as walking up stairs or walking for prolonged periods of time. Denies any melanotic stools, bright bloody stools, abdominal pain, nausea vomiting, wheezing, orthopnea, or lower extremity edema. He does report chronic constipation and says he has a bowel movement every 3 days. Regarding his history of iron-deficiency anemia previous causes included AVM and patient has received IV iron infusions and has been taking PO iron supplements.     In the ED, /68, HR 90, RR 18, with O2 saturation of 100% on RA.  CBC with H/H 4.6/17, MCV 61, RDW 19.9.  Ferritin 14. Troponin negative. EKG w/ NSR w/o ST segment changes. Occult blood positive. Patient was started with PRBC transfusion. [facility] was admitted [facility] for anemia likely secondary to GI bleed.    Past Medical History:   Diagnosis Date    AV malformation of  gastrointestinal tract 7/16/2020    Bilateral pulmonary embolism 5/25/2020    History of DVT (deep vein thrombosis) 6/12/2020    Iron deficiency anemia     Stroke        Past Surgical History:   Procedure Laterality Date    COLONOSCOPY N/A 6/15/2020    Procedure: COLONOSCOPY;  Surgeon: Brown Go MD;  Location: [facility] ENDO;  Service: Endoscopy;  Laterality: N/A;    COLONOSCOPY N/A 8/8/2020    Procedure: COLONOSCOPY;  Surgeon: Orlando Hannon MD;  Location: [facility] ENDO;  Service: Endoscopy;  Laterality: N/A;    ESOPHAGOGASTRODUODENOSCOPY N/A 6/15/2020    Procedure: EGD (ESOPHAGOGASTRODUODENOSCOPY);  Surgeon: Brown Go MD;  Location: [facility] ENDO;  Service: Endoscopy;  Laterality: N/A;    FUSION, SPINE, MINIMALLY INVASIVE, USING COMPUTER-ASSISTED NAVIGATION  8/7/2020    Procedure: ENTEROSCOPY, DOUBLE BALLOON, ANTEGRADE;  Surgeon: Alexandru Espinal MD;  Location: [facility] ENDO;  Service: Endoscopy;;    INTRALUMINAL GASTROINTESTINAL TRACT IMAGING VIA CAPSULE N/A 7/13/2020    Procedure: IMAGING PROCEDURE, GI TRACT, INTRALUMINAL, VIA CAPSULE;  Surgeon: Brown Go MD;  Location: [facility] ENDO;  Service: Endoscopy;  Laterality: N/A;    INTRALUMINAL GASTROINTESTINAL TRACT IMAGING VIA CAPSULE N/A 8/10/2020    Procedure: IMAGING PROCEDURE, GI TRACT, INTRALUMINAL, VIA CAPSULE;  Surgeon: Orlando Hannon MD;  Location: [facility] ENDO;  Service: Endoscopy;  Laterality: N/A;    TRANSESOPHAGEAL ECHOCARDIOGRAPHY N/A 4/29/2021    Procedure: ECHOCARDIOGRAM, TRANSESOPHAGEAL;  Surgeon: Vinny Diagnostic Provider;  Location: [facility] EP LAB;  Service: Anesthesiology;  Laterality: N/A;       Review of patient's allergies indicates:  No Known Allergies    No current facility-administered medications on file prior to encounter.     Current Outpatient Medications on File Prior to Encounter   Medication Sig    aspirin 81 MG Chew Take 1 tablet (81 mg total) by mouth once daily.    atorvastatin (LIPITOR) 40 MG tablet Take 1 tablet (40 mg  total) by mouth once daily.    clopidogreL (PLAVIX) 75 mg tablet Take 1 tablet (75 mg total) by mouth once daily.    levETIRAcetam (KEPPRA) 1000 MG tablet Take 1,000 mg by mouth 2 (two) times daily.    multivitamin capsule Take 1 capsule by mouth once daily.    [DISCONTINUED] albuterol (PROVENTIL/VENTOLIN HFA) 90 mcg/actuation inhaler Inhale 2 puffs into the lungs every 6 (six) hours as needed.    [DISCONTINUED] cholecalciferol, vitamin D3, 1,250 mcg (50,000 unit) Tab Take 50,000 Units by mouth once a week.    [DISCONTINUED] ondansetron (ZOFRAN-ODT) 4 MG TbDL Take 1 tablet (4 mg total) by mouth every 6 (six) hours as needed (nausea).     Family History       Problem Relation (Age of Onset)    Heart disease Father          Tobacco Use    Smoking status: Former     Types: Cigarettes    Smokeless tobacco: Never   Substance and Sexual Activity    Alcohol use: No    Drug use: No    Sexual activity: Not Currently     Partners: Female     Review of Systems   Constitutional:  Negative for chills, fatigue and fever.   HENT:  Negative for congestion, hearing loss and sore throat.    Respiratory:  Positive for shortness of breath.    Cardiovascular:  Negative for chest pain.   Gastrointestinal:  Positive for constipation. Negative for abdominal pain, blood in stool, diarrhea, nausea and vomiting.   Genitourinary:  Negative for dysuria and frequency.   Musculoskeletal:  Negative for back pain.   Skin:  Negative for rash.   Neurological:  Negative for weakness and headaches.   Hematological:  Does not bruise/bleed easily.   Psychiatric/Behavioral:  Negative for dysphoric mood and sleep disturbance. The patient is not nervous/anxious.      Objective:     Vital Signs (Most Recent):  Temp: 97.8 °F (36.6 °C) (06/17/24 1603)  Pulse: 70 (06/17/24 1603)  Resp: 17 (06/17/24 1603)  BP: 116/75 (06/17/24 1603)  SpO2: 100 % (06/17/24 1603) Vital Signs (24h Range):  Temp:  [97.5 °F (36.4 °C)-98.1 °F (36.7 °C)] 97.8 °F (36.6 °C)  Pulse:   [65-97] 70  Resp:  [14-22] 17  SpO2:  [87 %-100 %] 100 %  BP: (116-159)/(55-77) 116/75     Weight: 89.8 kg (198 lb)  Body mass index is 26.12 kg/m².     Physical Exam  HENT:      Head: Normocephalic and atraumatic.   Eyes:      Pupils: Pupils are equal, round, and reactive to light.      Comments: Pale conjunctiva   Cardiovascular:      Rate and Rhythm: Normal rate and regular rhythm.   Pulmonary:      Effort: Pulmonary effort is normal.      Breath sounds: Normal breath sounds.      Comments: CTAB  Abdominal:      Palpations: Abdomen is soft.      Tenderness: There is no abdominal tenderness.   Musculoskeletal:      Right lower leg: No edema.      Left lower leg: No edema.   Skin:     General: Skin is warm.      Capillary Refill: Capillary refill takes 2 to 3 seconds.      Findings: No rash.   Neurological:      Mental Status: He is alert and oriented to person, place, and time.   Psychiatric:         Mood and Affect: Mood normal.         Behavior: Behavior normal.              CRANIAL NERVES     CN III, IV, VI   Pupils are equal, round, and reactive to light.       Significant Labs: All pertinent labs within the past 24 hours have been reviewed.  CBC:   Recent Labs   Lab 06/17/24  0749   WBC 4.96   HGB 4.6*   HCT 17.0*        Significant Imaging: I have reviewed all pertinent imaging results/findings within the past 24 hours.  Assessment/Plan:     Acute vs chronic blood loss anemia  Fecal Occult Positive  Hx of AVM  Patient's anemia is currently uncontrolled. Etiology likely d/t acute vs chronic blood loss secondary to a GI Bleed as history of AVM.  Current CBC reviewed-   Lab Results   Component Value Date    HGB 4.6 (LL) 06/17/2024    HCT 17.0 (LL) 06/17/2024     Monitor serial CBC and transfuse if patient becomes hemodynamically unstable, symptomatic or H/H drops below 7/21.  Type + screen and consent obtained, will plan for 2 unit PRBC transfusion   Follow-up post transfusion H&H  Protonix 40mg IV  BID  Discontinue all ASA, NSAIDs and Heparin products. Hold home ASA and Plavix   Maintain IV access with 2 large bore Ivs  [facility] consulted appreciate recommendations  NPO at midnight  Type and screen, PT, and PTT. Check Iron studies, ferritin, folate, Vit B12, retic count, LDH, haptoglobin, FOBT, TSH     Seizure disorder  Will continue home Keppra 1000 mg BID    Hx of CVA (cerebral vascular accident)  After speaking to PCP, current medications should include Plavix as monotherapy. Patient endorses also taking aspirin as well. Will hold antiplatelet therapy as concern for bleeding. Will continue home statin. Will restart monotherapy antiplatelet agent once appropriate.       VTE Risk Mitigation (From admission, onward)           Ordered     IP VTE HIGH RISK PATIENT  Once         06/17/24 1111     Place sequential compression device  Until discontinued         06/17/24 1111     Reason for No Pharmacological VTE Prophylaxis  Once        Question:  Reasons:  Answer:  Risk of Bleeding    06/17/24 1111                          Jf Paris DO  [facility], PGY-3  Department of Hospital Medicine  [place] - Emergency Dept

## 2024-06-17 NOTE — ED PROVIDER NOTES
Encounter Date: 6/17/2024       History     Chief Complaint   Patient presents with    Referral     Pt had appointment with PCP this AM. Pt states abnormal labs and referred to ED. Also complaining of SOB x 1 weeks on exertion and when bending over.     HPI  This is a 69 y.o. male who presents to the Emergency Department with severe anemia, sent in by Hematology due to abnormal labs this a.m..  Patient reports shortness of breath and fatigue x1 month, worsened with exertion.  Patient denies any headache or dizziness, chest pain, abdominal pain, nausea, vomiting.  He does endorse intermittent constipation, however none currently.  Denies any black or tarry stools, no bright red blood per rectum.  Has had prior history of similar in the past but not for a few years.  He is on aspirin and Plavix.      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    Bilateral pulmonary embolism 5/25/2020    History of DVT (deep vein thrombosis) 6/12/2020    Iron deficiency anemia     Stroke      Past Surgical History:   Procedure Laterality Date    COLONOSCOPY N/A 6/15/2020    Procedure: COLONOSCOPY;  Surgeon: Brown Go MD;  Location: [facility];  Service: Endoscopy;  Laterality: N/A;    COLONOSCOPY N/A 8/8/2020    Procedure: COLONOSCOPY;  Surgeon: Orlando Hannon MD;  Location: [facility];  Service: Endoscopy;  Laterality: N/A;    ESOPHAGOGASTRODUODENOSCOPY N/A 6/15/2020    Procedure: EGD (ESOPHAGOGASTRODUODENOSCOPY);  Surgeon: Brown Go MD;  Location: [facility];  Service: Endoscopy;  Laterality: N/A;    FUSION, SPINE, MINIMALLY INVASIVE, USING COMPUTER-ASSISTED NAVIGATION  8/7/2020    Procedure: ENTEROSCOPY, DOUBLE BALLOON, ANTEGRADE;  Surgeon: Alexandru Espinal MD;  Location: [facility];  Service: Endoscopy;;    INTRALUMINAL GASTROINTESTINAL TRACT IMAGING VIA CAPSULE N/A 7/13/2020    Procedure: IMAGING  PROCEDURE, GI TRACT, INTRALUMINAL, VIA CAPSULE;  Surgeon: Brown Go MD;  Location: [facility] ENDO;  Service: Endoscopy;  Laterality: N/A;    INTRALUMINAL GASTROINTESTINAL TRACT IMAGING VIA CAPSULE N/A 8/10/2020    Procedure: IMAGING PROCEDURE, GI TRACT, INTRALUMINAL, VIA CAPSULE;  Surgeon: Orlando Hannon MD;  Location: [facility] ENDO;  Service: Endoscopy;  Laterality: N/A;    TRANSESOPHAGEAL ECHOCARDIOGRAPHY N/A 4/29/2021    Procedure: ECHOCARDIOGRAM, TRANSESOPHAGEAL;  Surgeon: Dosc Diagnostic Provider;  Location: [facility] EP LAB;  Service: Anesthesiology;  Laterality: N/A;     Family History   Problem Relation Name Age of Onset    Heart disease Father       Social History     Tobacco Use    Smoking status: Former     Types: Cigarettes    Smokeless tobacco: Never   Substance Use Topics    Alcohol use: No    Drug use: No     Review of Systems   All other systems reviewed and are negative.      Physical Exam     Initial Vitals [06/17/24 0903]   BP Pulse Resp Temp SpO2   (!) 147/68 90 18 97.6 °F (36.4 °C) 100 %      MAP       --         Physical Exam    Nursing note and vitals reviewed.  Constitutional: He appears well-developed and well-nourished. He is not diaphoretic. No distress.   HENT:   Head: Normocephalic and atraumatic.   Pallor   Eyes: Conjunctivae are normal.   Conjunctival pallor   Cardiovascular:  Normal rate, regular rhythm and intact distal pulses.           No murmur heard.  Pulmonary/Chest: Breath sounds normal. No respiratory distress. He has no wheezes. He has no rhonchi. He has no rales.   Abdominal: Abdomen is soft. Bowel sounds are normal. He exhibits no distension. There is no abdominal tenderness. There is no rebound.   Genitourinary:    Genitourinary Comments: Rectal exam unremarkable, no masses, no hemorrhoids.  Stool is light brown on glove     Musculoskeletal:         General: No tenderness. Normal range of motion.     Neurological: He is alert and oriented to person, place, and time. He has  normal strength.   Skin: Skin is warm and dry. Capillary refill takes less than 2 seconds. No rash noted. No pallor.   Psychiatric: He has a normal mood and affect. His behavior is normal. Judgment and thought content normal.         ED Course   Procedures  Labs Reviewed   OCCULT BLOOD X 1, STOOL - Abnormal; Notable for the following components:       Result Value    Occult Blood Positive (*)     All other components within normal limits   TYPE & SCREEN - Abnormal; Notable for the following components:    Indirect Romel POS (*)     All other components within normal limits   TROPONIN I   DIRECT ANTIGLOBULIN TEST   ANTIBODY IDENTIFICATION   PREPARE RBC SOFT   PREPARE RBC SOFT          Imaging Results    None          Medications   0.9%  NaCl infusion (for blood administration) (has no administration in time range)   0.9%  NaCl infusion (for blood administration) (has no administration in time range)   albuterol inhaler 2 puff (has no administration in time range)   atorvastatin tablet 40 mg (40 mg Oral Given 6/17/24 1159)   sodium chloride 0.9% flush 5 mL (has no administration in time range)   melatonin tablet 9 mg (has no administration in time range)   acetaminophen tablet 650 mg (has no administration in time range)   LIDOcaine 5 % patch 1 patch (has no administration in time range)   insulin aspart U-100 pen 1-10 Units (has no administration in time range)   glucose chewable tablet 16 g (has no administration in time range)   glucose chewable tablet 24 g (has no administration in time range)   glucagon (human recombinant) injection 1 mg (has no administration in time range)   dextrose 10% bolus 125 mL 125 mL (has no administration in time range)   dextrose 10% bolus 250 mL 250 mL (has no administration in time range)   levETIRAcetam tablet 500 mg (500 mg Oral Given 6/17/24 1159)   pantoprazole injection 40 mg (40 mg Intravenous Given 6/17/24 1323)     Medical Decision Making  This is an emergent evaluation of a  "69 y.o.male patient with presentation of severe anemia, hemoglobin 4.5, as performed this morning during outpatient labs.  Patient was sent in by Hematology for further evaluation, blood transfusion.  Patient has history of AV malformation of the GI tract.  Currently on aspirin and Plavix.  Was formally on Xarelto which was stopped in 2020 and he had an IVC placed  at that time.    Initial differentials include but are not limited to:  Anemia due to GI blood loss, angiodysplasia/av malformation of the GI tract, ulcer, polyp, intestinal mass or cancer, hemorrhoid, diverticulosis.     Plan:  Blood transfusion in the ED, GI consultation, admit for serial CBC is, possible endoscopy +/- colonoscopy.      Amount and/or Complexity of Data Reviewed  External Data Reviewed: notes.     Details:     Hematology clinic note today 06/17/2024:     "-Pt here 6/17/24 for anemia follow up, additionally with hx PE   -Had Injectafer 2 doses 2/2022  -received venofer June/July2022  -received x3 doses iron sucrose 300mg July/Aug 2023  -received x3 doses iron sucrose Oct 2023  -He is on baby asa and plavix now, has not noted any bleeding issues at this time.  -takes Vit D3 every other week  -needs PFO closure  - Saw Dr Go/GI 7/21/22, discussed sandostatin with pt, pt  does not want sandostatin, has not f/u with gi since  - He remains in [place], driving to/from [facility] [place] [facility] as his mother passed 2022, states he is doing well in [place] closer to family. Plans to return here at this time for appts. Active at [employer].     -Today pt endorses fatigue, HA, chest pressure increasing in frequency last couple weeks and with lesser amounts of activity as well as bending; sister is on phone with pt stating pt is very tired, has noticed difference in him particularly sob  - pt denies gi blood loss, he does report some constipation and small amounts of hemorrhoidal blood with wiping  - he did not make it to last IV iron infusions, states it was hard " "this past time to travel to [place] and in future he and sister state [facility] infusion would be easier for infusions"      Labs: ordered. Decision-making details documented in ED Course.  ECG/medicine tests: ordered and independent interpretation performed. Decision-making details documented in ED Course.  Discussion of management or test interpretation with external provider(s): Dr. NAZ De Luna,  Resident, regarding patient's anemia, hemoglobin levels, presentation of SOB, fatigue. Prior hx of GI bleeds and admission I the past. Will admit to floor for serial cbc's s/p blood transfusion, GI consult for further eval.    Risk  Prescription drug management.  Decision regarding hospitalization.  Diagnosis or treatment significantly limited by social determinants of health.  Risk Details:     Social determinants of health considered:  Access to healthcare including difficulty in obtaining follow-up and difficulty in obtaining medications; health literacy.    Critical Care  Total time providing critical care: 35 minutes (Critical care required for profound anemia requiring blood transfusion in the emergency department and admission to the hospital for further evaluation including GI consultation.)               ED Course as of 06/17/24 1419   Mon Jun 17, 2024   0923 MD referred. Todays labs remarkable for H/H 4.5/17. consent obtained  [NW]   1044 Independent EKG Interpretation:  Normal sinus rhythm at 90 bpm, nl axis, prolonged QT at 489 [place], no hypertrophy, no ST-T changes.      [NP]   1419 Occult Blood(!): Positive [NP]   1419 Troponin I: <0.006 [NP]      ED Course User Index  [NP] Santy Valenzuela MD  [NW] Radha Hummel PA-C                           Clinical Impression:  Final diagnoses:  [R06.02] Shortness of breath  [D64.9] Anemia requiring transfusions (Primary)  [D50.0] Anemia due to GI blood loss          ED Disposition Condition    Admit Stable                Santy Valenzuela MD  06/17/24 1103       Bianca, " Santy MORALES MD  06/17/24 1436

## 2024-06-17 NOTE — ASSESSMENT & PLAN NOTE
After speaking to PCP, current medications should include Plavix as monotherapy. Patient endorses also taking aspirin as well. Will hold antiplatelet therapy as concern for bleeding. Will continue home statin. Will restart monotherapy antiplatelet agent once appropriate.

## 2024-06-18 ENCOUNTER — ANESTHESIA (OUTPATIENT)
Dept: ENDOSCOPY | Facility: HOSPITAL | Age: 69
End: 2024-06-18
Payer: MEDICARE

## 2024-06-18 ENCOUNTER — ANESTHESIA EVENT (OUTPATIENT)
Dept: ENDOSCOPY | Facility: HOSPITAL | Age: 69
End: 2024-06-18
Payer: MEDICARE

## 2024-06-18 LAB
ALBUMIN SERPL BCP-MCNC: 3.2 G/DL (ref 3.5–5.2)
ALP SERPL-CCNC: 60 U/L (ref 55–135)
ALT SERPL W/O P-5'-P-CCNC: 12 U/L (ref 10–44)
ANION GAP SERPL CALC-SCNC: 9 MMOL/L (ref 8–16)
AST SERPL-CCNC: 21 U/L (ref 10–40)
BASOPHILS # BLD AUTO: 0.02 K/UL (ref 0–0.2)
BASOPHILS # BLD AUTO: 0.02 K/UL (ref 0–0.2)
BASOPHILS NFR BLD: 0.3 % (ref 0–1.9)
BASOPHILS NFR BLD: 0.3 % (ref 0–1.9)
BILIRUB SERPL-MCNC: 3 MG/DL (ref 0.1–1)
BLD PROD TYP BPU: NORMAL
BLD PROD TYP BPU: NORMAL
BLOOD UNIT EXPIRATION DATE: NORMAL
BLOOD UNIT EXPIRATION DATE: NORMAL
BLOOD UNIT TYPE CODE: 1700
BLOOD UNIT TYPE CODE: 1700
BLOOD UNIT TYPE: NORMAL
BLOOD UNIT TYPE: NORMAL
BUN SERPL-MCNC: 22 MG/DL (ref 8–23)
CALCIUM SERPL-MCNC: 8.6 MG/DL (ref 8.7–10.5)
CHLORIDE SERPL-SCNC: 111 MMOL/L (ref 95–110)
CO2 SERPL-SCNC: 18 MMOL/L (ref 23–29)
CODING SYSTEM: NORMAL
CODING SYSTEM: NORMAL
CREAT SERPL-MCNC: 1.2 MG/DL (ref 0.5–1.4)
CROSSMATCH INTERPRETATION: NORMAL
CROSSMATCH INTERPRETATION: NORMAL
DIFFERENTIAL METHOD BLD: ABNORMAL
DIFFERENTIAL METHOD BLD: ABNORMAL
DISPENSE STATUS: NORMAL
DISPENSE STATUS: NORMAL
EOSINOPHIL # BLD AUTO: 0 K/UL (ref 0–0.5)
EOSINOPHIL # BLD AUTO: 0.1 K/UL (ref 0–0.5)
EOSINOPHIL NFR BLD: 0.7 % (ref 0–8)
EOSINOPHIL NFR BLD: 0.8 % (ref 0–8)
ERYTHROCYTE [DISTWIDTH] IN BLOOD BY AUTOMATED COUNT: 24.3 % (ref 11.5–14.5)
ERYTHROCYTE [DISTWIDTH] IN BLOOD BY AUTOMATED COUNT: 25.4 % (ref 11.5–14.5)
EST. GFR  (NO RACE VARIABLE): >60 ML/MIN/1.73 M^2
GLUCOSE SERPL-MCNC: 95 MG/DL (ref 70–110)
HCT VFR BLD AUTO: 21.7 % (ref 40–54)
HCT VFR BLD AUTO: 22.4 % (ref 40–54)
HGB BLD-MCNC: 6.5 G/DL (ref 14–18)
HGB BLD-MCNC: 6.8 G/DL (ref 14–18)
HGB BLD-MCNC: 8.5 G/DL (ref 14–18)
IMM GRANULOCYTES # BLD AUTO: 0.05 K/UL (ref 0–0.04)
IMM GRANULOCYTES # BLD AUTO: 0.05 K/UL (ref 0–0.04)
IMM GRANULOCYTES NFR BLD AUTO: 0.7 % (ref 0–0.5)
IMM GRANULOCYTES NFR BLD AUTO: 0.8 % (ref 0–0.5)
LYMPHOCYTES # BLD AUTO: 1.1 K/UL (ref 1–4.8)
LYMPHOCYTES # BLD AUTO: 1.3 K/UL (ref 1–4.8)
LYMPHOCYTES NFR BLD: 17.7 % (ref 18–48)
LYMPHOCYTES NFR BLD: 18.6 % (ref 18–48)
MAGNESIUM SERPL-MCNC: 1.9 MG/DL (ref 1.6–2.6)
MCH RBC QN AUTO: 20.1 PG (ref 27–31)
MCH RBC QN AUTO: 20.3 PG (ref 27–31)
MCHC RBC AUTO-ENTMCNC: 30 G/DL (ref 32–36)
MCHC RBC AUTO-ENTMCNC: 30.4 G/DL (ref 32–36)
MCV RBC AUTO: 67 FL (ref 82–98)
MCV RBC AUTO: 67 FL (ref 82–98)
MONOCYTES # BLD AUTO: 0.6 K/UL (ref 0.3–1)
MONOCYTES # BLD AUTO: 0.7 K/UL (ref 0.3–1)
MONOCYTES NFR BLD: 9.5 % (ref 4–15)
MONOCYTES NFR BLD: 9.8 % (ref 4–15)
NEUTROPHILS # BLD AUTO: 4.3 K/UL (ref 1.8–7.7)
NEUTROPHILS # BLD AUTO: 4.9 K/UL (ref 1.8–7.7)
NEUTROPHILS NFR BLD: 69.8 % (ref 38–73)
NEUTROPHILS NFR BLD: 71 % (ref 38–73)
NRBC BLD-RTO: 1 /100 WBC
NRBC BLD-RTO: 1 /100 WBC
NUM UNITS TRANS PACKED RBC: NORMAL
NUM UNITS TRANS PACKED RBC: NORMAL
PHOSPHATE SERPL-MCNC: 3 MG/DL (ref 2.7–4.5)
PLATELET # BLD AUTO: 166 K/UL (ref 150–450)
PLATELET # BLD AUTO: 172 K/UL (ref 150–450)
PMV BLD AUTO: 9.8 FL (ref 9.2–12.9)
PMV BLD AUTO: ABNORMAL FL (ref 9.2–12.9)
POCT GLUCOSE: 90 MG/DL (ref 70–110)
POTASSIUM SERPL-SCNC: 4 MMOL/L (ref 3.5–5.1)
PROT SERPL-MCNC: 5.8 G/DL (ref 6–8.4)
RBC # BLD AUTO: 3.24 M/UL (ref 4.6–6.2)
RBC # BLD AUTO: 3.35 M/UL (ref 4.6–6.2)
SODIUM SERPL-SCNC: 138 MMOL/L (ref 136–145)
WBC # BLD AUTO: 6.03 K/UL (ref 3.9–12.7)
WBC # BLD AUTO: 7.06 K/UL (ref 3.9–12.7)

## 2024-06-18 PROCEDURE — 36430 TRANSFUSION BLD/BLD COMPNT: CPT

## 2024-06-18 PROCEDURE — 85018 HEMOGLOBIN: CPT

## 2024-06-18 PROCEDURE — 11000001 HC ACUTE MED/SURG PRIVATE ROOM

## 2024-06-18 PROCEDURE — P9016 RBC LEUKOCYTES REDUCED: HCPCS | Performed by: STUDENT IN AN ORGANIZED HEALTH CARE EDUCATION/TRAINING PROGRAM

## 2024-06-18 PROCEDURE — 84100 ASSAY OF PHOSPHORUS: CPT | Performed by: STUDENT IN AN ORGANIZED HEALTH CARE EDUCATION/TRAINING PROGRAM

## 2024-06-18 PROCEDURE — 37000009 HC ANESTHESIA EA ADD 15 MINS: Performed by: INTERNAL MEDICINE

## 2024-06-18 PROCEDURE — 83735 ASSAY OF MAGNESIUM: CPT | Performed by: STUDENT IN AN ORGANIZED HEALTH CARE EDUCATION/TRAINING PROGRAM

## 2024-06-18 PROCEDURE — 44360 SMALL BOWEL ENDOSCOPY: CPT | Performed by: INTERNAL MEDICINE

## 2024-06-18 PROCEDURE — 86922 COMPATIBILITY TEST ANTIGLOB: CPT | Performed by: STUDENT IN AN ORGANIZED HEALTH CARE EDUCATION/TRAINING PROGRAM

## 2024-06-18 PROCEDURE — 63600175 PHARM REV CODE 636 W HCPCS: Performed by: NURSE ANESTHETIST, CERTIFIED REGISTERED

## 2024-06-18 PROCEDURE — P9016 RBC LEUKOCYTES REDUCED: HCPCS

## 2024-06-18 PROCEDURE — 63600175 PHARM REV CODE 636 W HCPCS: Performed by: STUDENT IN AN ORGANIZED HEALTH CARE EDUCATION/TRAINING PROGRAM

## 2024-06-18 PROCEDURE — 25000003 PHARM REV CODE 250: Performed by: STUDENT IN AN ORGANIZED HEALTH CARE EDUCATION/TRAINING PROGRAM

## 2024-06-18 PROCEDURE — 0DJ08ZZ INSPECTION OF UPPER INTESTINAL TRACT, VIA NATURAL OR ARTIFICIAL OPENING ENDOSCOPIC: ICD-10-PCS | Performed by: INTERNAL MEDICINE

## 2024-06-18 PROCEDURE — C9113 INJ PANTOPRAZOLE SODIUM, VIA: HCPCS | Performed by: STUDENT IN AN ORGANIZED HEALTH CARE EDUCATION/TRAINING PROGRAM

## 2024-06-18 PROCEDURE — 37000008 HC ANESTHESIA 1ST 15 MINUTES: Performed by: INTERNAL MEDICINE

## 2024-06-18 PROCEDURE — 25000003 PHARM REV CODE 250: Performed by: NURSE ANESTHETIST, CERTIFIED REGISTERED

## 2024-06-18 PROCEDURE — 80053 COMPREHEN METABOLIC PANEL: CPT | Performed by: STUDENT IN AN ORGANIZED HEALTH CARE EDUCATION/TRAINING PROGRAM

## 2024-06-18 PROCEDURE — 44360 SMALL BOWEL ENDOSCOPY: CPT | Mod: ,,, | Performed by: INTERNAL MEDICINE

## 2024-06-18 PROCEDURE — 36415 COLL VENOUS BLD VENIPUNCTURE: CPT | Performed by: STUDENT IN AN ORGANIZED HEALTH CARE EDUCATION/TRAINING PROGRAM

## 2024-06-18 PROCEDURE — 85025 COMPLETE CBC W/AUTO DIFF WBC: CPT | Performed by: STUDENT IN AN ORGANIZED HEALTH CARE EDUCATION/TRAINING PROGRAM

## 2024-06-18 PROCEDURE — 36415 COLL VENOUS BLD VENIPUNCTURE: CPT | Mod: XB

## 2024-06-18 RX ORDER — PHENYLEPHRINE HYDROCHLORIDE 10 MG/ML
INJECTION INTRAVENOUS
Status: DISCONTINUED | OUTPATIENT
Start: 2024-06-18 | End: 2024-06-18

## 2024-06-18 RX ORDER — LIDOCAINE HYDROCHLORIDE 20 MG/ML
INJECTION INTRAVENOUS
Status: DISCONTINUED | OUTPATIENT
Start: 2024-06-18 | End: 2024-06-18

## 2024-06-18 RX ORDER — LEVETIRACETAM 500 MG/1
1000 TABLET ORAL 2 TIMES DAILY
Status: DISCONTINUED | OUTPATIENT
Start: 2024-06-18 | End: 2024-06-19

## 2024-06-18 RX ORDER — HYDROCODONE BITARTRATE AND ACETAMINOPHEN 500; 5 MG/1; MG/1
TABLET ORAL
Status: DISCONTINUED | OUTPATIENT
Start: 2024-06-18 | End: 2024-06-19 | Stop reason: HOSPADM

## 2024-06-18 RX ORDER — PROPOFOL 10 MG/ML
VIAL (ML) INTRAVENOUS
Status: DISCONTINUED | OUTPATIENT
Start: 2024-06-18 | End: 2024-06-18

## 2024-06-18 RX ORDER — LEVETIRACETAM 10 MG/ML
1000 INJECTION INTRAVASCULAR EVERY 12 HOURS
Status: DISCONTINUED | OUTPATIENT
Start: 2024-06-18 | End: 2024-06-18

## 2024-06-18 RX ADMIN — PROPOFOL 50 MG: 10 INJECTION, EMULSION INTRAVENOUS at 02:06

## 2024-06-18 RX ADMIN — PANTOPRAZOLE SODIUM 40 MG: 40 INJECTION, POWDER, FOR SOLUTION INTRAVENOUS at 09:06

## 2024-06-18 RX ADMIN — PHENYLEPHRINE HYDROCHLORIDE 200 MCG: 10 INJECTION INTRAVENOUS at 02:06

## 2024-06-18 RX ADMIN — LIDOCAINE HYDROCHLORIDE 75 MG: 20 INJECTION, SOLUTION INTRAVENOUS at 02:06

## 2024-06-18 RX ADMIN — LEVETIRACETAM 1000 MG: 10 INJECTION INTRAVENOUS at 09:06

## 2024-06-18 RX ADMIN — SODIUM CHLORIDE: 0.9 INJECTION, SOLUTION INTRAVENOUS at 01:06

## 2024-06-18 RX ADMIN — TOPICAL ANESTHETIC 1 EACH: 200 SPRAY DENTAL; PERIODONTAL at 02:06

## 2024-06-18 RX ADMIN — LEVETIRACETAM 1000 MG: 500 TABLET, FILM COATED ORAL at 09:06

## 2024-06-18 RX ADMIN — GLYCOPYRROLATE 0.1 MG: 0.2 INJECTION, SOLUTION INTRAMUSCULAR; INTRAVITREAL at 02:06

## 2024-06-18 NOTE — TRANSFER OF CARE
"Anesthesia Transfer of Care Note    Patient: Miky Esqueda    Procedure(s) Performed: Procedure(s) (LRB):  EGD (ESOPHAGOGASTRODUODENOSCOPY) (N/A)    Patient location: GI    Anesthesia Type: general    Transport from OR: Transported from OR on room air with adequate spontaneous ventilation    Post pain: adequate analgesia    Post assessment: no apparent anesthetic complications and tolerated procedure well    Post vital signs: stable    Level of consciousness: responds to stimulation and sedated    Nausea/Vomiting: no nausea/vomiting    Complications: none    Transfer of care protocol was followed      Last vitals: Visit Vitals  BP (!) 141/71   Pulse 78   Temp 36.6 °C (97.9 °F)   Resp 18   Ht 6' 1" (1.854 m)   Wt 86.9 kg (191 lb 9.3 oz)   SpO2 99%   BMI 25.28 kg/m²     "

## 2024-06-18 NOTE — PLAN OF CARE
Patient awake and stable. Dr. Bhatia explained the endoscopy report. Talked to the primary nurse, report given, patient ready to transfer back to hall

## 2024-06-18 NOTE — SUBJECTIVE & OBJECTIVE
Interval History: Received 2 1/2 unit PRBC transfusion overnight. 1/2 unit was voided due to time of transfusion. Patient remains NPO for push enteroscopy with GI this morning.     Review of Systems   Constitutional:  Negative for chills, fatigue and fever.   HENT:  Negative for congestion, hearing loss and sore throat.    Respiratory:  Positive for shortness of breath.    Cardiovascular:  Negative for chest pain.   Gastrointestinal:  Positive for constipation. Negative for abdominal pain, blood in stool, diarrhea, nausea and vomiting.   Genitourinary:  Negative for dysuria and frequency.   Musculoskeletal:  Negative for back pain.   Skin:  Negative for rash.   Neurological:  Negative for weakness and headaches.   Hematological:  Does not bruise/bleed easily.   Psychiatric/Behavioral:  Negative for dysphoric mood and sleep disturbance. The patient is not nervous/anxious.      Objective:     Vital Signs (Most Recent):  Temp: 97.7 °F (36.5 °C) (06/18/24 0722)  Pulse: 74 (06/18/24 0722)  Resp: 20 (06/18/24 0722)  BP: 125/70 (06/18/24 0722)  SpO2: (!) 93 % (06/18/24 0722) Vital Signs (24h Range):  Temp:  [97.6 °F (36.4 °C)-98.3 °F (36.8 °C)] 97.7 °F (36.5 °C)  Pulse:  [65-90] 74  Resp:  [14-21] 20  SpO2:  [93 %-100 %] 93 %  BP: (100-159)/(55-77) 125/70     Weight: 86.9 kg (191 lb 9.3 oz)  Body mass index is 25.28 kg/m².    Intake/Output Summary (Last 24 hours) at 6/18/2024 0851  Last data filed at 6/18/2024 0800  Gross per 24 hour   Intake 996.66 ml   Output 425 ml   Net 571.66 ml         Physical Exam  HENT:      Head: Normocephalic and atraumatic.   Eyes:      Pupils: Pupils are equal, round, and reactive to light.      Comments: Pale conjunctiva   Cardiovascular:      Rate and Rhythm: Normal rate and regular rhythm.   Pulmonary:      Effort: Pulmonary effort is normal.      Breath sounds: Normal breath sounds.      Comments: CTAB  Abdominal:      Palpations: Abdomen is soft.      Tenderness: There is no abdominal  tenderness.   Musculoskeletal:      Right lower leg: No edema.      Left lower leg: No edema.   Skin:     General: Skin is warm.      Capillary Refill: Capillary refill takes less than 2 seconds.      Findings: No rash.   Neurological:      Mental Status: He is alert and oriented to person, place, and time.   Psychiatric:         Mood and Affect: Mood normal.         Behavior: Behavior normal.             Significant Labs: All pertinent labs within the past 24 hours have been reviewed.  CBC:   Recent Labs   Lab 06/17/24  0749 06/17/24  2313 06/18/24  0644   WBC 4.96 9.66 7.06   HGB 4.6* 5.8* 6.8*   HCT 17.0* 19.7* 22.4*    194 172     CMP:   Recent Labs   Lab 06/17/24  1649 06/18/24  0644    138   K 4.1 4.0    111*   CO2 18* 18*   GLU 96 95   BUN 25* 22   CREATININE 1.3 1.2   CALCIUM 9.0 8.6*   PROT 6.3 5.8*   ALBUMIN 3.6 3.2*   BILITOT 1.1* 3.0*   ALKPHOS 61 60   AST 25 21   ALT 14 12   ANIONGAP 11 9       Significant Imaging: I have reviewed all pertinent imaging results/findings within the past 24 hours.

## 2024-06-18 NOTE — PLAN OF CARE
CM met with pt - he lives alone at his home in [place] - he has extended family there including famiy, adult children including daughter Romana Balderas and ex wife Priscilla Esqueda      Pt is AAO x 3, confirmed demographics.  Pt used to live in [place] - now lives in [place].    Pt wants to start seeing Dr. LEIHG Jarvis in [place]       dx:  GIB    No hh, no dme prior to admit       Pt is open to f/u with GI in [place].          06/18/24 1711   Discharge Assessment   Assessment Type Discharge Planning Assessment   Source of Information patient;health record   Communicated DB with patient/caregiver Date not available/Unable to determine   Reason For Admission GIB   People in Home alone   Do you expect to return to your current living situation? Yes   Do you have help at home or someone to help you manage your care at home? Yes   Who are your caregiver(s) and their phone number(s)? famiy, adult children including daughter Romana Balderas and ex wife Priscilla Esqueda  [phone]   Prior to hospitilization cognitive status: Alert/Oriented   Current cognitive status: Alert/Oriented   Walking or Climbing Stairs Difficulty no   Dressing/Bathing Difficulty no   Equipment Currently Used at Home none   Patient currently being followed by outpatient case management? No   Do you currently have service(s) that help you manage your care at home? No   Do you have prescription coverage? Yes  ([employer] [place], [place])   Do you have any problems affording any of your prescribed medications? No   Is the patient taking medications as prescribed? yes   Who is going to help you get home at discharge? Pt will drive himself home at d/c   How do you get to doctors appointments? car, drives self   Are you on dialysis? No   Do you take coumadin? No   Discharge Plan A Home;Home with family   Discharge Plan B Home;Home with family;Home Health   DME Needed Upon Discharge  none   Discharge Plan discussed with: Patient   Transition of Care Barriers  None   Physical Activity   On average, how many days per week do you engage in moderate to strenuous exercise (like a brisk walk)? 5 days   Financial Resource Strain   How hard is it for you to pay for the very basics like food, housing, medical care, and heating? Not very   Housing Stability   In the last 12 months, was there a time when you were not able to pay the mortgage or rent on time? N   At any time in the past 12 months, were you homeless or living in a shelter (including now)? N   Transportation Needs   Has the lack of transportation kept you from medical appointments, meetings, work or from getting things needed for daily living? No   Food Insecurity   Within the past 12 months, you worried that your food would run out before you got the money to buy more. Never true   Within the past 12 months, the food you bought just didn't last and you didn't have money to get more. Never true   Stress   Do you feel stress - tense, restless, nervous, or anxious, or unable to sleep at night because your mind is troubled all the time - these days? Not at all   Social Isolation   How often do you feel lonely or isolated from those around you?  Never   Alcohol Use   Q1: How often do you have a drink containing alcohol? Never   Utilities   In the past 12 months has the electric, gas, oil, or water company threatened to shut off services in your home? No   Health Literacy   How often do you need to have someone help you when you read instructions, pamphlets, or other written material from your doctor or pharmacy? Never

## 2024-06-18 NOTE — PLAN OF CARE
AAOx4. No C/o pain. Tolerating regular diet. Bed locked in lowest position, bed alarm set and call bell within reach.

## 2024-06-18 NOTE — PROGRESS NOTES
[facility] Medicine  Progress Note    Patient Name: Miky Esqueda  MRN: [id number]  Patient Class: IP- Inpatient   Admission Date: 6/17/2024  Length of Stay: 1 days  Attending Physician: Sonia Jacinto MD  Primary Care Provider: Farhat Matson MD    Subjective:     Principal Problem:Acute blood loss anemia    HPI:  Miky Esqueda is a 69-year-old male with a PMHx SUBHASH, CVA, h/o jejunal AVM, history of b/l PE s/p IVC filter who presented to [facility] ED for anemia.  Patient was recently seen by Hematology today and was advised to report to the emergency room for hemoglobin level of 4.6. Patient also reports a 2-3 week history of shortness of breath and chest tightness. He says the shortness of breath and chest tightness only occurs when exerting himself such as walking up stairs or walking for prolonged periods of time. Denies any melanotic stools, bright bloody stools, abdominal pain, nausea vomiting, wheezing, orthopnea, or lower extremity edema. He does report chronic constipation and says he has a bowel movement every 3 days. Regarding his history of iron-deficiency anemia previous causes included AVM and patient has received IV iron infusions and has been taking PO iron supplements.     In the ED, /68, HR 90, RR 18, with O2 saturation of 100% on RA.  CBC with H/H 4.6/17, MCV 61, RDW 19.9.  Ferritin 14. Troponin negative. EKG w/ NSR w/o ST segment changes. Occult blood positive. Patient was started with PRBC transfusion. [facility] for anemia likely secondary to GI bleed.    Overview/Hospital Course:  No notes on file    Interval History: Received 2 1/2 unit PRBC transfusion overnight. 1/2 unit was voided due to time of transfusion. Patient remains NPO for push enteroscopy with GI this morning.    Review of Systems   Constitutional:  Negative for chills, fatigue and fever.   HENT:  Negative for congestion, hearing loss and sore throat.     Respiratory:  Positive for shortness of breath.    Cardiovascular:  Negative for chest pain.   Gastrointestinal:  Positive for constipation. Negative for abdominal pain, blood in stool, diarrhea, nausea and vomiting.   Genitourinary:  Negative for dysuria and frequency.   Musculoskeletal:  Negative for back pain.   Skin:  Negative for rash.   Neurological:  Negative for weakness and headaches.   Hematological:  Does not bruise/bleed easily.   Psychiatric/Behavioral:  Negative for dysphoric mood and sleep disturbance. The patient is not nervous/anxious.      Objective:     Vital Signs (Most Recent):  Temp: 97.7 °F (36.5 °C) (06/18/24 0722)  Pulse: 74 (06/18/24 0722)  Resp: 20 (06/18/24 0722)  BP: 125/70 (06/18/24 0722)  SpO2: (!) 93 % (06/18/24 0722) Vital Signs (24h Range):  Temp:  [97.6 °F (36.4 °C)-98.3 °F (36.8 °C)] 97.7 °F (36.5 °C)  Pulse:  [65-90] 74  Resp:  [14-21] 20  SpO2:  [93 %-100 %] 93 %  BP: (100-159)/(55-77) 125/70     Weight: 86.9 kg (191 lb 9.3 oz)  Body mass index is 25.28 kg/m².    Intake/Output Summary (Last 24 hours) at 6/18/2024 0851  Last data filed at 6/18/2024 0800  Gross per 24 hour   Intake 996.66 ml   Output 425 ml   Net 571.66 ml         Physical Exam  HENT:      Head: Normocephalic and atraumatic.   Eyes:      Pupils: Pupils are equal, round, and reactive to light.      Comments: Pale conjunctiva   Cardiovascular:      Rate and Rhythm: Normal rate and regular rhythm.   Pulmonary:      Effort: Pulmonary effort is normal.      Breath sounds: Normal breath sounds.      Comments: CTAB  Abdominal:      Palpations: Abdomen is soft.      Tenderness: There is no abdominal tenderness.   Musculoskeletal:      Right lower leg: No edema.      Left lower leg: No edema.   Skin:     General: Skin is warm.      Capillary Refill: Capillary refill takes less than 2 seconds.      Findings: No rash.   Neurological:      Mental Status: He is alert and oriented to person, place, and time.   Psychiatric:          Mood and Affect: Mood normal.         Behavior: Behavior normal.             Significant Labs: All pertinent labs within the past 24 hours have been reviewed.  CBC:   Recent Labs   Lab 06/17/24  0749 06/17/24  2313 06/18/24  0644   WBC 4.96 9.66 7.06   HGB 4.6* 5.8* 6.8*   HCT 17.0* 19.7* 22.4*    194 172     CMP:   Recent Labs   Lab 06/17/24  1649 06/18/24  0644    138   K 4.1 4.0    111*   CO2 18* 18*   GLU 96 95   BUN 25* 22   CREATININE 1.3 1.2   CALCIUM 9.0 8.6*   PROT 6.3 5.8*   ALBUMIN 3.6 3.2*   BILITOT 1.1* 3.0*   ALKPHOS 61 60   AST 25 21   ALT 14 12   ANIONGAP 11 9       Significant Imaging: I have reviewed all pertinent imaging results/findings within the past 24 hours.    Assessment/Plan:      * Acute blood loss anemia  Patient's anemia is currently uncontrolled. Etiology likely d/t chronic blood loss secondary to a AVM.  Current CBC reviewed-   Lab Results   Component Value Date    HGB 4.6 (LL) 06/17/2024    HCT 17.0 (LL) 06/17/2024     Monitor serial CBC and transfuse if patient becomes hemodynamically unstable, symptomatic or H/H drops below 7/21.  Type + screen and consent obtained. S/p 2 1/2 unit PRBC transfusion overnight.1/2 unit was voided due to time of transfusion. Patient remains NPO for push enteroscopy with [facility].   Plan for CBC at 3pm this afternoon.  Protonix 40mg IV BID  Discontinue all ASA, NSAIDs and Heparin products. Currently on aspirin and Plavix  Maintain IV access with 2 large bore IVs  Keep NPO   Type and screen, PT, and PTT. Check Iron studies, ferritin, folate, Vit B12, retic count, LDH, haptoglobin, FOBT, TSH     Seizure disorder  Will continue home Keppra 1000 mg BID    Hx of CVA (cerebral vascular accident)  After speaking to PCP, current medications should include Plavix as monotherapy. Patient endorses also taking aspirin as well. Will hold antiplatelet therapy as concern for bleeding. Will continue home statin. Will restart monotherapy  antiplatelet agent once appropriate.       VTE Risk Mitigation (From admission, onward)           Ordered     IP VTE HIGH RISK PATIENT  Once         06/17/24 1111     Place sequential compression device  Until discontinued         06/17/24 1111     Reason for No Pharmacological VTE Prophylaxis  Once        Question:  Reasons:  Answer:  Risk of Bleeding    06/17/24 1111                    Discharge Planning   DB:      Code Status: Full Code   Is the patient medically ready for discharge?:     Reason for patient still in hospital (select all that apply): Patient trending condition, Treatment, and Consult recommendations             Jf Paris DO  Department of Hospital Medicine   [facility]

## 2024-06-18 NOTE — PLAN OF CARE
AOX4. VS stable. Safety maintained. Meds given per MAR. Denies pain or N/V at this time. Blood glucose monitored. PRBCs infusing per orders. Regular diet maintained. NPO @ midnight. Resting quietly. SR up X 2. Call light in reach. Bed alarm locked in lowest position. Pt denies any further needs at this time. Plan of care ongoing.       Problem: Adult Inpatient Plan of Care  Goal: Plan of Care Review  Outcome: Progressing  Goal: Patient-Specific Goal (Individualized)  Outcome: Progressing     Problem: Anemia  Goal: Anemia Symptom Improvement  Outcome: Progressing     Problem: Comorbidity Management  Goal: Maintenance of Seizure Control  Outcome: Progressing

## 2024-06-18 NOTE — PROGRESS NOTES
VIRTUAL NURSE:  Cued into patient's room.  Permission received per patient to turn camera to view patient.  Introduced as VN that will be working with floor nurse and nursing assistant.  Educated patient on VN's role in patient care and  VIP model.  Plan of care reviewed with patient and his ex-wife, Priscilla who is present.  Education per flowsheet.   Informed patient that staff will round on them every 2 hours but to use call light for any other needs they may have; informed of fall risk and fall precautions.  Patient verbalized understanding.  Call light within reach; bed siderails up x3.  Opportunity given for questions and questions answered.  Admission assessment questions answered.  Patient denies complaints or any needs at this time. Instructed to call for assistance.  Will cont to monitor and intervene as needed.    Labs, notes, orders, and careplan reviewed.        06/17/24 1927   Admission   Initial VN Admission Questions Complete   Communication Issues? None   Shift   Virtual Nurse - Rounding Complete   Pain Management Interventions quiet environment facilitated;relaxation techniques promoted   Virtual Nurse - Patient Verbalized Approval Of VN Rounding;Camera Use   Type of Frequent Check   Type Patient Rounds   Safety/Activity   Patient Rounds bed in low position;bed wheels locked;call light in patient/parent reach;clutter free environment maintained;ID band on;visualized patient;placement of personal items at bedside   Safety Promotion/Fall Prevention instructed to call staff for mobility

## 2024-06-18 NOTE — PLAN OF CARE
Pt.s Transferred to Endo unit via stretcher.IV fluids started the patient denies any pain or distress at this time.Will continue to monitor

## 2024-06-18 NOTE — [employer] PATIENT INSTRUCTIONS
Discharge Summary/Instructions after an Endoscopic Procedure  Patient Name: Miky Esqueda  Patient MRN: [id number]  Patient YOB: 1955 Tuesday, June 18, 2024  Jorge Alberto Bhatia MD  Dear patient,  As a result of recent federal legislation (The Federal Cures Act), you may   receive lab or pathology results from your procedure in your [facility]   account before your physician is able to contact you. Your physician or   their representative will relay the results to you with their   recommendations at their soonest availability.  Thank you,  Your health is very important to us during the Covid Crisis. Following your   procedure today, you will receive a daily text for 2 weeks asking about   signs or symptoms of Covid 19.  Please respond to this text when you   receive it so we can follow up and keep you as safe as possible.   RESTRICTIONS:  During your procedure today, you received medications for sedation.  These   medications may affect your judgment, balance and coordination.  Therefore,   for 24 hours, you have the following restrictions:   - DO NOT drive a car, operate machinery, make legal/financial decisions,   sign important papers or drink alcohol.    ACTIVITY:  Today: no heavy lifting, straining or running due to procedural   sedation/anesthesia.  The following day: return to full activity including work.  DIET:  Eat and drink normally unless instructed otherwise.     TREATMENT FOR COMMON SIDE EFFECTS:  - Mild abdominal pain, nausea, belching, bloating or excessive gas:  rest,   eat lightly and use a heating pad.  - Sore Throat: treat with throat lozenges and/or gargle with warm salt   water.  - Because air was used during the procedure, expelling large amounts of air   from your rectum or belching is normal.  - If a bowel prep was taken, you may not have a bowel movement for 1-3 days.    This is normal.  SYMPTOMS TO WATCH FOR AND REPORT TO YOUR PHYSICIAN:  1. Abdominal pain or bloating, other  than gas cramps.  2. Chest pain.  3. Back pain.  4. Signs of infection such as: chills or fever occurring within 24 hours   after the procedure.  5. Rectal bleeding, which would show as bright red, maroon, or black stools.   (A tablespoon of blood from the rectum is not serious, especially if   hemorrhoids are present.)  6. Vomiting.  7. Weakness or dizziness.  GO DIRECTLY TO THE NEAREST EMERGENCY ROOM IF YOU HAVE ANY OF THE FOLLOWING:      Difficulty breathing              Chills and/or fever over 101 F   Persistent vomiting and/or vomiting blood   Severe abdominal pain   Severe chest pain   Black, tarry stools   Bleeding- more than one tablespoon   Any other symptom or condition that you feel may need urgent attention  Your doctor recommends these additional instructions:  If any biopsies were taken, your doctors clinic will contact you in 1 to 2   weeks with any results.  - Return patient to hospital hall for ongoing care.   - Resume previous diet.   - Continue present medications.   - To visualize the small bowel, perform video capsule endoscopy at   appointment to be scheduled, inpatient vs outpatient.  - Observe patient's clinical course.  For questions, problems or results please call your physician - Jorge Alberto Bhatia MD.  EMERGENCY PHONE NUMBER: [phone],  LAB RESULTS: [phone]  IF A COMPLICATION OR EMERGENCY SITUATION ARISES AND YOU ARE UNABLE TO REACH   YOUR PHYSICIAN - GO DIRECTLY TO THE EMERGENCY ROOM.  Jorge Alberto Bhatia MD  6/18/2024 2:37:49 PM  This report has been verified and signed electronically.  Dear patient,  As a result of recent federal legislation (The Federal Cures Act), you may   receive lab or pathology results from your procedure in your [facility]   account before your physician is able to contact you. Your physician or   their representative will relay the results to you with their   recommendations at their soonest availability.  Thank you,  [employer]OVATION

## 2024-06-19 ENCOUNTER — TELEPHONE (OUTPATIENT)
Dept: GASTROENTEROLOGY | Facility: CLINIC | Age: 69
End: 2024-06-19
Payer: MEDICARE

## 2024-06-19 VITALS
TEMPERATURE: 98 F | HEIGHT: 73 IN | HEART RATE: 70 BPM | RESPIRATION RATE: 20 BRPM | OXYGEN SATURATION: 93 % | WEIGHT: 195.75 LBS | BODY MASS INDEX: 25.94 KG/M2 | DIASTOLIC BLOOD PRESSURE: 54 MMHG | SYSTOLIC BLOOD PRESSURE: 105 MMHG

## 2024-06-19 LAB
ALBUMIN SERPL BCP-MCNC: 3.2 G/DL (ref 3.5–5.2)
ALP SERPL-CCNC: 64 U/L (ref 55–135)
ALT SERPL W/O P-5'-P-CCNC: 14 U/L (ref 10–44)
ANION GAP SERPL CALC-SCNC: 8 MMOL/L (ref 8–16)
AST SERPL-CCNC: 17 U/L (ref 10–40)
BASOPHILS # BLD AUTO: 0.04 K/UL (ref 0–0.2)
BASOPHILS NFR BLD: 0.5 % (ref 0–1.9)
BILIRUB SERPL-MCNC: 2.1 MG/DL (ref 0.1–1)
BUN SERPL-MCNC: 20 MG/DL (ref 8–23)
CALCIUM SERPL-MCNC: 8.7 MG/DL (ref 8.7–10.5)
CHLORIDE SERPL-SCNC: 110 MMOL/L (ref 95–110)
CO2 SERPL-SCNC: 20 MMOL/L (ref 23–29)
CREAT SERPL-MCNC: 1.4 MG/DL (ref 0.5–1.4)
DIFFERENTIAL METHOD BLD: ABNORMAL
EOSINOPHIL # BLD AUTO: 0.1 K/UL (ref 0–0.5)
EOSINOPHIL NFR BLD: 1.2 % (ref 0–8)
ERYTHROCYTE [DISTWIDTH] IN BLOOD BY AUTOMATED COUNT: 25.5 % (ref 11.5–14.5)
EST. GFR  (NO RACE VARIABLE): 55 ML/MIN/1.73 M^2
GLUCOSE SERPL-MCNC: 97 MG/DL (ref 70–110)
HCT VFR BLD AUTO: 26.2 % (ref 40–54)
HGB BLD-MCNC: 8.2 G/DL (ref 14–18)
IMM GRANULOCYTES # BLD AUTO: 0.04 K/UL (ref 0–0.04)
IMM GRANULOCYTES NFR BLD AUTO: 0.5 % (ref 0–0.5)
LYMPHOCYTES # BLD AUTO: 1.4 K/UL (ref 1–4.8)
LYMPHOCYTES NFR BLD: 19 % (ref 18–48)
MAGNESIUM SERPL-MCNC: 1.9 MG/DL (ref 1.6–2.6)
MCH RBC QN AUTO: 21.5 PG (ref 27–31)
MCHC RBC AUTO-ENTMCNC: 31.3 G/DL (ref 32–36)
MCV RBC AUTO: 69 FL (ref 82–98)
MONOCYTES # BLD AUTO: 0.7 K/UL (ref 0.3–1)
MONOCYTES NFR BLD: 9.7 % (ref 4–15)
NEUTROPHILS # BLD AUTO: 5.2 K/UL (ref 1.8–7.7)
NEUTROPHILS NFR BLD: 69.1 % (ref 38–73)
NRBC BLD-RTO: 2 /100 WBC
OHS QRS DURATION: 86 MS
OHS QTC CALCULATION: 489 MS
PHOSPHATE SERPL-MCNC: 3.3 MG/DL (ref 2.7–4.5)
PLATELET # BLD AUTO: 175 K/UL (ref 150–450)
PMV BLD AUTO: ABNORMAL FL (ref 9.2–12.9)
POTASSIUM SERPL-SCNC: 3.8 MMOL/L (ref 3.5–5.1)
PROT SERPL-MCNC: 5.8 G/DL (ref 6–8.4)
RBC # BLD AUTO: 3.81 M/UL (ref 4.6–6.2)
SODIUM SERPL-SCNC: 138 MMOL/L (ref 136–145)
WBC # BLD AUTO: 7.53 K/UL (ref 3.9–12.7)

## 2024-06-19 PROCEDURE — 84100 ASSAY OF PHOSPHORUS: CPT | Performed by: STUDENT IN AN ORGANIZED HEALTH CARE EDUCATION/TRAINING PROGRAM

## 2024-06-19 PROCEDURE — 85025 COMPLETE CBC W/AUTO DIFF WBC: CPT | Performed by: STUDENT IN AN ORGANIZED HEALTH CARE EDUCATION/TRAINING PROGRAM

## 2024-06-19 PROCEDURE — 63600175 PHARM REV CODE 636 W HCPCS: Performed by: STUDENT IN AN ORGANIZED HEALTH CARE EDUCATION/TRAINING PROGRAM

## 2024-06-19 PROCEDURE — 99232 SBSQ HOSP IP/OBS MODERATE 35: CPT | Mod: ,,, | Performed by: INTERNAL MEDICINE

## 2024-06-19 PROCEDURE — 94761 N-INVAS EAR/PLS OXIMETRY MLT: CPT

## 2024-06-19 PROCEDURE — C9113 INJ PANTOPRAZOLE SODIUM, VIA: HCPCS | Performed by: STUDENT IN AN ORGANIZED HEALTH CARE EDUCATION/TRAINING PROGRAM

## 2024-06-19 PROCEDURE — 83735 ASSAY OF MAGNESIUM: CPT | Performed by: STUDENT IN AN ORGANIZED HEALTH CARE EDUCATION/TRAINING PROGRAM

## 2024-06-19 PROCEDURE — 36415 COLL VENOUS BLD VENIPUNCTURE: CPT | Performed by: STUDENT IN AN ORGANIZED HEALTH CARE EDUCATION/TRAINING PROGRAM

## 2024-06-19 PROCEDURE — 80053 COMPREHEN METABOLIC PANEL: CPT | Performed by: STUDENT IN AN ORGANIZED HEALTH CARE EDUCATION/TRAINING PROGRAM

## 2024-06-19 RX ORDER — POLYETHYLENE GLYCOL 3350 17 G/17G
17 POWDER, FOR SOLUTION ORAL 2 TIMES DAILY PRN
Status: DISCONTINUED | OUTPATIENT
Start: 2024-06-19 | End: 2024-06-19 | Stop reason: HOSPADM

## 2024-06-19 RX ORDER — CLOPIDOGREL BISULFATE 75 MG/1
75 TABLET ORAL DAILY
Qty: 30 TABLET | Refills: 11 | Status: SHIPPED | OUTPATIENT
Start: 2024-06-19 | End: 2025-06-19

## 2024-06-19 RX ORDER — CLOPIDOGREL BISULFATE 75 MG/1
75 TABLET ORAL DAILY
Qty: 30 TABLET | Refills: 11 | Status: SHIPPED | OUTPATIENT
Start: 2024-06-22 | End: 2024-06-19

## 2024-06-19 RX ORDER — ALBUTEROL SULFATE 90 UG/1
2 AEROSOL, METERED RESPIRATORY (INHALATION) EVERY 6 HOURS PRN
Qty: 18 G | Refills: 0 | Status: SHIPPED | OUTPATIENT
Start: 2024-06-19 | End: 2028-01-12

## 2024-06-19 RX ORDER — NAPROXEN SODIUM 220 MG/1
81 TABLET, FILM COATED ORAL DAILY
Qty: 90 TABLET | Refills: 3 | Status: SHIPPED | OUTPATIENT
Start: 2024-07-03 | End: 2025-07-03

## 2024-06-19 RX ORDER — FERROUS SULFATE 325(65) MG
325 TABLET ORAL 2 TIMES DAILY
Qty: 90 TABLET | Refills: 0 | Status: SHIPPED | OUTPATIENT
Start: 2024-06-19

## 2024-06-19 RX ORDER — LEVETIRACETAM 10 MG/ML
1000 INJECTION INTRAVASCULAR EVERY 12 HOURS
Status: DISCONTINUED | OUTPATIENT
Start: 2024-06-19 | End: 2024-06-19 | Stop reason: HOSPADM

## 2024-06-19 RX ORDER — SODIUM CHLORIDE 9 MG/ML
INJECTION, SOLUTION INTRAVENOUS
Status: DISCONTINUED | OUTPATIENT
Start: 2024-06-19 | End: 2024-06-19 | Stop reason: HOSPADM

## 2024-06-19 RX ADMIN — PANTOPRAZOLE SODIUM 40 MG: 40 INJECTION, POWDER, FOR SOLUTION INTRAVENOUS at 09:06

## 2024-06-19 NOTE — TELEPHONE ENCOUNTER
----- Message from Jazmin Auguste sent at 6/19/2024 11:01 AM CDT -----  Regarding: HFU  Patient will be discharging from [facility] and a HFU was requested with Dr Bhatia by DC provider.  Please schedule and message me back so DC can relay appointment information to patient prior to discharge. Patient is established.    Dr Bhatia performed a procedure a procedure on patient.    Dr Bhatia saw patient inpatient. Discharge requested a week or sooner f/u with Dr Bhatia.  Please review chart for more information. Discharge said this is a special case.     DX: Acute blood loss anemia    ThanksNerissa  Physician Referral Specialist/Discharge

## 2024-06-19 NOTE — HOSPITAL COURSE
Patient admitted for anemia with Hgb 4.6 with etiology acute versus chronic with GI consulted for evaluation. Patient with a previous jejunal AVM and SUBHASH. Patient was made NPO and underwent push enteroscopy without abnormal findings. Patient did require 4 units PRBC blood transfusion will admitted. Patient was advised to hold ASA until GI follow-up. Advised to continue home Plavix and restart iron supplementation. Patient was also advised to establish care with PCP in the next 1-2 weeks for hospital discharge. Patient was stable at discharge.     Vitals:    06/18/24 2307 06/19/24 0434 06/19/24 0719 06/19/24 0904   BP: (!) 116/58 120/67 (!) 105/54    BP Location:       Patient Position:       Pulse: 77 77 70    Resp: 17 17 20    Temp: 98.7 °F (37.1 °C) 98.1 °F (36.7 °C) 97.9 °F (36.6 °C)    TempSrc: Oral      SpO2: 97% (!) 92% (!) 92% (!) 93%   Weight: 88.8 kg (195 lb 12.3 oz)      Height:         Physical Exam  HENT:      Head: Normocephalic and atraumatic.   Eyes:      Pupils: Pupils are equal, round, and reactive to light.   Cardiovascular:      Rate and Rhythm: Normal rate and regular rhythm.   Pulmonary:      Effort: Pulmonary effort is normal.      Breath sounds: Normal breath sounds.      Comments: CTAB  Abdominal:      Palpations: Abdomen is soft.      Tenderness: There is no abdominal tenderness.   Musculoskeletal:      Right lower leg: No edema.      Left lower leg: No edema.   Skin:     General: Skin is warm.      Capillary Refill: Capillary refill takes less than 2 seconds.      Findings: No rash.   Neurological:      Mental Status: He is alert and oriented to person, place, and time.   Psychiatric:         Mood and Affect: Mood normal.         Behavior: Behavior normal.

## 2024-06-19 NOTE — PROGRESS NOTES
Discharge orders noted. Additional clinical references attached. Patient's discharge instructions given by bedside RN. Virtual nurse cued into room and reviewed discharge instructions. Education provided on new medication, diagnosis, and follow-up appointments. Teach back method used. Patient verbalized understanding. All questions answered. Transport to [facility] requested. Bedside nurse updated on patient status and transportation request.      06/19/24 1700   AVS Confirmation   Discharge instructions and AVS given to and reviewed with patient and/or significant other. Yes

## 2024-06-19 NOTE — DISCHARGE SUMMARY
[facility]  Discharge Summary      Patient Name: Miky Esqueda  MRN: [id number]  ROBERTO: [id number]  Patient Class: IP- Inpatient  Admission Date: 6/17/2024  Hospital Length of Stay: 2 days  Discharge Date and Time:  06/19/2024 2:59 PM  Attending Physician: Bobby Smith MD   Discharging Provider: Mike Hart MD  Primary Care Provider: Farhat Matson MD    Primary Care Team: Networked reference to record PCT     HPI:   Miky Esqueda is a 69-year-old male with a PMHx SUBHASH, CVA, h/o jejunal AVM, history of b/l PE s/p IVC filter who presented to [facility] ED for anemia.  Patient was recently seen by Hematology today and was advised to report to the emergency room for hemoglobin level of 4.6. Patient also reports a 2-3 week history of shortness of breath and chest tightness. He says the shortness of breath and chest tightness only occurs when exerting himself such as walking up stairs or walking for prolonged periods of time. Denies any melanotic stools, bright bloody stools, abdominal pain, nausea vomiting, wheezing, orthopnea, or lower extremity edema. He does report chronic constipation and says he has a bowel movement every 3 days. Regarding his history of iron-deficiency anemia previous causes included AVM and patient has received IV iron infusions and has been taking PO iron supplements.     In the ED, /68, HR 90, RR 18, with O2 saturation of 100% on RA.  CBC with H/H 4.6/17, MCV 61, RDW 19.9.  Ferritin 14. Troponin negative. EKG w/ NSR w/o ST segment changes. Occult blood positive. Patient was started with PRBC transfusion. [facility] for anemia likely secondary to GI bleed.    Procedure(s) (LRB):  EGD (ESOPHAGOGASTRODUODENOSCOPY) (N/A)      Hospital Course:   Patient admitted for anemia with Hgb 4.6 with etiology acute versus chronic with GI consulted for evaluation. Patient with a previous jejunal AVM and SUBHASH. Patient was made NPO and  underwent push enteroscopy without abnormal findings. Patient did require 4 units PRBC blood transfusion will admitted. Patient was advised to hold ASA until GI follow-up. Advised to continue home Plavix and restart iron supplementation. Patient was also advised to establish care with PCP in the next 1-2 weeks for hospital discharge. Patient was stable at discharge.     Vitals:    06/18/24 2307 06/19/24 0434 06/19/24 0719 06/19/24 0904   BP: (!) 116/58 120/67 (!) 105/54    BP Location:       Patient Position:       Pulse: 77 77 70    Resp: 17 17 20    Temp: 98.7 °F (37.1 °C) 98.1 °F (36.7 °C) 97.9 °F (36.6 °C)    TempSrc: Oral      SpO2: 97% (!) 92% (!) 92% (!) 93%   Weight: 88.8 kg (195 lb 12.3 oz)      Height:         Physical Exam  HENT:      Head: Normocephalic and atraumatic.   Eyes:      Pupils: Pupils are equal, round, and reactive to light.   Cardiovascular:      Rate and Rhythm: Normal rate and regular rhythm.   Pulmonary:      Effort: Pulmonary effort is normal.      Breath sounds: Normal breath sounds.      Comments: CTAB  Abdominal:      Palpations: Abdomen is soft.      Tenderness: There is no abdominal tenderness.   Musculoskeletal:      Right lower leg: No edema.      Left lower leg: No edema.   Skin:     General: Skin is warm.      Capillary Refill: Capillary refill takes less than 2 seconds.      Findings: No rash.   Neurological:      Mental Status: He is alert and oriented to person, place, and time.   Psychiatric:         Mood and Affect: Mood normal.         Behavior: Behavior normal.      Goals of Care Treatment Preferences:  Code Status: Full Code      Consults:   Consults (From admission, onward)          Status Ordering Provider     Inpatient consult to Gastroenterology  Once        Provider:  Jorge Alberto Bhatia MD    Completed DIAZ SOSA            No new Assessment & Plan notes have been filed under this hospital service since the last note was generated.  Service: [facility]  Medicine    Final Active Diagnoses:    Diagnosis Date Noted POA    PRINCIPAL PROBLEM:  Acute blood loss anemia [D62] 06/17/2024 Yes    Seizure disorder [G40.909] 06/17/2024 Yes    Hx of CVA (cerebral vascular accident) [I63.9] 06/12/2020 Yes      Problems Resolved During this Admission:    Diagnosis Date Noted Date Resolved POA    Acute deep vein thrombosis (DVT) of popliteal vein of both lower extremities [I82.433] 07/16/2020 06/17/2024 Yes       Discharged Condition: stable    Disposition: Home or Self Care    Follow Up:   Follow-up Information       Magdy Zuñiga MD Follow up.    Specialty: Family Medicine  Why: pt wants to establish care with this physician  Contact information:  [address] [place] [id number]  [phone]                           Patient Instructions:      Ambulatory referral/consult to Gastroenterology   Standing Status: Future   Referral Priority: Routine Referral Type: Consultation   Referral Reason: Specialty Services Required   Requested Specialty: Gastroenterology   Number of Visits Requested: 1     Ambulatory referral/consult to Hematology / Oncology   Standing Status: Future   Referral Priority: Routine Referral Type: Consultation   Referral Reason: Specialty Services Required   Requested Specialty: Hematology and Oncology   Number of Visits Requested: 1     Notify your health care provider if you experience any of the following:  temperature >100.4     Notify your health care provider if you experience any of the following:  persistent nausea and vomiting or diarrhea     Notify your health care provider if you experience any of the following:  severe uncontrolled pain     Notify your health care provider if you experience any of the following:  difficulty breathing or increased cough     Notify your health care provider if you experience any of the following:  severe persistent headache     Notify your health care provider if you experience any of the following:  persistent  dizziness, light-headedness, or visual disturbances     Notify your health care provider if you experience any of the following:  increased confusion or weakness     Activity as tolerated        Medications:  Reconciled Home Medications:      Medication List        START taking these medications      FeroSuL 325 mg (65 mg iron) Tab tablet  Generic drug: ferrous sulfate  Take 1 tablet (325 mg total) by mouth 2 (two) times daily.            CHANGE how you take these medications      aspirin 81 MG Chew  Take 1 tablet (81 mg total) by mouth once daily.  Start taking on: July 3, 2024  What changed: These instructions start on July 3, 2024. If you are unsure what to do until then, ask your doctor or other care provider.            CONTINUE taking these medications      albuterol 90 mcg/actuation inhaler  Commonly known as: PROVENTIL/VENTOLIN HFA  Inhale 2 puffs into the lungs every 6 (six) hours as needed.     atorvastatin 40 MG tablet  Commonly known as: LIPITOR  Take 1 tablet (40 mg total) by mouth once daily.     clopidogreL 75 mg tablet  Commonly known as: PLAVIX  Take 1 tablet (75 mg total) by mouth once daily.     levETIRAcetam 1000 MG tablet  Commonly known as: KEPPRA  Take 1,000 mg by mouth 2 (two) times daily.     multivitamin capsule  Take 1 capsule by mouth once daily.              Indwelling Lines/Drains at time of discharge:   Lines/Drains/Airways       None                   Time spent on the discharge of patient: 35 minutes         Mike Hart MD  Department of Hospital Medicine  [facility]

## 2024-06-19 NOTE — PLAN OF CARE
Pt for d/c to home today -   No dme, no hh ordered    Per GI final note - pt to follow up with Dr Espinal   Pt will need pcp apt as well - CM to contact pt with apt   Future Appointments   Date Time Provider Department Center   7/1/2024  4:00 PM Anastacia Davenport, [facility] HEM ONC Jonathan Gupta     Discharging nurse to review all d/c meds/instructions         06/19/24 5365   Final Note   Assessment Type Final Discharge Note   Anticipated Discharge Disposition Home   What phone number can be called within the next 1-3 days to see how you are doing after discharge? [phone]   Hospital Resources/Appts/Education Provided Appointments scheduled and added to AVS   Post-Acute Status   Discharge Delays None known at this time

## 2024-06-19 NOTE — NURSING
Received report from Ana, received the patient sitting up in bed, awake and alert, call light in reach, bed locked in low with alarm on and call light in reach.

## 2024-06-19 NOTE — PROGRESS NOTES
[facility]  Gastroenterology  Progress Note    Patient Name: Miky Esqueda  MRN: [id number]  Admission Date: 6/17/2024  Hospital Length of Stay: 2 days  Code Status: Full Code   Attending Provider: Bobby Smith MD  Consulting Provider: Jorge Alberto Bhatia MD  Primary Care Physician: Farhat Matson MD  Principal Problem: Acute blood loss anemia        Subjective:     Interval History: NAOEN. Denies further bleeding Hgb stable.    Review of Systems   Constitutional:  Positive for fatigue.   Respiratory:  Positive for shortness of breath.    Gastrointestinal:  Negative for abdominal pain and blood in stool.     Objective:     Vital Signs (Most Recent):  Temp: 97.9 °F (36.6 °C) (06/19/24 0719)  Pulse: 70 (06/19/24 0719)  Resp: 20 (06/19/24 0719)  BP: (!) 105/54 (06/19/24 0719)  SpO2: (!) 93 % (06/19/24 0904) Vital Signs (24h Range):  Temp:  [97.8 °F (36.6 °C)-98.7 °F (37.1 °C)] 97.9 °F (36.6 °C)  Pulse:  [70-79] 70  Resp:  [17-20] 20  SpO2:  [92 %-100 %] 93 %  BP: (105-139)/(54-67) 105/54     Weight: 88.8 kg (195 lb 12.3 oz) (06/18/24 2307)  Body mass index is 25.83 kg/m².      Intake/Output Summary (Last 24 hours) at 6/19/2024 1613  Last data filed at 6/19/2024 1401  Gross per 24 hour   Intake 1009.25 ml   Output 800 ml   Net 209.25 ml       Lines/Drains/Airways       None                    Physical Exam  Vitals and nursing note reviewed.   Constitutional:       Appearance: He is well-developed.   HENT:      Head: Normocephalic and atraumatic.   Eyes:      General: No scleral icterus.     Pupils: Pupils are equal, round, and reactive to light.   Cardiovascular:      Rate and Rhythm: Normal rate and regular rhythm.      Heart sounds: Normal heart sounds.   Pulmonary:      Effort: Pulmonary effort is normal. No respiratory distress.      Breath sounds: Normal breath sounds.   Abdominal:      General: Bowel sounds are normal. There is no distension.      Palpations: Abdomen is soft.      Tenderness: There  is no abdominal tenderness.   Musculoskeletal:         General: Normal range of motion.      Cervical back: Normal range of motion and neck supple.   Skin:     General: Skin is warm and dry.      Findings: No erythema or rash.   Neurological:      Mental Status: He is alert and oriented to person, place, and time.      Comments: No asterixis   Psychiatric:         Behavior: Behavior normal.          Significant Labs:  Recent Lab Results         06/19/24  0440   06/18/24  2147        Albumin 3.2         ALP 64         ALT 14         Anion Gap 8         AST 17         Baso # 0.04         Basophil % 0.5         BILIRUBIN TOTAL 2.1  Comment: For infants and newborns, interpretation of results should be based  on gestational age, weight and in agreement with clinical  observations.    Premature Infant recommended reference ranges:  Up to 24 hours.............<8.0 mg/dL  Up to 48 hours............<12.0 mg/dL  3-5 days..................<15.0 mg/dL  6-29 days.................<15.0 mg/dL           BUN 20         Calcium 8.7         Chloride 110         CO2 20         Creatinine 1.4         Differential Method Automated         eGFR 55         Eos # 0.1         Eos % 1.2         Glucose 97         Gran # (ANC) 5.2         Gran % 69.1         Hematocrit 26.2         Hemoglobin 8.2   8.5  Comment: Patient received blood.       Immature Grans (Abs) 0.04  Comment: Mild elevation in immature granulocytes is non specific and   can be seen in a variety of conditions including stress response,   acute inflammation, trauma and pregnancy. Correlation with other   laboratory and clinical findings is essential.           Immature Granulocytes 0.5         Lymph # 1.4         Lymph % 19.0         Magnesium  1.9         MCH 21.5         MCHC 31.3         MCV 69         Mono # 0.7         Mono % 9.7         MPV SEE COMMENT  Comment: Result not available.         nRBC 2         Phosphorus Level 3.3         Platelet Count 175         Potassium  3.8         PROTEIN TOTAL 5.8         RBC 3.81         RDW 25.5         Sodium 138         WBC 7.53                   Significant Imaging:  Imaging results within the past 24 hours have been reviewed.  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 Push enteroscopy 6/18 unremarkable. Pt responded well to transfusion  OK to advance diet  Arrange f/u with Dr. Espinal as outpat        Thank you for your consult. I will sign off. Please contact us if you have any additional questions.    Jorge Alberto Bhatia MD  Gastroenterology  [place] [facility]

## 2024-06-19 NOTE — PLAN OF CARE
AOX4. VS stable. Safety maintained. Meds given per MAR. Denies pain or N/V at this time. 1 unit PRBCs infused per orders. Regular diet maintained. NPO @ midnight. Resting quietly. SR up X 2. Call light in reach. Bed alarm set. Pt denies any further needs at this time. Plan of care ongoing.       Problem: Adult Inpatient Plan of Care  Goal: Plan of Care Review  Outcome: Progressing  Goal: Patient-Specific Goal (Individualized)  Outcome: Progressing     Problem: Anemia  Goal: Anemia Symptom Improvement  Outcome: Progressing     Problem: Comorbidity Management  Goal: Maintenance of Seizure Control  Outcome: Progressing

## 2024-06-19 NOTE — ANESTHESIA POSTPROCEDURE EVALUATION
Anesthesia Post Evaluation    Patient: Miky Esqueda    Procedure(s) Performed: Procedure(s) (LRB):  EGD (ESOPHAGOGASTRODUODENOSCOPY) (N/A)    Final Anesthesia Type: general      Patient location during evaluation: GI PACU  Patient participation: Yes- Able to Participate  Level of consciousness: awake and alert  Post-procedure vital signs: reviewed and stable  Pain management: adequate  Airway patency: patent    PONV status at discharge: No PONV  Anesthetic complications: no      Cardiovascular status: blood pressure returned to baseline and hemodynamically stable  Respiratory status: unassisted  Hydration status: euvolemic  Follow-up not needed.              Vitals Value Taken Time   /67 06/19/24 0434   Temp 36.7 °C (98.1 °F) 06/19/24 0434   Pulse 77 06/19/24 0434   Resp 17 06/19/24 0434   SpO2 92 % 06/19/24 0434         Event Time   Out of Recovery 06/18/2024 15:05:53         Pain/Randolph Score: Randolph Score: 10 (6/18/2024  2:46 PM)

## 2024-06-19 NOTE — ASSESSMENT & PLAN NOTE
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 Push enteroscopy 6/18 unremarkable. Pt responded well to transfusion  OK to advance diet  Arrange f/u with Dr. Espinal as outpat

## 2024-06-19 NOTE — ANESTHESIA PREPROCEDURE EVALUATION
06/19/2024  Miky Esqueda is a 69 y.o., male.      Pre-op Assessment     I have reviewed the Nursing Notes.    I have reviewed the Medications.     Review of Systems  Anesthesia Hx:  No problems with previous Anesthesia             Denies Family Hx of Anesthesia complications.     Social:  Non-Smoker, No Alcohol Use       Hematology/Oncology:  Hematology Normal   Oncology Normal                                   EENT/Dental:  EENT/Dental Normal           Cardiovascular:  Cardiovascular Normal Exercise tolerance: good                        Shortness of Breath                          Pulmonary:      Shortness of breath                  Renal/:  Renal/ Normal                 Hepatic/GI:  Hepatic/GI Normal                 Musculoskeletal:  Musculoskeletal Normal                Neurological:   CVA    Seizures           Seizure Disorder             CVA - Cerebrovasular Accident                 Endocrine:  Endocrine Normal                Physical Exam  General: Well nourished    Airway:  Mallampati: II / II  Mouth Opening: Normal  TM Distance: Normal  Tongue: Normal  Neck ROM: Normal ROM    Dental:  Intact        Anesthesia Plan  Type of Anesthesia, risks & benefits discussed:    Anesthesia Type: Gen Natural Airway  Intra-op Monitoring Plan: Standard ASA Monitors  Post Op Pain Control Plan: multimodal analgesia  Induction:  IV  Airway Plan: Direct, Post-Induction  Informed Consent: Informed consent signed with the Patient and all parties understand the risks and agree with anesthesia plan.  All questions answered.   ASA Score: 2    Ready For Surgery From Anesthesia Perspective.     .

## 2024-06-19 NOTE — SUBJECTIVE & OBJECTIVE
Subjective:     Interval History: NAOEN. Denies further bleeding Hgb stable.    Review of Systems   Constitutional:  Positive for fatigue.   Respiratory:  Positive for shortness of breath.    Gastrointestinal:  Negative for abdominal pain and blood in stool.     Objective:     Vital Signs (Most Recent):  Temp: 97.9 °F (36.6 °C) (06/19/24 0719)  Pulse: 70 (06/19/24 0719)  Resp: 20 (06/19/24 0719)  BP: (!) 105/54 (06/19/24 0719)  SpO2: (!) 93 % (06/19/24 0904) Vital Signs (24h Range):  Temp:  [97.8 °F (36.6 °C)-98.7 °F (37.1 °C)] 97.9 °F (36.6 °C)  Pulse:  [70-79] 70  Resp:  [17-20] 20  SpO2:  [92 %-100 %] 93 %  BP: (105-139)/(54-67) 105/54     Weight: 88.8 kg (195 lb 12.3 oz) (06/18/24 2307)  Body mass index is 25.83 kg/m².      Intake/Output Summary (Last 24 hours) at 6/19/2024 1613  Last data filed at 6/19/2024 1401  Gross per 24 hour   Intake 1009.25 ml   Output 800 ml   Net 209.25 ml       Lines/Drains/Airways       None                    Physical Exam  Vitals and nursing note reviewed.   Constitutional:       Appearance: He is well-developed.   HENT:      Head: Normocephalic and atraumatic.   Eyes:      General: No scleral icterus.     Pupils: Pupils are equal, round, and reactive to light.   Cardiovascular:      Rate and Rhythm: Normal rate and regular rhythm.      Heart sounds: Normal heart sounds.   Pulmonary:      Effort: Pulmonary effort is normal. No respiratory distress.      Breath sounds: Normal breath sounds.   Abdominal:      General: Bowel sounds are normal. There is no distension.      Palpations: Abdomen is soft.      Tenderness: There is no abdominal tenderness.   Musculoskeletal:         General: Normal range of motion.      Cervical back: Normal range of motion and neck supple.   Skin:     General: Skin is warm and dry.      Findings: No erythema or rash.   Neurological:      Mental Status: He is alert and oriented to person, place, and time.      Comments: No asterixis   Psychiatric:          Behavior: Behavior normal.          Significant Labs:  Recent Lab Results         06/19/24  0440   06/18/24  2147        Albumin 3.2         ALP 64         ALT 14         Anion Gap 8         AST 17         Baso # 0.04         Basophil % 0.5         BILIRUBIN TOTAL 2.1  Comment: For infants and newborns, interpretation of results should be based  on gestational age, weight and in agreement with clinical  observations.    Premature Infant recommended reference ranges:  Up to 24 hours.............<8.0 mg/dL  Up to 48 hours............<12.0 mg/dL  3-5 days..................<15.0 mg/dL  6-29 days.................<15.0 mg/dL           BUN 20         Calcium 8.7         Chloride 110         CO2 20         Creatinine 1.4         Differential Method Automated         eGFR 55         Eos # 0.1         Eos % 1.2         Glucose 97         Gran # (ANC) 5.2         Gran % 69.1         Hematocrit 26.2         Hemoglobin 8.2   8.5  Comment: Patient received blood.       Immature Grans (Abs) 0.04  Comment: Mild elevation in immature granulocytes is non specific and   can be seen in a variety of conditions including stress response,   acute inflammation, trauma and pregnancy. Correlation with other   laboratory and clinical findings is essential.           Immature Granulocytes 0.5         Lymph # 1.4         Lymph % 19.0         Magnesium  1.9         MCH 21.5         MCHC 31.3         MCV 69         Mono # 0.7         Mono % 9.7         MPV SEE COMMENT  Comment: Result not available.         nRBC 2         Phosphorus Level 3.3         Platelet Count 175         Potassium 3.8         PROTEIN TOTAL 5.8         RBC 3.81         RDW 25.5         Sodium 138         WBC 7.53                   Significant Imaging:  Imaging results within the past 24 hours have been reviewed.

## 2024-06-20 RX ORDER — SODIUM CHLORIDE 0.9 % (FLUSH) 0.9 %
10 SYRINGE (ML) INJECTION
Status: CANCELLED | OUTPATIENT
Start: 2024-07-11

## 2024-06-20 RX ORDER — EPINEPHRINE 0.3 MG/.3ML
0.3 INJECTION SUBCUTANEOUS ONCE AS NEEDED
Status: CANCELLED | OUTPATIENT
Start: 2024-07-04

## 2024-06-20 RX ORDER — HEPARIN 100 UNIT/ML
500 SYRINGE INTRAVENOUS
Status: CANCELLED | OUTPATIENT
Start: 2024-07-18

## 2024-06-20 RX ORDER — DIPHENHYDRAMINE HYDROCHLORIDE 50 MG/ML
50 INJECTION INTRAMUSCULAR; INTRAVENOUS ONCE AS NEEDED
Status: CANCELLED | OUTPATIENT
Start: 2024-07-04

## 2024-06-20 RX ORDER — DIPHENHYDRAMINE HYDROCHLORIDE 50 MG/ML
50 INJECTION INTRAMUSCULAR; INTRAVENOUS ONCE AS NEEDED
Status: CANCELLED | OUTPATIENT
Start: 2024-06-27

## 2024-06-20 RX ORDER — DIPHENHYDRAMINE HYDROCHLORIDE 50 MG/ML
50 INJECTION INTRAMUSCULAR; INTRAVENOUS ONCE AS NEEDED
Status: CANCELLED | OUTPATIENT
Start: 2024-07-18

## 2024-06-20 RX ORDER — EPINEPHRINE 0.3 MG/.3ML
0.3 INJECTION SUBCUTANEOUS ONCE AS NEEDED
Status: CANCELLED | OUTPATIENT
Start: 2024-07-18

## 2024-06-20 RX ORDER — HEPARIN 100 UNIT/ML
500 SYRINGE INTRAVENOUS
Status: CANCELLED | OUTPATIENT
Start: 2024-07-04

## 2024-06-20 RX ORDER — HEPARIN 100 UNIT/ML
500 SYRINGE INTRAVENOUS
Status: CANCELLED | OUTPATIENT
Start: 2024-07-11

## 2024-06-20 RX ORDER — EPINEPHRINE 0.3 MG/.3ML
0.3 INJECTION SUBCUTANEOUS ONCE AS NEEDED
Status: CANCELLED | OUTPATIENT
Start: 2024-07-11

## 2024-06-20 RX ORDER — SODIUM CHLORIDE 0.9 % (FLUSH) 0.9 %
10 SYRINGE (ML) INJECTION
Status: CANCELLED | OUTPATIENT
Start: 2024-06-27

## 2024-06-20 RX ORDER — EPINEPHRINE 0.3 MG/.3ML
0.3 INJECTION SUBCUTANEOUS ONCE AS NEEDED
Status: CANCELLED | OUTPATIENT
Start: 2024-06-27

## 2024-06-20 RX ORDER — SODIUM CHLORIDE 0.9 % (FLUSH) 0.9 %
10 SYRINGE (ML) INJECTION
Status: CANCELLED | OUTPATIENT
Start: 2024-07-04

## 2024-06-20 RX ORDER — SODIUM CHLORIDE 0.9 % (FLUSH) 0.9 %
10 SYRINGE (ML) INJECTION
Status: CANCELLED | OUTPATIENT
Start: 2024-07-18

## 2024-06-20 RX ORDER — HEPARIN 100 UNIT/ML
500 SYRINGE INTRAVENOUS
Status: CANCELLED | OUTPATIENT
Start: 2024-06-27

## 2024-06-20 RX ORDER — DIPHENHYDRAMINE HYDROCHLORIDE 50 MG/ML
50 INJECTION INTRAMUSCULAR; INTRAVENOUS ONCE AS NEEDED
Status: CANCELLED | OUTPATIENT
Start: 2024-07-11

## 2024-06-20 NOTE — PROGRESS NOTES
Reviewed need for IV iron with sister Anastacia Esqueda who pt instructed me at last visit to have results and follow up care called to.  Iron sucrose 300mg x4 doses ordered. She would like to schedule in [facility] as this would be easier to get to from [place].  Pt post hospital will also be following gi, appt not yet made.  Sister Anastacia : Phone number [phone]

## 2024-06-20 NOTE — Clinical Note
Add to list of iron approvals- placed orders for 4 doses of iron sucrose at 300mg at [facility]

## 2024-06-21 ENCOUNTER — TELEPHONE (OUTPATIENT)
Dept: HEMATOLOGY/ONCOLOGY | Facility: CLINIC | Age: 69
End: 2024-06-21
Payer: MEDICARE

## 2024-06-21 RX ORDER — HEPARIN 100 UNIT/ML
500 SYRINGE INTRAVENOUS
OUTPATIENT
Start: 2024-06-28

## 2024-06-21 RX ORDER — SODIUM CHLORIDE 0.9 % (FLUSH) 0.9 %
10 SYRINGE (ML) INJECTION
OUTPATIENT
Start: 2024-06-28

## 2024-06-21 NOTE — TELEPHONE ENCOUNTER
Called pt sister Mrs. Galaviz to up date her on pt labs 6/28 at [facility], f/u 7/1 but will change to a virtual once pt sister helps him to reset his password. I gave her his user name and change his email address to change the password. Updated emergecy contact.

## 2024-06-27 ENCOUNTER — TELEPHONE (OUTPATIENT)
Dept: HEMATOLOGY/ONCOLOGY | Facility: CLINIC | Age: 69
End: 2024-06-27
Payer: MEDICARE

## 2024-06-27 ENCOUNTER — PATIENT MESSAGE (OUTPATIENT)
Dept: INFUSION THERAPY | Facility: HOSPITAL | Age: 69
End: 2024-06-27
Payer: MEDICARE

## 2024-06-27 NOTE — TELEPHONE ENCOUNTER
----- Message from Divine Garibay sent at 6/27/2024 10:40 AM CDT -----  Type:  Patient Returning Call    Who Called:  Anastacia (sister)  Who Left Message for Patient:  Yuni  Does the patient know what this is regarding?:  yes  Best Call Back Number:    Additional Information:  Anastacia is requesting a call back in regards to where her brother will be receiving iron infusions.

## 2024-06-27 NOTE — TELEPHONE ENCOUNTER
Called pt back and spoke with him about his labs tomorrow, virtual on Monday, and that the infusion [place] tried to reach him. I also sent a message to the MA to give her the sister number to give her a call as well to get the pt scheduled.

## 2024-06-27 NOTE — TELEPHONE ENCOUNTER
Spoke with pt sister to let her know his infusions are to be in [facility] and his lab for tomorrow is in [place]. The infusion center did call but did not get an answer. I sent them another message to ask if they can call the sister and that I had just got off the phone with the pt. ----- Message from Minda Galloway RN sent at 6/27/2024 11:00 AM CDT -----    ----- Message -----  From: Dee Dee Landa  Sent: 6/27/2024  10:55 AM CDT  To: Damon Newman Staff    Needs advice from nurse:      Who Called:-Anastacia  Regarding:states they were told by Yashira that infusion would be scheduled in [place], however the portal msg sent states it is to be done in [place]/pt is confused  Would the patient rather a call back or VIA [facility]?  Best Call Back number: [phone]  Additional Info:

## 2024-06-27 NOTE — TELEPHONE ENCOUNTER
----- Message from Ajith Charles sent at 6/27/2024  8:54 AM CDT -----  .Type:  Needs Medical Advice    Who Called: pt sister Anastacia    Would the patient rather a call back or a response via [facility]? Call back  Best Call Back Number:   Additional Information:     Pt  stated she would like a call back to change pt appt on 7/1 to virtual

## 2024-06-28 ENCOUNTER — INFUSION (OUTPATIENT)
Dept: INFUSION THERAPY | Facility: HOSPITAL | Age: 69
End: 2024-06-28
Attending: INTERNAL MEDICINE
Payer: MEDICARE

## 2024-06-28 VITALS
TEMPERATURE: 98 F | RESPIRATION RATE: 16 BRPM | WEIGHT: 197.75 LBS | BODY MASS INDEX: 26.21 KG/M2 | HEIGHT: 73 IN | SYSTOLIC BLOOD PRESSURE: 138 MMHG | DIASTOLIC BLOOD PRESSURE: 69 MMHG | HEART RATE: 69 BPM | OXYGEN SATURATION: 95 %

## 2024-06-28 DIAGNOSIS — D64.9 ANEMIA, UNSPECIFIED TYPE: ICD-10-CM

## 2024-06-28 DIAGNOSIS — D50.0 IRON DEFICIENCY ANEMIA DUE TO CHRONIC BLOOD LOSS: ICD-10-CM

## 2024-06-28 DIAGNOSIS — R06.09 DYSPNEA ON EXERTION: ICD-10-CM

## 2024-06-28 DIAGNOSIS — D62 ACUTE BLOOD LOSS ANEMIA: Primary | ICD-10-CM

## 2024-06-28 PROCEDURE — 63600175 PHARM REV CODE 636 W HCPCS: Mod: PN | Performed by: NURSE PRACTITIONER

## 2024-06-28 PROCEDURE — 25000003 PHARM REV CODE 250: Mod: PN | Performed by: NURSE PRACTITIONER

## 2024-06-28 PROCEDURE — 96365 THER/PROPH/DIAG IV INF INIT: CPT | Mod: PN

## 2024-06-28 RX ADMIN — SODIUM CHLORIDE 125 MG: 9 INJECTION, SOLUTION INTRAVENOUS at 01:06

## 2024-06-28 RX ADMIN — SODIUM CHLORIDE: 9 INJECTION, SOLUTION INTRAVENOUS at 01:06

## 2024-06-28 NOTE — PLAN OF CARE
Pt tolerated Ferrlicet infusion well.  Pt did not wish to stay for the 30 minutes observation period.  No s/s of infusion reaction noted. Instructed to call MD with any problems

## 2024-07-01 ENCOUNTER — TELEPHONE (OUTPATIENT)
Dept: HEMATOLOGY/ONCOLOGY | Facility: CLINIC | Age: 69
End: 2024-07-01

## 2024-07-01 NOTE — TELEPHONE ENCOUNTER
Informed pt that his hemglobin is holding, CBC looks good. He is scheduled for his iron infusion, he had one last Friday. He has no complaints or symptoms at this time. Informed him if he does to give us a call and keep us updated. After his last infusion a few weeks later we will schedule him for labs to see how the infusions took and to f/u with provider in person or virtual.

## 2024-07-02 RX ORDER — SODIUM CHLORIDE 0.9 % (FLUSH) 0.9 %
10 SYRINGE (ML) INJECTION
OUTPATIENT
Start: 2024-07-12

## 2024-07-02 RX ORDER — SODIUM CHLORIDE 0.9 % (FLUSH) 0.9 %
10 SYRINGE (ML) INJECTION
Status: CANCELLED | OUTPATIENT
Start: 2024-07-05

## 2024-07-02 RX ORDER — HEPARIN 100 UNIT/ML
500 SYRINGE INTRAVENOUS
OUTPATIENT
Start: 2024-07-19

## 2024-07-02 RX ORDER — HEPARIN 100 UNIT/ML
500 SYRINGE INTRAVENOUS
Status: CANCELLED | OUTPATIENT
Start: 2024-07-05

## 2024-07-02 RX ORDER — SODIUM CHLORIDE 0.9 % (FLUSH) 0.9 %
10 SYRINGE (ML) INJECTION
OUTPATIENT
Start: 2024-07-19

## 2024-07-02 RX ORDER — HEPARIN 100 UNIT/ML
500 SYRINGE INTRAVENOUS
OUTPATIENT
Start: 2024-07-12

## 2024-07-05 ENCOUNTER — INFUSION (OUTPATIENT)
Dept: INFUSION THERAPY | Facility: HOSPITAL | Age: 69
End: 2024-07-05
Attending: INTERNAL MEDICINE
Payer: MEDICARE

## 2024-07-05 VITALS
SYSTOLIC BLOOD PRESSURE: 179 MMHG | TEMPERATURE: 98 F | HEART RATE: 86 BPM | DIASTOLIC BLOOD PRESSURE: 88 MMHG | WEIGHT: 199.06 LBS | HEIGHT: 72 IN | BODY MASS INDEX: 26.96 KG/M2 | RESPIRATION RATE: 17 BRPM

## 2024-07-05 DIAGNOSIS — R06.09 DYSPNEA ON EXERTION: ICD-10-CM

## 2024-07-05 DIAGNOSIS — D50.0 IRON DEFICIENCY ANEMIA DUE TO CHRONIC BLOOD LOSS: ICD-10-CM

## 2024-07-05 DIAGNOSIS — D62 ACUTE BLOOD LOSS ANEMIA: Primary | ICD-10-CM

## 2024-07-05 DIAGNOSIS — D64.9 ANEMIA, UNSPECIFIED TYPE: ICD-10-CM

## 2024-07-05 PROCEDURE — 96365 THER/PROPH/DIAG IV INF INIT: CPT | Mod: PN

## 2024-07-05 PROCEDURE — 63600175 PHARM REV CODE 636 W HCPCS: Mod: PN | Performed by: INTERNAL MEDICINE

## 2024-07-05 PROCEDURE — 25000003 PHARM REV CODE 250: Mod: PN | Performed by: INTERNAL MEDICINE

## 2024-07-05 RX ORDER — SODIUM CHLORIDE 0.9 % (FLUSH) 0.9 %
10 SYRINGE (ML) INJECTION
Status: DISCONTINUED | OUTPATIENT
Start: 2024-07-05 | End: 2024-07-05 | Stop reason: HOSPADM

## 2024-07-05 RX ORDER — HEPARIN 100 UNIT/ML
500 SYRINGE INTRAVENOUS
Status: DISCONTINUED | OUTPATIENT
Start: 2024-07-05 | End: 2024-07-05 | Stop reason: HOSPADM

## 2024-07-05 RX ADMIN — SODIUM CHLORIDE: 9 INJECTION, SOLUTION INTRAVENOUS at 01:07

## 2024-07-05 RX ADMIN — SODIUM CHLORIDE 125 MG: 9 INJECTION, SOLUTION INTRAVENOUS at 01:07

## 2024-07-05 NOTE — PLAN OF CARE
Problem: Adult Inpatient Plan of Care  Goal: Plan of Care Review  Outcome: Progressing  Flowsheets (Taken 7/5/2024 1514)  Plan of Care Reviewed With: patient     Patient tolerated Ferrlecit infusion. Observed for 30 minutes following completion of infusion. IV flushed and removed. Bleeding stopped and secured with gauze and coban. AVS provided and reviewed. Patient ambulated per self upon discharge from infusion center. No acute distress noted.

## 2024-07-05 NOTE — PLAN OF CARE
Problem: Adult Inpatient Plan of Care  Goal: Patient-Specific Goal (Individualized)  Outcome: Progressing  Flowsheets (Taken 7/5/2024 1330)  Individualized Care Needs:   recliner, blanket, review of treatment   orientation to unit/bay   offered refreshments  Anxieties, Fears or Concerns: IV placement     Problem: Anemia  Goal: Anemia Symptom Improvement  Intervention: Monitor and Manage Anemia  Flowsheets (Taken 7/5/2024 1403)  Safety Promotion/Fall Prevention:   in recliner, wheels locked   Fall Risk signage in place  Fatigue Management: activity schedule adjusted

## 2024-07-12 ENCOUNTER — INFUSION (OUTPATIENT)
Dept: INFUSION THERAPY | Facility: HOSPITAL | Age: 69
End: 2024-07-12
Attending: INTERNAL MEDICINE
Payer: MEDICARE

## 2024-07-12 VITALS
HEART RATE: 82 BPM | WEIGHT: 196.19 LBS | SYSTOLIC BLOOD PRESSURE: 106 MMHG | RESPIRATION RATE: 16 BRPM | DIASTOLIC BLOOD PRESSURE: 62 MMHG | OXYGEN SATURATION: 96 % | TEMPERATURE: 98 F | BODY MASS INDEX: 26.57 KG/M2 | HEIGHT: 72 IN

## 2024-07-12 DIAGNOSIS — D64.9 ANEMIA, UNSPECIFIED TYPE: ICD-10-CM

## 2024-07-12 DIAGNOSIS — R06.09 DYSPNEA ON EXERTION: ICD-10-CM

## 2024-07-12 DIAGNOSIS — D50.0 IRON DEFICIENCY ANEMIA DUE TO CHRONIC BLOOD LOSS: ICD-10-CM

## 2024-07-12 DIAGNOSIS — D62 ACUTE BLOOD LOSS ANEMIA: Primary | ICD-10-CM

## 2024-07-12 PROCEDURE — 25000003 PHARM REV CODE 250: Mod: PN | Performed by: INTERNAL MEDICINE

## 2024-07-12 PROCEDURE — 63600175 PHARM REV CODE 636 W HCPCS: Mod: PN | Performed by: INTERNAL MEDICINE

## 2024-07-12 RX ADMIN — SODIUM CHLORIDE: 9 INJECTION, SOLUTION INTRAVENOUS at 01:07

## 2024-07-12 RX ADMIN — SODIUM CHLORIDE 125 MG: 9 INJECTION, SOLUTION INTRAVENOUS at 01:07

## 2024-07-12 NOTE — NURSING
Pt  arrived to infusion clinic for ferrlecit.  1416 Tolerated well. Instructed pt to wait 30 minutes to be observed. Pt verbalized understanding.   1446 Left unit NAD.

## 2024-07-12 NOTE — PLAN OF CARE
Problem: Adult Inpatient Plan of Care  Goal: Plan of Care Review  Description: Provided warm blanket and elevated legs in recliner  Outcome: Progressing  Goal: Patient-Specific Goal (Individualized)  Outcome: Progressing     Problem: Anemia  Goal: Anemia Symptom Improvement  Outcome: Progressing

## 2024-07-19 ENCOUNTER — INFUSION (OUTPATIENT)
Dept: INFUSION THERAPY | Facility: HOSPITAL | Age: 69
End: 2024-07-19
Attending: INTERNAL MEDICINE
Payer: MEDICARE

## 2024-07-19 VITALS
BODY MASS INDEX: 26.88 KG/M2 | OXYGEN SATURATION: 98 % | DIASTOLIC BLOOD PRESSURE: 71 MMHG | HEART RATE: 79 BPM | RESPIRATION RATE: 18 BRPM | SYSTOLIC BLOOD PRESSURE: 125 MMHG | WEIGHT: 198.44 LBS | HEIGHT: 72 IN | TEMPERATURE: 98 F

## 2024-07-19 DIAGNOSIS — D62 ACUTE BLOOD LOSS ANEMIA: Primary | ICD-10-CM

## 2024-07-19 DIAGNOSIS — D64.9 ANEMIA, UNSPECIFIED TYPE: ICD-10-CM

## 2024-07-19 DIAGNOSIS — D50.0 IRON DEFICIENCY ANEMIA DUE TO CHRONIC BLOOD LOSS: ICD-10-CM

## 2024-07-19 DIAGNOSIS — R06.09 DYSPNEA ON EXERTION: ICD-10-CM

## 2024-07-19 PROCEDURE — 63600175 PHARM REV CODE 636 W HCPCS: Mod: PN | Performed by: INTERNAL MEDICINE

## 2024-07-19 PROCEDURE — 25000003 PHARM REV CODE 250: Mod: PN | Performed by: INTERNAL MEDICINE

## 2024-07-19 PROCEDURE — A4216 STERILE WATER/SALINE, 10 ML: HCPCS | Mod: PN | Performed by: INTERNAL MEDICINE

## 2024-07-19 PROCEDURE — 96365 THER/PROPH/DIAG IV INF INIT: CPT | Mod: PN

## 2024-07-19 RX ORDER — SODIUM CHLORIDE 0.9 % (FLUSH) 0.9 %
10 SYRINGE (ML) INJECTION
Status: DISCONTINUED | OUTPATIENT
Start: 2024-07-19 | End: 2024-07-19 | Stop reason: HOSPADM

## 2024-07-19 RX ADMIN — Medication 10 ML: at 01:07

## 2024-07-19 RX ADMIN — SODIUM CHLORIDE: 9 INJECTION, SOLUTION INTRAVENOUS at 01:07

## 2024-07-19 RX ADMIN — SODIUM CHLORIDE 125 MG: 9 INJECTION, SOLUTION INTRAVENOUS at 02:07

## 2024-07-19 NOTE — PLAN OF CARE
Problem: Adult Inpatient Plan of Care  Goal: Patient-Specific Goal (Individualized)  Outcome: Not Progressing  Flowsheets (Taken 7/19/2024 1330)  Individualized Care Needs: recliner, blanket, snacks, warm blanket, dim lights, tv, conversation  Anxieties, Fears or Concerns: none  Patient/Family-Specific Goals (Include Timeframe): no s/s of reaction during treatment     Problem: Fatigue  Goal: Improved Activity Tolerance  Intervention: Promote Improved Energy  Flowsheets (Taken 7/19/2024 1330)  Fatigue Management:   activity schedule adjusted   fatigue-related activity identified   frequent rest breaks encouraged   paced activity encouraged  Sleep/Rest Enhancement:   natural light exposure provided   relaxation techniques promoted   regular sleep/rest pattern promoted  Activity Management:   Ambulated -L4   Ambulated to bathroom - L4   Ambulated in [place] - L4   Ambulated in room - L4   Up in stretcher chair - L1   Up in chair - L3  Environmental Support:   calm environment promoted   environmental consistency promoted   personal routine supported     Problem: Adult Inpatient Plan of Care  Goal: Plan of Care Review  Outcome: Progressing  Flowsheets (Taken 7/19/2024 1550)  Plan of Care Reviewed With: patient   Pt tolerated his Ferrlecit infusion well, NAD. Pt was observed for 30 minutes post infusion with no new c/o voiced. Pt given a schedule and reviewed, pt verbalized understanding. Pt ambulated out of the clinic without difficulty.

## 2024-07-23 RX ORDER — SODIUM CHLORIDE 0.9 % (FLUSH) 0.9 %
10 SYRINGE (ML) INJECTION
OUTPATIENT
Start: 2024-08-16

## 2024-07-23 RX ORDER — HEPARIN 100 UNIT/ML
500 SYRINGE INTRAVENOUS
OUTPATIENT
Start: 2024-08-02

## 2024-07-23 RX ORDER — SODIUM CHLORIDE 0.9 % (FLUSH) 0.9 %
10 SYRINGE (ML) INJECTION
Status: CANCELLED | OUTPATIENT
Start: 2024-07-26

## 2024-07-23 RX ORDER — SODIUM CHLORIDE 0.9 % (FLUSH) 0.9 %
10 SYRINGE (ML) INJECTION
OUTPATIENT
Start: 2024-08-09

## 2024-07-23 RX ORDER — HEPARIN 100 UNIT/ML
500 SYRINGE INTRAVENOUS
OUTPATIENT
Start: 2024-08-09

## 2024-07-23 RX ORDER — HEPARIN 100 UNIT/ML
500 SYRINGE INTRAVENOUS
Status: CANCELLED | OUTPATIENT
Start: 2024-07-26

## 2024-07-23 RX ORDER — HEPARIN 100 UNIT/ML
500 SYRINGE INTRAVENOUS
OUTPATIENT
Start: 2024-08-16

## 2024-07-23 RX ORDER — SODIUM CHLORIDE 0.9 % (FLUSH) 0.9 %
10 SYRINGE (ML) INJECTION
OUTPATIENT
Start: 2024-08-02

## 2024-07-26 ENCOUNTER — INFUSION (OUTPATIENT)
Dept: INFUSION THERAPY | Facility: HOSPITAL | Age: 69
End: 2024-07-26
Attending: INTERNAL MEDICINE
Payer: MEDICARE

## 2024-07-26 VITALS
WEIGHT: 198.63 LBS | OXYGEN SATURATION: 98 % | DIASTOLIC BLOOD PRESSURE: 70 MMHG | HEIGHT: 72 IN | HEART RATE: 80 BPM | SYSTOLIC BLOOD PRESSURE: 145 MMHG | TEMPERATURE: 98 F | RESPIRATION RATE: 16 BRPM | BODY MASS INDEX: 26.9 KG/M2

## 2024-07-26 DIAGNOSIS — D62 ACUTE BLOOD LOSS ANEMIA: Primary | ICD-10-CM

## 2024-07-26 DIAGNOSIS — D64.9 ANEMIA, UNSPECIFIED TYPE: ICD-10-CM

## 2024-07-26 DIAGNOSIS — D50.0 IRON DEFICIENCY ANEMIA DUE TO CHRONIC BLOOD LOSS: ICD-10-CM

## 2024-07-26 DIAGNOSIS — R06.09 DYSPNEA ON EXERTION: ICD-10-CM

## 2024-07-26 PROCEDURE — 25000003 PHARM REV CODE 250: Mod: PN | Performed by: INTERNAL MEDICINE

## 2024-07-26 PROCEDURE — 63600175 PHARM REV CODE 636 W HCPCS: Mod: PN | Performed by: INTERNAL MEDICINE

## 2024-07-26 PROCEDURE — 96365 THER/PROPH/DIAG IV INF INIT: CPT | Mod: PN

## 2024-07-26 RX ORDER — SODIUM CHLORIDE 0.9 % (FLUSH) 0.9 %
10 SYRINGE (ML) INJECTION
Status: DISCONTINUED | OUTPATIENT
Start: 2024-07-26 | End: 2024-07-26 | Stop reason: HOSPADM

## 2024-07-26 RX ORDER — HEPARIN 100 UNIT/ML
500 SYRINGE INTRAVENOUS
Status: DISCONTINUED | OUTPATIENT
Start: 2024-07-26 | End: 2024-07-26 | Stop reason: HOSPADM

## 2024-07-26 RX ADMIN — SODIUM CHLORIDE 125 MG: 9 INJECTION, SOLUTION INTRAVENOUS at 01:07

## 2024-07-26 RX ADMIN — SODIUM CHLORIDE: 9 INJECTION, SOLUTION INTRAVENOUS at 01:07

## 2024-07-26 NOTE — PLAN OF CARE
Problem: Adult Inpatient Plan of Care  Goal: Plan of Care Review  Outcome: Progressing  Flowsheets (Taken 7/26/2024 1333)  Plan of Care Reviewed With: patient  Goal: Patient-Specific Goal (Individualized)  Outcome: Progressing  Flowsheets (Taken 7/26/2024 1333)  Individualized Care Needs: Recliner, warm blanket, snacks, tv, conversation  Anxieties, Fears or Concerns: None  Patient/Family-Specific Goals (Include Timeframe): free of S/S of reaction with infusion.     Problem: Fatigue  Goal: Improved Activity Tolerance  Outcome: Progressing  Intervention: Promote Improved Energy  Flowsheets (Taken 7/26/2024 1333)  Fatigue Management:   frequent rest breaks encouraged   paced activity encouraged  Sleep/Rest Enhancement: regular sleep/rest pattern promoted  Activity Management: Ambulated -L4  Environmental Support: rest periods encouraged    Patient to Infusion for Ferrlecit. Treatment plan reviewed with patient. VSS. Tolerated infusion. Provided with copy of upcoming appointment schedule. Escorted to the front lobby in no distress for discharge to home.

## 2024-08-02 ENCOUNTER — INFUSION (OUTPATIENT)
Dept: INFUSION THERAPY | Facility: HOSPITAL | Age: 69
End: 2024-08-02
Attending: INTERNAL MEDICINE
Payer: MEDICARE

## 2024-08-02 VITALS
BODY MASS INDEX: 26.9 KG/M2 | HEIGHT: 72 IN | HEART RATE: 77 BPM | WEIGHT: 198.63 LBS | DIASTOLIC BLOOD PRESSURE: 67 MMHG | SYSTOLIC BLOOD PRESSURE: 140 MMHG | RESPIRATION RATE: 20 BRPM | TEMPERATURE: 97 F | OXYGEN SATURATION: 97 %

## 2024-08-02 DIAGNOSIS — R06.09 DYSPNEA ON EXERTION: ICD-10-CM

## 2024-08-02 DIAGNOSIS — D62 ACUTE BLOOD LOSS ANEMIA: Primary | ICD-10-CM

## 2024-08-02 DIAGNOSIS — D64.9 ANEMIA, UNSPECIFIED TYPE: ICD-10-CM

## 2024-08-02 DIAGNOSIS — D50.0 IRON DEFICIENCY ANEMIA DUE TO CHRONIC BLOOD LOSS: ICD-10-CM

## 2024-08-02 PROCEDURE — 25000003 PHARM REV CODE 250: Mod: PN | Performed by: INTERNAL MEDICINE

## 2024-08-02 PROCEDURE — 96365 THER/PROPH/DIAG IV INF INIT: CPT | Mod: PN

## 2024-08-02 PROCEDURE — 63600175 PHARM REV CODE 636 W HCPCS: Mod: PN | Performed by: INTERNAL MEDICINE

## 2024-08-02 RX ADMIN — SODIUM CHLORIDE 125 MG: 9 INJECTION, SOLUTION INTRAVENOUS at 01:08

## 2024-08-02 RX ADMIN — SODIUM CHLORIDE: 9 INJECTION, SOLUTION INTRAVENOUS at 01:08

## 2024-08-02 NOTE — PLAN OF CARE
.Pt tolerated ferrlecit infusion well.  No adverse reaction noted.   IV flushed with NS and D/C per protocol.  Patient left clinic in no acute distress.

## 2024-08-09 ENCOUNTER — INFUSION (OUTPATIENT)
Dept: INFUSION THERAPY | Facility: HOSPITAL | Age: 69
End: 2024-08-09
Attending: INTERNAL MEDICINE
Payer: MEDICARE

## 2024-08-09 VITALS
HEIGHT: 72 IN | TEMPERATURE: 98 F | BODY MASS INDEX: 27.11 KG/M2 | WEIGHT: 200.19 LBS | DIASTOLIC BLOOD PRESSURE: 77 MMHG | HEART RATE: 64 BPM | OXYGEN SATURATION: 99 % | RESPIRATION RATE: 18 BRPM | SYSTOLIC BLOOD PRESSURE: 132 MMHG

## 2024-08-09 DIAGNOSIS — D62 ACUTE BLOOD LOSS ANEMIA: Primary | ICD-10-CM

## 2024-08-09 DIAGNOSIS — D64.9 ANEMIA, UNSPECIFIED TYPE: ICD-10-CM

## 2024-08-09 DIAGNOSIS — D50.0 IRON DEFICIENCY ANEMIA DUE TO CHRONIC BLOOD LOSS: ICD-10-CM

## 2024-08-09 DIAGNOSIS — R06.09 DYSPNEA ON EXERTION: ICD-10-CM

## 2024-08-09 PROCEDURE — A4216 STERILE WATER/SALINE, 10 ML: HCPCS | Mod: PN | Performed by: INTERNAL MEDICINE

## 2024-08-09 PROCEDURE — 63600175 PHARM REV CODE 636 W HCPCS: Mod: PN | Performed by: INTERNAL MEDICINE

## 2024-08-09 PROCEDURE — 25000003 PHARM REV CODE 250: Mod: PN | Performed by: INTERNAL MEDICINE

## 2024-08-09 PROCEDURE — 96365 THER/PROPH/DIAG IV INF INIT: CPT | Mod: PN

## 2024-08-09 RX ORDER — SODIUM CHLORIDE 0.9 % (FLUSH) 0.9 %
10 SYRINGE (ML) INJECTION
Status: DISCONTINUED | OUTPATIENT
Start: 2024-08-09 | End: 2024-08-09 | Stop reason: HOSPADM

## 2024-08-09 RX ADMIN — SODIUM CHLORIDE 125 MG: 9 INJECTION, SOLUTION INTRAVENOUS at 02:08

## 2024-08-09 RX ADMIN — SODIUM CHLORIDE: 9 INJECTION, SOLUTION INTRAVENOUS at 01:08

## 2024-08-09 RX ADMIN — Medication 10 ML: at 01:08

## 2024-08-16 ENCOUNTER — INFUSION (OUTPATIENT)
Dept: INFUSION THERAPY | Facility: HOSPITAL | Age: 69
End: 2024-08-16
Attending: INTERNAL MEDICINE
Payer: MEDICARE

## 2024-08-16 VITALS
HEIGHT: 72 IN | SYSTOLIC BLOOD PRESSURE: 144 MMHG | BODY MASS INDEX: 27.74 KG/M2 | OXYGEN SATURATION: 98 % | DIASTOLIC BLOOD PRESSURE: 80 MMHG | TEMPERATURE: 98 F | WEIGHT: 204.81 LBS | RESPIRATION RATE: 18 BRPM | HEART RATE: 82 BPM

## 2024-08-16 DIAGNOSIS — D64.9 ANEMIA, UNSPECIFIED TYPE: ICD-10-CM

## 2024-08-16 DIAGNOSIS — D62 ACUTE BLOOD LOSS ANEMIA: Primary | ICD-10-CM

## 2024-08-16 DIAGNOSIS — D50.0 IRON DEFICIENCY ANEMIA DUE TO CHRONIC BLOOD LOSS: ICD-10-CM

## 2024-08-16 DIAGNOSIS — R06.09 DYSPNEA ON EXERTION: ICD-10-CM

## 2024-08-16 PROCEDURE — 63600175 PHARM REV CODE 636 W HCPCS: Mod: PN | Performed by: INTERNAL MEDICINE

## 2024-08-16 PROCEDURE — 25000003 PHARM REV CODE 250: Mod: PN | Performed by: INTERNAL MEDICINE

## 2024-08-16 PROCEDURE — 96366 THER/PROPH/DIAG IV INF ADDON: CPT | Mod: PN

## 2024-08-16 PROCEDURE — 96365 THER/PROPH/DIAG IV INF INIT: CPT | Mod: PN

## 2024-08-16 RX ORDER — SODIUM CHLORIDE 0.9 % (FLUSH) 0.9 %
10 SYRINGE (ML) INJECTION
Status: DISCONTINUED | OUTPATIENT
Start: 2024-08-16 | End: 2024-08-16 | Stop reason: HOSPADM

## 2024-08-16 RX ADMIN — SODIUM CHLORIDE: 9 INJECTION, SOLUTION INTRAVENOUS at 01:08

## 2024-08-16 RX ADMIN — SODIUM CHLORIDE 125 MG: 9 INJECTION, SOLUTION INTRAVENOUS at 01:08

## 2024-08-16 NOTE — NURSING
Pt arrived to unit for ferrlecit.   Instructed pt to wait 30 min to be observed. Verbalized understanding.  Tolerated well.

## 2024-09-06 ENCOUNTER — LAB VISIT (OUTPATIENT)
Dept: LAB | Facility: HOSPITAL | Age: 69
End: 2024-09-06
Attending: NURSE PRACTITIONER
Payer: MEDICARE

## 2024-09-06 DIAGNOSIS — D50.0 IRON DEFICIENCY ANEMIA DUE TO CHRONIC BLOOD LOSS: ICD-10-CM

## 2024-09-06 LAB
BASOPHILS # BLD AUTO: 0.04 K/UL (ref 0–0.2)
BASOPHILS NFR BLD: 1.1 % (ref 0–1.9)
DIFFERENTIAL METHOD BLD: ABNORMAL
EOSINOPHIL # BLD AUTO: 0.1 K/UL (ref 0–0.5)
EOSINOPHIL NFR BLD: 2.2 % (ref 0–8)
ERYTHROCYTE [DISTWIDTH] IN BLOOD BY AUTOMATED COUNT: 14.6 % (ref 11.5–14.5)
FERRITIN SERPL-MCNC: 70 NG/ML (ref 20–300)
HCT VFR BLD AUTO: 33.3 % (ref 40–54)
HGB BLD-MCNC: 10.5 G/DL (ref 14–18)
IMM GRANULOCYTES # BLD AUTO: 0.01 K/UL (ref 0–0.04)
IMM GRANULOCYTES NFR BLD AUTO: 0.3 % (ref 0–0.5)
IRON SERPL-MCNC: 52 UG/DL (ref 45–160)
LYMPHOCYTES # BLD AUTO: 1.4 K/UL (ref 1–4.8)
LYMPHOCYTES NFR BLD: 37.6 % (ref 18–48)
MCH RBC QN AUTO: 25.5 PG (ref 27–31)
MCHC RBC AUTO-ENTMCNC: 31.5 G/DL (ref 32–36)
MCV RBC AUTO: 81 FL (ref 82–98)
MONOCYTES # BLD AUTO: 0.4 K/UL (ref 0.3–1)
MONOCYTES NFR BLD: 11.7 % (ref 4–15)
NEUTROPHILS # BLD AUTO: 1.7 K/UL (ref 1.8–7.7)
NEUTROPHILS NFR BLD: 47.1 % (ref 38–73)
NRBC BLD-RTO: 0 /100 WBC
PLATELET # BLD AUTO: 177 K/UL (ref 150–450)
PMV BLD AUTO: 9.7 FL (ref 9.2–12.9)
RBC # BLD AUTO: 4.12 M/UL (ref 4.6–6.2)
SATURATED IRON: 17 % (ref 20–50)
TOTAL IRON BINDING CAPACITY: 302 UG/DL (ref 250–450)
TRANSFERRIN SERPL-MCNC: 204 MG/DL (ref 200–375)
WBC # BLD AUTO: 3.59 K/UL (ref 3.9–12.7)

## 2024-09-06 PROCEDURE — 36415 COLL VENOUS BLD VENIPUNCTURE: CPT | Mod: PN | Performed by: NURSE PRACTITIONER

## 2024-09-06 PROCEDURE — 82728 ASSAY OF FERRITIN: CPT | Performed by: NURSE PRACTITIONER

## 2024-09-06 PROCEDURE — 85025 COMPLETE CBC W/AUTO DIFF WBC: CPT | Mod: PN | Performed by: NURSE PRACTITIONER

## 2024-09-06 PROCEDURE — 83540 ASSAY OF IRON: CPT | Performed by: NURSE PRACTITIONER

## 2024-09-16 PROBLEM — I63.9 CVA (CEREBRAL VASCULAR ACCIDENT): Status: RESOLVED | Noted: 2020-06-12 | Resolved: 2024-09-16

## 2024-09-23 ENCOUNTER — TELEPHONE (OUTPATIENT)
Dept: HEMATOLOGY/ONCOLOGY | Facility: CLINIC | Age: 69
End: 2024-09-23
Payer: MEDICARE

## 2024-09-24 ENCOUNTER — TELEPHONE (OUTPATIENT)
Dept: HEMATOLOGY/ONCOLOGY | Facility: CLINIC | Age: 69
End: 2024-09-24
Payer: MEDICARE

## 2024-09-24 RX ORDER — HEPARIN 100 UNIT/ML
500 SYRINGE INTRAVENOUS
OUTPATIENT
Start: 2024-10-01

## 2024-09-24 RX ORDER — SODIUM CHLORIDE 0.9 % (FLUSH) 0.9 %
10 SYRINGE (ML) INJECTION
OUTPATIENT
Start: 2024-10-01

## 2024-09-24 NOTE — TELEPHONE ENCOUNTER
Lab Results   Component Value Date    WBC 3.59 (L) 09/06/2024    HGB 10.5 (L) 09/06/2024    HCT 33.3 (L) 09/06/2024    MCV 81 (L) 09/06/2024     09/06/2024       Lab Results   Component Value Date    IRON 52 09/06/2024    TRANSFERRIN 204 09/06/2024    TIBC 302 09/06/2024    FESATURATED 17 (L) 09/06/2024      Lab Results   Component Value Date    FERRITIN 70 09/06/2024     Received ferric gluconate x8 June/July/Aug 2024, tolerated well. Hemoglobin improved to 10.5 g/dL, still anemic.  Iron sat significant improvement but still below normal, 17%. I would recommend he receive a repeat round of iv iron with 4 doses instead of 8 to improve iron sat and further increase his hemoglobin. Orders placed at [facility].

## 2024-09-25 ENCOUNTER — TELEPHONE (OUTPATIENT)
Dept: HEMATOLOGY/ONCOLOGY | Facility: CLINIC | Age: 69
End: 2024-09-25
Payer: MEDICARE

## 2024-09-25 NOTE — TELEPHONE ENCOUNTER
Called pt an left a voicemail letting him know   Received ferric gluconate x8 June/July/Aug 2024, tolerated well. Hemoglobin improved to 10.5 g/dL, still anemic.  Iron sat significant improvement but still below normal, 17%. I would recommend he receive a repeat round of iv iron with 4 doses instead of 8 to improve iron sat and further increase his hemoglobin. Orders placed at [facility]. Pushed appt from November to 12/18/2024 and labs scheduled 12/13/2024. Asked pt to give us a call back.

## 2024-09-26 ENCOUNTER — PATIENT MESSAGE (OUTPATIENT)
Dept: INFUSION THERAPY | Facility: HOSPITAL | Age: 69
End: 2024-09-26
Payer: MEDICARE

## 2024-10-09 ENCOUNTER — TELEPHONE (OUTPATIENT)
Dept: HEMATOLOGY/ONCOLOGY | Facility: CLINIC | Age: 69
End: 2024-10-09
Payer: MEDICARE

## 2024-10-09 NOTE — TELEPHONE ENCOUNTER
Informed pt's sister that [facility] center and Anastacia Davenport NP has been trying to get in contact with him because he will need more IV iron. Also, informed pt's sister that pt will be following up with Dr Jose in December because Anastacia Davenport NP no longer works at [facility]. Provided [place] [facility] [place] phone number and lab/dr appt dates. Pt's sister verbalized understanding and noted information.

## 2024-10-15 ENCOUNTER — TELEPHONE (OUTPATIENT)
Dept: INFUSION THERAPY | Facility: HOSPITAL | Age: 69
End: 2024-10-15
Payer: MEDICARE

## 2024-10-15 NOTE — TELEPHONE ENCOUNTER
----- Message from Cynthia sent at 10/14/2024  4:30 PM CDT -----  Type: Appointment Request    Caller is requesting appointment.      Name of Caller:pt    When is the first available appointment?na    Symptoms:  D62 (ICD-10-CM) - Acute blood loss anemia  D64.9 (ICD-10-CM) - Anemia, unspecified type  R06.09 (ICD-10-CM) - Dyspnea on exertion  D50.0 (ICD-10-CM) - Iron deficiency anemia due to chronic blood loss    Would the patient rather a call back or a response via [facility]? Call back    Best Call Back Number:[phone]      Additional Information: referral in EPIC      Please call Back to advise. Thanks!

## 2024-10-22 RX ORDER — SODIUM CHLORIDE 0.9 % (FLUSH) 0.9 %
10 SYRINGE (ML) INJECTION
OUTPATIENT
Start: 2024-11-09

## 2024-10-22 RX ORDER — HEPARIN 100 UNIT/ML
500 SYRINGE INTRAVENOUS
OUTPATIENT
Start: 2024-10-26

## 2024-10-22 RX ORDER — HEPARIN 100 UNIT/ML
500 SYRINGE INTRAVENOUS
OUTPATIENT
Start: 2024-11-09

## 2024-10-22 RX ORDER — SODIUM CHLORIDE 0.9 % (FLUSH) 0.9 %
10 SYRINGE (ML) INJECTION
OUTPATIENT
Start: 2024-10-26

## 2024-10-22 RX ORDER — SODIUM CHLORIDE 0.9 % (FLUSH) 0.9 %
10 SYRINGE (ML) INJECTION
OUTPATIENT
Start: 2024-10-22

## 2024-10-22 RX ORDER — HEPARIN 100 UNIT/ML
500 SYRINGE INTRAVENOUS
OUTPATIENT
Start: 2024-10-22

## 2024-10-22 RX ORDER — HEPARIN 100 UNIT/ML
500 SYRINGE INTRAVENOUS
OUTPATIENT
Start: 2024-11-02

## 2024-10-22 RX ORDER — SODIUM CHLORIDE 0.9 % (FLUSH) 0.9 %
10 SYRINGE (ML) INJECTION
OUTPATIENT
Start: 2024-11-02

## 2024-10-25 ENCOUNTER — INFUSION (OUTPATIENT)
Dept: INFUSION THERAPY | Facility: HOSPITAL | Age: 69
End: 2024-10-25
Attending: INTERNAL MEDICINE
Payer: MEDICARE

## 2024-10-25 VITALS
DIASTOLIC BLOOD PRESSURE: 70 MMHG | HEIGHT: 72 IN | SYSTOLIC BLOOD PRESSURE: 131 MMHG | WEIGHT: 204.81 LBS | RESPIRATION RATE: 20 BRPM | BODY MASS INDEX: 27.74 KG/M2 | OXYGEN SATURATION: 98 % | HEART RATE: 64 BPM | TEMPERATURE: 97 F

## 2024-10-25 DIAGNOSIS — R06.09 DYSPNEA ON EXERTION: ICD-10-CM

## 2024-10-25 DIAGNOSIS — D64.9 ANEMIA, UNSPECIFIED TYPE: ICD-10-CM

## 2024-10-25 DIAGNOSIS — D62 ACUTE BLOOD LOSS ANEMIA: Primary | ICD-10-CM

## 2024-10-25 DIAGNOSIS — D50.0 IRON DEFICIENCY ANEMIA DUE TO CHRONIC BLOOD LOSS: ICD-10-CM

## 2024-10-25 PROCEDURE — 96365 THER/PROPH/DIAG IV INF INIT: CPT | Mod: PN

## 2024-10-25 PROCEDURE — 25000003 PHARM REV CODE 250: Mod: PN | Performed by: INTERNAL MEDICINE

## 2024-10-25 PROCEDURE — 63600175 PHARM REV CODE 636 W HCPCS: Mod: PN | Performed by: INTERNAL MEDICINE

## 2024-10-25 RX ADMIN — SODIUM CHLORIDE: 9 INJECTION, SOLUTION INTRAVENOUS at 01:10

## 2024-10-25 RX ADMIN — SODIUM CHLORIDE 125 MG: 9 INJECTION, SOLUTION INTRAVENOUS at 01:10

## 2024-10-28 ENCOUNTER — TELEPHONE (OUTPATIENT)
Dept: GASTROENTEROLOGY | Facility: CLINIC | Age: 69
End: 2024-10-28
Payer: MEDICARE

## 2024-11-01 ENCOUNTER — INFUSION (OUTPATIENT)
Dept: INFUSION THERAPY | Facility: HOSPITAL | Age: 69
End: 2024-11-01
Attending: INTERNAL MEDICINE
Payer: MEDICARE

## 2024-11-01 VITALS
SYSTOLIC BLOOD PRESSURE: 159 MMHG | WEIGHT: 203.94 LBS | HEART RATE: 61 BPM | OXYGEN SATURATION: 99 % | TEMPERATURE: 98 F | HEIGHT: 72 IN | DIASTOLIC BLOOD PRESSURE: 83 MMHG | RESPIRATION RATE: 18 BRPM | BODY MASS INDEX: 27.62 KG/M2

## 2024-11-01 DIAGNOSIS — D50.0 IRON DEFICIENCY ANEMIA DUE TO CHRONIC BLOOD LOSS: ICD-10-CM

## 2024-11-01 DIAGNOSIS — R06.09 DYSPNEA ON EXERTION: ICD-10-CM

## 2024-11-01 DIAGNOSIS — D64.9 ANEMIA, UNSPECIFIED TYPE: ICD-10-CM

## 2024-11-01 DIAGNOSIS — D62 ACUTE BLOOD LOSS ANEMIA: Primary | ICD-10-CM

## 2024-11-01 PROCEDURE — 25000003 PHARM REV CODE 250: Mod: PN | Performed by: INTERNAL MEDICINE

## 2024-11-01 PROCEDURE — 96365 THER/PROPH/DIAG IV INF INIT: CPT | Mod: PN

## 2024-11-01 PROCEDURE — 63600175 PHARM REV CODE 636 W HCPCS: Mod: PN | Performed by: INTERNAL MEDICINE

## 2024-11-01 RX ORDER — SODIUM CHLORIDE 0.9 % (FLUSH) 0.9 %
10 SYRINGE (ML) INJECTION
Status: DISCONTINUED | OUTPATIENT
Start: 2024-11-01 | End: 2024-11-01 | Stop reason: HOSPADM

## 2024-11-01 RX ORDER — HEPARIN 100 UNIT/ML
500 SYRINGE INTRAVENOUS
Status: DISCONTINUED | OUTPATIENT
Start: 2024-11-01 | End: 2024-11-01 | Stop reason: HOSPADM

## 2024-11-01 RX ADMIN — SODIUM CHLORIDE 125 MG: 9 INJECTION, SOLUTION INTRAVENOUS at 01:11

## 2024-11-08 ENCOUNTER — INFUSION (OUTPATIENT)
Dept: INFUSION THERAPY | Facility: HOSPITAL | Age: 69
End: 2024-11-08
Attending: INTERNAL MEDICINE
Payer: MEDICARE

## 2024-11-08 VITALS
WEIGHT: 203.94 LBS | RESPIRATION RATE: 18 BRPM | OXYGEN SATURATION: 99 % | HEIGHT: 72 IN | HEART RATE: 62 BPM | SYSTOLIC BLOOD PRESSURE: 137 MMHG | DIASTOLIC BLOOD PRESSURE: 72 MMHG | TEMPERATURE: 98 F | BODY MASS INDEX: 27.62 KG/M2

## 2024-11-08 DIAGNOSIS — D64.9 ANEMIA, UNSPECIFIED TYPE: ICD-10-CM

## 2024-11-08 DIAGNOSIS — R06.09 DYSPNEA ON EXERTION: ICD-10-CM

## 2024-11-08 DIAGNOSIS — D62 ACUTE BLOOD LOSS ANEMIA: Primary | ICD-10-CM

## 2024-11-08 DIAGNOSIS — D50.0 IRON DEFICIENCY ANEMIA DUE TO CHRONIC BLOOD LOSS: ICD-10-CM

## 2024-11-08 PROCEDURE — 63600175 PHARM REV CODE 636 W HCPCS: Mod: PN | Performed by: INTERNAL MEDICINE

## 2024-11-08 PROCEDURE — 96365 THER/PROPH/DIAG IV INF INIT: CPT | Mod: PN

## 2024-11-08 PROCEDURE — 25000003 PHARM REV CODE 250: Mod: PN | Performed by: INTERNAL MEDICINE

## 2024-11-08 RX ORDER — HEPARIN 100 UNIT/ML
500 SYRINGE INTRAVENOUS
Status: DISCONTINUED | OUTPATIENT
Start: 2024-11-08 | End: 2024-11-08 | Stop reason: HOSPADM

## 2024-11-08 RX ORDER — SODIUM CHLORIDE 0.9 % (FLUSH) 0.9 %
10 SYRINGE (ML) INJECTION
Status: DISCONTINUED | OUTPATIENT
Start: 2024-11-08 | End: 2024-11-08 | Stop reason: HOSPADM

## 2024-11-08 RX ADMIN — SODIUM CHLORIDE 125 MG: 9 INJECTION, SOLUTION INTRAVENOUS at 01:11

## 2024-11-08 RX ADMIN — SODIUM CHLORIDE: 9 INJECTION, SOLUTION INTRAVENOUS at 01:11

## 2024-11-08 NOTE — PLAN OF CARE
Tolerted ferrlecit well.   Observed pt post infusion, no reactions noted.  No questions or concerns at this time.  Ambulated off unit in NAD.

## 2024-11-15 ENCOUNTER — INFUSION (OUTPATIENT)
Dept: INFUSION THERAPY | Facility: HOSPITAL | Age: 69
End: 2024-11-15
Attending: INTERNAL MEDICINE
Payer: MEDICARE

## 2024-11-15 VITALS
OXYGEN SATURATION: 98 % | HEART RATE: 74 BPM | SYSTOLIC BLOOD PRESSURE: 140 MMHG | DIASTOLIC BLOOD PRESSURE: 73 MMHG | TEMPERATURE: 98 F | RESPIRATION RATE: 16 BRPM

## 2024-11-15 DIAGNOSIS — R06.09 DYSPNEA ON EXERTION: ICD-10-CM

## 2024-11-15 DIAGNOSIS — D64.9 ANEMIA, UNSPECIFIED TYPE: ICD-10-CM

## 2024-11-15 DIAGNOSIS — D62 ACUTE BLOOD LOSS ANEMIA: Primary | ICD-10-CM

## 2024-11-15 DIAGNOSIS — D50.0 IRON DEFICIENCY ANEMIA DUE TO CHRONIC BLOOD LOSS: ICD-10-CM

## 2024-11-15 PROCEDURE — 63600175 PHARM REV CODE 636 W HCPCS: Mod: PN | Performed by: INTERNAL MEDICINE

## 2024-11-15 PROCEDURE — 25000003 PHARM REV CODE 250: Mod: PN | Performed by: INTERNAL MEDICINE

## 2024-11-15 PROCEDURE — A4216 STERILE WATER/SALINE, 10 ML: HCPCS | Mod: PN | Performed by: INTERNAL MEDICINE

## 2024-11-15 PROCEDURE — 96365 THER/PROPH/DIAG IV INF INIT: CPT | Mod: PN

## 2024-11-15 RX ORDER — SODIUM CHLORIDE 0.9 % (FLUSH) 0.9 %
10 SYRINGE (ML) INJECTION
Status: DISCONTINUED | OUTPATIENT
Start: 2024-11-15 | End: 2024-11-15 | Stop reason: HOSPADM

## 2024-11-15 RX ORDER — HEPARIN 100 UNIT/ML
500 SYRINGE INTRAVENOUS
Status: DISCONTINUED | OUTPATIENT
Start: 2024-11-15 | End: 2024-11-15 | Stop reason: HOSPADM

## 2024-11-15 RX ADMIN — Medication 10 ML: at 02:11

## 2024-11-15 RX ADMIN — SODIUM CHLORIDE 125 MG: 9 INJECTION, SOLUTION INTRAVENOUS at 02:11

## 2024-11-15 NOTE — PLAN OF CARE
Problem: Adult Inpatient Plan of Care  Goal: Plan of Care Review  Outcome: Progressing  Flowsheets (Taken 11/15/2024 7860)  Plan of Care Reviewed With: patient     Patient tolerated Ferrlecit without incident. IV flushed with blood return, saline locked, and removed. AVS provided per portal;schedule reviewed. Ambulates per self.   No acute distress noted.  No future infusion appointments at this time. Patient has completed ordered Ferrlecit.

## 2024-11-30 ENCOUNTER — NURSE TRIAGE (OUTPATIENT)
Dept: ADMINISTRATIVE | Facility: CLINIC | Age: 69
End: 2024-11-30
Payer: MEDICARE

## 2024-11-30 NOTE — TELEPHONE ENCOUNTER
Sister calling    In [place] at the hospital in ER.   Under care of MD and RN  Reports that staff has told them they are not equipped to help him stop bleeding and that he needs to be transferred. Reports that hospital told her that they already contacted outside hospital for transfer.    Advised sister that I am unable to triage patient as he is under care of MD and RN at this time. Did advise sister that hospital will need to reach out to transfer center to coordinate care. Sister ANA.     Sister also asked that I send message to provider. I advised that I can send message but that provider may not see message until Monday morning, sister ANA.             Reason for Disposition   Patient already left for the hospital/clinic.    Protocols used: No Contact or Duplicate Contact Call-A-

## 2024-12-02 ENCOUNTER — TELEPHONE (OUTPATIENT)
Dept: HEMATOLOGY/ONCOLOGY | Facility: CLINIC | Age: 69
End: 2024-12-02
Payer: MEDICARE

## 2024-12-02 NOTE — TELEPHONE ENCOUNTER
ASSESSMENT/PLAN   Tonsillitis=  RAPID STREP =pos    COVID=pos    augmentin  Tylenol for fever and pain   Throat lozenges ok  Warm salt water gargles  Follow-up with primary doctor 2 days  Go to emergency room for chest pain shortness of breath or dizziness   Spoke to nurse Maame at [facility] regarding pt's condition. Maame stated pt received 2 units of blood and she will be rechecking h&h shortly (last hemoglobin on 12/1 was 8.7). Also, Maame stated pt has a consult to ENT and they are currently holding aspirin and plavix. Covering provider in hospital is Dr Luca Nguyen. Informed pt's sister regarding this information. Pt's sister verbalized understanding and will keep us posted about pt.

## 2024-12-02 NOTE — TELEPHONE ENCOUNTER
----- Message from Sasha sent at 12/2/2024  9:50 AM CST -----  Type:  Needs Medical Advice/priority per pt     Who Called: family member     Would the patient rather a call back or a response via [facility]? call  Best Call Back Number: [phone]  Additional Information: requesting a call back from Minda

## 2024-12-02 NOTE — TELEPHONE ENCOUNTER
Called pt's sister to check on pt due to him being in [place] at the hospital in ER for nose bleed. Pt's sister stated pt had a bad nose bleed that would not stop so he had to be transferred to [facility] in [place], [place] where he received blood transfusions. Also, pt sister stated she was concerned about blood thinners that pt was prescribed by Dr Hart. Informed pt's sister to let hospital know to reach out to Dr Hart regarding this matter and I will inform Dr Jose about this info. Pt's sister verbalized understanding and will keep us updated with pt's condition.

## 2024-12-15 NOTE — PROGRESS NOTES
PATIENT: Miky Esqueda  MRN: [id number]  DATE: 12/18/2024    Diagnosis:   1. Iron deficiency anemia due to chronic blood loss    2. History of pulmonary embolus (PE)    3. Chronic deep vein thrombosis (DVT) of popliteal vein of both lower extremities    4. H/O: CVA (cerebrovascular accident)    5. AV malformation of gastrointestinal tract    6. Anemia requiring transfusions      Chief Complaint: iron deficiency anemia    Subjective:     History of Present Illness:    HPI As per Dr Gonzales's previous note 1/27/2022:     He presented to the ER 05/25/2020 with acute onset of shortness of breath, diaphoresis and lightheadedness.  CT chest showed extensive bilateral pulmonary thromboemboli.  No evidence of central saddle embolus.  There was moderate pulmonary emphysema.  Lower extremity ultrasound also revealed nonocclusive thrombus in the right and left popliteal veins as well as the right femoral vein.    This is his 1st episode of pulmonary embolism/DVT.  He was started on Xarelto.    Then he had a syncopal episode in his PCP's office on 06/12/2020 which lasted about 30 sec followed by brief 15 sec seizure like episode.  He was taken to the ER where a CT head showed a hypodense area in the left parietal lobe concerning for infarct.  By this time he also developed significant anemia and was transfused 2 units packed red blood cells.    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    He got 1 dose of Injectafer 07/21/2020 and a second dose 9/4/2020    He presented to the ER 8/7/2020 with severe anemia Hb 4.6 - passed out in his primary care physician's office. This was the second episode of severe symptomatic anemia after being started on Xarelto.    He has past h/o extensive DVT/PE in may 2020, CVA in June 2020 and h/o jejunal AVM. No active source of bleeding could be found from extensive GI work-up  2020.  -severe anemia felt likely related to intermittent bleeding from angioectasia(s) vs a Dieulafoy  lesion.  -he underwent IVC filter placement 08/10/2020 and his Xarelto was discontinued    -Had Injectafer 2 doses 2/2022  -received venofer June/July2022  -received x3 doses iron sucrose 300mg July/Aug 2023  -received x3 doses iron sucrose Oct 2023      INTERVAL HISTORY:   - he presents for a follow-up appointment for his anemia.  - he received ferric gluconate x 4 doses in October/November 2024.  - today, he endorses fatigue, mild dyspnea upon exertion. He denies chest pain, nausea, vomiting, diarrhea, constipation. He's had a few episodes of epistaxis in the past 2 months.         Past Medical, Surgical, Family and Social History Reviewed.    Medications and Allergies reviewed      Review of Systems   Constitutional:  Positive for fatigue.   HENT:  Negative for sore throat.    Eyes:  Negative for visual disturbance.   Respiratory:  Positive for shortness of breath.    Cardiovascular:  Negative for chest pain.   Gastrointestinal:  Negative for abdominal pain.   Genitourinary:  Negative for dysuria.   Musculoskeletal:  Negative for back pain.   Skin:  Negative for rash.   Neurological:  Negative for headaches.   Hematological:  Negative for adenopathy.   Psychiatric/Behavioral:  The patient is not nervous/anxious.      ECOG SCORE    1 - Restricted in strenuous activity-ambulatory and able to carry out work of a light nature             Objective:     Vitals:    12/18/24 1119   BP: (!) 177/92   BP Location: Left arm   Patient Position: Sitting   Pulse: 86   Resp: 18   SpO2: 99%   Weight: 93.8 kg (206 lb 12.7 oz)           Body mass index is 27.28 kg/m².        Physical Exam   Constitutional: He is oriented to person, place, and time. normal appearance. No distress.   HENT:   Head: Normocephalic and atraumatic.   Mouth/Throat: Mucous membranes are moist. Oropharynx is clear.   Eyes: No scleral icterus.   Pale conjunctiva   Cardiovascular: Normal rate and regular rhythm.   Murmur heard.Pulmonary:      Effort:  Pulmonary effort is normal.      Breath sounds: Normal breath sounds.     Abdominal: Soft. Normal appearance and bowel sounds are normal. He exhibits no distension. There is no abdominal tenderness. There is no guarding.   Musculoskeletal:         General: Normal range of motion.      Cervical back: Normal range of motion and neck supple.      Right lower leg: No edema.      Left lower leg: No edema.   Neurological: He is alert and oriented to person, place, and time. Gait normal.   Skin: Skin is warm and dry. No bruising noted. No erythema. There is pallor. No jaundice.   Psychiatric: His behavior is normal. Mood and thought content normal.   Nursing note and vitals reviewed.    Labs:  Labs have been reviewed.    Lab Results   Component Value Date    WBC 7.12 12/18/2024    HGB 10.2 (L) 12/18/2024    HCT 34.0 (L) 12/18/2024    MCV 84 12/18/2024     12/18/2024       Lab Results   Component Value Date    IRON 52 09/06/2024    TRANSFERRIN 204 09/06/2024    TIBC 302 09/06/2024    FESATURATED 17 (L) 09/06/2024      Lab Results   Component Value Date    FERRITIN 51 12/18/2024           Assessment:       1. Iron deficiency anemia due to chronic blood loss    2. History of pulmonary embolus (PE)    3. Chronic deep vein thrombosis (DVT) of popliteal vein of both lower extremities    4. H/O: CVA (cerebrovascular accident)    5. AV malformation of gastrointestinal tract    6. Anemia requiring transfusions    7. Epistaxis      Plan:       Iron deficiency anemia, symptomatic anemia / AV malformation of gastrointestinal tract  -secondary to h/o GI bleed and use of blood thinners  -received 1 dose of Injectafer in July 2020 and a second dose in sept 2020, 2 doses Feb 2022  -received venofer 6/10/22, 6/17/22, 6/24/22, 7/1/22  -Saw GI 7/21/22 who recommended Sandostin which pt now states he is not interested in taking   -received iron sucrose x3 doses 300mg July/Aug 2023   - iron sucrose x3 Oct 2023   - he received ferric  gluconate x 4 doses in October/November 2024.  - Labs have been reviewed. Hemoglobin is 10.2 g/dL. Ferritin is 51 ng/mL  - will arrange for more ferric gluconate at [facility].  - continue follow-up with gastroenterology  - repeat labs in 3,6 months.  - return to clinic in 6 months. Plan to have him see a provider at [facility], which is closer to his house.    H/o Acute bilateral PE with bilateral DVT  - unprovoked  - status post IVC filter placement 08/10/2020  - intolerant to anticoagulation secondary to recurrent GI bleeds in past, now on daily baby ASA and plavix    H/o CVA with seizure disorder  - diagnosed June 2020  - doing well, no focal neurological deficits, no seizures, stable   - on keppra        Epistaxis  - He's had a few episodes of epistaxis in the past 2 months.  - refer to ENT.     - repeat labs in 3,6 months.  - return to clinic in 6 months. Plan to have him see a provider at [facility], which is closer to his house.      Trent Jose M.D.  Hematology/Oncology  [facility] - [address], [address]  [place] [address]  Phone: [phone]  Fax: [phone]

## 2024-12-17 ENCOUNTER — TELEPHONE (OUTPATIENT)
Dept: GASTROENTEROLOGY | Facility: CLINIC | Age: 69
End: 2024-12-17
Payer: MEDICARE

## 2024-12-17 NOTE — TELEPHONE ENCOUNTER
Pt confirmed clinic appt on Wednesday, December 18, 2024 at 830am.  Pt repeated clinic address and instructions to check in on 1st floor MOB prior to coming to [address].

## 2024-12-18 ENCOUNTER — LAB VISIT (OUTPATIENT)
Dept: LAB | Facility: HOSPITAL | Age: 69
End: 2024-12-18
Attending: NURSE PRACTITIONER
Payer: MEDICARE

## 2024-12-18 ENCOUNTER — OFFICE VISIT (OUTPATIENT)
Dept: GASTROENTEROLOGY | Facility: CLINIC | Age: 69
End: 2024-12-18
Payer: MEDICARE

## 2024-12-18 ENCOUNTER — OFFICE VISIT (OUTPATIENT)
Dept: HEMATOLOGY/ONCOLOGY | Facility: CLINIC | Age: 69
End: 2024-12-18
Payer: MEDICARE

## 2024-12-18 ENCOUNTER — TELEPHONE (OUTPATIENT)
Dept: HEMATOLOGY/ONCOLOGY | Facility: CLINIC | Age: 69
End: 2024-12-18

## 2024-12-18 VITALS
OXYGEN SATURATION: 99 % | DIASTOLIC BLOOD PRESSURE: 92 MMHG | WEIGHT: 206.81 LBS | HEART RATE: 86 BPM | RESPIRATION RATE: 18 BRPM | SYSTOLIC BLOOD PRESSURE: 177 MMHG | BODY MASS INDEX: 27.28 KG/M2

## 2024-12-18 VITALS
BODY MASS INDEX: 27.51 KG/M2 | WEIGHT: 207.56 LBS | DIASTOLIC BLOOD PRESSURE: 84 MMHG | SYSTOLIC BLOOD PRESSURE: 139 MMHG | HEIGHT: 73 IN | HEART RATE: 92 BPM

## 2024-12-18 DIAGNOSIS — I82.533 CHRONIC DEEP VEIN THROMBOSIS (DVT) OF POPLITEAL VEIN OF BOTH LOWER EXTREMITIES: ICD-10-CM

## 2024-12-18 DIAGNOSIS — D50.0 ANEMIA DUE TO GASTROINTESTINAL BLOOD LOSS: Primary | ICD-10-CM

## 2024-12-18 DIAGNOSIS — D64.9 ANEMIA REQUIRING TRANSFUSIONS: ICD-10-CM

## 2024-12-18 DIAGNOSIS — R04.0 EPISTAXIS: Primary | ICD-10-CM

## 2024-12-18 DIAGNOSIS — Z86.711 HISTORY OF PULMONARY EMBOLUS (PE): ICD-10-CM

## 2024-12-18 DIAGNOSIS — K55.20 AV MALFORMATION OF GASTROINTESTINAL TRACT: ICD-10-CM

## 2024-12-18 DIAGNOSIS — D50.0 IRON DEFICIENCY ANEMIA DUE TO CHRONIC BLOOD LOSS: ICD-10-CM

## 2024-12-18 DIAGNOSIS — R04.0 EPISTAXIS: ICD-10-CM

## 2024-12-18 DIAGNOSIS — Z86.73 H/O: CVA (CEREBROVASCULAR ACCIDENT): ICD-10-CM

## 2024-12-18 DIAGNOSIS — D50.0 IRON DEFICIENCY ANEMIA DUE TO CHRONIC BLOOD LOSS: Primary | ICD-10-CM

## 2024-12-18 LAB
BASOPHILS # BLD AUTO: 0.03 K/UL (ref 0–0.2)
BASOPHILS NFR BLD: 0.4 % (ref 0–1.9)
DIFFERENTIAL METHOD BLD: ABNORMAL
EOSINOPHIL # BLD AUTO: 0.1 K/UL (ref 0–0.5)
EOSINOPHIL NFR BLD: 2 % (ref 0–8)
ERYTHROCYTE [DISTWIDTH] IN BLOOD BY AUTOMATED COUNT: 15.7 % (ref 11.5–14.5)
FERRITIN SERPL-MCNC: 51 NG/ML (ref 20–300)
HCT VFR BLD AUTO: 34 % (ref 40–54)
HGB BLD-MCNC: 10.2 G/DL (ref 14–18)
IMM GRANULOCYTES # BLD AUTO: 0 K/UL (ref 0–0.04)
IMM GRANULOCYTES NFR BLD AUTO: 0 % (ref 0–0.5)
IRON SERPL-MCNC: 30 UG/DL (ref 45–160)
LYMPHOCYTES # BLD AUTO: 1.6 K/UL (ref 1–4.8)
LYMPHOCYTES NFR BLD: 21.8 % (ref 18–48)
MCH RBC QN AUTO: 25.1 PG (ref 27–31)
MCHC RBC AUTO-ENTMCNC: 30 G/DL (ref 32–36)
MCV RBC AUTO: 84 FL (ref 82–98)
MONOCYTES # BLD AUTO: 0.6 K/UL (ref 0.3–1)
MONOCYTES NFR BLD: 8.4 % (ref 4–15)
NEUTROPHILS # BLD AUTO: 4.8 K/UL (ref 1.8–7.7)
NEUTROPHILS NFR BLD: 67.4 % (ref 38–73)
NRBC BLD-RTO: 0 /100 WBC
PLATELET # BLD AUTO: 273 K/UL (ref 150–450)
PMV BLD AUTO: 10.5 FL (ref 9.2–12.9)
RBC # BLD AUTO: 4.07 M/UL (ref 4.6–6.2)
SATURATED IRON: 10 % (ref 20–50)
TOTAL IRON BINDING CAPACITY: 309 UG/DL (ref 250–450)
TRANSFERRIN SERPL-MCNC: 209 MG/DL (ref 200–375)
WBC # BLD AUTO: 7.12 K/UL (ref 3.9–12.7)

## 2024-12-18 PROCEDURE — 3008F BODY MASS INDEX DOCD: CPT | Mod: CPTII,S$GLB,, | Performed by: INTERNAL MEDICINE

## 2024-12-18 PROCEDURE — 82728 ASSAY OF FERRITIN: CPT | Performed by: NURSE PRACTITIONER

## 2024-12-18 PROCEDURE — 1101F PT FALLS ASSESS-DOCD LE1/YR: CPT | Mod: CPTII,S$GLB,, | Performed by: INTERNAL MEDICINE

## 2024-12-18 PROCEDURE — 3080F DIAST BP >= 90 MM HG: CPT | Mod: CPTII,S$GLB,, | Performed by: INTERNAL MEDICINE

## 2024-12-18 PROCEDURE — 99999 PR PBB SHADOW E&M-EST. PATIENT-LVL III: CPT | Mod: PBBFAC,,, | Performed by: INTERNAL MEDICINE

## 2024-12-18 PROCEDURE — 83540 ASSAY OF IRON: CPT | Performed by: NURSE PRACTITIONER

## 2024-12-18 PROCEDURE — 99214 OFFICE O/P EST MOD 30 MIN: CPT | Mod: S$GLB,,, | Performed by: INTERNAL MEDICINE

## 2024-12-18 PROCEDURE — 3044F HG A1C LEVEL LT 7.0%: CPT | Mod: CPTII,S$GLB,, | Performed by: INTERNAL MEDICINE

## 2024-12-18 PROCEDURE — 3075F SYST BP GE 130 - 139MM HG: CPT | Mod: CPTII,S$GLB,, | Performed by: INTERNAL MEDICINE

## 2024-12-18 PROCEDURE — 1126F AMNT PAIN NOTED NONE PRSNT: CPT | Mod: CPTII,S$GLB,, | Performed by: INTERNAL MEDICINE

## 2024-12-18 PROCEDURE — 3077F SYST BP >= 140 MM HG: CPT | Mod: CPTII,S$GLB,, | Performed by: INTERNAL MEDICINE

## 2024-12-18 PROCEDURE — 1160F RVW MEDS BY RX/DR IN RCRD: CPT | Mod: CPTII,S$GLB,, | Performed by: INTERNAL MEDICINE

## 2024-12-18 PROCEDURE — 36415 COLL VENOUS BLD VENIPUNCTURE: CPT | Performed by: NURSE PRACTITIONER

## 2024-12-18 PROCEDURE — 3079F DIAST BP 80-89 MM HG: CPT | Mod: CPTII,S$GLB,, | Performed by: INTERNAL MEDICINE

## 2024-12-18 PROCEDURE — 1159F MED LIST DOCD IN RCRD: CPT | Mod: CPTII,S$GLB,, | Performed by: INTERNAL MEDICINE

## 2024-12-18 PROCEDURE — 3288F FALL RISK ASSESSMENT DOCD: CPT | Mod: CPTII,S$GLB,, | Performed by: INTERNAL MEDICINE

## 2024-12-18 PROCEDURE — 85025 COMPLETE CBC W/AUTO DIFF WBC: CPT | Performed by: NURSE PRACTITIONER

## 2024-12-18 RX ORDER — HEPARIN 100 UNIT/ML
500 SYRINGE INTRAVENOUS
OUTPATIENT
Start: 2025-01-07

## 2024-12-18 RX ORDER — HEPARIN 100 UNIT/ML
500 SYRINGE INTRAVENOUS
OUTPATIENT
Start: 2024-12-24

## 2024-12-18 RX ORDER — DIPHENHYDRAMINE HYDROCHLORIDE 50 MG/ML
50 INJECTION INTRAMUSCULAR; INTRAVENOUS ONCE AS NEEDED
OUTPATIENT
Start: 2025-01-07

## 2024-12-18 RX ORDER — SODIUM CHLORIDE 0.9 % (FLUSH) 0.9 %
10 SYRINGE (ML) INJECTION
OUTPATIENT
Start: 2024-12-24

## 2024-12-18 RX ORDER — HEPARIN 100 UNIT/ML
500 SYRINGE INTRAVENOUS
OUTPATIENT
Start: 2025-01-14

## 2024-12-18 RX ORDER — EPINEPHRINE 0.3 MG/.3ML
0.3 INJECTION SUBCUTANEOUS ONCE AS NEEDED
OUTPATIENT
Start: 2024-12-31

## 2024-12-18 RX ORDER — DIPHENHYDRAMINE HYDROCHLORIDE 50 MG/ML
50 INJECTION INTRAMUSCULAR; INTRAVENOUS ONCE AS NEEDED
OUTPATIENT
Start: 2024-12-24

## 2024-12-18 RX ORDER — SODIUM CHLORIDE 0.9 % (FLUSH) 0.9 %
10 SYRINGE (ML) INJECTION
OUTPATIENT
Start: 2024-12-31

## 2024-12-18 RX ORDER — SODIUM CHLORIDE 0.9 % (FLUSH) 0.9 %
10 SYRINGE (ML) INJECTION
OUTPATIENT
Start: 2025-01-14

## 2024-12-18 RX ORDER — HEPARIN 100 UNIT/ML
500 SYRINGE INTRAVENOUS
OUTPATIENT
Start: 2024-12-31

## 2024-12-18 RX ORDER — EPINEPHRINE 0.3 MG/.3ML
0.3 INJECTION SUBCUTANEOUS ONCE AS NEEDED
OUTPATIENT
Start: 2025-01-07

## 2024-12-18 RX ORDER — EPINEPHRINE 0.3 MG/.3ML
0.3 INJECTION SUBCUTANEOUS ONCE AS NEEDED
OUTPATIENT
Start: 2024-12-24

## 2024-12-18 RX ORDER — DIPHENHYDRAMINE HYDROCHLORIDE 50 MG/ML
50 INJECTION INTRAMUSCULAR; INTRAVENOUS ONCE AS NEEDED
OUTPATIENT
Start: 2025-01-14

## 2024-12-18 RX ORDER — DIPHENHYDRAMINE HYDROCHLORIDE 50 MG/ML
50 INJECTION INTRAMUSCULAR; INTRAVENOUS ONCE AS NEEDED
OUTPATIENT
Start: 2024-12-31

## 2024-12-18 RX ORDER — SODIUM CHLORIDE 0.9 % (FLUSH) 0.9 %
10 SYRINGE (ML) INJECTION
OUTPATIENT
Start: 2025-01-07

## 2024-12-18 RX ORDER — EPINEPHRINE 0.3 MG/.3ML
0.3 INJECTION SUBCUTANEOUS ONCE AS NEEDED
OUTPATIENT
Start: 2025-01-14

## 2024-12-18 NOTE — TELEPHONE ENCOUNTER
Informed pt that Dr Jose stated he will like to set him up for more IV iron at [facility] because labs came back after he left. Pt verbalized understanding.

## 2024-12-18 NOTE — PATIENT INSTRUCTIONS
If anemia were to reoccur/ patient becomes symptomatic with the anemia, he should be admitted or transferred to [facility] for repeat VCE vs upper DBE for ablation of small bowel AVM vs dileulfouy lesion treatment. These lesions are hard to treat unless actively bleeding.   Continue daily PO iron as well as Injectofer 750 mg every 6 months with hematology in [place]

## 2024-12-18 NOTE — PROGRESS NOTES
"[facility]    CC: symptomatic anemia     HPI 69 y.o. male with history of CVA, seizure disorder, DVT, and PE with IVC filter (placed in 2020 as taken off xarelto due to severe anemia episodes), vit D def, PFO, chronic SUBHASH on IV iron, who presents to the clinic as a referral for symptomatic anemia. Patient recently admitted for symptomatic anemia in which he had a negative EGD/colonoscopy. He has recurrent hospitalizations for symptomatic anemia in which he has had syncopal events for hemoglobin ranging from 4-5 prompting hospital admission. With last admission 6/2024. Previously his anemia was suspected to be secondary to jejunal AVMs that have been previously been suspect to bleed vs dileafouy lesion in which his Xarelto was stopped and IVC filter placed. He has no evidence of overt GIB, no history of overt GIB.  Patient feels great today with no concerns. No fatigue.     Physical Examination  /84 (BP Location: Left arm, Patient Position: Sitting)   Pulse 92   Ht 6' 1" (1.854 m)   Wt 94.2 kg (207 lb 9 oz)   BMI 27.38 kg/m²   General appearance: alert, cooperative, no distress  Lungs: clear to auscultation bilaterally, no dullness to percussion bilaterally  Heart: regular rate and rhythm without rub; no displacement of the PMI   Abdomen: soft, non-tender; bowel sounds normoactive; no organomegaly    Assessment:   69 yr M presenting to the clinic for chronic SUBHASH and symptomatic anemia with recent hospitalization.   During admission underwent EGD 6/18/24 showing narrowed Schatzki ring in the lower 1/3 of esophagus, small HH, duodenum/ examined jejunum normal. Video capsule performed 2020 noting normal exam with recommendation to stop Xarelto and place IVC filter which was done. Prior capsule showed one jejunal angioectasia however DBE following was unremarkable. Since Xarelto was stopped in 2020, patient has had recurrent bouts of anemia requiring hospitalization (2 in total with one " hospitalization 2/2 active nose bleed). Suspect his bleeding is related to episodes of acute blood loss secondary to small bowel angioectasia vs a Dieulafoy lesion which may only be identifiable when actively bleeding. Recent severe epistaxis could indicate a potential new diagnosis of HHT.     Plan:  -If anemia were to reoccur/ patient becomes symptomatic with the anemia, he should be admitted or transferred to [facility] for repeat VCE then upper vs lower DBE for treatment of a suspected small bowel angioectasia vs Dieulafoy lesion.    -Continue daily PO iron as well as Injectofer 750 mg every 6 months with hematology in [place]   -Patient known to me but reinterview next visit     Total encounter time >30 min including all elements of care     Paco Parish MD   [facility]   Alexandru Espinal MD   [facility]   [address] [facility], [address]  [place] [address] [phone]

## 2024-12-18 NOTE — Clinical Note
1. Let him know that I'd like to set him up for more IV iron (labs came back after he left), so we'll arrange for ferric gluconate at [facility].  Thanks!

## 2024-12-31 ENCOUNTER — TELEPHONE (OUTPATIENT)
Dept: INFUSION THERAPY | Facility: HOSPITAL | Age: 69
End: 2024-12-31
Payer: MEDICARE

## 2025-01-13 ENCOUNTER — PATIENT MESSAGE (OUTPATIENT)
Dept: INFUSION THERAPY | Facility: HOSPITAL | Age: 70
End: 2025-01-13
Payer: MEDICARE

## 2025-01-15 ENCOUNTER — TELEPHONE (OUTPATIENT)
Dept: HEMATOLOGY/ONCOLOGY | Facility: CLINIC | Age: 70
End: 2025-01-15
Payer: MEDICARE

## 2025-01-15 NOTE — TELEPHONE ENCOUNTER
Informed pt's sister that Dr Jose stated pt takes aspirin and plavix so if he's continuing to have nosebleeds, then he'd have to consider stopping one of these medications. Also, Dr Jose stated he is not sure if he's taking plavix for any cardiovascular reasons, but that may be the one to stop (if no cardiovascular reasons to continue it). Pt's sister stated pt had stop taking aspirin the last time he seen us in Dec 2024 and just been taking plavix. Informed pt's sister that Dr Jose stated for pt to stop taking plavix and only take aspirin. Pt's sister verbalized understanding.

## 2025-01-15 NOTE — TELEPHONE ENCOUNTER
Pt's sister stated pt is having nosebleeds again and he seen a local ENT yesterday 1/14 in [place] where they cauterized it but his nose is back bleeding. Also, pt's sister stated she wanted to know if this is related to blood thinner or do they need to do something different. Informed pt's sister that I will let Dr Jose know.

## 2025-01-15 NOTE — TELEPHONE ENCOUNTER
----- Message from Indy sent at 1/15/2025  8:22 AM CST -----  Type:  Needs Medical Advice    Who Called:  pt's sister  Symptoms (please be specific): nose bleed    How long has patient had these symptoms:  Monday night   Pharmacy name and phone #:  [id number] - [address] AT [address] OF [address] [address]  Phone: [phone]  Would the patient rather a call back or a response via [facility]? Call   Best Call Back Number: [phone]   Additional Information:

## 2025-01-27 ENCOUNTER — PATIENT MESSAGE (OUTPATIENT)
Dept: INFUSION THERAPY | Facility: HOSPITAL | Age: 70
End: 2025-01-27
Payer: MEDICARE

## 2025-06-23 ENCOUNTER — TELEPHONE (OUTPATIENT)
Dept: HEMATOLOGY/ONCOLOGY | Facility: CLINIC | Age: 70
End: 2025-06-23
Payer: MEDICARE